# Patient Record
Sex: FEMALE | Race: WHITE | NOT HISPANIC OR LATINO | Employment: OTHER | URBAN - NONMETROPOLITAN AREA
[De-identification: names, ages, dates, MRNs, and addresses within clinical notes are randomized per-mention and may not be internally consistent; named-entity substitution may affect disease eponyms.]

---

## 2017-11-18 ENCOUNTER — HOSPITAL ENCOUNTER (OUTPATIENT)
Dept: GENERAL RADIOLOGY | Facility: HOSPITAL | Age: 72
Discharge: HOME | End: 2017-11-18
Admitting: COLON & RECTAL SURGERY

## 2017-11-18 ENCOUNTER — TRANSCRIBE ORDERS (OUTPATIENT)
Dept: CARDIOLOGY | Facility: HOSPITAL | Age: 72
End: 2017-11-18

## 2017-11-18 DIAGNOSIS — Z00.8 ENCOUNTER FOR ELECTROCARDIOGRAM: Primary | ICD-10-CM

## 2017-11-18 DIAGNOSIS — C18.9 COLON CANCER (HCC): ICD-10-CM

## 2017-11-18 DIAGNOSIS — Z92.89 HISTORY OF EKG: ICD-10-CM

## 2017-11-18 PROCEDURE — 71020 HC CHEST PA AND LATERAL: CPT

## 2017-12-18 ENCOUNTER — CONSULT (OUTPATIENT)
Dept: ONCOLOGY | Facility: CLINIC | Age: 72
End: 2017-12-18

## 2017-12-18 ENCOUNTER — DOCUMENTATION (OUTPATIENT)
Dept: ONCOLOGY | Facility: CLINIC | Age: 72
End: 2017-12-18

## 2017-12-18 ENCOUNTER — LAB (OUTPATIENT)
Dept: ONCOLOGY | Facility: HOSPITAL | Age: 72
End: 2017-12-18

## 2017-12-18 VITALS
HEIGHT: 62 IN | HEART RATE: 74 BPM | RESPIRATION RATE: 16 BRPM | SYSTOLIC BLOOD PRESSURE: 131 MMHG | BODY MASS INDEX: 25.84 KG/M2 | DIASTOLIC BLOOD PRESSURE: 69 MMHG | TEMPERATURE: 98.9 F | WEIGHT: 140.43 LBS

## 2017-12-18 DIAGNOSIS — C19 CANCER OF RECTOSIGMOID (COLON) (HCC): ICD-10-CM

## 2017-12-18 DIAGNOSIS — C19 CANCER OF RECTOSIGMOID (COLON) (HCC): Primary | ICD-10-CM

## 2017-12-18 LAB
ALBUMIN SERPL-MCNC: 4 G/DL (ref 3.4–4.8)
ALBUMIN/GLOB SERPL: 1.1 G/DL (ref 1.1–1.8)
ALP SERPL-CCNC: 81 U/L (ref 38–126)
ALT SERPL W P-5'-P-CCNC: 32 U/L (ref 9–52)
ANION GAP SERPL CALCULATED.3IONS-SCNC: 11 MMOL/L (ref 5–15)
AST SERPL-CCNC: 26 U/L (ref 14–36)
BASOPHILS # BLD AUTO: 0.03 10*3/MM3 (ref 0–0.2)
BASOPHILS NFR BLD AUTO: 0.3 % (ref 0–2)
BILIRUB SERPL-MCNC: 0.5 MG/DL (ref 0.2–1.3)
BUN BLD-MCNC: 12 MG/DL (ref 7–21)
BUN/CREAT SERPL: 20.7 (ref 7–25)
CALCIUM SPEC-SCNC: 9.2 MG/DL (ref 8.4–10.2)
CHLORIDE SERPL-SCNC: 100 MMOL/L (ref 95–110)
CO2 SERPL-SCNC: 28 MMOL/L (ref 22–31)
CREAT BLD-MCNC: 0.58 MG/DL (ref 0.5–1)
DEPRECATED RDW RBC AUTO: 45.9 FL (ref 36.4–46.3)
EOSINOPHIL # BLD AUTO: 0.15 10*3/MM3 (ref 0–0.7)
EOSINOPHIL NFR BLD AUTO: 1.5 % (ref 0–7)
ERYTHROCYTE [DISTWIDTH] IN BLOOD BY AUTOMATED COUNT: 15.8 % (ref 11.5–14.5)
FERRITIN SERPL-MCNC: 96.1 NG/ML (ref 11.1–264)
FOLATE SERPL-MCNC: 7.2 NG/ML (ref 2.76–21)
GFR SERPL CREATININE-BSD FRML MDRD: 102 ML/MIN/1.73 (ref 39–90)
GLOBULIN UR ELPH-MCNC: 3.8 GM/DL (ref 2.3–3.5)
GLUCOSE BLD-MCNC: 93 MG/DL (ref 60–100)
HCT VFR BLD AUTO: 38.9 % (ref 35–45)
HGB BLD-MCNC: 12.3 G/DL (ref 12–15.5)
IMM GRANULOCYTES # BLD: 0.04 10*3/MM3 (ref 0–0.02)
IMM GRANULOCYTES NFR BLD: 0.4 % (ref 0–0.5)
IRON 24H UR-MRATE: 58 MCG/DL (ref 37–170)
IRON SATN MFR SERPL: 19 % (ref 15–50)
LYMPHOCYTES # BLD AUTO: 2.08 10*3/MM3 (ref 0.6–4.2)
LYMPHOCYTES NFR BLD AUTO: 21 % (ref 10–50)
MCH RBC QN AUTO: 25.5 PG (ref 26.5–34)
MCHC RBC AUTO-ENTMCNC: 31.6 G/DL (ref 31.4–36)
MCV RBC AUTO: 80.7 FL (ref 80–98)
MONOCYTES # BLD AUTO: 0.72 10*3/MM3 (ref 0–0.9)
MONOCYTES NFR BLD AUTO: 7.3 % (ref 0–12)
NEUTROPHILS # BLD AUTO: 6.89 10*3/MM3 (ref 2–8.6)
NEUTROPHILS NFR BLD AUTO: 69.5 % (ref 37–80)
PLATELET # BLD AUTO: 596 10*3/MM3 (ref 150–450)
PMV BLD AUTO: 9.6 FL (ref 8–12)
POTASSIUM BLD-SCNC: 3.6 MMOL/L (ref 3.5–5.1)
PROT SERPL-MCNC: 7.8 G/DL (ref 6.3–8.6)
RBC # BLD AUTO: 4.82 10*6/MM3 (ref 3.77–5.16)
SODIUM BLD-SCNC: 139 MMOL/L (ref 137–145)
TIBC SERPL-MCNC: 312 MCG/DL (ref 265–497)
VIT B12 BLD-MCNC: 616 PG/ML (ref 239–931)
WBC NRBC COR # BLD: 9.91 10*3/MM3 (ref 3.2–9.8)

## 2017-12-18 PROCEDURE — 85025 COMPLETE CBC W/AUTO DIFF WBC: CPT | Performed by: INTERNAL MEDICINE

## 2017-12-18 PROCEDURE — 82607 VITAMIN B-12: CPT | Performed by: INTERNAL MEDICINE

## 2017-12-18 PROCEDURE — 82728 ASSAY OF FERRITIN: CPT | Performed by: INTERNAL MEDICINE

## 2017-12-18 PROCEDURE — G0463 HOSPITAL OUTPT CLINIC VISIT: HCPCS | Performed by: INTERNAL MEDICINE

## 2017-12-18 PROCEDURE — 82746 ASSAY OF FOLIC ACID SERUM: CPT | Performed by: INTERNAL MEDICINE

## 2017-12-18 PROCEDURE — 83550 IRON BINDING TEST: CPT | Performed by: INTERNAL MEDICINE

## 2017-12-18 PROCEDURE — 99204 OFFICE O/P NEW MOD 45 MIN: CPT | Performed by: INTERNAL MEDICINE

## 2017-12-18 PROCEDURE — 82378 CARCINOEMBRYONIC ANTIGEN: CPT | Performed by: INTERNAL MEDICINE

## 2017-12-18 PROCEDURE — 80053 COMPREHEN METABOLIC PANEL: CPT | Performed by: INTERNAL MEDICINE

## 2017-12-18 PROCEDURE — 83540 ASSAY OF IRON: CPT | Performed by: INTERNAL MEDICINE

## 2017-12-18 RX ORDER — ONDANSETRON 4 MG/1
8 TABLET, FILM COATED ORAL 4 TIMES DAILY PRN
Qty: 40 TABLET | Refills: 3 | Status: SHIPPED | OUTPATIENT
Start: 2017-12-18 | End: 2018-08-20

## 2017-12-18 NOTE — PROGRESS NOTES
Oncology Social Worker met with patient and her family in the exam room subsequent to her initial consultation with medical oncology. Pt. Is accompanied in the room by her , two sons, daughter and daughter in law. Pt. 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   Prior to SW introduction, pt received education from RN related to chemotherapy.   Pt. And family visibly overwhelmed with information.   Distress screening completed by pt who scored an eight on tool and marked indicators of nervousness and worry as well as physical concerns.  Undersigned provided information as to supports and [facility] services to include financial navigator, dietician, massage/music therapy.  SW encouraged pt to ask questions and offered feedback, encouragement and validation / normalization of emotions.  Pt stated no immediate needs as she shared primary concerns related to side effects of medications and treatment. Pt. Was encouraged to call with any questions or concerns. She is scheduled to begin chemotherapy next week subsequent to port placement.  Ongoing support to pt and her family was reinforced.

## 2017-12-18 NOTE — PATIENT INSTRUCTIONS
Oxaliplatin Injection  What is this medicine?  OXALIPLATIN (ox AL i FARZANA tin) is a chemotherapy drug. It targets fast dividing cells, like cancer cells, and causes these cells to die. This medicine is used to treat cancers of the colon and rectum, and many other cancers.  This medicine may be used for other purposes; ask your health care provider or pharmacist if you have questions.  COMMON BRAND NAME(S): Eloxatin  What should I tell my health care provider before I take this medicine?  They need to know if you have any of these conditions:  -kidney disease  -an unusual or allergic reaction to oxaliplatin, other chemotherapy, other medicines, foods, dyes, or preservatives  -pregnant or trying to get pregnant  -breast-feeding  How should I use this medicine?  This drug is given as an infusion into a vein. It is administered in a hospital or clinic by a specially trained health care professional.  Talk to your pediatrician regarding the use of this medicine in children. Special care may be needed.  Overdosage: If you think you have taken too much of this medicine contact a poison control center or emergency room at once.  NOTE: This medicine is only for you. Do not share this medicine with others.  What if I miss a dose?  It is important not to miss a dose. Call your doctor or health care professional if you are unable to keep an appointment.  What may interact with this medicine?  -medicines to increase blood counts like filgrastim, pegfilgrastim, sargramostim  -probenecid  -some antibiotics like amikacin, gentamicin, neomycin, polymyxin B, streptomycin, tobramycin  -zalcitabine  Talk to your doctor or health care professional before taking any of these medicines:  -acetaminophen  -aspirin  -ibuprofen  -ketoprofen  -naproxen  This list may not describe all possible interactions. Give your health care provider a list of all the medicines, herbs, non-prescription drugs, or dietary supplements you use. Also tell them if  you smoke, drink alcohol, or use illegal drugs. Some items may interact with your medicine.  What should I watch for while using this medicine?  Your condition will be monitored carefully while you are receiving this medicine. You will need important blood work done while you are taking this medicine.  This medicine can make you more sensitive to cold. Do not drink cold drinks or use ice. Cover exposed skin before coming in contact with cold temperatures or cold objects. When out in cold weather wear warm clothing and cover your mouth and nose to warm the air that goes into your lungs. Tell your doctor if you get sensitive to the cold.  This drug may make you feel generally unwell. This is not uncommon, as chemotherapy can affect healthy cells as well as cancer cells. Report any side effects. Continue your course of treatment even though you feel ill unless your doctor tells you to stop.  In some cases, you may be given additional medicines to help with side effects. Follow all directions for their use.  Call your doctor or health care professional for advice if you get a fever, chills or sore throat, or other symptoms of a cold or flu. Do not treat yourself. This drug decreases your body's ability to fight infections. Try to avoid being around people who are sick.  This medicine may increase your risk to bruise or bleed. Call your doctor or health care professional if you notice any unusual bleeding.  Be careful brushing and flossing your teeth or using a toothpick because you may get an infection or bleed more easily. If you have any dental work done, tell your dentist you are receiving this medicine.  Avoid taking products that contain aspirin, acetaminophen, ibuprofen, naproxen, or ketoprofen unless instructed by your doctor. These medicines may hide a fever.  Do not become pregnant while taking this medicine. Women should inform their doctor if they wish to become pregnant or think they might be pregnant. There  is a potential for serious side effects to an unborn child. Talk to your health care professional or pharmacist for more information. Do not breast-feed an infant while taking this medicine.  Call your doctor or health care professional if you get diarrhea. Do not treat yourself.  What side effects may I notice from receiving this medicine?  Side effects that you should report to your doctor or health care professional as soon as possible:  -allergic reactions like skin rash, itching or hives, swelling of the face, lips, or tongue  -low blood counts - This drug may decrease the number of white blood cells, red blood cells and platelets. You may be at increased risk for infections and bleeding.  -signs of infection - fever or chills, cough, sore throat, pain or difficulty passing urine  -signs of decreased platelets or bleeding - bruising, pinpoint red spots on the skin, black, tarry stools, nosebleeds  -signs of decreased red blood cells - unusually weak or tired, fainting spells, lightheadedness  -breathing problems  -chest pain, pressure  -cough  -diarrhea  -jaw tightness  -mouth sores  -nausea and vomiting  -pain, swelling, redness or irritation at the injection site  -pain, tingling, numbness in the hands or feet  -problems with balance, talking, walking  -redness, blistering, peeling or loosening of the skin, including inside the mouth  -trouble passing urine or change in the amount of urine  Side effects that usually do not require medical attention (report to your doctor or health care professional if they continue or are bothersome):  -changes in vision  -constipation  -hair loss  -loss of appetite  -metallic taste in the mouth or changes in taste  -stomach pain  This list may not describe all possible side effects. Call your doctor for medical advice about side effects. You may report side effects to FDA at 5-432-FDA-1908.  Where should I keep my medicine?  This drug is given in a hospital or clinic and  will not be stored at home.  NOTE: This sheet is a summary. It may not cover all possible information. If you have questions about this medicine, talk to your doctor, pharmacist, or health care provider.     © 2017, [employer]. (2009-07-14 17:22:47)  Leucovorin injection  What is this medicine?  LEUCOVORIN (loo koe VOR in) is used to prevent or treat the harmful effects of some medicines. This medicine is used to treat anemia caused by a low amount of folic acid in the body. It is also used with 5-fluorouracil (5-FU) to treat colon cancer.  This medicine may be used for other purposes; ask your health care provider or pharmacist if you have questions.  What should I tell my health care provider before I take this medicine?  They need to know if you have any of these conditions:  -anemia from low levels of vitamin B-12 in the blood  -an unusual or allergic reaction to leucovorin, folic acid, other medicines, foods, dyes, or preservatives  -pregnant or trying to get pregnant  -breast-feeding  How should I use this medicine?  This medicine is for injection into a muscle or into a vein. It is given by a health care professional in a hospital or clinic setting.  Talk to your pediatrician regarding the use of this medicine in children. Special care may be needed.  Overdosage: If you think you have taken too much of this medicine contact a poison control center or emergency room at once.  NOTE: This medicine is only for you. Do not share this medicine with others.  What if I miss a dose?  This does not apply.  What may interact with this medicine?  -capecitabine  -fluorouracil  -phenobarbital  -phenytoin  -primidone  -trimethoprim-sulfamethoxazole  This list may not describe all possible interactions. Give your health care provider a list of all the medicines, herbs, non-prescription drugs, or dietary supplements you use. Also tell them if you smoke, drink alcohol, or use illegal drugs. Some items may interact  with your medicine.  What should I watch for while using this medicine?  Your condition will be monitored carefully while you are receiving this medicine.  This medicine may increase the side effects of 5-fluorouracil, 5-FU. Tell your doctor or health care professional if you have diarrhea or mouth sores that do not get better or that get worse.  What side effects may I notice from receiving this medicine?  Side effects that you should report to your doctor or health care professional as soon as possible:  -allergic reactions like skin rash, itching or hives, swelling of the face, lips, or tongue  -breathing problems  -fever, infection  -mouth sores  -unusual bleeding or bruising  -unusually weak or tired  Side effects that usually do not require medical attention (report to your doctor or health care professional if they continue or are bothersome):  -constipation or diarrhea  -loss of appetite  -nausea, vomiting  This list may not describe all possible side effects. Call your doctor for medical advice about side effects. You may report side effects to FDA at 7-773-FDA-1462.  Where should I keep my medicine?  This drug is given in a hospital or clinic and will not be stored at home.  NOTE: This sheet is a summary. It may not cover all possible information. If you have questions about this medicine, talk to your doctor, pharmacist, or health care provider.     © 2017, [employer]. (2009-06-23 16:50:29)  Fluorouracil, 5-FU injection  What is this medicine?  FLUOROURACIL, 5-FU (flure oh YOOR a rivera) is a chemotherapy drug. It slows the growth of cancer cells. This medicine is used to treat many types of cancer like breast cancer, colon or rectal cancer, pancreatic cancer, and stomach cancer.  This medicine may be used for other purposes; ask your health care provider or pharmacist if you have questions.  COMMON BRAND NAME(S): Adrucil  What should I tell my health care provider before I take this  medicine?  They need to know if you have any of these conditions:  -blood disorders  -dihydropyrimidine dehydrogenase (DPD) deficiency  -infection (especially a virus infection such as chickenpox, cold sores, or herpes)  -kidney disease  -liver disease  -malnourished, poor nutrition  -recent or ongoing radiation therapy  -an unusual or allergic reaction to fluorouracil, other chemotherapy, other medicines, foods, dyes, or preservatives  -pregnant or trying to get pregnant  -breast-feeding  How should I use this medicine?  This drug is given as an infusion or injection into a vein. It is administered in a hospital or clinic by a specially trained health care professional.  Talk to your pediatrician regarding the use of this medicine in children. Special care may be needed.  Overdosage: If you think you have taken too much of this medicine contact a poison control center or emergency room at once.  NOTE: This medicine is only for you. Do not share this medicine with others.  What if I miss a dose?  It is important not to miss your dose. Call your doctor or health care professional if you are unable to keep an appointment.  What may interact with this medicine?  -allopurinol  -cimetidine  -dapsone  -digoxin  -hydroxyurea  -leucovorin  -levamisole  -medicines for seizures like ethotoin, fosphenytoin, phenytoin  -medicines to increase blood counts like filgrastim, pegfilgrastim, sargramostim  -medicines that treat or prevent blood clots like warfarin, enoxaparin, and dalteparin  -methotrexate  -metronidazole  -pyrimethamine  -some other chemotherapy drugs like busulfan, cisplatin, estramustine, vinblastine  -trimethoprim  -trimetrexate  -vaccines  Talk to your doctor or health care professional before taking any of these medicines:  -acetaminophen  -aspirin  -ibuprofen  -ketoprofen  -naproxen  This list may not describe all possible interactions. Give your health care provider a list of all the medicines, herbs,  non-prescription drugs, or dietary supplements you use. Also tell them if you smoke, drink alcohol, or use illegal drugs. Some items may interact with your medicine.  What should I watch for while using this medicine?  Visit your doctor for checks on your progress. This drug may make you feel generally unwell. This is not uncommon, as chemotherapy can affect healthy cells as well as cancer cells. Report any side effects. Continue your course of treatment even though you feel ill unless your doctor tells you to stop.  In some cases, you may be given additional medicines to help with side effects. Follow all directions for their use.  Call your doctor or health care professional for advice if you get a fever, chills or sore throat, or other symptoms of a cold or flu. Do not treat yourself. This drug decreases your body's ability to fight infections. Try to avoid being around people who are sick.  This medicine may increase your risk to bruise or bleed. Call your doctor or health care professional if you notice any unusual bleeding.  Be careful brushing and flossing your teeth or using a toothpick because you may get an infection or bleed more easily. If you have any dental work done, tell your dentist you are receiving this medicine.  Avoid taking products that contain aspirin, acetaminophen, ibuprofen, naproxen, or ketoprofen unless instructed by your doctor. These medicines may hide a fever.  Do not become pregnant while taking this medicine. Women should inform their doctor if they wish to become pregnant or think they might be pregnant. There is a potential for serious side effects to an unborn child. Talk to your health care professional or pharmacist for more information. Do not breast-feed an infant while taking this medicine.  Men should inform their doctor if they wish to father a child. This medicine may lower sperm counts.  Do not treat diarrhea with over the counter products. Contact your doctor if you  have diarrhea that lasts more than 2 days or if it is severe and watery.  This medicine can make you more sensitive to the sun. Keep out of the sun. If you cannot avoid being in the sun, wear protective clothing and use sunscreen. Do not use sun lamps or tanning beds/booths.  What side effects may I notice from receiving this medicine?  Side effects that you should report to your doctor or health care professional as soon as possible:  -allergic reactions like skin rash, itching or hives, swelling of the face, lips, or tongue  -low blood counts - this medicine may decrease the number of white blood cells, red blood cells and platelets. You may be at increased risk for infections and bleeding.  -signs of infection - fever or chills, cough, sore throat, pain or difficulty passing urine  -signs of decreased platelets or bleeding - bruising, pinpoint red spots on the skin, black, tarry stools, blood in the urine  -signs of decreased red blood cells - unusually weak or tired, fainting spells, lightheadedness  -breathing problems  -changes in vision  -chest pain  -mouth sores  -nausea and vomiting  -pain, swelling, redness at site where injected  -pain, tingling, numbness in the hands or feet  -redness, swelling, or sores on hands or feet  -stomach pain  -unusual bleeding  Side effects that usually do not require medical attention (report to your doctor or health care professional if they continue or are bothersome):  -changes in finger or toe nails  -diarrhea  -dry or itchy skin  -hair loss  -headache  -loss of appetite  -sensitivity of eyes to the light  -stomach upset  -unusually teary eyes  This list may not describe all possible side effects. Call your doctor for medical advice about side effects. You may report side effects to FDA at 9-157-FDA-8718.  Where should I keep my medicine?  This drug is given in a hospital or clinic and will not be stored at home.  NOTE: This sheet is a summary. It may not cover all  possible information. If you have questions about this medicine, talk to your doctor, pharmacist, or health care provider.     © 2017, [employer]. (2009-04-22 13:53:16)

## 2017-12-18 NOTE — PROGRESS NOTES
DATE OF CONSULT: 12/18/2017    REQUESTING SOURCE:  CHRISTINA Mccormack      REASON FOR CONSULTATION:  Adenocarcinoma of rectosigmoid region      HISTORY OF PRESENT ILLNESS:    72-year-old female with a past medical history significant for cardiomegaly, was found to have stool positive on routine testing done by primary medical provider.  Subsequently patient was referred to colonoscopy on November 5, 2017 by Dr. Corey and was found to have large polyp 15 cm from anal verge which showed high-grade dysplasia with adenocarcinoma.  Subsequently patient was referred to Dr. Hutchison had lower anterior resection done by Dr. Hutchison on November 22, 2017 which showed a stage III colon cancer with 2 out of 29 lymph nodes positive.  Patient has been referred to [facility] for further evaluation and recommendation regarding her newly diagnosed colon cancer.  States she had a constipation 1 week prior to her colonoscopy.  States her stools were darker than usual.  Denies any gross blood in the stool.  States she has lost 15 pounds since surgery.  States her appetite is getting better.  Still complains of intermittent constipation.  Denies any headache or dizziness.  Denies any tingling or numbness affecting upper or lower extremity.  Denies any abnormal swollen and anywhere in the body.    PAST MEDICAL HISTORY:    1. cardiomegaly      PAST SURGICAL HISTORY:  Past Surgical History:   Procedure Laterality Date   • COLON SURGERY     • GALLBLADDER SURGERY     • RETINAL DETACHMENT SURGERY         ALLERGIES:    Allergies   Allergen Reactions   • Codeine        SOCIAL HISTORY:   Social History   Substance Use Topics   • Smoking status: Former Smoker     Quit date: 12/2012   • Smokeless tobacco: Never Used   • Alcohol use None       CURRENT MEDICATIONS:    Current Outpatient Prescriptions   Medication Sig Dispense Refill   • Docusate Calcium (STOOL SOFTENER PO) Take  by mouth Daily.     • ondansetron (ZOFRAN) 4 MG tablet Take  2 tablets by mouth 4 (Four) Times a Day As Needed for Nausea or Vomiting. 40 tablet 3     No current facility-administered medications for this visit.         HOME MEDICATIONS:   No current outpatient prescriptions on file prior to visit.     No current facility-administered medications on file prior to visit.        FAMILY HISTORY:    Family History   Problem Relation Age of Onset   • Heart attack Brother    • Cancer Maternal Aunt        REVIEW OF SYSTEMS:      CONSTITUTIONAL:  Complains of fatigue.Complains of a 15 pound weight loss since surgery.  States her appetite is getting better.  Denies any fever, chills .     HEENT:  No epistaxis, mouth sores or difficulty swallowing.    RESPIRATORY:  No new shortness of breath. No new cough or hemoptysis.    CARDIOVASCULAR:  No chest pain or palpitations.    GASTROINTESTINAL: Complains of abdominal discomfort in right lower quadrant where scar is.  Denies any drainage.  Complains of constipation. No nausea, vomiting or blood in the stool.    GENITOURINARY: No Dysuria or Hematuria.    MUSCULOSKELETAL:  No new back pain or arthralgia..    LYMPHATICS:  Denies any abnormal swollen glands anywhere in the body.    NEUROLOGICAL : No tingling or numbness. No headache or dizziness. No seizures or balance problems.    SKIN: No new skin lesions.        PHYSICAL EXAMINATION:      VITAL SIGNS:  Temp:  [98.9 °F (37.2 °C)] 98.9 °F (37.2 °C)  Heart Rate:  [74] 74  Resp:  [16] 16  BP: (131)/(69) 131/69  Weight: 140 pounds.    ECOG performance status: 1    GENERAL:  Not in any distress.    HEENT:  Normocephalic, Atraumatic.Eyes  Shows mild pallor. No icterus. Extraocular Movements Intact. No Facial Asymmetry noted.    NECK:  No adenopathy. NO JVD.    RESPIRATORY:  Fair air entry bilateral. No rhonchi or wheezing.    CARDIOVASCULAR:  S1, S2. Regular rate and rhythm. No murmur or gallop appreciated.    ABDOMEN:  Soft, obese, nontender. Bowel sounds present in all four quadrants.  No  organomegaly appreciated.  Well-healed surgical scar present in midline, right lower quadrant and epigastric region.    EXTREMITIES:  No edema.No Calf Tenderness.    NEUROLOGIC:  Alert, awake and oriented ×3.  No  Motor or sensory deficit appreciated. Cranial Nerves 2-12 grossly intact.    SKIN : No new skin lesion identified.        DIAGNOSTIC DATA:    WBC   Date Value Ref Range Status   12/18/2017 9.91 (H) 3.20 - 9.80 10*3/mm3 Final     RBC   Date Value Ref Range Status   12/18/2017 4.82 3.77 - 5.16 10*6/mm3 Final     Hemoglobin   Date Value Ref Range Status   12/18/2017 12.3 12.0 - 15.5 g/dL Final     Hematocrit   Date Value Ref Range Status   12/18/2017 38.9 35.0 - 45.0 % Final     MCV   Date Value Ref Range Status   12/18/2017 80.7 80.0 - 98.0 fL Final     MCH   Date Value Ref Range Status   12/18/2017 25.5 (L) 26.5 - 34.0 pg Final     MCHC   Date Value Ref Range Status   12/18/2017 31.6 31.4 - 36.0 g/dL Final     RDW   Date Value Ref Range Status   12/18/2017 15.8 (H) 11.5 - 14.5 % Final     RDW-SD   Date Value Ref Range Status   12/18/2017 45.9 36.4 - 46.3 fl Final     MPV   Date Value Ref Range Status   12/18/2017 9.6 8.0 - 12.0 fL Final     Platelets   Date Value Ref Range Status   12/18/2017 596 (H) 150 - 450 10*3/mm3 Final     Neutrophil %   Date Value Ref Range Status   12/18/2017 69.5 37.0 - 80.0 % Final     Lymphocyte %   Date Value Ref Range Status   12/18/2017 21.0 10.0 - 50.0 % Final     Monocyte %   Date Value Ref Range Status   12/18/2017 7.3 0.0 - 12.0 % Final     Eosinophil %   Date Value Ref Range Status   12/18/2017 1.5 0.0 - 7.0 % Final     Basophil %   Date Value Ref Range Status   12/18/2017 0.3 0.0 - 2.0 % Final     Immature Grans %   Date Value Ref Range Status   12/18/2017 0.4 0.0 - 0.5 % Final     Neutrophils, Absolute   Date Value Ref Range Status   12/18/2017 6.89 2.00 - 8.60 10*3/mm3 Final     Lymphocytes, Absolute   Date Value Ref Range Status   12/18/2017 2.08 0.60 - 4.20 10*3/mm3  Final     Monocytes, Absolute   Date Value Ref Range Status   12/18/2017 0.72 0.00 - 0.90 10*3/mm3 Final     Eosinophils, Absolute   Date Value Ref Range Status   12/18/2017 0.15 0.00 - 0.70 10*3/mm3 Final     Basophils, Absolute   Date Value Ref Range Status   12/18/2017 0.03 0.00 - 0.20 10*3/mm3 Final     Immature Grans, Absolute   Date Value Ref Range Status   12/18/2017 0.04 (H) 0.00 - 0.02 10*3/mm3 Final     Glucose   Date Value Ref Range Status   12/18/2017 93 60 - 100 mg/dL Final     Sodium   Date Value Ref Range Status   12/18/2017 139 137 - 145 mmol/L Final     Potassium   Date Value Ref Range Status   12/18/2017 3.6 3.5 - 5.1 mmol/L Final     CO2   Date Value Ref Range Status   12/18/2017 28.0 22.0 - 31.0 mmol/L Final     Chloride   Date Value Ref Range Status   12/18/2017 100 95 - 110 mmol/L Final     Anion Gap   Date Value Ref Range Status   12/18/2017 11.0 5.0 - 15.0 mmol/L Final     Creatinine   Date Value Ref Range Status   12/18/2017 0.58 0.50 - 1.00 mg/dL Final     BUN   Date Value Ref Range Status   12/18/2017 12 7 - 21 mg/dL Final     BUN/Creatinine Ratio   Date Value Ref Range Status   12/18/2017 20.7 7.0 - 25.0 Final     Calcium   Date Value Ref Range Status   12/18/2017 9.2 8.4 - 10.2 mg/dL Final     eGFR Non  Amer   Date Value Ref Range Status   12/18/2017 102 (H) 39 - 90 mL/min/1.73 Final     Alkaline Phosphatase   Date Value Ref Range Status   12/18/2017 81 38 - 126 U/L Final     Total Protein   Date Value Ref Range Status   12/18/2017 7.8 6.3 - 8.6 g/dL Final     ALT (SGPT)   Date Value Ref Range Status   12/18/2017 32 9 - 52 U/L Final     AST (SGOT)   Date Value Ref Range Status   12/18/2017 26 14 - 36 U/L Final     Total Bilirubin   Date Value Ref Range Status   12/18/2017 0.5 0.2 - 1.3 mg/dL Final     Albumin   Date Value Ref Range Status   12/18/2017 4.00 3.40 - 4.80 g/dL Final     Globulin   Date Value Ref Range Status   12/18/2017 3.8 (H) 2.3 - 3.5 gm/dL Final     A/G Ratio    Date Value Ref Range Status   12/18/2017 1.1 1.1 - 1.8 g/dL Final     Lab Results   Component Value Date    IRON 58 12/18/2017    TIBC 312 12/18/2017    LABIRON 19 12/18/2017    FERRITIN 96.10 12/18/2017    [id number] 616 12/18/2017    FOLATE 7.20 12/18/2017     No results found for: , LABCA2, AFPTM, HCGQUANT, , CHROMGRNA, 2WFCM30ECA, CEA, REFLABREPO]      CEA level prior to surgery was elevated at 11.2.        Radiology Data :  CT scan of abdomen and pelvis with and without contrast done on November 6, 2017 at [facility] showed:  1.  Extensive rectosigmoid carcinoma extending to total length of 16 cm.  2.  7 mm mesenteric nodule adjacent to the sigmoid colon may be metastatic lymph node      Chest X-ray done on November 18, 2017 showed:  IMPRESSION:  CONCLUSION:  No Acute Disease  Cardiomegaly          Pathology :  Biopsy result from colonoscopy showed:  Tubular adenoma with high-grade dysplasia and focal adenocarcinoma in situ      Surgical pathology report from November 22, 2017 showed:  Adenocarcinoma with mucinous features arising from sigmoid colon, T3 N1 B lesion, 2 out of 29 lymph nodes positive.  Lymphatic invasion present and no vascular invasion or perineural invasion seen.          ASSESSMENT AND PLAN:      1.  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  Lymphatic invasion present.  No large vessel invasion or perineural invasion seen.  Margins were negative.  Staging of cancer, result of CT scan as well as pathology were discussed with patient and her family.  They were informed that his stage III cancer in usual treatment for stage III cancer of colon includes 6 month of treatment with chemotherapy like FOLFOX.  Side effect of FOLFOX including nausea, vomiting, diarrhea, lowering of blood count, risk of infection, neuropathy, kidney failure, diarrhea, need for blood transfusion and possibility of allergic reaction, risk of heart attack from  coronary vasospasm was also discuss.  We will provide them information to read about chemotherapy today.  Patient is scheduled to get port placed by Dr. Corey on December 21, 2017 and Paty.  We'll start her chemotherapy next week on December 27, 2017.  CBC, CMP and CEA today.    2.  History of cardiomegaly.    3.  Constipation    4.  Health maintenance: Patient quit smoking 5 years ago.  Had a colonoscopy in November 6, 2017.  Had a mammogram earlier this year.        Thank you for this consultation.    Wil Abraham MD  12/18/2017  7:36 PM          EMR Dragon/Transcription disclaimer:   Much of this encounter note is an electronic transcription/translation of spoken language to printed text. The electronic translation of spoken language may permit erroneous, or at times, nonsensical words or phrases to be inadvertently transcribed; Although I have reviewed the note for such errors, some may still exist.

## 2017-12-20 LAB — CEA SERPL-MCNC: 1.4 NG/ML (ref 0–5)

## 2017-12-27 ENCOUNTER — OFFICE VISIT (OUTPATIENT)
Dept: ONCOLOGY | Facility: CLINIC | Age: 72
End: 2017-12-27

## 2017-12-27 ENCOUNTER — INFUSION (OUTPATIENT)
Dept: ONCOLOGY | Facility: HOSPITAL | Age: 72
End: 2017-12-27

## 2017-12-27 VITALS
WEIGHT: 141.09 LBS | HEIGHT: 62 IN | TEMPERATURE: 98.7 F | HEART RATE: 94 BPM | BODY MASS INDEX: 25.96 KG/M2 | DIASTOLIC BLOOD PRESSURE: 61 MMHG | SYSTOLIC BLOOD PRESSURE: 124 MMHG | RESPIRATION RATE: 18 BRPM

## 2017-12-27 DIAGNOSIS — E87.6 HYPOKALEMIA: ICD-10-CM

## 2017-12-27 DIAGNOSIS — C19 CANCER OF RECTOSIGMOID (COLON) (HCC): Primary | ICD-10-CM

## 2017-12-27 DIAGNOSIS — C19 CANCER OF RECTOSIGMOID (COLON) (HCC): ICD-10-CM

## 2017-12-27 DIAGNOSIS — Z45.2 ENCOUNTER FOR VENOUS ACCESS DEVICE CARE: ICD-10-CM

## 2017-12-27 LAB
ALBUMIN SERPL-MCNC: 3.7 G/DL (ref 3.4–4.8)
ALBUMIN/GLOB SERPL: 1.1 G/DL (ref 1.1–1.8)
ALP SERPL-CCNC: 72 U/L (ref 38–126)
ALT SERPL W P-5'-P-CCNC: 33 U/L (ref 9–52)
ANION GAP SERPL CALCULATED.3IONS-SCNC: 11 MMOL/L (ref 5–15)
AST SERPL-CCNC: 26 U/L (ref 14–36)
BASOPHILS # BLD AUTO: 0.02 10*3/MM3 (ref 0–0.2)
BASOPHILS NFR BLD AUTO: 0.2 % (ref 0–2)
BILIRUB SERPL-MCNC: 0.5 MG/DL (ref 0.2–1.3)
BUN BLD-MCNC: 9 MG/DL (ref 7–21)
BUN/CREAT SERPL: 15.3 (ref 7–25)
CALCIUM SPEC-SCNC: 9 MG/DL (ref 8.4–10.2)
CHLORIDE SERPL-SCNC: 102 MMOL/L (ref 95–110)
CO2 SERPL-SCNC: 28 MMOL/L (ref 22–31)
CREAT BLD-MCNC: 0.59 MG/DL (ref 0.5–1)
DEPRECATED RDW RBC AUTO: 47.3 FL (ref 36.4–46.3)
EOSINOPHIL # BLD AUTO: 0.08 10*3/MM3 (ref 0–0.7)
EOSINOPHIL NFR BLD AUTO: 1 % (ref 0–7)
ERYTHROCYTE [DISTWIDTH] IN BLOOD BY AUTOMATED COUNT: 16.2 % (ref 11.5–14.5)
GFR SERPL CREATININE-BSD FRML MDRD: 100 ML/MIN/1.73 (ref 39–90)
GLOBULIN UR ELPH-MCNC: 3.3 GM/DL (ref 2.3–3.5)
GLUCOSE BLD-MCNC: 118 MG/DL (ref 60–100)
HCT VFR BLD AUTO: 37.3 % (ref 35–45)
HGB BLD-MCNC: 11.8 G/DL (ref 12–15.5)
IMM GRANULOCYTES # BLD: 0.03 10*3/MM3 (ref 0–0.02)
IMM GRANULOCYTES NFR BLD: 0.4 % (ref 0–0.5)
LYMPHOCYTES # BLD AUTO: 0.93 10*3/MM3 (ref 0.6–4.2)
LYMPHOCYTES NFR BLD AUTO: 11.4 % (ref 10–50)
MCH RBC QN AUTO: 25.6 PG (ref 26.5–34)
MCHC RBC AUTO-ENTMCNC: 31.6 G/DL (ref 31.4–36)
MCV RBC AUTO: 80.9 FL (ref 80–98)
MONOCYTES # BLD AUTO: 0.73 10*3/MM3 (ref 0–0.9)
MONOCYTES NFR BLD AUTO: 8.9 % (ref 0–12)
NEUTROPHILS # BLD AUTO: 6.38 10*3/MM3 (ref 2–8.6)
NEUTROPHILS NFR BLD AUTO: 78.1 % (ref 37–80)
PLATELET # BLD AUTO: 390 10*3/MM3 (ref 150–450)
PMV BLD AUTO: 9.7 FL (ref 8–12)
POTASSIUM BLD-SCNC: 2.7 MMOL/L (ref 3.5–5.1)
PROT SERPL-MCNC: 7 G/DL (ref 6.3–8.6)
RBC # BLD AUTO: 4.61 10*6/MM3 (ref 3.77–5.16)
SODIUM BLD-SCNC: 141 MMOL/L (ref 137–145)
WBC NRBC COR # BLD: 8.17 10*3/MM3 (ref 3.2–9.8)

## 2017-12-27 PROCEDURE — 96375 TX/PRO/DX INJ NEW DRUG ADDON: CPT | Performed by: INTERNAL MEDICINE

## 2017-12-27 PROCEDURE — 25010000002 OXALIPLATIN PER 0.5 MG: Performed by: INTERNAL MEDICINE

## 2017-12-27 PROCEDURE — 25010000002 LEUCOVORIN CALCIUM PER 50 MG: Performed by: INTERNAL MEDICINE

## 2017-12-27 PROCEDURE — 96415 CHEMO IV INFUSION ADDL HR: CPT | Performed by: INTERNAL MEDICINE

## 2017-12-27 PROCEDURE — 25010000003 DEXAMETHASONE SODIUM PHOSPHATE 100 MG/10ML SOLUTION 10 ML VIAL: Performed by: INTERNAL MEDICINE

## 2017-12-27 PROCEDURE — 96413 CHEMO IV INFUSION 1 HR: CPT | Performed by: INTERNAL MEDICINE

## 2017-12-27 PROCEDURE — 96411 CHEMO IV PUSH ADDL DRUG: CPT | Performed by: INTERNAL MEDICINE

## 2017-12-27 PROCEDURE — 96368 THER/DIAG CONCURRENT INF: CPT | Performed by: INTERNAL MEDICINE

## 2017-12-27 PROCEDURE — 25010000002 PALONOSETRON PER 25 MCG: Performed by: INTERNAL MEDICINE

## 2017-12-27 PROCEDURE — 85025 COMPLETE CBC W/AUTO DIFF WBC: CPT

## 2017-12-27 PROCEDURE — 36591 DRAW BLOOD OFF VENOUS DEVICE: CPT | Performed by: INTERNAL MEDICINE

## 2017-12-27 PROCEDURE — 99215 OFFICE O/P EST HI 40 MIN: CPT | Performed by: INTERNAL MEDICINE

## 2017-12-27 PROCEDURE — 25010000002 HEPARIN (PORCINE) PER 1000 UNITS: Performed by: INTERNAL MEDICINE

## 2017-12-27 PROCEDURE — 80053 COMPREHEN METABOLIC PANEL: CPT

## 2017-12-27 PROCEDURE — G0498 CHEMO EXTEND IV INFUS W/PUMP: HCPCS | Performed by: INTERNAL MEDICINE

## 2017-12-27 PROCEDURE — 25010000002 FLUOROURACIL PER 500 MG: Performed by: INTERNAL MEDICINE

## 2017-12-27 RX ORDER — DEXTROSE MONOHYDRATE 50 MG/ML
250 INJECTION, SOLUTION INTRAVENOUS ONCE
Status: CANCELLED | OUTPATIENT
Start: 2017-12-27

## 2017-12-27 RX ORDER — POTASSIUM CHLORIDE 750 MG/1
80 CAPSULE, EXTENDED RELEASE ORAL ONCE
Status: COMPLETED | OUTPATIENT
Start: 2017-12-27 | End: 2017-12-27

## 2017-12-27 RX ORDER — SODIUM CHLORIDE 0.9 % (FLUSH) 0.9 %
10 SYRINGE (ML) INJECTION AS NEEDED
Status: DISCONTINUED | OUTPATIENT
Start: 2017-12-27 | End: 2017-12-27 | Stop reason: HOSPADM

## 2017-12-27 RX ORDER — PALONOSETRON 0.05 MG/ML
0.25 INJECTION, SOLUTION INTRAVENOUS ONCE
Status: COMPLETED | OUTPATIENT
Start: 2017-12-27 | End: 2017-12-27

## 2017-12-27 RX ORDER — FLUOROURACIL 50 MG/ML
400 INJECTION, SOLUTION INTRAVENOUS ONCE
Status: CANCELLED | OUTPATIENT
Start: 2017-12-27

## 2017-12-27 RX ORDER — POTASSIUM CHLORIDE 750 MG/1
80 CAPSULE, EXTENDED RELEASE ORAL DAILY
Status: CANCELLED
Start: 2017-12-27

## 2017-12-27 RX ORDER — DEXTROSE MONOHYDRATE 50 MG/ML
250 INJECTION, SOLUTION INTRAVENOUS ONCE
Status: COMPLETED | OUTPATIENT
Start: 2017-12-27 | End: 2017-12-27

## 2017-12-27 RX ORDER — PALONOSETRON 0.05 MG/ML
0.25 INJECTION, SOLUTION INTRAVENOUS ONCE
Status: CANCELLED | OUTPATIENT
Start: 2017-12-27

## 2017-12-27 RX ORDER — POTASSIUM CHLORIDE 20 MEQ/1
40 TABLET, EXTENDED RELEASE ORAL DAILY
Qty: 6 TABLET | Refills: 0 | Status: SHIPPED | OUTPATIENT
Start: 2017-12-27 | End: 2017-12-30

## 2017-12-27 RX ORDER — FLUOROURACIL 50 MG/ML
400 INJECTION, SOLUTION INTRAVENOUS ONCE
Status: COMPLETED | OUTPATIENT
Start: 2017-12-27 | End: 2017-12-27

## 2017-12-27 RX ORDER — SODIUM CHLORIDE 0.9 % (FLUSH) 0.9 %
10 SYRINGE (ML) INJECTION AS NEEDED
Status: CANCELLED | OUTPATIENT
Start: 2017-12-27

## 2017-12-27 RX ORDER — FOLIC ACID 1 MG/1
1 TABLET ORAL DAILY
Qty: 90 TABLET | Refills: 1 | Status: SHIPPED | OUTPATIENT
Start: 2017-12-27 | End: 2018-06-28 | Stop reason: SDUPTHER

## 2017-12-27 RX ADMIN — LEUCOVORIN CALCIUM 660 MG: 350 INJECTION, POWDER, LYOPHILIZED, FOR SOLUTION INTRAMUSCULAR; INTRAVENOUS at 11:47

## 2017-12-27 RX ADMIN — Medication 10 ML: at 09:35

## 2017-12-27 RX ADMIN — FLUOROURACIL 3940 MG: 50 INJECTION, SOLUTION INTRAVENOUS at 14:35

## 2017-12-27 RX ADMIN — DEXAMETHASONE SODIUM PHOSPHATE 12 MG: 10 INJECTION, SOLUTION INTRAMUSCULAR; INTRAVENOUS at 11:09

## 2017-12-27 RX ADMIN — PALONOSETRON HYDROCHLORIDE 0.25 MG: 0.25 INJECTION INTRAVENOUS at 11:03

## 2017-12-27 RX ADMIN — OXALIPLATIN 140 MG: 5 INJECTION, SOLUTION, CONCENTRATE INTRAVENOUS at 11:47

## 2017-12-27 RX ADMIN — POTASSIUM CHLORIDE 80 MEQ: 750 CAPSULE, EXTENDED RELEASE ORAL at 11:05

## 2017-12-27 RX ADMIN — DEXTROSE MONOHYDRATE 250 ML: 50 INJECTION, SOLUTION INTRAVENOUS at 10:48

## 2017-12-27 RX ADMIN — FLUOROURACIL 660 MG: 50 INJECTION, SOLUTION INTRAVENOUS at 14:17

## 2017-12-28 NOTE — PROGRESS NOTES
DATE OF VISIT: 12/27/2017    REASON FOR VISIT:  Rectosigmoid cancer on adjuvant therapy with FOLFOX    HISTORY OF PRESENT ILLNESS:    72-year-old female with a past medical history significant for cardiomegaly was initially seen in consultation on December 18, 2017 for newly diagnosed rectosigmoid cancer.  Since then patient had port placement by Dr. Corey.  Patient is here to start first round of chemotherapy with FOLFOX today on December 27, 2017.  States her abdominal soreness is improving.  Denies any bleeding.  Still complains of intermittent constipation.  Denies any new headache or dizziness.  Denies any tingling or numbness affecting upper or lower extremity.    PAST MEDICAL HISTORY:    1.  Cardiomegaly  2.  Adenocarcinoma of rectosigmoid region      SOCIAL HISTORY:    Social History   Substance Use Topics   • Smoking status: Former Smoker     Quit date: 12/2012   • Smokeless tobacco: Never Used   • Alcohol use No       Surgical History :  Past Surgical History:   Procedure Laterality Date   • COLON SURGERY     • GALLBLADDER SURGERY     • RETINAL DETACHMENT SURGERY         ALLERGIES:    Allergies   Allergen Reactions   • Codeine          FAMILY HISTORY:  Family History   Problem Relation Age of Onset   • Heart attack Brother    • Cancer Maternal Aunt            REVIEW OF SYSTEMS:      CONSTITUTIONAL:  Complains of fatigue.Complains of a 15 pound weight loss since surgery.  States her appetite is getting better.  Denies any fever, chills .      HEENT:  No epistaxis, mouth sores or difficulty swallowing.     RESPIRATORY:  No new shortness of breath. No new cough or hemoptysis.     CARDIOVASCULAR:  No chest pain or palpitations.     GASTROINTESTINAL: Complains of abdominal discomfort in right lower quadrant where scar is.  Denies any drainage.  Complains of constipation. No nausea, vomiting or blood in the stool.     GENITOURINARY: No Dysuria or Hematuria.     MUSCULOSKELETAL:  No new back pain or  "arthralgia..     LYMPHATICS:  Denies any abnormal swollen glands anywhere in the body.     NEUROLOGICAL : No tingling or numbness. No headache or dizziness. No seizures or balance problems.     SKIN: No new skin lesions.          PHYSICAL EXAMINATION:      VITAL SIGNS:  /61  Pulse 94  Temp 98.7 °F (37.1 °C)  Resp 18  Ht 157.5 cm (62\")  Wt 64 kg (141 lb 1.5 oz)  BMI 25.81 kg/m2  Last 3 weights    12/27/17  0947   Weight: 64 kg (141 lb 1.5 oz)       ECOG  performance status: 1      GENERAL:  Not in any distress.    HEENT:  Normocephalic, Atraumatic.Mild Conjunctival pallor. No icterus. Extraocular Movements Intact. No Facial Asymmetry noted.    NECK:  No adenopathy. No JVD.    RESPIRATORY:  Fair air entry bilateral. No rhonchi or wheezing.    CARDIOVASCULAR:  S1, S2. Regular rate and rhythm. No murmur or gallop appreciated.    ABDOMEN:  Soft, obese, nontender. Bowel sounds present in all four quadrants.  No organomegaly appreciated.Well-healed surgical scar present in midline, right lower quadrant and epigastric region.    EXTREMITIES:  No edema.No Calf Tenderness.    NEUROLOGIC:  Alert, awake and oriented ×3.  No  Motor or sensory deficit appreciated. Cranial Nerves 2-12 grossly intact.    SKIN : No new skin lesion identified        DIAGNOSTIC DATA:    Glucose   Date Value Ref Range Status   12/27/2017 118 (H) 60 - 100 mg/dL Final     Sodium   Date Value Ref Range Status   12/27/2017 141 137 - 145 mmol/L Final     Potassium   Date Value Ref Range Status   12/27/2017 2.7 (C) 3.5 - 5.1 mmol/L Final     CO2   Date Value Ref Range Status   12/27/2017 28.0 22.0 - 31.0 mmol/L Final     Chloride   Date Value Ref Range Status   12/27/2017 102 95 - 110 mmol/L Final     Anion Gap   Date Value Ref Range Status   12/27/2017 11.0 5.0 - 15.0 mmol/L Final     Creatinine   Date Value Ref Range Status   12/27/2017 0.59 0.50 - 1.00 mg/dL Final     BUN   Date Value Ref Range Status   12/27/2017 9 7 - 21 mg/dL Final "     BUN/Creatinine Ratio   Date Value Ref Range Status   12/27/2017 15.3 7.0 - 25.0 Final     Calcium   Date Value Ref Range Status   12/27/2017 9.0 8.4 - 10.2 mg/dL Final     eGFR Non  Amer   Date Value Ref Range Status   12/27/2017 100 (H) 39 - 90 mL/min/1.73 Final     Alkaline Phosphatase   Date Value Ref Range Status   12/27/2017 72 38 - 126 U/L Final     Total Protein   Date Value Ref Range Status   12/27/2017 7.0 6.3 - 8.6 g/dL Final     ALT (SGPT)   Date Value Ref Range Status   12/27/2017 33 9 - 52 U/L Final     AST (SGOT)   Date Value Ref Range Status   12/27/2017 26 14 - 36 U/L Final     Total Bilirubin   Date Value Ref Range Status   12/27/2017 0.5 0.2 - 1.3 mg/dL Final     Albumin   Date Value Ref Range Status   12/27/2017 3.70 3.40 - 4.80 g/dL Final     Globulin   Date Value Ref Range Status   12/27/2017 3.3 2.3 - 3.5 gm/dL Final     A/G Ratio   Date Value Ref Range Status   12/27/2017 1.1 1.1 - 1.8 g/dL Final     Lab Results   Component Value Date    WBC 8.17 12/27/2017    HGB 11.8 (L) 12/27/2017    HCT 37.3 12/27/2017    MCV 80.9 12/27/2017     12/27/2017     Lab Results   Component Value Date    NEUTROABS 6.38 12/27/2017    IRON 58 12/18/2017    TIBC 312 12/18/2017    LABIRON 19 12/18/2017    FERRITIN 96.10 12/18/2017    BSABHODT30 616 12/18/2017    FOLATE 7.20 12/18/2017     Lab Results   Component Value Date    CEA 1.40 12/18/2017           CEA level prior to surgery was elevated at 11.2.           Radiology Data :  CT scan of abdomen and pelvis with and without contrast done on November 6, 2017 at [facility] showed:  1.  Extensive rectosigmoid carcinoma extending to total length of 16 cm.  2.  7 mm mesenteric nodule adjacent to the sigmoid colon may be metastatic lymph node        Chest X-ray done on November 18, 2017 showed:  IMPRESSION:  CONCLUSION:  No Acute Disease  Cardiomegaly              Pathology :  Biopsy result from colonoscopy showed:  Tubular adenoma with  high-grade dysplasia and focal adenocarcinoma in situ        Surgical pathology report from November 22, 2017 showed:  Adenocarcinoma with mucinous features arising from sigmoid colon, T3 N1 B lesion, 2 out of 29 lymph nodes positive.  Lymphatic invasion present and no vascular invasion or perineural invasion seen.              ASSESSMENT AND PLAN:       1.  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  Lymphatic invasion present.  No large vessel invasion or perineural invasion seen.  Margins were negative.   last clinic visit on December 18, 2017 patient was provided information about FOLFOX.  All her question and concern about side effects profile were answered today.  We'll start with chemotherapy with FOLFOX today on December 27, 2017.  I tried to Contact Dr. Hutchison today regarding location of the tumor whether it was the rectal tumor going into sigmoid colon or sigmoid adenocarcinoma.  He is away for 2 weeks.  We have left messages with office to contact us w when he returns.  For now we will continue with FOLFOX.  We'll see her back in about 2 weeks with a CBC and CMP on that day.  Patient was encouraged to call us with any unusual side effects from chemotherapy.    2.  Severe hypokalemia.  Patient was found to have a potassium of 2.7 today.  We will give her Kdur 40 mEq ×2 ,1 hour apart here  . prescription for Kdur 40 mEq by mouth daily for 3 days has been sent to pharmacy today.    3.  History of cardiomegaly.     4.  Constipation     5.  Health maintenance: Patient quit smoking 5 years ago.  Had a colonoscopy in November 6, 2017.  Had a mammogram earlier this year.    Wil Abraham MD  12/27/2017  6:42 PM        EMR Dragon/Transcription disclaimer:   Much of this encounter note is an electronic transcription/translation of spoken language to printed text. The electronic translation of spoken language may permit erroneous, or at times, nonsensical words or phrases to  be inadvertently transcribed; Although I have reviewed the note for such errors, some may still exist.

## 2017-12-29 ENCOUNTER — INFUSION (OUTPATIENT)
Dept: ONCOLOGY | Facility: HOSPITAL | Age: 72
End: 2017-12-29

## 2017-12-29 DIAGNOSIS — C19 CANCER OF RECTOSIGMOID (COLON) (HCC): ICD-10-CM

## 2017-12-29 DIAGNOSIS — Z45.2 ENCOUNTER FOR VENOUS ACCESS DEVICE CARE: Primary | ICD-10-CM

## 2017-12-29 PROCEDURE — 96523 IRRIG DRUG DELIVERY DEVICE: CPT | Performed by: NURSE PRACTITIONER

## 2017-12-29 PROCEDURE — 25010000002 HEPARIN FLUSH (PORCINE) 100 UNIT/ML SOLUTION: Performed by: INTERNAL MEDICINE

## 2017-12-29 RX ORDER — SODIUM CHLORIDE 0.9 % (FLUSH) 0.9 %
10 SYRINGE (ML) INJECTION AS NEEDED
Status: DISCONTINUED | OUTPATIENT
Start: 2017-12-29 | End: 2017-12-29 | Stop reason: HOSPADM

## 2017-12-29 RX ORDER — SODIUM CHLORIDE 0.9 % (FLUSH) 0.9 %
10 SYRINGE (ML) INJECTION AS NEEDED
Status: CANCELLED | OUTPATIENT
Start: 2018-01-10

## 2017-12-29 RX ADMIN — SODIUM CHLORIDE, PRESERVATIVE FREE 500 UNITS: 5 INJECTION INTRAVENOUS at 13:57

## 2017-12-29 RX ADMIN — Medication 10 ML: at 13:57

## 2018-01-02 ENCOUNTER — DOCUMENTATION (OUTPATIENT)
Dept: NUTRITION | Facility: HOSPITAL | Age: 73
End: 2018-01-02

## 2018-01-02 ENCOUNTER — APPOINTMENT (OUTPATIENT)
Dept: ONCOLOGY | Facility: CLINIC | Age: 73
End: 2018-01-02

## 2018-01-02 NOTE — PROGRESS NOTES
Adult Outpatient Nutrition  Assessment    Patient Name:  Mary Ann Condon  YOB: 1945  MRN: [id number]    Assessment Date:  Late entry from 12/27/17 visit    CHART REVIEW  Mary Ann Condon  1945  72 y.o.  Wt: 140.9 lb  Ht: 62 in  Dx: colon cancer  Tx: chemo    PATIENT/FAMILY COMMENTS  Usual Body Weight: 155 lb  Weight Comments: lost 9% body weight  Diet: regular (nothing cold)  Food Allergies: nkfa  Supplements: willing to try  Meals: 2-3  Food Preparation:   Nutrition Concerns:  *weight loss  *constipation      GOAL(s)  Optimize nutrition in attempt to prevent further loss of LBM      EDUCATION  -High calorie high protein diet (nothing cold)  -Supplementation        * Provided samples of commercial supplements & discount coupons. Recommended that pt heat chocolate flavored varieties (and drink as hot rachel)  -Importance of staying hydrated  -Food safety    To reinforce education, written information with RD's name & number provided.  Pt and family receptive to suggestions, and agreed to call on dietitian as needed.                          Electronically signed by:  Tarsha Castro RD  01/02/18 8:56 AM

## 2018-01-10 ENCOUNTER — OFFICE VISIT (OUTPATIENT)
Dept: ONCOLOGY | Facility: CLINIC | Age: 73
End: 2018-01-10

## 2018-01-10 ENCOUNTER — INFUSION (OUTPATIENT)
Dept: ONCOLOGY | Facility: HOSPITAL | Age: 73
End: 2018-01-10

## 2018-01-10 VITALS
SYSTOLIC BLOOD PRESSURE: 121 MMHG | DIASTOLIC BLOOD PRESSURE: 71 MMHG | TEMPERATURE: 98.6 F | BODY MASS INDEX: 25.64 KG/M2 | HEART RATE: 68 BPM | WEIGHT: 139.33 LBS | RESPIRATION RATE: 18 BRPM | HEIGHT: 62 IN

## 2018-01-10 DIAGNOSIS — C19 CANCER OF RECTOSIGMOID (COLON) (HCC): Primary | ICD-10-CM

## 2018-01-10 DIAGNOSIS — E87.6 HYPOKALEMIA: Primary | ICD-10-CM

## 2018-01-10 DIAGNOSIS — E87.6 HYPOKALEMIA: ICD-10-CM

## 2018-01-10 DIAGNOSIS — C19 CANCER OF RECTOSIGMOID (COLON) (HCC): ICD-10-CM

## 2018-01-10 DIAGNOSIS — Z45.2 ENCOUNTER FOR VENOUS ACCESS DEVICE CARE: ICD-10-CM

## 2018-01-10 LAB
ALBUMIN SERPL-MCNC: 3.6 G/DL (ref 3.4–4.8)
ALBUMIN/GLOB SERPL: 1.2 G/DL (ref 1.1–1.8)
ALP SERPL-CCNC: 68 U/L (ref 38–126)
ALT SERPL W P-5'-P-CCNC: 32 U/L (ref 9–52)
ANION GAP SERPL CALCULATED.3IONS-SCNC: 11 MMOL/L (ref 5–15)
AST SERPL-CCNC: 28 U/L (ref 14–36)
BASOPHILS # BLD AUTO: 0.02 10*3/MM3 (ref 0–0.2)
BASOPHILS NFR BLD AUTO: 0.7 % (ref 0–2)
BILIRUB SERPL-MCNC: 0.4 MG/DL (ref 0.2–1.3)
BUN BLD-MCNC: 8 MG/DL (ref 7–21)
BUN/CREAT SERPL: 13.6 (ref 7–25)
CALCIUM SPEC-SCNC: 8.6 MG/DL (ref 8.4–10.2)
CHLORIDE SERPL-SCNC: 103 MMOL/L (ref 95–110)
CO2 SERPL-SCNC: 29 MMOL/L (ref 22–31)
CREAT BLD-MCNC: 0.59 MG/DL (ref 0.5–1)
DEPRECATED RDW RBC AUTO: 46.9 FL (ref 36.4–46.3)
EOSINOPHIL # BLD AUTO: 0.07 10*3/MM3 (ref 0–0.7)
EOSINOPHIL NFR BLD AUTO: 2.4 % (ref 0–7)
ERYTHROCYTE [DISTWIDTH] IN BLOOD BY AUTOMATED COUNT: 16.2 % (ref 11.5–14.5)
GFR SERPL CREATININE-BSD FRML MDRD: 100 ML/MIN/1.73 (ref 60–90)
GLOBULIN UR ELPH-MCNC: 3 GM/DL (ref 2.3–3.5)
GLUCOSE BLD-MCNC: 88 MG/DL (ref 60–100)
HCT VFR BLD AUTO: 35.1 % (ref 35–45)
HGB BLD-MCNC: 11.3 G/DL (ref 12–15.5)
IMM GRANULOCYTES # BLD: 0.01 10*3/MM3 (ref 0–0.02)
IMM GRANULOCYTES NFR BLD: 0.3 % (ref 0–0.5)
LYMPHOCYTES # BLD AUTO: 0.83 10*3/MM3 (ref 0.6–4.2)
LYMPHOCYTES NFR BLD AUTO: 28.1 % (ref 10–50)
MCH RBC QN AUTO: 26 PG (ref 26.5–34)
MCHC RBC AUTO-ENTMCNC: 32.2 G/DL (ref 31.4–36)
MCV RBC AUTO: 80.9 FL (ref 80–98)
MONOCYTES # BLD AUTO: 0.29 10*3/MM3 (ref 0–0.9)
MONOCYTES NFR BLD AUTO: 9.8 % (ref 0–12)
NEUTROPHILS # BLD AUTO: 1.73 10*3/MM3 (ref 2–8.6)
NEUTROPHILS NFR BLD AUTO: 58.7 % (ref 37–80)
PLATELET # BLD AUTO: 260 10*3/MM3 (ref 150–450)
PMV BLD AUTO: 9.3 FL (ref 8–12)
POTASSIUM BLD-SCNC: 3.1 MMOL/L (ref 3.5–5.1)
PROT SERPL-MCNC: 6.6 G/DL (ref 6.3–8.6)
RBC # BLD AUTO: 4.34 10*6/MM3 (ref 3.77–5.16)
SODIUM BLD-SCNC: 143 MMOL/L (ref 137–145)
WBC NRBC COR # BLD: 2.95 10*3/MM3 (ref 3.2–9.8)

## 2018-01-10 PROCEDURE — G0498 CHEMO EXTEND IV INFUS W/PUMP: HCPCS | Performed by: INTERNAL MEDICINE

## 2018-01-10 PROCEDURE — 96411 CHEMO IV PUSH ADDL DRUG: CPT | Performed by: INTERNAL MEDICINE

## 2018-01-10 PROCEDURE — 99214 OFFICE O/P EST MOD 30 MIN: CPT | Performed by: INTERNAL MEDICINE

## 2018-01-10 PROCEDURE — 36591 DRAW BLOOD OFF VENOUS DEVICE: CPT | Performed by: INTERNAL MEDICINE

## 2018-01-10 PROCEDURE — 25010000002 LEUCOVORIN CALCIUM PER 50 MG: Performed by: INTERNAL MEDICINE

## 2018-01-10 PROCEDURE — 80053 COMPREHEN METABOLIC PANEL: CPT

## 2018-01-10 PROCEDURE — 96415 CHEMO IV INFUSION ADDL HR: CPT | Performed by: INTERNAL MEDICINE

## 2018-01-10 PROCEDURE — 25010000002 FLUOROURACIL PER 500 MG: Performed by: INTERNAL MEDICINE

## 2018-01-10 PROCEDURE — 96413 CHEMO IV INFUSION 1 HR: CPT | Performed by: INTERNAL MEDICINE

## 2018-01-10 PROCEDURE — 25010000002 PALONOSETRON PER 25 MCG: Performed by: INTERNAL MEDICINE

## 2018-01-10 PROCEDURE — 25010000003 DEXAMETHASONE SODIUM PHOSPHATE 100 MG/10ML SOLUTION 10 ML VIAL: Performed by: INTERNAL MEDICINE

## 2018-01-10 PROCEDURE — 85025 COMPLETE CBC W/AUTO DIFF WBC: CPT

## 2018-01-10 PROCEDURE — 25010000002 OXALIPLATIN PER 0.5 MG: Performed by: INTERNAL MEDICINE

## 2018-01-10 PROCEDURE — 96368 THER/DIAG CONCURRENT INF: CPT | Performed by: INTERNAL MEDICINE

## 2018-01-10 PROCEDURE — 96375 TX/PRO/DX INJ NEW DRUG ADDON: CPT | Performed by: INTERNAL MEDICINE

## 2018-01-10 RX ORDER — DEXTROSE MONOHYDRATE 50 MG/ML
250 INJECTION, SOLUTION INTRAVENOUS ONCE
Status: COMPLETED | OUTPATIENT
Start: 2018-01-10 | End: 2018-01-10

## 2018-01-10 RX ORDER — POTASSIUM CHLORIDE 750 MG/1
40 CAPSULE, EXTENDED RELEASE ORAL ONCE
Status: COMPLETED | OUTPATIENT
Start: 2018-01-10 | End: 2018-01-10

## 2018-01-10 RX ORDER — PALONOSETRON 0.05 MG/ML
0.25 INJECTION, SOLUTION INTRAVENOUS ONCE
Status: CANCELLED | OUTPATIENT
Start: 2018-01-10

## 2018-01-10 RX ORDER — DEXTROSE MONOHYDRATE 50 MG/ML
250 INJECTION, SOLUTION INTRAVENOUS ONCE
Status: CANCELLED | OUTPATIENT
Start: 2018-01-10

## 2018-01-10 RX ORDER — FLUOROURACIL 50 MG/ML
400 INJECTION, SOLUTION INTRAVENOUS ONCE
Status: CANCELLED | OUTPATIENT
Start: 2018-01-10

## 2018-01-10 RX ORDER — PALONOSETRON 0.05 MG/ML
0.25 INJECTION, SOLUTION INTRAVENOUS ONCE
Status: COMPLETED | OUTPATIENT
Start: 2018-01-10 | End: 2018-01-10

## 2018-01-10 RX ORDER — FLUOROURACIL 50 MG/ML
400 INJECTION, SOLUTION INTRAVENOUS ONCE
Status: COMPLETED | OUTPATIENT
Start: 2018-01-10 | End: 2018-01-10

## 2018-01-10 RX ORDER — POTASSIUM CHLORIDE 750 MG/1
40 CAPSULE, EXTENDED RELEASE ORAL ONCE
Status: CANCELLED
Start: 2018-01-10 | End: 2018-01-10

## 2018-01-10 RX ORDER — POTASSIUM CHLORIDE 750 MG/1
10 TABLET, EXTENDED RELEASE ORAL DAILY
Qty: 30 TABLET | Refills: 1 | Status: SHIPPED | OUTPATIENT
Start: 2018-01-10 | End: 2018-03-05 | Stop reason: SDUPTHER

## 2018-01-10 RX ORDER — SODIUM CHLORIDE 0.9 % (FLUSH) 0.9 %
10 SYRINGE (ML) INJECTION AS NEEDED
Status: CANCELLED | OUTPATIENT
Start: 2018-01-24

## 2018-01-10 RX ORDER — SODIUM CHLORIDE 0.9 % (FLUSH) 0.9 %
10 SYRINGE (ML) INJECTION AS NEEDED
Status: DISCONTINUED | OUTPATIENT
Start: 2018-01-10 | End: 2018-01-10 | Stop reason: HOSPADM

## 2018-01-10 RX ADMIN — DEXAMETHASONE SODIUM PHOSPHATE 12 MG: 10 INJECTION, SOLUTION INTRAMUSCULAR; INTRAVENOUS at 09:45

## 2018-01-10 RX ADMIN — FLUOROURACIL 660 MG: 50 INJECTION, SOLUTION INTRAVENOUS at 13:57

## 2018-01-10 RX ADMIN — LEUCOVORIN CALCIUM 660 MG: 350 INJECTION, POWDER, LYOPHILIZED, FOR SOLUTION INTRAMUSCULAR; INTRAVENOUS at 11:04

## 2018-01-10 RX ADMIN — OXALIPLATIN 110 MG: 5 INJECTION, SOLUTION, CONCENTRATE INTRAVENOUS at 11:04

## 2018-01-10 RX ADMIN — FLUOROURACIL 3150 MG: 50 INJECTION, SOLUTION INTRAVENOUS at 14:17

## 2018-01-10 RX ADMIN — Medication 10 ML: at 08:11

## 2018-01-10 RX ADMIN — POTASSIUM CHLORIDE 40 MEQ: 750 CAPSULE, EXTENDED RELEASE ORAL at 09:55

## 2018-01-10 RX ADMIN — PALONOSETRON HYDROCHLORIDE 0.25 MG: 0.25 INJECTION INTRAVENOUS at 09:34

## 2018-01-10 RX ADMIN — DEXTROSE MONOHYDRATE 250 ML: 50 INJECTION, SOLUTION INTRAVENOUS at 09:00

## 2018-01-10 NOTE — PROGRESS NOTES
DATE OF VISIT: 1/10/2018    REASON FOR VISIT:  Rectosigmoid cancer on adjuvant therapy with FOLFOX    HISTORY OF PRESENT ILLNESS:    72-year-old female with a past medical history significant for cardiomegaly was initially seen in consultation on December 18, 2017 for newly diagnosed rectosigmoid cancer.  Patient was started on adjuvant chemotherapy with FOLFOX on December 27, 2017.  Patient is here to get cycle 2 of FOLFOX today.  Complains of nausea that lasted for 1-2 days after chemotherapy.  Complains of right flank pain.  Denies any excessive diarrhea.  Complains of fatigue.  Denies any fever or chills.    PAST MEDICAL HISTORY:    1.  Cardiomegaly  2.  Adenocarcinoma of rectosigmoid region      SOCIAL HISTORY:    Social History   Substance Use Topics   • Smoking status: Former Smoker     Quit date: 12/2012   • Smokeless tobacco: Never Used   • Alcohol use No       Surgical History :  Past Surgical History:   Procedure Laterality Date   • COLON SURGERY     • GALLBLADDER SURGERY     • RETINAL DETACHMENT SURGERY         ALLERGIES:    Allergies   Allergen Reactions   • Codeine          FAMILY HISTORY:  Family History   Problem Relation Age of Onset   • Heart attack Brother    • Cancer Maternal Aunt            REVIEW OF SYSTEMS:      CONSTITUTIONAL:  Complains of fatigue.States her appetite is getting better.  Denies any fever, chills .      HEENT: States she had cold sores on her lower lip and underneath her nose after chemotherapy which is resolved.   No epistaxis, mouth sores or difficulty swallowing.     RESPIRATORY:  No new shortness of breath. No new cough or hemoptysis.     CARDIOVASCULAR:  No chest pain or palpitations.     GASTROINTESTINAL: Complains of intermittent nausea that lasted for 2 days after last chemotherapy.  Complains of right flank discomfort. Denies any blood in stool.  Denies any vomiting.    GENITOURINARY: No Dysuria or Hematuria.     MUSCULOSKELETAL:  No new back pain or  "arthralgia..     LYMPHATICS:  Denies any abnormal swollen glands anywhere in the body.     NEUROLOGICAL : No tingling or numbness. No headache or dizziness. No seizures or balance problems.     SKIN: No new skin lesions.          PHYSICAL EXAMINATION:      VITAL SIGNS:  /71  Pulse 68  Temp 98.6 °F (37 °C) (Temporal Artery )   Resp 18  Ht 157.5 cm (62.01\")  Wt 63.2 kg (139 lb 5.3 oz)  BMI 25.48 kg/m2  Last 3 weights    01/10/18  0822   Weight: 63.2 kg (139 lb 5.3 oz)       ECOG  performance status: 1      GENERAL:  Not in any distress.    HEENT:  Normocephalic, Atraumatic.Mild Conjunctival pallor. No icterus. Extraocular Movements Intact. No Facial Asymmetry noted.    NECK:  No adenopathy. No JVD.    RESPIRATORY:  Fair air entry bilateral. No rhonchi or wheezing.    CARDIOVASCULAR:  S1, S2. Regular rate and rhythm. No murmur or gallop appreciated.    ABDOMEN:  Soft, obese, nontender. Bowel sounds present in all four quadrants.  No organomegaly appreciated.Well-healed surgical scar present in midline, right lower quadrant and epigastric region.    EXTREMITIES:  No edema.No Calf Tenderness.    NEUROLOGIC:  Alert, awake and oriented ×3.  No  Motor or sensory deficit appreciated. Cranial Nerves 2-12 grossly intact.    SKIN : No new skin lesion identified        DIAGNOSTIC DATA:    Glucose   Date Value Ref Range Status   01/10/2018 88 60 - 100 mg/dL Final     Sodium   Date Value Ref Range Status   01/10/2018 143 137 - 145 mmol/L Final     Potassium   Date Value Ref Range Status   01/10/2018 3.1 (L) 3.5 - 5.1 mmol/L Final     CO2   Date Value Ref Range Status   01/10/2018 29.0 22.0 - 31.0 mmol/L Final     Chloride   Date Value Ref Range Status   01/10/2018 103 95 - 110 mmol/L Final     Anion Gap   Date Value Ref Range Status   01/10/2018 11.0 5.0 - 15.0 mmol/L Final     Creatinine   Date Value Ref Range Status   01/10/2018 0.59 0.50 - 1.00 mg/dL Final     BUN   Date Value Ref Range Status   01/10/2018 8 7 - 21 " mg/dL Final     BUN/Creatinine Ratio   Date Value Ref Range Status   01/10/2018 13.6 7.0 - 25.0 Final     Calcium   Date Value Ref Range Status   01/10/2018 8.6 8.4 - 10.2 mg/dL Final     eGFR Non  Amer   Date Value Ref Range Status   01/10/2018 100 >60 mL/min/1.73 Final     Alkaline Phosphatase   Date Value Ref Range Status   01/10/2018 68 38 - 126 U/L Final     Total Protein   Date Value Ref Range Status   01/10/2018 6.6 6.3 - 8.6 g/dL Final     ALT (SGPT)   Date Value Ref Range Status   01/10/2018 32 9 - 52 U/L Final     AST (SGOT)   Date Value Ref Range Status   01/10/2018 28 14 - 36 U/L Final     Total Bilirubin   Date Value Ref Range Status   01/10/2018 0.4 0.2 - 1.3 mg/dL Final     Albumin   Date Value Ref Range Status   01/10/2018 3.60 3.40 - 4.80 g/dL Final     Globulin   Date Value Ref Range Status   01/10/2018 3.0 2.3 - 3.5 gm/dL Final     A/G Ratio   Date Value Ref Range Status   01/10/2018 1.2 1.1 - 1.8 g/dL Final     Lab Results   Component Value Date    WBC 2.95 (L) 01/10/2018    HGB 11.3 (L) 01/10/2018    HCT 35.1 01/10/2018    MCV 80.9 01/10/2018     01/10/2018     Lab Results   Component Value Date    NEUTROABS 1.73 (L) 01/10/2018    IRON 58 12/18/2017    TIBC 312 12/18/2017    LABIRON 19 12/18/2017    FERRITIN 96.10 12/18/2017    [id number] 616 12/18/2017    FOLATE 7.20 12/18/2017     Lab Results   Component Value Date    CEA 1.40 12/18/2017           CEA level prior to surgery was elevated at 11.2.           Radiology Data :  CT scan of abdomen and pelvis with and without contrast done on November 6, 2017 at [facility] showed:  1.  Extensive rectosigmoid carcinoma extending to total length of 16 cm.  2.  7 mm mesenteric nodule adjacent to the sigmoid colon may be metastatic lymph node        Chest X-ray done on November 18, 2017 showed:  IMPRESSION:  CONCLUSION:  No Acute Disease  Cardiomegaly              Pathology :  Biopsy result from colonoscopy showed:  Tubular adenoma  with high-grade dysplasia and focal adenocarcinoma in situ        Surgical pathology report from November 22, 2017 showed:  Adenocarcinoma with mucinous features arising from sigmoid colon, T3 N1 B lesion, 2 out of 29 lymph nodes positive.  Lymphatic invasion present and no vascular invasion or perineural invasion seen.              ASSESSMENT AND PLAN:       1.  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  Lymphatic invasion present.  No large vessel invasion or perineural invasion seen.  Margins were negative.  I tried to Contact Dr. Hutchison today regarding location of the tumor whether it was the rectal tumor going into sigmoid colon or sigmoid adenocarcinoma.  He is away for 2 weeks.  We have left messages with office to contact us w when he returns.  She was started on chemotherapy with FOLFOX on December 27, 2017.  Patient tolerated first round of chemotherapy well.  In view of leukopenia with white blood cell count of 2.9 with absolute neutrophil count of 1700 we will decrease the dose of oxaliplatin by 20% and 5-FU combined 20% from cycle 2 today on January 10, 2018.  We will see her back in about 2 weeks with cycle 3 of chemotherapy.    2.   hypokalemia.  Patient was found to have a potassium of 3.1 today.  We will give her Kdur 40 mEq ×1 today.. prescription for Kdur 10 mEq by mouth daily as been sent to her pharmacy today.    3.  History of cardiomegaly.     4.  Constipation     5.  Health maintenance: Patient quit smoking 5 years ago.  Had a colonoscopy in November 6, 2017.  Had a mammogram earlier this year.        Wil Abraham MD  1/10/2018  8:50 AM        EMR Dragon/Transcription disclaimer:   Much of this encounter note is an electronic transcription/translation of spoken language to printed text. The electronic translation of spoken language may permit erroneous, or at times, nonsensical words or phrases to be inadvertently transcribed; Although I have reviewed  the note for such errors, some may still exist.

## 2018-01-12 ENCOUNTER — INFUSION (OUTPATIENT)
Dept: ONCOLOGY | Facility: HOSPITAL | Age: 73
End: 2018-01-12

## 2018-01-15 ENCOUNTER — DOCUMENTATION (OUTPATIENT)
Dept: ONCOLOGY | Facility: CLINIC | Age: 73
End: 2018-01-15

## 2018-01-15 NOTE — PROGRESS NOTES
Discussed with Dr. Poe. Patient had the tumor mostly in the sigmoid colon with a small part extending above the rectal reflection. This would not be treated as a primary rectal cancer and radiation wouldn't be of much benefit.

## 2018-01-24 ENCOUNTER — OFFICE VISIT (OUTPATIENT)
Dept: ONCOLOGY | Facility: CLINIC | Age: 73
End: 2018-01-24

## 2018-01-24 ENCOUNTER — INFUSION (OUTPATIENT)
Dept: ONCOLOGY | Facility: HOSPITAL | Age: 73
End: 2018-01-24

## 2018-01-24 VITALS
TEMPERATURE: 98.5 F | RESPIRATION RATE: 18 BRPM | DIASTOLIC BLOOD PRESSURE: 79 MMHG | BODY MASS INDEX: 25.68 KG/M2 | HEART RATE: 70 BPM | WEIGHT: 139.55 LBS | HEIGHT: 62 IN | SYSTOLIC BLOOD PRESSURE: 139 MMHG

## 2018-01-24 DIAGNOSIS — C19 CANCER OF RECTOSIGMOID (COLON) (HCC): ICD-10-CM

## 2018-01-24 DIAGNOSIS — C19 CANCER OF RECTOSIGMOID (COLON) (HCC): Primary | ICD-10-CM

## 2018-01-24 DIAGNOSIS — Z45.2 ENCOUNTER FOR VENOUS ACCESS DEVICE CARE: ICD-10-CM

## 2018-01-24 LAB
ALBUMIN SERPL-MCNC: 3.9 G/DL (ref 3.4–4.8)
ALBUMIN/GLOB SERPL: 1.3 G/DL (ref 1.1–1.8)
ALP SERPL-CCNC: 78 U/L (ref 38–126)
ALT SERPL W P-5'-P-CCNC: 43 U/L (ref 9–52)
ANION GAP SERPL CALCULATED.3IONS-SCNC: 10 MMOL/L (ref 5–15)
AST SERPL-CCNC: 26 U/L (ref 14–36)
BASOPHILS # BLD AUTO: 0.02 10*3/MM3 (ref 0–0.2)
BASOPHILS NFR BLD AUTO: 0.5 % (ref 0–2)
BILIRUB SERPL-MCNC: 0.5 MG/DL (ref 0.2–1.3)
BUN BLD-MCNC: 10 MG/DL (ref 7–21)
BUN/CREAT SERPL: 15.2 (ref 7–25)
CALCIUM SPEC-SCNC: 8.9 MG/DL (ref 8.4–10.2)
CHLORIDE SERPL-SCNC: 102 MMOL/L (ref 95–110)
CO2 SERPL-SCNC: 30 MMOL/L (ref 22–31)
CREAT BLD-MCNC: 0.66 MG/DL (ref 0.5–1)
DEPRECATED RDW RBC AUTO: 48.6 FL (ref 36.4–46.3)
EOSINOPHIL # BLD AUTO: 0.08 10*3/MM3 (ref 0–0.7)
EOSINOPHIL NFR BLD AUTO: 2.1 % (ref 0–7)
ERYTHROCYTE [DISTWIDTH] IN BLOOD BY AUTOMATED COUNT: 16.8 % (ref 11.5–14.5)
GFR SERPL CREATININE-BSD FRML MDRD: 88 ML/MIN/1.73 (ref 60–90)
GLOBULIN UR ELPH-MCNC: 3.1 GM/DL (ref 2.3–3.5)
GLUCOSE BLD-MCNC: 90 MG/DL (ref 60–100)
HCT VFR BLD AUTO: 36.6 % (ref 35–45)
HGB BLD-MCNC: 11.8 G/DL (ref 12–15.5)
IMM GRANULOCYTES # BLD: 0 10*3/MM3 (ref 0–0.02)
IMM GRANULOCYTES NFR BLD: 0 % (ref 0–0.5)
LYMPHOCYTES # BLD AUTO: 1.28 10*3/MM3 (ref 0.6–4.2)
LYMPHOCYTES NFR BLD AUTO: 34.1 % (ref 10–50)
MCH RBC QN AUTO: 26 PG (ref 26.5–34)
MCHC RBC AUTO-ENTMCNC: 32.2 G/DL (ref 31.4–36)
MCV RBC AUTO: 80.8 FL (ref 80–98)
MONOCYTES # BLD AUTO: 0.44 10*3/MM3 (ref 0–0.9)
MONOCYTES NFR BLD AUTO: 11.7 % (ref 0–12)
NEUTROPHILS # BLD AUTO: 1.93 10*3/MM3 (ref 2–8.6)
NEUTROPHILS NFR BLD AUTO: 51.6 % (ref 37–80)
PLATELET # BLD AUTO: 220 10*3/MM3 (ref 150–450)
PMV BLD AUTO: 9.2 FL (ref 8–12)
POTASSIUM BLD-SCNC: 3.5 MMOL/L (ref 3.5–5.1)
PROT SERPL-MCNC: 7 G/DL (ref 6.3–8.6)
RBC # BLD AUTO: 4.53 10*6/MM3 (ref 3.77–5.16)
SODIUM BLD-SCNC: 142 MMOL/L (ref 137–145)
WBC NRBC COR # BLD: 3.75 10*3/MM3 (ref 3.2–9.8)

## 2018-01-24 PROCEDURE — 85025 COMPLETE CBC W/AUTO DIFF WBC: CPT

## 2018-01-24 PROCEDURE — 25010000002 OXALIPLATIN PER 0.5 MG: Performed by: NURSE PRACTITIONER

## 2018-01-24 PROCEDURE — 36591 DRAW BLOOD OFF VENOUS DEVICE: CPT | Performed by: NURSE PRACTITIONER

## 2018-01-24 PROCEDURE — 96411 CHEMO IV PUSH ADDL DRUG: CPT | Performed by: NURSE PRACTITIONER

## 2018-01-24 PROCEDURE — 25010000002 LEUCOVORIN CALCIUM PER 50 MG: Performed by: NURSE PRACTITIONER

## 2018-01-24 PROCEDURE — 96375 TX/PRO/DX INJ NEW DRUG ADDON: CPT | Performed by: NURSE PRACTITIONER

## 2018-01-24 PROCEDURE — 99214 OFFICE O/P EST MOD 30 MIN: CPT | Performed by: NURSE PRACTITIONER

## 2018-01-24 PROCEDURE — 96368 THER/DIAG CONCURRENT INF: CPT | Performed by: NURSE PRACTITIONER

## 2018-01-24 PROCEDURE — 25010000002 PALONOSETRON PER 25 MCG: Performed by: NURSE PRACTITIONER

## 2018-01-24 PROCEDURE — G0498 CHEMO EXTEND IV INFUS W/PUMP: HCPCS | Performed by: NURSE PRACTITIONER

## 2018-01-24 PROCEDURE — 80053 COMPREHEN METABOLIC PANEL: CPT

## 2018-01-24 PROCEDURE — 96413 CHEMO IV INFUSION 1 HR: CPT | Performed by: NURSE PRACTITIONER

## 2018-01-24 PROCEDURE — 96415 CHEMO IV INFUSION ADDL HR: CPT | Performed by: NURSE PRACTITIONER

## 2018-01-24 PROCEDURE — 25010000003 DEXAMETHASONE SODIUM PHOSPHATE 100 MG/10ML SOLUTION 10 ML VIAL: Performed by: NURSE PRACTITIONER

## 2018-01-24 PROCEDURE — 25010000002 FLUOROURACIL PER 500 MG: Performed by: NURSE PRACTITIONER

## 2018-01-24 RX ORDER — SODIUM CHLORIDE 0.9 % (FLUSH) 0.9 %
10 SYRINGE (ML) INJECTION AS NEEDED
Status: CANCELLED | OUTPATIENT
Start: 2018-01-26

## 2018-01-24 RX ORDER — PALONOSETRON 0.05 MG/ML
0.25 INJECTION, SOLUTION INTRAVENOUS ONCE
Status: CANCELLED | OUTPATIENT
Start: 2018-01-24

## 2018-01-24 RX ORDER — FLUOROURACIL 50 MG/ML
400 INJECTION, SOLUTION INTRAVENOUS ONCE
Status: COMPLETED | OUTPATIENT
Start: 2018-01-24 | End: 2018-01-24

## 2018-01-24 RX ORDER — DEXTROSE MONOHYDRATE 50 MG/ML
250 INJECTION, SOLUTION INTRAVENOUS ONCE
Status: CANCELLED | OUTPATIENT
Start: 2018-01-24

## 2018-01-24 RX ORDER — PALONOSETRON 0.05 MG/ML
0.25 INJECTION, SOLUTION INTRAVENOUS ONCE
Status: COMPLETED | OUTPATIENT
Start: 2018-01-24 | End: 2018-01-24

## 2018-01-24 RX ORDER — FLUOROURACIL 50 MG/ML
400 INJECTION, SOLUTION INTRAVENOUS ONCE
Status: CANCELLED | OUTPATIENT
Start: 2018-01-24

## 2018-01-24 RX ORDER — DEXTROSE MONOHYDRATE 50 MG/ML
250 INJECTION, SOLUTION INTRAVENOUS ONCE
Status: COMPLETED | OUTPATIENT
Start: 2018-01-24 | End: 2018-01-24

## 2018-01-24 RX ORDER — SODIUM CHLORIDE 0.9 % (FLUSH) 0.9 %
10 SYRINGE (ML) INJECTION AS NEEDED
Status: DISCONTINUED | OUTPATIENT
Start: 2018-01-24 | End: 2018-01-24 | Stop reason: HOSPADM

## 2018-01-24 RX ADMIN — LEUCOVORIN CALCIUM 660 MG: 350 INJECTION, POWDER, LYOPHILIZED, FOR SOLUTION INTRAMUSCULAR; INTRAVENOUS at 11:35

## 2018-01-24 RX ADMIN — DEXTROSE MONOHYDRATE 250 ML: 50 INJECTION, SOLUTION INTRAVENOUS at 09:55

## 2018-01-24 RX ADMIN — DEXAMETHASONE SODIUM PHOSPHATE 12 MG: 10 INJECTION, SOLUTION INTRAMUSCULAR; INTRAVENOUS at 11:07

## 2018-01-24 RX ADMIN — PALONOSETRON HYDROCHLORIDE 0.25 MG: 0.25 INJECTION INTRAVENOUS at 10:28

## 2018-01-24 RX ADMIN — Medication 10 ML: at 08:42

## 2018-01-24 RX ADMIN — FLUOROURACIL 660 MG: 50 INJECTION, SOLUTION INTRAVENOUS at 13:57

## 2018-01-24 RX ADMIN — FLUOROURACIL 3150 MG: 50 INJECTION, SOLUTION INTRAVENOUS at 14:19

## 2018-01-24 RX ADMIN — OXALIPLATIN 110 MG: 5 INJECTION, SOLUTION, CONCENTRATE INTRAVENOUS at 11:35

## 2018-01-24 NOTE — PROGRESS NOTES
"DATE OF VISIT: 1/24/2018    REASON FOR VISIT:  Rectosigmoid cancer on adjuvant therapy with FOLFOX     HISTORY OF PRESENT ILLNESS:    72-year-old female with a past medical history significant for cardiomegaly was initially seen in consultation on December 18, 2017 for newly diagnosed rectosigmoid cancer.  Patient was started on adjuvant chemotherapy with FOLFOX on December 27, 2017.  Patient is here to get cycle 3 of FOLFOX today.  Complains of nausea that lasted for 1-2 days after chemotherapy but is relieved with Zofran. States she has some intermittent constipation , she states she is already taking a stool softener. Denies any fever or chills.         PAST MEDICAL HISTORY:    No past medical history on file.    SOCIAL HISTORY:    Social History   Substance Use Topics   • Smoking status: Former Smoker     Quit date: 12/2012   • Smokeless tobacco: Never Used   • Alcohol use No       Surgical History :  Past Surgical History:   Procedure Laterality Date   • COLON SURGERY     • GALLBLADDER SURGERY     • RETINAL DETACHMENT SURGERY         ALLERGIES:    Allergies   Allergen Reactions   • Codeine        REVIEW OF SYSTEMS:      CONSTITUTIONAL:  No fever, chills, or night sweats.     HEENT:  No epistaxis, mouth sores, or difficulty swallowing.    RESPIRATORY:  No new shortness of breath or cough at present.    CARDIOVASCULAR:  No chest pain or palpitations.    GASTROINTESTINAL:  No abdominal pain, nausea, vomiting, or blood in the stool.    GENITOURINARY:  No dysuria or hematuria.    MUSCULOSKELETAL:  No any new back pain or arthralgias.     NEUROLOGICAL:  No tingling or numbness. No new headache or dizziness.     LYMPHATICS:  Denies any abnormal swollen and anywhere in the body.    SKIN:  Denies any new skin rash.    PHYSICAL EXAMINATION:      VITAL SIGNS:  /79  Pulse 70  Temp 98.5 °F (36.9 °C) (Temporal Artery )   Resp 18  Ht 157.5 cm (62.01\")  Wt 63.3 kg (139 lb 8.8 oz)  BMI 25.52 kg/m2    GENERAL:  Not " in any distress.    HEENT:  Normocephalic, Atraumatic.Mild Conjunctival pallor. No icterus. No Facial Asymmetry noted.    NECK:  No adenopathy. No JVD.    RESPIRATORY:  Fair air entry bilateral. No rhonchi or wheezing.    CARDIOVASCULAR:  S1, S2. Regular rate and rhythm. No murmur or gallop appreciated.    ABDOMEN:  Soft, obese, nontender. Bowel sounds present in all four quadrants.  No organomegaly appreciated.    EXTREMITIES:  No edema.No Calf Tenderness.    NEUROLOGIC:  Alert, awake and oriented ×3.    SKIN : No new skin lesion identified  DIAGNOSTIC DATA:    Glucose   Date Value Ref Range Status   01/24/2018 90 60 - 100 mg/dL Final     Sodium   Date Value Ref Range Status   01/24/2018 142 137 - 145 mmol/L Final     Potassium   Date Value Ref Range Status   01/24/2018 3.5 3.5 - 5.1 mmol/L Final     CO2   Date Value Ref Range Status   01/24/2018 30.0 22.0 - 31.0 mmol/L Final     Chloride   Date Value Ref Range Status   01/24/2018 102 95 - 110 mmol/L Final     Anion Gap   Date Value Ref Range Status   01/24/2018 10.0 5.0 - 15.0 mmol/L Final     Creatinine   Date Value Ref Range Status   01/24/2018 0.66 0.50 - 1.00 mg/dL Final     BUN   Date Value Ref Range Status   01/24/2018 10 7 - 21 mg/dL Final     BUN/Creatinine Ratio   Date Value Ref Range Status   01/24/2018 15.2 7.0 - 25.0 Final     Calcium   Date Value Ref Range Status   01/24/2018 8.9 8.4 - 10.2 mg/dL Final     eGFR Non  Amer   Date Value Ref Range Status   01/24/2018 88 >60 mL/min/1.73 Final     Alkaline Phosphatase   Date Value Ref Range Status   01/24/2018 78 38 - 126 U/L Final     Total Protein   Date Value Ref Range Status   01/24/2018 7.0 6.3 - 8.6 g/dL Final     ALT (SGPT)   Date Value Ref Range Status   01/24/2018 43 9 - 52 U/L Final     AST (SGOT)   Date Value Ref Range Status   01/24/2018 26 14 - 36 U/L Final     Total Bilirubin   Date Value Ref Range Status   01/24/2018 0.5 0.2 - 1.3 mg/dL Final     Albumin   Date Value Ref Range Status    01/24/2018 3.90 3.40 - 4.80 g/dL Final     Globulin   Date Value Ref Range Status   01/24/2018 3.1 2.3 - 3.5 gm/dL Final     A/G Ratio   Date Value Ref Range Status   01/24/2018 1.3 1.1 - 1.8 g/dL Final     Lab Results   Component Value Date    WBC 3.75 01/24/2018    HGB 11.8 (L) 01/24/2018    HCT 36.6 01/24/2018    MCV 80.8 01/24/2018     01/24/2018     Lab Results   Component Value Date    NEUTROABS 1.93 (L) 01/24/2018    IRON 58 12/18/2017    TIBC 312 12/18/2017    LABIRON 19 12/18/2017    FERRITIN 96.10 12/18/2017    [id number] 616 12/18/2017    FOLATE 7.20 12/18/2017     Lab Results   Component Value Date    CEA 1.40 12/18/2017   ]    CEA prior to surgery was 11.2    [facility] showed:  1.  Extensive rectosigmoid carcinoma extending to total length of 16 cm.  2.  7 mm mesenteric nodule adjacent to the sigmoid colon may be metastatic lymph node          Chest X-ray done on November 18, 2017 showed:  IMPRESSION:  CONCLUSION:  No Acute Disease  Cardiomegaly          Pathology :  Biopsy result from colonoscopy showed:  Tubular adenoma with high-grade dysplasia and focal adenocarcinoma in situ          Surgical pathology report from November 22, 2017 showed:  Adenocarcinoma with mucinous features arising from sigmoid colon, T3 N1 B lesion, 2 out of 29 lymph nodes positive.  Lymphatic invasion present and no vascular invasion or perineural invasion seen.      ASSESSMENT AND PLAN:    1. 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  Lymphatic invasion present.  No large vessel invasion or perineural invasion seen.  Margins were negative. Diomedes Fatima received a call from Dr. Hurtado stating that the patient had the tumor mostly in the sigmoid colon with a small part extending above the rectal reflection. This would not be treated as a primary rectal cancer and radiation wouldn't be of much benefit. She was started on chemotherapy with FOLFOX on December 27, 2017.   Patient tolerated first round of chemotherapy well. She had some leukopenia after first cycle so oxaliplatin  And 5FU were decreased by 20%; will proceed with cycle #3 today, return in 2 weeks for cycle #4.       2. Hypokalemia; on last visit K was 3.1 she was started on oral K , and repeat today is 3.5 she will continue with oral K every other day and will recheck again in 2 weeks.  3. Constipation, she is already taking stool softener, will add Miralax to her daily regimen. She has been instructed that if she develops diarrhea or loose stools to d/c Miralax.    This document has been signed by CHRISTINA Paredes on January 24, 2018 11:59 AM

## 2018-01-26 ENCOUNTER — INFUSION (OUTPATIENT)
Dept: ONCOLOGY | Facility: HOSPITAL | Age: 73
End: 2018-01-26

## 2018-01-26 DIAGNOSIS — C19 CANCER OF RECTOSIGMOID (COLON) (HCC): ICD-10-CM

## 2018-01-26 DIAGNOSIS — Z45.2 ENCOUNTER FOR VENOUS ACCESS DEVICE CARE: Primary | ICD-10-CM

## 2018-01-26 PROCEDURE — 25010000002 HEPARIN FLUSH (PORCINE) 100 UNIT/ML SOLUTION: Performed by: INTERNAL MEDICINE

## 2018-01-26 PROCEDURE — 96523 IRRIG DRUG DELIVERY DEVICE: CPT | Performed by: NURSE PRACTITIONER

## 2018-01-26 RX ORDER — SODIUM CHLORIDE 0.9 % (FLUSH) 0.9 %
10 SYRINGE (ML) INJECTION AS NEEDED
Status: CANCELLED | OUTPATIENT
Start: 2018-02-07

## 2018-01-26 RX ORDER — SODIUM CHLORIDE 0.9 % (FLUSH) 0.9 %
10 SYRINGE (ML) INJECTION AS NEEDED
Status: DISCONTINUED | OUTPATIENT
Start: 2018-01-26 | End: 2018-01-26 | Stop reason: HOSPADM

## 2018-01-26 RX ADMIN — SODIUM CHLORIDE, PRESERVATIVE FREE 500 UNITS: 5 INJECTION INTRAVENOUS at 11:28

## 2018-01-26 RX ADMIN — Medication 10 ML: at 11:29

## 2018-02-07 ENCOUNTER — INFUSION (OUTPATIENT)
Dept: ONCOLOGY | Facility: HOSPITAL | Age: 73
End: 2018-02-07

## 2018-02-07 ENCOUNTER — OFFICE VISIT (OUTPATIENT)
Dept: ONCOLOGY | Facility: CLINIC | Age: 73
End: 2018-02-07

## 2018-02-07 VITALS
WEIGHT: 140.65 LBS | DIASTOLIC BLOOD PRESSURE: 74 MMHG | HEIGHT: 62 IN | BODY MASS INDEX: 25.88 KG/M2 | HEART RATE: 72 BPM | TEMPERATURE: 98.3 F | RESPIRATION RATE: 18 BRPM | SYSTOLIC BLOOD PRESSURE: 145 MMHG

## 2018-02-07 DIAGNOSIS — Z45.2 ENCOUNTER FOR VENOUS ACCESS DEVICE CARE: Primary | ICD-10-CM

## 2018-02-07 DIAGNOSIS — E87.6 HYPOKALEMIA: ICD-10-CM

## 2018-02-07 DIAGNOSIS — C19 CANCER OF RECTOSIGMOID (COLON) (HCC): Primary | ICD-10-CM

## 2018-02-07 DIAGNOSIS — C19 CANCER OF RECTOSIGMOID (COLON) (HCC): ICD-10-CM

## 2018-02-07 LAB
ALBUMIN SERPL-MCNC: 3.9 G/DL (ref 3.4–4.8)
ALBUMIN/GLOB SERPL: 1.3 G/DL (ref 1.1–1.8)
ALP SERPL-CCNC: 85 U/L (ref 38–126)
ALT SERPL W P-5'-P-CCNC: 44 U/L (ref 9–52)
ANION GAP SERPL CALCULATED.3IONS-SCNC: 9 MMOL/L (ref 5–15)
AST SERPL-CCNC: 34 U/L (ref 14–36)
BASOPHILS # BLD AUTO: 0.03 10*3/MM3 (ref 0–0.2)
BASOPHILS NFR BLD AUTO: 0.8 % (ref 0–2)
BILIRUB SERPL-MCNC: 0.5 MG/DL (ref 0.2–1.3)
BUN BLD-MCNC: 10 MG/DL (ref 7–21)
BUN/CREAT SERPL: 16.1 (ref 7–25)
CALCIUM SPEC-SCNC: 8.6 MG/DL (ref 8.4–10.2)
CHLORIDE SERPL-SCNC: 105 MMOL/L (ref 95–110)
CO2 SERPL-SCNC: 27 MMOL/L (ref 22–31)
CREAT BLD-MCNC: 0.62 MG/DL (ref 0.5–1)
DEPRECATED RDW RBC AUTO: 50.3 FL (ref 36.4–46.3)
EOSINOPHIL # BLD AUTO: 0.09 10*3/MM3 (ref 0–0.7)
EOSINOPHIL NFR BLD AUTO: 2.4 % (ref 0–7)
ERYTHROCYTE [DISTWIDTH] IN BLOOD BY AUTOMATED COUNT: 17.7 % (ref 11.5–14.5)
GFR SERPL CREATININE-BSD FRML MDRD: 95 ML/MIN/1.73 (ref 60–90)
GLOBULIN UR ELPH-MCNC: 3.1 GM/DL (ref 2.3–3.5)
GLUCOSE BLD-MCNC: 90 MG/DL (ref 60–100)
HCT VFR BLD AUTO: 35.4 % (ref 35–45)
HGB BLD-MCNC: 11.5 G/DL (ref 12–15.5)
IMM GRANULOCYTES # BLD: 0 10*3/MM3 (ref 0–0.02)
IMM GRANULOCYTES NFR BLD: 0 % (ref 0–0.5)
LYMPHOCYTES # BLD AUTO: 1.18 10*3/MM3 (ref 0.6–4.2)
LYMPHOCYTES NFR BLD AUTO: 31.2 % (ref 10–50)
MCH RBC QN AUTO: 26.3 PG (ref 26.5–34)
MCHC RBC AUTO-ENTMCNC: 32.5 G/DL (ref 31.4–36)
MCV RBC AUTO: 80.8 FL (ref 80–98)
MONOCYTES # BLD AUTO: 0.45 10*3/MM3 (ref 0–0.9)
MONOCYTES NFR BLD AUTO: 11.9 % (ref 0–12)
NEUTROPHILS # BLD AUTO: 2.03 10*3/MM3 (ref 2–8.6)
NEUTROPHILS NFR BLD AUTO: 53.7 % (ref 37–80)
PLATELET # BLD AUTO: 178 10*3/MM3 (ref 150–450)
PMV BLD AUTO: 9.2 FL (ref 8–12)
POTASSIUM BLD-SCNC: 3 MMOL/L (ref 3.5–5.1)
PROT SERPL-MCNC: 7 G/DL (ref 6.3–8.6)
RBC # BLD AUTO: 4.38 10*6/MM3 (ref 3.77–5.16)
SODIUM BLD-SCNC: 141 MMOL/L (ref 137–145)
WBC NRBC COR # BLD: 3.78 10*3/MM3 (ref 3.2–9.8)

## 2018-02-07 PROCEDURE — 25010000002 PALONOSETRON PER 25 MCG: Performed by: INTERNAL MEDICINE

## 2018-02-07 PROCEDURE — 96411 CHEMO IV PUSH ADDL DRUG: CPT | Performed by: INTERNAL MEDICINE

## 2018-02-07 PROCEDURE — 80053 COMPREHEN METABOLIC PANEL: CPT

## 2018-02-07 PROCEDURE — 99214 OFFICE O/P EST MOD 30 MIN: CPT | Performed by: INTERNAL MEDICINE

## 2018-02-07 PROCEDURE — 85025 COMPLETE CBC W/AUTO DIFF WBC: CPT

## 2018-02-07 PROCEDURE — 25010000003 DEXAMETHASONE SODIUM PHOSPHATE 100 MG/10ML SOLUTION 10 ML VIAL: Performed by: INTERNAL MEDICINE

## 2018-02-07 PROCEDURE — 25010000002 OXALIPLATIN PER 0.5 MG: Performed by: INTERNAL MEDICINE

## 2018-02-07 PROCEDURE — 96375 TX/PRO/DX INJ NEW DRUG ADDON: CPT | Performed by: INTERNAL MEDICINE

## 2018-02-07 PROCEDURE — 25010000002 FLUOROURACIL PER 500 MG: Performed by: INTERNAL MEDICINE

## 2018-02-07 PROCEDURE — 96413 CHEMO IV INFUSION 1 HR: CPT | Performed by: INTERNAL MEDICINE

## 2018-02-07 PROCEDURE — 96415 CHEMO IV INFUSION ADDL HR: CPT | Performed by: INTERNAL MEDICINE

## 2018-02-07 PROCEDURE — 36591 DRAW BLOOD OFF VENOUS DEVICE: CPT | Performed by: INTERNAL MEDICINE

## 2018-02-07 PROCEDURE — 25010000002 LEUCOVORIN CALCIUM PER 50 MG: Performed by: INTERNAL MEDICINE

## 2018-02-07 PROCEDURE — G0498 CHEMO EXTEND IV INFUS W/PUMP: HCPCS | Performed by: INTERNAL MEDICINE

## 2018-02-07 PROCEDURE — 96368 THER/DIAG CONCURRENT INF: CPT | Performed by: INTERNAL MEDICINE

## 2018-02-07 RX ORDER — ALENDRONATE SODIUM 70 MG/1
70 TABLET ORAL
COMMUNITY
Start: 2017-10-16 | End: 2018-08-20

## 2018-02-07 RX ORDER — POTASSIUM CHLORIDE 750 MG/1
40 CAPSULE, EXTENDED RELEASE ORAL ONCE
Status: CANCELLED
Start: 2018-02-07 | End: 2018-02-07

## 2018-02-07 RX ORDER — PALONOSETRON 0.05 MG/ML
0.25 INJECTION, SOLUTION INTRAVENOUS ONCE
Status: COMPLETED | OUTPATIENT
Start: 2018-02-07 | End: 2018-02-07

## 2018-02-07 RX ORDER — PALONOSETRON 0.05 MG/ML
0.25 INJECTION, SOLUTION INTRAVENOUS ONCE
Status: CANCELLED | OUTPATIENT
Start: 2018-02-21 | End: 2018-02-21

## 2018-02-07 RX ORDER — FLUOROURACIL 50 MG/ML
400 INJECTION, SOLUTION INTRAVENOUS ONCE
Status: COMPLETED | OUTPATIENT
Start: 2018-02-07 | End: 2018-02-07

## 2018-02-07 RX ORDER — DEXTROSE MONOHYDRATE 50 MG/ML
250 INJECTION, SOLUTION INTRAVENOUS ONCE
Status: COMPLETED | OUTPATIENT
Start: 2018-02-07 | End: 2018-02-07

## 2018-02-07 RX ORDER — SODIUM CHLORIDE 0.9 % (FLUSH) 0.9 %
10 SYRINGE (ML) INJECTION AS NEEDED
Status: CANCELLED | OUTPATIENT
Start: 2018-02-09

## 2018-02-07 RX ORDER — PALONOSETRON 0.05 MG/ML
0.25 INJECTION, SOLUTION INTRAVENOUS ONCE
Status: CANCELLED | OUTPATIENT
Start: 2018-02-07

## 2018-02-07 RX ORDER — FLUOROURACIL 50 MG/ML
400 INJECTION, SOLUTION INTRAVENOUS ONCE
Status: CANCELLED | OUTPATIENT
Start: 2018-02-07

## 2018-02-07 RX ORDER — DEXTROSE MONOHYDRATE 50 MG/ML
250 INJECTION, SOLUTION INTRAVENOUS ONCE
Status: CANCELLED | OUTPATIENT
Start: 2018-02-07

## 2018-02-07 RX ORDER — SODIUM CHLORIDE 0.9 % (FLUSH) 0.9 %
10 SYRINGE (ML) INJECTION AS NEEDED
Status: DISCONTINUED | OUTPATIENT
Start: 2018-02-07 | End: 2018-02-07 | Stop reason: HOSPADM

## 2018-02-07 RX ORDER — POTASSIUM CHLORIDE 750 MG/1
40 CAPSULE, EXTENDED RELEASE ORAL ONCE
Status: COMPLETED | OUTPATIENT
Start: 2018-02-07 | End: 2018-02-07

## 2018-02-07 RX ADMIN — FLUOROURACIL 660 MG: 50 INJECTION, SOLUTION INTRAVENOUS at 12:41

## 2018-02-07 RX ADMIN — LEUCOVORIN CALCIUM 660 MG: 350 INJECTION, POWDER, LYOPHILIZED, FOR SOLUTION INTRAMUSCULAR; INTRAVENOUS at 10:15

## 2018-02-07 RX ADMIN — DEXTROSE MONOHYDRATE 250 ML: 50 INJECTION, SOLUTION INTRAVENOUS at 10:00

## 2018-02-07 RX ADMIN — DEXAMETHASONE SODIUM PHOSPHATE 12 MG: 10 INJECTION, SOLUTION INTRAMUSCULAR; INTRAVENOUS at 09:38

## 2018-02-07 RX ADMIN — PALONOSETRON HYDROCHLORIDE 0.25 MG: 0.25 INJECTION INTRAVENOUS at 09:39

## 2018-02-07 RX ADMIN — FLUOROURACIL 3150 MG: 50 INJECTION, SOLUTION INTRAVENOUS at 13:01

## 2018-02-07 RX ADMIN — Medication 10 ML: at 08:37

## 2018-02-07 RX ADMIN — POTASSIUM CHLORIDE 40 MEQ: 750 CAPSULE, EXTENDED RELEASE ORAL at 09:56

## 2018-02-07 RX ADMIN — OXALIPLATIN 110 MG: 5 INJECTION, SOLUTION, CONCENTRATE INTRAVENOUS at 10:15

## 2018-02-07 NOTE — PROGRESS NOTES
DATE OF VISIT: 2/7/2018    REASON FOR VISIT:  Rectosigmoid cancer on adjuvant therapy with FOLFOX    HISTORY OF PRESENT ILLNESS:    72-year-old female with a past medical history significant for cardiomegaly was initially seen in consultation on December 18, 2017 for newly diagnosed rectosigmoid cancer.  Patient was started on adjuvant chemotherapy with FOLFOX on December 27, 2017.  Patient is here to get cycle 4 of FOLFOX today.  Complains of pain on the lateral aspect of right elbow that started about 3-4 days ago.  Complains of constipation alternating with diarrhea with stool softener. Denies any fever or chills.    PAST MEDICAL HISTORY:    1.  Cardiomegaly  2.  Adenocarcinoma of rectosigmoid region      SOCIAL HISTORY:    Social History   Substance Use Topics   • Smoking status: Former Smoker     Quit date: 12/2012   • Smokeless tobacco: Never Used   • Alcohol use No       Surgical History :  Past Surgical History:   Procedure Laterality Date   • COLON SURGERY     • GALLBLADDER SURGERY     • RETINAL DETACHMENT SURGERY         ALLERGIES:    Allergies   Allergen Reactions   • Codeine          FAMILY HISTORY:  Family History   Problem Relation Age of Onset   • Heart attack Brother    • Cancer Maternal Aunt            REVIEW OF SYSTEMS:      CONSTITUTIONAL:  Complains of fatigue. Denies any fever, chills .      HEENT:    No epistaxis, mouth sores or difficulty swallowing.     RESPIRATORY:  No new shortness of breath. No new cough or hemoptysis.     CARDIOVASCULAR:  No chest pain or palpitations.     GASTROINTESTINAL:Complains of constipation alternating with diarrhea when she uses stool softener.  Denies any excessive diarrhea. Denies any blood in stool.  Denies any vomiting.    GENITOURINARY: No Dysuria or Hematuria.     MUSCULOSKELETAL:   Complains of discomfort on lateral aspect of right elbow that started 3-4 days ago.  No new back pain or arthralgia..     LYMPHATICS:  Denies any abnormal swollen glands  "anywhere in the body.     NEUROLOGICAL : No tingling or numbness. No headache or dizziness. No seizures or balance problems.     SKIN: No new skin lesions.          PHYSICAL EXAMINATION:      VITAL SIGNS:  /74  Pulse 72  Temp 98.3 °F (36.8 °C) (Temporal Artery )   Resp 18  Ht 157.5 cm (62.01\")  Wt 63.8 kg (140 lb 10.5 oz)  BMI 25.72 kg/m2  Last 3 weights    02/07/18  0851   Weight: 63.8 kg (140 lb 10.5 oz)       ECOG  performance status: 1      GENERAL:  Not in any distress.    HEENT:  Normocephalic, Atraumatic.Mild Conjunctival pallor. No icterus. Extraocular Movements Intact. No Facial Asymmetry noted.    NECK:  No adenopathy. No JVD.    RESPIRATORY:  Fair air entry bilateral. No rhonchi or wheezing.    CARDIOVASCULAR:  S1, S2. Regular rate and rhythm. No murmur or gallop appreciated.    ABDOMEN:  Soft, obese, nontender. Bowel sounds present in all four quadrants.  No organomegaly appreciated.Well-healed surgical scar present in midline, right lower quadrant and epigastric region.    EXTREMITIES:  No edema.No Calf Tenderness.No swelling appreciated of right upper extremity.    NEUROLOGIC:  Alert, awake and oriented ×3.  No  Motor or sensory deficit appreciated. Cranial Nerves 2-12 grossly intact.    SKIN : No new skin lesion identified        DIAGNOSTIC DATA:    Glucose   Date Value Ref Range Status   02/07/2018 90 60 - 100 mg/dL Final     Sodium   Date Value Ref Range Status   02/07/2018 141 137 - 145 mmol/L Final     Potassium   Date Value Ref Range Status   02/07/2018 3.0 (L) 3.5 - 5.1 mmol/L Final     CO2   Date Value Ref Range Status   02/07/2018 27.0 22.0 - 31.0 mmol/L Final     Chloride   Date Value Ref Range Status   02/07/2018 105 95 - 110 mmol/L Final     Anion Gap   Date Value Ref Range Status   02/07/2018 9.0 5.0 - 15.0 mmol/L Final     Creatinine   Date Value Ref Range Status   02/07/2018 0.62 0.50 - 1.00 mg/dL Final     BUN   Date Value Ref Range Status   02/07/2018 10 7 - 21 mg/dL Final "     BUN/Creatinine Ratio   Date Value Ref Range Status   02/07/2018 16.1 7.0 - 25.0 Final     Calcium   Date Value Ref Range Status   02/07/2018 8.6 8.4 - 10.2 mg/dL Final     eGFR Non  Amer   Date Value Ref Range Status   02/07/2018 95 >60 mL/min/1.73 Final     Alkaline Phosphatase   Date Value Ref Range Status   02/07/2018 85 38 - 126 U/L Final     Total Protein   Date Value Ref Range Status   02/07/2018 7.0 6.3 - 8.6 g/dL Final     ALT (SGPT)   Date Value Ref Range Status   02/07/2018 44 9 - 52 U/L Final     AST (SGOT)   Date Value Ref Range Status   02/07/2018 34 14 - 36 U/L Final     Total Bilirubin   Date Value Ref Range Status   02/07/2018 0.5 0.2 - 1.3 mg/dL Final     Albumin   Date Value Ref Range Status   02/07/2018 3.90 3.40 - 4.80 g/dL Final     Globulin   Date Value Ref Range Status   02/07/2018 3.1 2.3 - 3.5 gm/dL Final     A/G Ratio   Date Value Ref Range Status   02/07/2018 1.3 1.1 - 1.8 g/dL Final     Lab Results   Component Value Date    WBC 3.78 02/07/2018    HGB 11.5 (L) 02/07/2018    HCT 35.4 02/07/2018    MCV 80.8 02/07/2018     02/07/2018     Lab Results   Component Value Date    NEUTROABS 2.03 02/07/2018    IRON 58 12/18/2017    TIBC 312 12/18/2017    LABIRON 19 12/18/2017    FERRITIN 96.10 12/18/2017    [id number] 616 12/18/2017    FOLATE 7.20 12/18/2017     Lab Results   Component Value Date    CEA 1.40 12/18/2017           CEA level prior to surgery was elevated at 11.2.           Radiology Data :  CT scan of abdomen and pelvis with and without contrast done on November 6, 2017 at [facility] showed:  1.  Extensive rectosigmoid carcinoma extending to total length of 16 cm.  2.  7 mm mesenteric nodule adjacent to the sigmoid colon may be metastatic lymph node        Chest X-ray done on November 18, 2017 showed:  IMPRESSION:  CONCLUSION:  No Acute Disease  Cardiomegaly              Pathology :  Biopsy result from colonoscopy showed:  Tubular adenoma with high-grade  dysplasia and focal adenocarcinoma in situ        Surgical pathology report from November 22, 2017 showed:  Adenocarcinoma with mucinous features arising from sigmoid colon, T3 N1 B lesion, 2 out of 29 lymph nodes positive.  Lymphatic invasion present and no vascular invasion or perineural invasion seen.              ASSESSMENT AND PLAN:       1.  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  Lymphatic invasion present.  No large vessel invasion or perineural invasion seen.  Margins were negative.   She was started on chemotherapy with FOLFOX on December 27, 2017.   In view of leukopenia with white blood cell count of 2.9 with absolute neutrophil count of 1700 we will decrease the dose of oxaliplatin by 20% and 5-FU combined 20% from cycle 2on January 10, 2018.  We will go head with cycle 4 of FOLFOX today.  Case was discussed with Dr. Poe by Dr. Hercules on January 15, 2018 and as perDr. Poe patient's tumor was mostly in sigmoid colon not in the rectum.  So she does not need any radiation which was discussed with patient and his family.      2.   hypokalemia.  Patient was found to have a potassium of 3.0 today.  We will give her Kdur 40 mEq ×1 today.  Patient was instructed to continue taking K-Dur 10 milliequivalents by mouth daily at home.    3.  History of cardiomegaly.     4.  Constipation     5.  Health maintenance: Patient quit smoking 5 years ago.  Had a colonoscopy in November 6, 2017.  Had a mammogram earlier this year.    .  Prescriptions: Patient is enough prescription for Zofran and Kdur.        Wil Abraham MD  2/7/2018  9:39 AM        EMR Dragon/Transcription disclaimer:   Much of this encounter note is an electronic transcription/translation of spoken language to printed text. The electronic translation of spoken language may permit erroneous, or at times, nonsensical words or phrases to be inadvertently transcribed; Although I have reviewed the note for  such errors, some may still exist.

## 2018-02-09 ENCOUNTER — INFUSION (OUTPATIENT)
Dept: ONCOLOGY | Facility: HOSPITAL | Age: 73
End: 2018-02-09

## 2018-02-09 DIAGNOSIS — Z45.2 ENCOUNTER FOR VENOUS ACCESS DEVICE CARE: Primary | ICD-10-CM

## 2018-02-09 PROCEDURE — 25010000002 HEPARIN FLUSH (PORCINE) 100 UNIT/ML SOLUTION: Performed by: INTERNAL MEDICINE

## 2018-02-09 PROCEDURE — 96523 IRRIG DRUG DELIVERY DEVICE: CPT | Performed by: INTERNAL MEDICINE

## 2018-02-09 RX ORDER — SODIUM CHLORIDE 0.9 % (FLUSH) 0.9 %
10 SYRINGE (ML) INJECTION AS NEEDED
Status: DISCONTINUED | OUTPATIENT
Start: 2018-02-09 | End: 2018-02-09 | Stop reason: HOSPADM

## 2018-02-09 RX ORDER — SODIUM CHLORIDE 0.9 % (FLUSH) 0.9 %
10 SYRINGE (ML) INJECTION AS NEEDED
Status: CANCELLED | OUTPATIENT
Start: 2018-02-09

## 2018-02-09 RX ADMIN — Medication 10 ML: at 10:12

## 2018-02-09 RX ADMIN — SODIUM CHLORIDE, PRESERVATIVE FREE 500 UNITS: 5 INJECTION INTRAVENOUS at 10:12

## 2018-02-13 ENCOUNTER — DOCUMENTATION (OUTPATIENT)
Dept: NUTRITION | Facility: HOSPITAL | Age: 73
End: 2018-02-13

## 2018-02-13 NOTE — PROGRESS NOTES
Adult Outpatient Nutrition  Assessment    Patient Name:  Mary Ann Condon  YOB: 1945  MRN: [id number]    Assessment Date:  Late Entry from 2/7/18 visit    Comments: Brief follow-up to reinforce nutrition needs. Pt stated eating well. Purchased Frother--provides more warm beverage options. Wt 140.8 lb (stable). Pt agreed to call on RD as needed.                        Electronically signed by:  Tarsha Castro RD  02/13/18 4:41 PM

## 2018-02-21 ENCOUNTER — INFUSION (OUTPATIENT)
Dept: ONCOLOGY | Facility: HOSPITAL | Age: 73
End: 2018-02-21

## 2018-02-21 ENCOUNTER — OFFICE VISIT (OUTPATIENT)
Dept: ONCOLOGY | Facility: CLINIC | Age: 73
End: 2018-02-21

## 2018-02-21 VITALS
WEIGHT: 140.65 LBS | HEART RATE: 64 BPM | HEIGHT: 62 IN | TEMPERATURE: 98.8 F | BODY MASS INDEX: 25.88 KG/M2 | SYSTOLIC BLOOD PRESSURE: 139 MMHG | RESPIRATION RATE: 18 BRPM | DIASTOLIC BLOOD PRESSURE: 76 MMHG

## 2018-02-21 DIAGNOSIS — E87.6 HYPOKALEMIA: ICD-10-CM

## 2018-02-21 DIAGNOSIS — C19 CANCER OF RECTOSIGMOID (COLON) (HCC): Primary | ICD-10-CM

## 2018-02-21 DIAGNOSIS — D70.1 CHEMOTHERAPY-INDUCED NEUTROPENIA (HCC): Primary | ICD-10-CM

## 2018-02-21 DIAGNOSIS — Z45.2 ENCOUNTER FOR VENOUS ACCESS DEVICE CARE: ICD-10-CM

## 2018-02-21 DIAGNOSIS — T45.1X5A CHEMOTHERAPY-INDUCED NEUTROPENIA (HCC): Primary | ICD-10-CM

## 2018-02-21 DIAGNOSIS — C19 CANCER OF RECTOSIGMOID (COLON) (HCC): ICD-10-CM

## 2018-02-21 LAB
ALBUMIN SERPL-MCNC: 3.7 G/DL (ref 3.4–4.8)
ALBUMIN/GLOB SERPL: 1.1 G/DL (ref 1.1–1.8)
ALP SERPL-CCNC: 83 U/L (ref 38–126)
ALT SERPL W P-5'-P-CCNC: 42 U/L (ref 9–52)
ANION GAP SERPL CALCULATED.3IONS-SCNC: 13 MMOL/L (ref 5–15)
AST SERPL-CCNC: 36 U/L (ref 14–36)
BASOPHILS # BLD AUTO: 0.03 10*3/MM3 (ref 0–0.2)
BASOPHILS NFR BLD AUTO: 0.9 % (ref 0–2)
BILIRUB SERPL-MCNC: 0.6 MG/DL (ref 0.2–1.3)
BUN BLD-MCNC: 14 MG/DL (ref 7–21)
BUN/CREAT SERPL: 22.2 (ref 7–25)
CALCIUM SPEC-SCNC: 9.2 MG/DL (ref 8.4–10.2)
CHLORIDE SERPL-SCNC: 104 MMOL/L (ref 95–110)
CO2 SERPL-SCNC: 26 MMOL/L (ref 22–31)
CREAT BLD-MCNC: 0.63 MG/DL (ref 0.5–1)
DEPRECATED RDW RBC AUTO: 54.8 FL (ref 36.4–46.3)
EOSINOPHIL # BLD AUTO: 0.08 10*3/MM3 (ref 0–0.7)
EOSINOPHIL NFR BLD AUTO: 2.4 % (ref 0–7)
ERYTHROCYTE [DISTWIDTH] IN BLOOD BY AUTOMATED COUNT: 18.9 % (ref 11.5–14.5)
GFR SERPL CREATININE-BSD FRML MDRD: 93 ML/MIN/1.73 (ref 39–90)
GLOBULIN UR ELPH-MCNC: 3.3 GM/DL (ref 2.3–3.5)
GLUCOSE BLD-MCNC: 91 MG/DL (ref 60–100)
HCT VFR BLD AUTO: 35.5 % (ref 35–45)
HGB BLD-MCNC: 11.7 G/DL (ref 12–15.5)
IMM GRANULOCYTES # BLD: 0 10*3/MM3 (ref 0–0.02)
IMM GRANULOCYTES NFR BLD: 0 % (ref 0–0.5)
LYMPHOCYTES # BLD AUTO: 1.18 10*3/MM3 (ref 0.6–4.2)
LYMPHOCYTES NFR BLD AUTO: 35 % (ref 10–50)
MCH RBC QN AUTO: 26.7 PG (ref 26.5–34)
MCHC RBC AUTO-ENTMCNC: 33 G/DL (ref 31.4–36)
MCV RBC AUTO: 81.1 FL (ref 80–98)
MONOCYTES # BLD AUTO: 0.4 10*3/MM3 (ref 0–0.9)
MONOCYTES NFR BLD AUTO: 11.9 % (ref 0–12)
NEUTROPHILS # BLD AUTO: 1.68 10*3/MM3 (ref 2–8.6)
NEUTROPHILS NFR BLD AUTO: 49.8 % (ref 37–80)
PLATELET # BLD AUTO: 154 10*3/MM3 (ref 150–450)
PMV BLD AUTO: 9.7 FL (ref 8–12)
POTASSIUM BLD-SCNC: 3.7 MMOL/L (ref 3.5–5.1)
PROT SERPL-MCNC: 7 G/DL (ref 6.3–8.6)
RBC # BLD AUTO: 4.38 10*6/MM3 (ref 3.77–5.16)
SODIUM BLD-SCNC: 143 MMOL/L (ref 137–145)
WBC NRBC COR # BLD: 3.37 10*3/MM3 (ref 3.2–9.8)

## 2018-02-21 PROCEDURE — 80053 COMPREHEN METABOLIC PANEL: CPT

## 2018-02-21 PROCEDURE — 25010000002 OXALIPLATIN PER 0.5 MG: Performed by: INTERNAL MEDICINE

## 2018-02-21 PROCEDURE — 25010000002 PALONOSETRON PER 25 MCG: Performed by: INTERNAL MEDICINE

## 2018-02-21 PROCEDURE — 99214 OFFICE O/P EST MOD 30 MIN: CPT | Performed by: INTERNAL MEDICINE

## 2018-02-21 PROCEDURE — 96375 TX/PRO/DX INJ NEW DRUG ADDON: CPT | Performed by: INTERNAL MEDICINE

## 2018-02-21 PROCEDURE — 36591 DRAW BLOOD OFF VENOUS DEVICE: CPT | Performed by: INTERNAL MEDICINE

## 2018-02-21 PROCEDURE — 25010000002 FLUOROURACIL PER 500 MG: Performed by: INTERNAL MEDICINE

## 2018-02-21 PROCEDURE — 85025 COMPLETE CBC W/AUTO DIFF WBC: CPT

## 2018-02-21 PROCEDURE — G0498 CHEMO EXTEND IV INFUS W/PUMP: HCPCS | Performed by: INTERNAL MEDICINE

## 2018-02-21 PROCEDURE — 96368 THER/DIAG CONCURRENT INF: CPT | Performed by: INTERNAL MEDICINE

## 2018-02-21 PROCEDURE — 96415 CHEMO IV INFUSION ADDL HR: CPT | Performed by: INTERNAL MEDICINE

## 2018-02-21 PROCEDURE — 25010000002 LEUCOVORIN CALCIUM PER 50 MG: Performed by: INTERNAL MEDICINE

## 2018-02-21 PROCEDURE — 96413 CHEMO IV INFUSION 1 HR: CPT | Performed by: INTERNAL MEDICINE

## 2018-02-21 PROCEDURE — 25010000003 DEXAMETHASONE SODIUM PHOSPHATE 100 MG/10ML SOLUTION 10 ML VIAL: Performed by: INTERNAL MEDICINE

## 2018-02-21 PROCEDURE — 96411 CHEMO IV PUSH ADDL DRUG: CPT | Performed by: INTERNAL MEDICINE

## 2018-02-21 RX ORDER — FLUOROURACIL 50 MG/ML
400 INJECTION, SOLUTION INTRAVENOUS ONCE
Status: CANCELLED | OUTPATIENT
Start: 2018-02-21

## 2018-02-21 RX ORDER — FLUOROURACIL 50 MG/ML
400 INJECTION, SOLUTION INTRAVENOUS ONCE
Status: COMPLETED | OUTPATIENT
Start: 2018-02-21 | End: 2018-02-21

## 2018-02-21 RX ORDER — PALONOSETRON 0.05 MG/ML
0.25 INJECTION, SOLUTION INTRAVENOUS ONCE
Status: COMPLETED | OUTPATIENT
Start: 2018-02-21 | End: 2018-02-21

## 2018-02-21 RX ORDER — DEXTROSE MONOHYDRATE 50 MG/ML
250 INJECTION, SOLUTION INTRAVENOUS ONCE
Status: COMPLETED | OUTPATIENT
Start: 2018-02-21 | End: 2018-02-21

## 2018-02-21 RX ORDER — DEXTROSE MONOHYDRATE 50 MG/ML
250 INJECTION, SOLUTION INTRAVENOUS ONCE
Status: CANCELLED | OUTPATIENT
Start: 2018-02-21

## 2018-02-21 RX ORDER — SODIUM CHLORIDE 0.9 % (FLUSH) 0.9 %
10 SYRINGE (ML) INJECTION AS NEEDED
Status: DISCONTINUED | OUTPATIENT
Start: 2018-02-21 | End: 2018-02-21 | Stop reason: HOSPADM

## 2018-02-21 RX ORDER — PALONOSETRON 0.05 MG/ML
0.25 INJECTION, SOLUTION INTRAVENOUS ONCE
Status: CANCELLED | OUTPATIENT
Start: 2018-03-14 | End: 2018-03-07

## 2018-02-21 RX ORDER — SODIUM CHLORIDE 0.9 % (FLUSH) 0.9 %
10 SYRINGE (ML) INJECTION AS NEEDED
Status: CANCELLED | OUTPATIENT
Start: 2018-02-23

## 2018-02-21 RX ADMIN — FLUOROURACIL 660 MG: 50 INJECTION, SOLUTION INTRAVENOUS at 12:03

## 2018-02-21 RX ADMIN — FLUOROURACIL 3150 MG: 50 INJECTION, SOLUTION INTRAVENOUS at 12:22

## 2018-02-21 RX ADMIN — OXALIPLATIN 110 MG: 5 INJECTION, SOLUTION, CONCENTRATE INTRAVENOUS at 09:39

## 2018-02-21 RX ADMIN — Medication 10 ML: at 07:51

## 2018-02-21 RX ADMIN — DEXTROSE MONOHYDRATE 250 ML: 50 INJECTION, SOLUTION INTRAVENOUS at 08:53

## 2018-02-21 RX ADMIN — DEXAMETHASONE SODIUM PHOSPHATE 12 MG: 10 INJECTION, SOLUTION INTRAMUSCULAR; INTRAVENOUS at 08:53

## 2018-02-21 RX ADMIN — PALONOSETRON HYDROCHLORIDE 0.25 MG: 0.25 INJECTION INTRAVENOUS at 09:20

## 2018-02-21 RX ADMIN — LEUCOVORIN CALCIUM 660 MG: 350 INJECTION, POWDER, LYOPHILIZED, FOR SOLUTION INTRAMUSCULAR; INTRAVENOUS at 09:38

## 2018-02-21 NOTE — PROGRESS NOTES
DATE OF VISIT: 2/21/2018    REASON FOR VISIT:  Sigmoid cancer on adjuvant therapy with FOLFOX    HISTORY OF PRESENT ILLNESS:    72-year-old female with a past medical history significant for cardiomegaly was initially seen in consultation on December 18, 2017 for newly diagnosed rectosigmoid cancer.  Patient was started on adjuvant chemotherapy with FOLFOX on December 27, 2017.  Patient is here to get cycle 5 of FOLFOX today. Still complains of pain on the lateral aspect of right elbow especially when reaching something.  Complains of constipation .  Complains of intermittent abdominal discomfort. Denies any fever or chills.      PAST MEDICAL HISTORY:    1.  Cardiomegaly  2.  Adenocarcinoma of rectosigmoid region      SOCIAL HISTORY:    Social History   Substance Use Topics   • Smoking status: Former Smoker     Quit date: 12/2012   • Smokeless tobacco: Never Used   • Alcohol use No       Surgical History :  Past Surgical History:   Procedure Laterality Date   • COLON SURGERY     • GALLBLADDER SURGERY     • RETINAL DETACHMENT SURGERY         ALLERGIES:    Allergies   Allergen Reactions   • Codeine          FAMILY HISTORY:  Family History   Problem Relation Age of Onset   • Heart attack Brother    • Cancer Maternal Aunt            REVIEW OF SYSTEMS:      CONSTITUTIONAL:  Complains of fatigue. Denies any fever, chills .      HEENT:    No epistaxis, mouth sores or difficulty swallowing.     RESPIRATORY:  No new shortness of breath. No new cough or hemoptysis.     CARDIOVASCULAR:  No chest pain or palpitations.     GASTROINTESTINAL:Complains of intermittent constipation.  Complains of intermittent abdominal  Discomfort.  Denies any excessive diarrhea. Denies any blood in stool.  Denies any vomiting.    GENITOURINARY: No Dysuria or Hematuria.     MUSCULOSKELETAL:   Complains of discomfort on lateral aspect of right elbow that has persisted since last clinic visit.  No new back pain or arthralgia..     LYMPHATICS:  Denies  "any abnormal swollen glands anywhere in the body.     NEUROLOGICAL : No tingling or numbness. No headache or dizziness. No seizures or balance problems.     SKIN: No new skin lesions.          PHYSICAL EXAMINATION:      VITAL SIGNS:  /76  Pulse 64  Temp 98.8 °F (37.1 °C) (Temporal Artery )   Resp 18  Ht 157.5 cm (62.01\")  Wt 63.8 kg (140 lb 10.5 oz)  BMI 25.72 kg/m2  Last 3 weights    02/21/18  0802   Weight: 63.8 kg (140 lb 10.5 oz)       ECOG  performance status: 1      GENERAL:  Not in any distress.    HEENT:  Normocephalic, Atraumatic.Mild Conjunctival pallor. No icterus. Extraocular Movements Intact. No Facial Asymmetry noted.    NECK:  No adenopathy. No JVD.    RESPIRATORY:  Fair air entry bilateral. No rhonchi or wheezing.    CARDIOVASCULAR:  S1, S2. Regular rate and rhythm. No murmur or gallop appreciated.    ABDOMEN:  Soft, obese, nontender. Bowel sounds present in all four quadrants.  No organomegaly appreciated.Well-healed surgical scar present in midline, right lower quadrant and epigastric region.    EXTREMITIES:  No edema.No Calf Tenderness.No swelling appreciated of right upper extremity.    NEUROLOGIC:  Alert, awake and oriented ×3.  No  Motor or sensory deficit appreciated. Cranial Nerves 2-12 grossly intact.    SKIN : No new skin lesion identified        DIAGNOSTIC DATA:    Glucose   Date Value Ref Range Status   02/21/2018 91 60 - 100 mg/dL Final     Sodium   Date Value Ref Range Status   02/21/2018 143 137 - 145 mmol/L Final     Potassium   Date Value Ref Range Status   02/21/2018 3.7 3.5 - 5.1 mmol/L Final     CO2   Date Value Ref Range Status   02/21/2018 26.0 22.0 - 31.0 mmol/L Final     Chloride   Date Value Ref Range Status   02/21/2018 104 95 - 110 mmol/L Final     Anion Gap   Date Value Ref Range Status   02/21/2018 13.0 5.0 - 15.0 mmol/L Final     Creatinine   Date Value Ref Range Status   02/21/2018 0.63 0.50 - 1.00 mg/dL Final     BUN   Date Value Ref Range Status "   02/21/2018 14 7 - 21 mg/dL Final     BUN/Creatinine Ratio   Date Value Ref Range Status   02/21/2018 22.2 7.0 - 25.0 Final     Calcium   Date Value Ref Range Status   02/21/2018 9.2 8.4 - 10.2 mg/dL Final     eGFR Non  Amer   Date Value Ref Range Status   02/21/2018 93 (H) 39 - 90 mL/min/1.73 Final     Alkaline Phosphatase   Date Value Ref Range Status   02/21/2018 83 38 - 126 U/L Final     Total Protein   Date Value Ref Range Status   02/21/2018 7.0 6.3 - 8.6 g/dL Final     ALT (SGPT)   Date Value Ref Range Status   02/21/2018 42 9 - 52 U/L Final     AST (SGOT)   Date Value Ref Range Status   02/21/2018 36 14 - 36 U/L Final     Total Bilirubin   Date Value Ref Range Status   02/21/2018 0.6 0.2 - 1.3 mg/dL Final     Albumin   Date Value Ref Range Status   02/21/2018 3.70 3.40 - 4.80 g/dL Final     Globulin   Date Value Ref Range Status   02/21/2018 3.3 2.3 - 3.5 gm/dL Final     A/G Ratio   Date Value Ref Range Status   02/21/2018 1.1 1.1 - 1.8 g/dL Final     Lab Results   Component Value Date    WBC 3.37 02/21/2018    HGB 11.7 (L) 02/21/2018    HCT 35.5 02/21/2018    MCV 81.1 02/21/2018     02/21/2018     Lab Results   Component Value Date    NEUTROABS 1.68 (L) 02/21/2018    IRON 58 12/18/2017    TIBC 312 12/18/2017    LABIRON 19 12/18/2017    FERRITIN 96.10 12/18/2017    [id number] 616 12/18/2017    FOLATE 7.20 12/18/2017     Lab Results   Component Value Date    CEA 1.40 12/18/2017           CEA level prior to surgery was elevated at 11.2.           Radiology Data :  CT scan of abdomen and pelvis with and without contrast done on November 6, 2017 at [facility] showed:  1.  Extensive rectosigmoid carcinoma extending to total length of 16 cm.  2.  7 mm mesenteric nodule adjacent to the sigmoid colon may be metastatic lymph node        Chest X-ray done on November 18, 2017 showed:  IMPRESSION:  CONCLUSION:  No Acute Disease  Cardiomegaly              Pathology :  Biopsy result from colonoscopy  showed:  Tubular adenoma with high-grade dysplasia and focal adenocarcinoma in situ        Surgical pathology report from November 22, 2017 showed:  Adenocarcinoma with mucinous features arising from sigmoid colon, T3 N1 B lesion, 2 out of 29 lymph nodes positive.  Lymphatic invasion present and no vascular invasion or perineural invasion seen.              ASSESSMENT AND PLAN:       1.  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  Lymphatic invasion present.  No large vessel invasion or perineural invasion seen.  Margins were negative.   She was started on chemotherapy with FOLFOX on December 27, 2017.   In view of leukopenia with white blood cell count of 2.9 with absolute neutrophil count of 1700 we will decrease the dose of oxaliplatin by 20% and 5-FU combined 20% from cycle 2 on January 10, 2018.  We will go ahead with cycle 5 of FOLFOX today.  Case was discussed with Dr. Michael by Dr. Hercules on January 15, 2018 and as perDrKane Michael patient's tumor was mostly in sigmoid colon not in the rectum.  So she does not need any radiation which was discussed with patient and his family.      2.  Mild neutropenia: Most likely secondary to chemotherapy.  Her white blood cell count is normal at 3.3 with absolute neutrophil count of 1670.  We will go ahead with cycle 5 of chemotherapy today.    3.  Persistent discomfort on lateral aspect of right elbow: Most likely muscular in origin.  Recommend following up with orthopedic for further workup.    4.   Hypokalemia.  Patient was found to have a potassium of 3.7 today.    Patient was instructed to continue taking K-Dur 10 milliequivalents by mouth daily at home.    5.  History of cardiomegaly.     6.  Constipation     7.  Health maintenance: Patient quit smoking 5 years ago.  Had a colonoscopy in November 6, 2017.  Had a mammogram earlier this year.    8.  Prescriptions: Patient is enough prescription for Zofran and Kdur.        Wil BENTLEY  MD Jarad  2/21/2018  8:42 AM        EMR Dragon/Transcription disclaimer:   Much of this encounter note is an electronic transcription/translation of spoken language to printed text. The electronic translation of spoken language may permit erroneous, or at times, nonsensical words or phrases to be inadvertently transcribed; Although I have reviewed the note for such errors, some may still exist.

## 2018-02-23 ENCOUNTER — INFUSION (OUTPATIENT)
Dept: ONCOLOGY | Facility: HOSPITAL | Age: 73
End: 2018-02-23

## 2018-02-23 DIAGNOSIS — Z45.2 ENCOUNTER FOR VENOUS ACCESS DEVICE CARE: Primary | ICD-10-CM

## 2018-02-23 PROCEDURE — 25010000002 HEPARIN FLUSH (PORCINE) 100 UNIT/ML SOLUTION: Performed by: INTERNAL MEDICINE

## 2018-02-23 PROCEDURE — 96523 IRRIG DRUG DELIVERY DEVICE: CPT | Performed by: INTERNAL MEDICINE

## 2018-02-23 RX ORDER — SODIUM CHLORIDE 0.9 % (FLUSH) 0.9 %
10 SYRINGE (ML) INJECTION AS NEEDED
Status: CANCELLED | OUTPATIENT
Start: 2018-03-07

## 2018-02-23 RX ORDER — SODIUM CHLORIDE 0.9 % (FLUSH) 0.9 %
10 SYRINGE (ML) INJECTION AS NEEDED
Status: DISCONTINUED | OUTPATIENT
Start: 2018-02-23 | End: 2018-02-23 | Stop reason: HOSPADM

## 2018-02-23 RX ADMIN — Medication 10 ML: at 10:42

## 2018-02-23 RX ADMIN — SODIUM CHLORIDE, PRESERVATIVE FREE 500 UNITS: 5 INJECTION INTRAVENOUS at 10:42

## 2018-03-05 RX ORDER — POTASSIUM CHLORIDE 750 MG/1
TABLET, FILM COATED, EXTENDED RELEASE ORAL
Qty: 30 TABLET | Refills: 1 | Status: SHIPPED | OUTPATIENT
Start: 2018-03-05 | End: 2018-04-18 | Stop reason: SDUPTHER

## 2018-03-07 ENCOUNTER — OFFICE VISIT (OUTPATIENT)
Dept: ONCOLOGY | Facility: CLINIC | Age: 73
End: 2018-03-07

## 2018-03-07 ENCOUNTER — INFUSION (OUTPATIENT)
Dept: ONCOLOGY | Facility: HOSPITAL | Age: 73
End: 2018-03-07

## 2018-03-07 VITALS
HEART RATE: 86 BPM | DIASTOLIC BLOOD PRESSURE: 84 MMHG | TEMPERATURE: 97.7 F | WEIGHT: 140.21 LBS | BODY MASS INDEX: 25.8 KG/M2 | SYSTOLIC BLOOD PRESSURE: 126 MMHG | RESPIRATION RATE: 18 BRPM | HEIGHT: 62 IN

## 2018-03-07 DIAGNOSIS — C19 CANCER OF RECTOSIGMOID (COLON) (HCC): Primary | ICD-10-CM

## 2018-03-07 DIAGNOSIS — Z45.2 ENCOUNTER FOR VENOUS ACCESS DEVICE CARE: ICD-10-CM

## 2018-03-07 DIAGNOSIS — C19 CANCER OF RECTOSIGMOID (COLON) (HCC): ICD-10-CM

## 2018-03-07 LAB
ALBUMIN SERPL-MCNC: 4 G/DL (ref 3.4–4.8)
ALBUMIN/GLOB SERPL: 1.1 G/DL (ref 1.1–1.8)
ALP SERPL-CCNC: 105 U/L (ref 38–126)
ALT SERPL W P-5'-P-CCNC: 38 U/L (ref 9–52)
ANION GAP SERPL CALCULATED.3IONS-SCNC: 14 MMOL/L (ref 5–15)
AST SERPL-CCNC: 30 U/L (ref 14–36)
BASOPHILS # BLD AUTO: 0.03 10*3/MM3 (ref 0–0.2)
BASOPHILS NFR BLD AUTO: 1.1 % (ref 0–2)
BILIRUB SERPL-MCNC: 0.5 MG/DL (ref 0.2–1.3)
BUN BLD-MCNC: 18 MG/DL (ref 7–21)
BUN/CREAT SERPL: 23.4 (ref 7–25)
CALCIUM SPEC-SCNC: 9.1 MG/DL (ref 8.4–10.2)
CHLORIDE SERPL-SCNC: 104 MMOL/L (ref 95–110)
CO2 SERPL-SCNC: 24 MMOL/L (ref 22–31)
CREAT BLD-MCNC: 0.77 MG/DL (ref 0.5–1)
DEPRECATED RDW RBC AUTO: 58.9 FL (ref 36.4–46.3)
EOSINOPHIL # BLD AUTO: 0.07 10*3/MM3 (ref 0–0.7)
EOSINOPHIL NFR BLD AUTO: 2.5 % (ref 0–7)
ERYTHROCYTE [DISTWIDTH] IN BLOOD BY AUTOMATED COUNT: 19.7 % (ref 11.5–14.5)
GFR SERPL CREATININE-BSD FRML MDRD: 74 ML/MIN/1.73 (ref 39–90)
GLOBULIN UR ELPH-MCNC: 3.5 GM/DL (ref 2.3–3.5)
GLUCOSE BLD-MCNC: 100 MG/DL (ref 60–100)
HCT VFR BLD AUTO: 38.9 % (ref 35–45)
HGB BLD-MCNC: 12.3 G/DL (ref 12–15.5)
IMM GRANULOCYTES # BLD: 0 10*3/MM3 (ref 0–0.02)
IMM GRANULOCYTES NFR BLD: 0 % (ref 0–0.5)
LYMPHOCYTES # BLD AUTO: 1.05 10*3/MM3 (ref 0.6–4.2)
LYMPHOCYTES NFR BLD AUTO: 38.2 % (ref 10–50)
MCH RBC QN AUTO: 26.2 PG (ref 26.5–34)
MCHC RBC AUTO-ENTMCNC: 31.6 G/DL (ref 31.4–36)
MCV RBC AUTO: 82.9 FL (ref 80–98)
MONOCYTES # BLD AUTO: 0.54 10*3/MM3 (ref 0–0.9)
MONOCYTES NFR BLD AUTO: 19.6 % (ref 0–12)
NEUTROPHILS # BLD AUTO: 1.06 10*3/MM3 (ref 2–8.6)
NEUTROPHILS NFR BLD AUTO: 38.6 % (ref 37–80)
PLATELET # BLD AUTO: 131 10*3/MM3 (ref 150–450)
PMV BLD AUTO: 10.2 FL (ref 8–12)
POTASSIUM BLD-SCNC: 3.8 MMOL/L (ref 3.5–5.1)
PROT SERPL-MCNC: 7.5 G/DL (ref 6.3–8.6)
RBC # BLD AUTO: 4.69 10*6/MM3 (ref 3.77–5.16)
SODIUM BLD-SCNC: 142 MMOL/L (ref 137–145)
WBC NRBC COR # BLD: 2.75 10*3/MM3 (ref 3.2–9.8)

## 2018-03-07 PROCEDURE — 25010000002 HEPARIN FLUSH (PORCINE) 100 UNIT/ML SOLUTION: Performed by: INTERNAL MEDICINE

## 2018-03-07 PROCEDURE — 99214 OFFICE O/P EST MOD 30 MIN: CPT | Performed by: INTERNAL MEDICINE

## 2018-03-07 PROCEDURE — 80053 COMPREHEN METABOLIC PANEL: CPT

## 2018-03-07 PROCEDURE — 85025 COMPLETE CBC W/AUTO DIFF WBC: CPT

## 2018-03-07 PROCEDURE — 36591 DRAW BLOOD OFF VENOUS DEVICE: CPT | Performed by: INTERNAL MEDICINE

## 2018-03-07 PROCEDURE — G0463 HOSPITAL OUTPT CLINIC VISIT: HCPCS | Performed by: INTERNAL MEDICINE

## 2018-03-07 RX ORDER — SODIUM CHLORIDE 0.9 % (FLUSH) 0.9 %
10 SYRINGE (ML) INJECTION AS NEEDED
Status: DISCONTINUED | OUTPATIENT
Start: 2018-03-07 | End: 2018-03-07 | Stop reason: HOSPADM

## 2018-03-07 RX ORDER — SODIUM CHLORIDE 0.9 % (FLUSH) 0.9 %
10 SYRINGE (ML) INJECTION AS NEEDED
Status: CANCELLED | OUTPATIENT
Start: 2018-03-14

## 2018-03-07 RX ADMIN — SODIUM CHLORIDE, PRESERVATIVE FREE 500 UNITS: 5 INJECTION INTRAVENOUS at 09:04

## 2018-03-07 RX ADMIN — Medication 10 ML: at 08:28

## 2018-03-07 NOTE — PROGRESS NOTES
DATE OF VISIT: 3/7/2018    REASON FOR VISIT:  Sigmoid cancer on adjuvant therapy with FOLFOX    HISTORY OF PRESENT ILLNESS:    72-year-old female with a past medical history significant for cardiomegaly was initially seen in consultation on December 18, 2017 for newly diagnosed rectosigmoid cancer.  Patient was started on adjuvant chemotherapy with FOLFOX on December 27, 2017.  Patient is here to get cycle 6 of FOLFOX today.   Complains of constipation .  Complains of intermittent abdominal discomfort. Denies any fever or chills.      PAST MEDICAL HISTORY:    1.  Cardiomegaly  2.  Adenocarcinoma of rectosigmoid region      SOCIAL HISTORY:    Social History   Substance Use Topics   • Smoking status: Former Smoker     Quit date: 12/2012   • Smokeless tobacco: Never Used   • Alcohol use No       Surgical History :  Past Surgical History:   Procedure Laterality Date   • COLON SURGERY     • GALLBLADDER SURGERY     • RETINAL DETACHMENT SURGERY         ALLERGIES:    Allergies   Allergen Reactions   • Codeine          FAMILY HISTORY:  Family History   Problem Relation Age of Onset   • Heart attack Brother    • Cancer Maternal Aunt            REVIEW OF SYSTEMS:      CONSTITUTIONAL:  Complains of fatigue. Denies any fever, chills .      HEENT:    No epistaxis, mouth sores or difficulty swallowing.     RESPIRATORY:  No new shortness of breath. No new cough or hemoptysis.     CARDIOVASCULAR:  No chest pain or palpitations.     GASTROINTESTINAL:Complains of intermittent constipation.  Complains of intermittent abdominal  Discomfort.  Denies any excessive diarrhea. Denies any blood in stool.  Denies any vomiting.    GENITOURINARY: No Dysuria or Hematuria.     MUSCULOSKELETAL:   States pain in right elbow is completely resolved. No new back pain or arthralgia..     LYMPHATICS:  Denies any abnormal swollen glands anywhere in the body.     NEUROLOGICAL : No tingling or numbness. No headache or dizziness. No seizures or balance  "problems.     SKIN: No new skin lesions.          PHYSICAL EXAMINATION:      VITAL SIGNS:  /84  Pulse 86  Temp 97.7 °F (36.5 °C) (Temporal Artery )   Resp 18  Ht 157.5 cm (62.01\")  Wt 63.6 kg (140 lb 3.4 oz)  BMI 25.64 kg/m2  Last 3 weights    03/07/18  0836   Weight: 63.6 kg (140 lb 3.4 oz)       ECOG  performance status: 1      GENERAL:  Not in any distress.    HEENT:  Normocephalic, Atraumatic.Mild Conjunctival pallor. No icterus. Extraocular Movements Intact. No Facial Asymmetry noted.    NECK:  No adenopathy. No JVD.    RESPIRATORY:  Fair air entry bilateral. No rhonchi or wheezing.    CARDIOVASCULAR:  S1, S2. Regular rate and rhythm. No murmur or gallop appreciated.    ABDOMEN:  Soft, obese, nontender. Bowel sounds present in all four quadrants.  No organomegaly appreciated.Well-healed surgical scar present in midline, right lower quadrant and epigastric region.    EXTREMITIES:  No edema.No Calf Tenderness.No swelling appreciated of right upper extremity.    NEUROLOGIC:  Alert, awake and oriented ×3.  No  Motor or sensory deficit appreciated. Cranial Nerves 2-12 grossly intact.    SKIN : No new skin lesion identified        DIAGNOSTIC DATA:    Glucose   Date Value Ref Range Status   03/07/2018 100 60 - 100 mg/dL Final     Sodium   Date Value Ref Range Status   03/07/2018 142 137 - 145 mmol/L Final     Potassium   Date Value Ref Range Status   03/07/2018 3.8 3.5 - 5.1 mmol/L Final     CO2   Date Value Ref Range Status   03/07/2018 24.0 22.0 - 31.0 mmol/L Final     Chloride   Date Value Ref Range Status   03/07/2018 104 95 - 110 mmol/L Final     Anion Gap   Date Value Ref Range Status   03/07/2018 14.0 5.0 - 15.0 mmol/L Final     Creatinine   Date Value Ref Range Status   03/07/2018 0.77 0.50 - 1.00 mg/dL Final     BUN   Date Value Ref Range Status   03/07/2018 18 7 - 21 mg/dL Final     BUN/Creatinine Ratio   Date Value Ref Range Status   03/07/2018 23.4 7.0 - 25.0 Final     Calcium   Date Value Ref " Range Status   03/07/2018 9.1 8.4 - 10.2 mg/dL Final     eGFR Non  Amer   Date Value Ref Range Status   03/07/2018 74 39 - 90 mL/min/1.73 Final     Alkaline Phosphatase   Date Value Ref Range Status   03/07/2018 105 38 - 126 U/L Final     Total Protein   Date Value Ref Range Status   03/07/2018 7.5 6.3 - 8.6 g/dL Final     ALT (SGPT)   Date Value Ref Range Status   03/07/2018 38 9 - 52 U/L Final     AST (SGOT)   Date Value Ref Range Status   03/07/2018 30 14 - 36 U/L Final     Total Bilirubin   Date Value Ref Range Status   03/07/2018 0.5 0.2 - 1.3 mg/dL Final     Albumin   Date Value Ref Range Status   03/07/2018 4.00 3.40 - 4.80 g/dL Final     Globulin   Date Value Ref Range Status   03/07/2018 3.5 2.3 - 3.5 gm/dL Final     A/G Ratio   Date Value Ref Range Status   03/07/2018 1.1 1.1 - 1.8 g/dL Final     Lab Results   Component Value Date    WBC 2.75 (L) 03/07/2018    HGB 12.3 03/07/2018    HCT 38.9 03/07/2018    MCV 82.9 03/07/2018     (L) 03/07/2018     Lab Results   Component Value Date    NEUTROABS 1.06 (L) 03/07/2018    IRON 58 12/18/2017    TIBC 312 12/18/2017    LABIRON 19 12/18/2017    FERRITIN 96.10 12/18/2017    [id number] 616 12/18/2017    FOLATE 7.20 12/18/2017     Lab Results   Component Value Date    CEA 1.40 12/18/2017           CEA level prior to surgery was elevated at 11.2.           Radiology Data :  CT scan of abdomen and pelvis with and without contrast done on November 6, 2017 at [facility] showed:  1.  Extensive rectosigmoid carcinoma extending to total length of 16 cm.  2.  7 mm mesenteric nodule adjacent to the sigmoid colon may be metastatic lymph node        Chest X-ray done on November 18, 2017 showed:  IMPRESSION:  CONCLUSION:  No Acute Disease  Cardiomegaly              Pathology :  Biopsy result from colonoscopy showed:  Tubular adenoma with high-grade dysplasia and focal adenocarcinoma in situ        Surgical pathology report from November 22, 2017  showed:  Adenocarcinoma with mucinous features arising from sigmoid colon, T3 N1 B lesion, 2 out of 29 lymph nodes positive.  Lymphatic invasion present and no vascular invasion or perineural invasion seen.              ASSESSMENT AND PLAN:       1.  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  Lymphatic invasion present.  No large vessel invasion or perineural invasion seen.  Margins were negative.   She was started on chemotherapy with FOLFOX on December 27, 2017.   In view of leukopenia with white blood cell count of 2.9 with absolute neutrophil count of 1700 we will decrease the dose of oxaliplatin by 20% and 5-FU combined 20% from cycle 2 on January 10, 2018.  Case was discussed with Dr. Michael by Dr. Hercules on January 15, 2018 and as perDr. Michael patient's tumor was mostly in sigmoid colon not in the rectum.  So she does not need any radiation which was discussed with patient and his family.  Neutropenia with ANC count of 1000, we will hold chemotherapy today.  We'll recheck CBC next week and resume chemotherapy.  Patient is requesting cycle 7 of chemotherapy to be scheduled on April 4 since she is planning to go to [place] for 1 week after next round of chemotherapy.  We will do that.  In view of recurrent cytopenia requiring holding chemotherapy.  We will omit 5-FU infusion with cycle 6 of chemotherapy next week.  This recommendation were discussed with patient and her family.    2.  Moderate neutropenia: Most likely secondary to chemotherapy.  Her white blood cell count is normal at 2.75 with absolute neutrophil count of 1030.  Hold chemotherapy today and recheck CBC and resume chemotherapy next week.    3.  Mild thrombocytopenia: Platelet count is 1 31,000 today.  Most likely secondary to chemotherapy.  Monitored with CBC.    4.   Hypokalemia.  Patient was found to have a potassium of 3.7 today.    Patient was instructed to continue taking K-Dur 10 milliequivalents  by mouth daily at home.    5.  History of cardiomegaly.     6.  Constipation     7.  Health maintenance: Patient quit smoking 5 years ago.  Had a colonoscopy in November 6, 2017.  Had a mammogram earlier this year.    8.  Prescriptions: Patient is enough prescription for Zofran and Kdur.        Wil Abraham MD  3/7/2018  11:23 AM        EMR Dragon/Transcription disclaimer:   Much of this encounter note is an electronic transcription/translation of spoken language to printed text. The electronic translation of spoken language may permit erroneous, or at times, nonsensical words or phrases to be inadvertently transcribed; Although I have reviewed the note for such errors, some may still exist.

## 2018-03-14 ENCOUNTER — INFUSION (OUTPATIENT)
Dept: ONCOLOGY | Facility: HOSPITAL | Age: 73
End: 2018-03-14

## 2018-03-14 VITALS
HEART RATE: 72 BPM | RESPIRATION RATE: 16 BRPM | SYSTOLIC BLOOD PRESSURE: 137 MMHG | TEMPERATURE: 97.4 F | DIASTOLIC BLOOD PRESSURE: 66 MMHG

## 2018-03-14 DIAGNOSIS — C19 CANCER OF RECTOSIGMOID (COLON) (HCC): ICD-10-CM

## 2018-03-14 DIAGNOSIS — Z45.2 ENCOUNTER FOR VENOUS ACCESS DEVICE CARE: Primary | ICD-10-CM

## 2018-03-14 LAB
ALBUMIN SERPL-MCNC: 3.9 G/DL (ref 3.4–4.8)
ALBUMIN/GLOB SERPL: 1.1 G/DL (ref 1.1–1.8)
ALP SERPL-CCNC: 99 U/L (ref 38–126)
ALT SERPL W P-5'-P-CCNC: 28 U/L (ref 9–52)
ANION GAP SERPL CALCULATED.3IONS-SCNC: 11 MMOL/L (ref 5–15)
AST SERPL-CCNC: 27 U/L (ref 14–36)
BASOPHILS # BLD MANUAL: 0.06 10*3/MM3 (ref 0–0.2)
BASOPHILS NFR BLD AUTO: 2 % (ref 0–2)
BILIRUB SERPL-MCNC: 0.4 MG/DL (ref 0.2–1.3)
BUN BLD-MCNC: 17 MG/DL (ref 7–21)
BUN/CREAT SERPL: 24.3 (ref 7–25)
CALCIUM SPEC-SCNC: 9.4 MG/DL (ref 8.4–10.2)
CHLORIDE SERPL-SCNC: 101 MMOL/L (ref 95–110)
CO2 SERPL-SCNC: 29 MMOL/L (ref 22–31)
CREAT BLD-MCNC: 0.7 MG/DL (ref 0.5–1)
DEPRECATED RDW RBC AUTO: 60.1 FL (ref 36.4–46.3)
ERYTHROCYTE [DISTWIDTH] IN BLOOD BY AUTOMATED COUNT: 19.7 % (ref 11.5–14.5)
GFR SERPL CREATININE-BSD FRML MDRD: 82 ML/MIN/1.73 (ref 60–90)
GLOBULIN UR ELPH-MCNC: 3.6 GM/DL (ref 2.3–3.5)
GLUCOSE BLD-MCNC: 90 MG/DL (ref 60–100)
HCT VFR BLD AUTO: 39.1 % (ref 35–45)
HGB BLD-MCNC: 12.9 G/DL (ref 12–15.5)
LYMPHOCYTES # BLD MANUAL: 1.6 10*3/MM3 (ref 0.6–4.2)
LYMPHOCYTES NFR BLD MANUAL: 27 % (ref 0–12)
LYMPHOCYTES NFR BLD MANUAL: 52 % (ref 10–50)
MCH RBC QN AUTO: 27.6 PG (ref 26.5–34)
MCHC RBC AUTO-ENTMCNC: 33 G/DL (ref 31.4–36)
MCV RBC AUTO: 83.7 FL (ref 80–98)
METAMYELOCYTES NFR BLD MANUAL: 2 % (ref 0–0)
MONOCYTES # BLD AUTO: 0.83 10*3/MM3 (ref 0–0.9)
NEUTROPHILS # BLD AUTO: 0.4 10*3/MM3 (ref 2–8.6)
NEUTROPHILS NFR BLD MANUAL: 13 % (ref 37–80)
PLATELET # BLD AUTO: 247 10*3/MM3 (ref 150–450)
PMV BLD AUTO: 9.4 FL (ref 8–12)
POTASSIUM BLD-SCNC: 3.9 MMOL/L (ref 3.5–5.1)
PROT SERPL-MCNC: 7.5 G/DL (ref 6.3–8.6)
RBC # BLD AUTO: 4.67 10*6/MM3 (ref 3.77–5.16)
RBC MORPH BLD: NORMAL
SMALL PLATELETS BLD QL SMEAR: ADEQUATE
SODIUM BLD-SCNC: 141 MMOL/L (ref 137–145)
VARIANT LYMPHS NFR BLD MANUAL: 4 % (ref 0–5)
WBC MORPH BLD: NORMAL
WBC NRBC COR # BLD: 3.08 10*3/MM3 (ref 3.2–9.8)

## 2018-03-14 PROCEDURE — 25010000002 PALONOSETRON PER 25 MCG: Performed by: INTERNAL MEDICINE

## 2018-03-14 PROCEDURE — 80053 COMPREHEN METABOLIC PANEL: CPT

## 2018-03-14 PROCEDURE — G0498 CHEMO EXTEND IV INFUS W/PUMP: HCPCS | Performed by: INTERNAL MEDICINE

## 2018-03-14 PROCEDURE — 96375 TX/PRO/DX INJ NEW DRUG ADDON: CPT | Performed by: INTERNAL MEDICINE

## 2018-03-14 PROCEDURE — 25010000002 OXALIPLATIN PER 0.5 MG: Performed by: INTERNAL MEDICINE

## 2018-03-14 PROCEDURE — 25010000003 DEXAMETHASONE SODIUM PHOSPHATE 100 MG/10ML SOLUTION 10 ML VIAL: Performed by: INTERNAL MEDICINE

## 2018-03-14 PROCEDURE — 85007 BL SMEAR W/DIFF WBC COUNT: CPT

## 2018-03-14 PROCEDURE — 25010000002 FLUOROURACIL PER 500 MG: Performed by: INTERNAL MEDICINE

## 2018-03-14 PROCEDURE — 96368 THER/DIAG CONCURRENT INF: CPT | Performed by: INTERNAL MEDICINE

## 2018-03-14 PROCEDURE — 96415 CHEMO IV INFUSION ADDL HR: CPT | Performed by: INTERNAL MEDICINE

## 2018-03-14 PROCEDURE — 25010000002 LEUCOVORIN CALCIUM PER 50 MG: Performed by: INTERNAL MEDICINE

## 2018-03-14 PROCEDURE — 85025 COMPLETE CBC W/AUTO DIFF WBC: CPT

## 2018-03-14 PROCEDURE — 96413 CHEMO IV INFUSION 1 HR: CPT | Performed by: INTERNAL MEDICINE

## 2018-03-14 RX ORDER — SODIUM CHLORIDE 0.9 % (FLUSH) 0.9 %
10 SYRINGE (ML) INJECTION AS NEEDED
Status: DISCONTINUED | OUTPATIENT
Start: 2018-03-14 | End: 2018-03-14 | Stop reason: HOSPADM

## 2018-03-14 RX ORDER — PALONOSETRON 0.05 MG/ML
0.25 INJECTION, SOLUTION INTRAVENOUS ONCE
Status: COMPLETED | OUTPATIENT
Start: 2018-03-14 | End: 2018-03-14

## 2018-03-14 RX ORDER — DEXTROSE MONOHYDRATE 50 MG/ML
250 INJECTION, SOLUTION INTRAVENOUS ONCE
Status: CANCELLED | OUTPATIENT
Start: 2018-03-14

## 2018-03-14 RX ORDER — DEXTROSE MONOHYDRATE 50 MG/ML
250 INJECTION, SOLUTION INTRAVENOUS ONCE
Status: COMPLETED | OUTPATIENT
Start: 2018-03-14 | End: 2018-03-14

## 2018-03-14 RX ORDER — SODIUM CHLORIDE 0.9 % (FLUSH) 0.9 %
10 SYRINGE (ML) INJECTION AS NEEDED
Status: CANCELLED | OUTPATIENT
Start: 2018-03-16

## 2018-03-14 RX ADMIN — PALONOSETRON HYDROCHLORIDE 0.25 MG: 0.25 INJECTION INTRAVENOUS at 10:24

## 2018-03-14 RX ADMIN — LEUCOVORIN CALCIUM 660 MG: 350 INJECTION, POWDER, LYOPHILIZED, FOR SOLUTION INTRAMUSCULAR; INTRAVENOUS at 10:56

## 2018-03-14 RX ADMIN — DEXTROSE MONOHYDRATE 250 ML: 50 INJECTION, SOLUTION INTRAVENOUS at 09:45

## 2018-03-14 RX ADMIN — FLUOROURACIL 3150 MG: 50 INJECTION, SOLUTION INTRAVENOUS at 13:29

## 2018-03-14 RX ADMIN — Medication 10 ML: at 08:50

## 2018-03-14 RX ADMIN — DEXAMETHASONE SODIUM PHOSPHATE 12 MG: 10 INJECTION, SOLUTION INTRAMUSCULAR; INTRAVENOUS at 09:45

## 2018-03-14 RX ADMIN — OXALIPLATIN 110 MG: 5 INJECTION, SOLUTION, CONCENTRATE INTRAVENOUS at 10:56

## 2018-03-16 ENCOUNTER — INFUSION (OUTPATIENT)
Dept: ONCOLOGY | Facility: HOSPITAL | Age: 73
End: 2018-03-16

## 2018-03-16 DIAGNOSIS — Z45.2 ENCOUNTER FOR VENOUS ACCESS DEVICE CARE: Primary | ICD-10-CM

## 2018-03-16 PROCEDURE — 96523 IRRIG DRUG DELIVERY DEVICE: CPT | Performed by: INTERNAL MEDICINE

## 2018-03-16 PROCEDURE — 25010000002 HEPARIN FLUSH (PORCINE) 100 UNIT/ML SOLUTION: Performed by: INTERNAL MEDICINE

## 2018-03-16 RX ORDER — SODIUM CHLORIDE 0.9 % (FLUSH) 0.9 %
10 SYRINGE (ML) INJECTION AS NEEDED
Status: CANCELLED | OUTPATIENT
Start: 2018-04-04

## 2018-03-16 RX ORDER — SODIUM CHLORIDE 0.9 % (FLUSH) 0.9 %
10 SYRINGE (ML) INJECTION AS NEEDED
Status: DISCONTINUED | OUTPATIENT
Start: 2018-03-16 | End: 2018-03-16 | Stop reason: HOSPADM

## 2018-03-16 RX ADMIN — SODIUM CHLORIDE, PRESERVATIVE FREE 500 UNITS: 5 INJECTION INTRAVENOUS at 10:58

## 2018-03-16 RX ADMIN — Medication 10 ML: at 10:58

## 2018-04-04 ENCOUNTER — OFFICE VISIT (OUTPATIENT)
Dept: ONCOLOGY | Facility: CLINIC | Age: 73
End: 2018-04-04

## 2018-04-04 ENCOUNTER — APPOINTMENT (OUTPATIENT)
Dept: ONCOLOGY | Facility: HOSPITAL | Age: 73
End: 2018-04-04

## 2018-04-04 ENCOUNTER — INFUSION (OUTPATIENT)
Dept: ONCOLOGY | Facility: HOSPITAL | Age: 73
End: 2018-04-04

## 2018-04-04 VITALS
BODY MASS INDEX: 27.16 KG/M2 | WEIGHT: 147.6 LBS | TEMPERATURE: 98.1 F | HEIGHT: 62 IN | RESPIRATION RATE: 18 BRPM | HEART RATE: 65 BPM | DIASTOLIC BLOOD PRESSURE: 72 MMHG | SYSTOLIC BLOOD PRESSURE: 137 MMHG

## 2018-04-04 DIAGNOSIS — D70.1 CHEMOTHERAPY-INDUCED NEUTROPENIA (HCC): Primary | ICD-10-CM

## 2018-04-04 DIAGNOSIS — Z45.2 ENCOUNTER FOR VENOUS ACCESS DEVICE CARE: ICD-10-CM

## 2018-04-04 DIAGNOSIS — C19 CANCER OF RECTOSIGMOID (COLON) (HCC): Primary | ICD-10-CM

## 2018-04-04 DIAGNOSIS — T45.1X5A CHEMOTHERAPY-INDUCED NEUTROPENIA (HCC): Primary | ICD-10-CM

## 2018-04-04 DIAGNOSIS — E87.6 HYPOKALEMIA: ICD-10-CM

## 2018-04-04 DIAGNOSIS — C19 CANCER OF RECTOSIGMOID (COLON) (HCC): ICD-10-CM

## 2018-04-04 LAB
ALBUMIN SERPL-MCNC: 3.9 G/DL (ref 3.4–4.8)
ALBUMIN/GLOB SERPL: 1.2 G/DL (ref 1.1–1.8)
ALP SERPL-CCNC: 91 U/L (ref 38–126)
ALT SERPL W P-5'-P-CCNC: 29 U/L (ref 9–52)
ANION GAP SERPL CALCULATED.3IONS-SCNC: 10 MMOL/L (ref 5–15)
ANISOCYTOSIS BLD QL: NORMAL
AST SERPL-CCNC: 27 U/L (ref 14–36)
BASOPHILS # BLD AUTO: 0.04 10*3/MM3 (ref 0–0.2)
BASOPHILS NFR BLD AUTO: 1 % (ref 0–2)
BILIRUB SERPL-MCNC: 0.4 MG/DL (ref 0.2–1.3)
BUN BLD-MCNC: 14 MG/DL (ref 7–21)
BUN/CREAT SERPL: 23.3 (ref 7–25)
CALCIUM SPEC-SCNC: 9.2 MG/DL (ref 8.4–10.2)
CHLORIDE SERPL-SCNC: 103 MMOL/L (ref 95–110)
CO2 SERPL-SCNC: 28 MMOL/L (ref 22–31)
CREAT BLD-MCNC: 0.6 MG/DL (ref 0.5–1)
DEPRECATED RDW RBC AUTO: 59.8 FL (ref 36.4–46.3)
EOSINOPHIL # BLD AUTO: 0.12 10*3/MM3 (ref 0–0.7)
EOSINOPHIL NFR BLD AUTO: 3 % (ref 0–7)
ERYTHROCYTE [DISTWIDTH] IN BLOOD BY AUTOMATED COUNT: 19 % (ref 11.5–14.5)
GFR SERPL CREATININE-BSD FRML MDRD: 98 ML/MIN/1.73 (ref 60–90)
GLOBULIN UR ELPH-MCNC: 3.3 GM/DL (ref 2.3–3.5)
GLUCOSE BLD-MCNC: 92 MG/DL (ref 60–100)
HCT VFR BLD AUTO: 37.5 % (ref 35–45)
HGB BLD-MCNC: 12.3 G/DL (ref 12–15.5)
IMM GRANULOCYTES # BLD: 0.02 10*3/MM3 (ref 0–0.02)
IMM GRANULOCYTES NFR BLD: 0.5 % (ref 0–0.5)
LYMPHOCYTES # BLD AUTO: 1.34 10*3/MM3 (ref 0.6–4.2)
LYMPHOCYTES NFR BLD AUTO: 33.4 % (ref 10–50)
MCH RBC QN AUTO: 27.8 PG (ref 26.5–34)
MCHC RBC AUTO-ENTMCNC: 32.8 G/DL (ref 31.4–36)
MCV RBC AUTO: 84.8 FL (ref 80–98)
MONOCYTES # BLD AUTO: 0.88 10*3/MM3 (ref 0–0.9)
MONOCYTES NFR BLD AUTO: 21.9 % (ref 0–12)
NEUTROPHILS # BLD AUTO: 1.61 10*3/MM3 (ref 2–8.6)
NEUTROPHILS NFR BLD AUTO: 40.2 % (ref 37–80)
PLATELET # BLD AUTO: 365 10*3/MM3 (ref 150–450)
PMV BLD AUTO: 9.8 FL (ref 8–12)
POTASSIUM BLD-SCNC: 3.8 MMOL/L (ref 3.5–5.1)
PROT SERPL-MCNC: 7.2 G/DL (ref 6.3–8.6)
RBC # BLD AUTO: 4.42 10*6/MM3 (ref 3.77–5.16)
SMALL PLATELETS BLD QL SMEAR: ADEQUATE
SODIUM BLD-SCNC: 141 MMOL/L (ref 137–145)
WBC MORPH BLD: NORMAL
WBC NRBC COR # BLD: 4.01 10*3/MM3 (ref 3.2–9.8)

## 2018-04-04 PROCEDURE — 25010000002 FLUOROURACIL PER 500 MG: Performed by: INTERNAL MEDICINE

## 2018-04-04 PROCEDURE — 85007 BL SMEAR W/DIFF WBC COUNT: CPT

## 2018-04-04 PROCEDURE — 96415 CHEMO IV INFUSION ADDL HR: CPT | Performed by: INTERNAL MEDICINE

## 2018-04-04 PROCEDURE — 96413 CHEMO IV INFUSION 1 HR: CPT | Performed by: INTERNAL MEDICINE

## 2018-04-04 PROCEDURE — 96368 THER/DIAG CONCURRENT INF: CPT | Performed by: INTERNAL MEDICINE

## 2018-04-04 PROCEDURE — 85025 COMPLETE CBC W/AUTO DIFF WBC: CPT

## 2018-04-04 PROCEDURE — 25010000002 LEUCOVORIN CALCIUM PER 50 MG: Performed by: INTERNAL MEDICINE

## 2018-04-04 PROCEDURE — 96375 TX/PRO/DX INJ NEW DRUG ADDON: CPT | Performed by: INTERNAL MEDICINE

## 2018-04-04 PROCEDURE — 25010000002 OXALIPLATIN PER 0.5 MG: Performed by: INTERNAL MEDICINE

## 2018-04-04 PROCEDURE — 99214 OFFICE O/P EST MOD 30 MIN: CPT | Performed by: INTERNAL MEDICINE

## 2018-04-04 PROCEDURE — 25010000003 DEXAMETHASONE SODIUM PHOSPHATE 100 MG/10ML SOLUTION 10 ML VIAL: Performed by: INTERNAL MEDICINE

## 2018-04-04 PROCEDURE — G0498 CHEMO EXTEND IV INFUS W/PUMP: HCPCS | Performed by: INTERNAL MEDICINE

## 2018-04-04 PROCEDURE — 25010000002 PALONOSETRON PER 25 MCG: Performed by: INTERNAL MEDICINE

## 2018-04-04 PROCEDURE — 80053 COMPREHEN METABOLIC PANEL: CPT

## 2018-04-04 RX ORDER — PALONOSETRON 0.05 MG/ML
0.25 INJECTION, SOLUTION INTRAVENOUS ONCE
Status: COMPLETED | OUTPATIENT
Start: 2018-04-04 | End: 2018-04-04

## 2018-04-04 RX ORDER — DEXTROSE MONOHYDRATE 50 MG/ML
250 INJECTION, SOLUTION INTRAVENOUS ONCE
Status: CANCELLED | OUTPATIENT
Start: 2018-04-04

## 2018-04-04 RX ORDER — DEXTROSE MONOHYDRATE 50 MG/ML
250 INJECTION, SOLUTION INTRAVENOUS ONCE
Status: COMPLETED | OUTPATIENT
Start: 2018-04-04 | End: 2018-04-04

## 2018-04-04 RX ORDER — SODIUM CHLORIDE 0.9 % (FLUSH) 0.9 %
10 SYRINGE (ML) INJECTION AS NEEDED
Status: CANCELLED | OUTPATIENT
Start: 2018-04-06

## 2018-04-04 RX ORDER — PALONOSETRON 0.05 MG/ML
0.25 INJECTION, SOLUTION INTRAVENOUS ONCE
Status: CANCELLED | OUTPATIENT
Start: 2018-04-04

## 2018-04-04 RX ORDER — SODIUM CHLORIDE 0.9 % (FLUSH) 0.9 %
10 SYRINGE (ML) INJECTION AS NEEDED
Status: DISCONTINUED | OUTPATIENT
Start: 2018-04-04 | End: 2018-04-04 | Stop reason: HOSPADM

## 2018-04-04 RX ADMIN — PALONOSETRON HYDROCHLORIDE 0.25 MG: 0.25 INJECTION INTRAVENOUS at 10:52

## 2018-04-04 RX ADMIN — Medication 10 ML: at 08:27

## 2018-04-04 RX ADMIN — OXALIPLATIN 115 MG: 5 INJECTION, SOLUTION, CONCENTRATE INTRAVENOUS at 11:32

## 2018-04-04 RX ADMIN — DEXTROSE MONOHYDRATE 250 ML: 50 INJECTION, SOLUTION INTRAVENOUS at 10:06

## 2018-04-04 RX ADMIN — LEUCOVORIN CALCIUM 680 MG: 350 INJECTION, POWDER, LYOPHILIZED, FOR SOLUTION INTRAMUSCULAR; INTRAVENOUS at 11:32

## 2018-04-04 RX ADMIN — FLUOROURACIL 3240 MG: 50 INJECTION, SOLUTION INTRAVENOUS at 13:59

## 2018-04-04 RX ADMIN — DEXAMETHASONE SODIUM PHOSPHATE 12 MG: 10 INJECTION, SOLUTION INTRAMUSCULAR; INTRAVENOUS at 10:25

## 2018-04-04 NOTE — PROGRESS NOTES
DATE OF VISIT: 4/4/2018    REASON FOR VISIT:  Sigmoid cancer on adjuvant therapy with FOLFOX    HISTORY OF PRESENT ILLNESS:    72-year-old female with a past medical history significant for cardiomegaly was initially seen in consultation on December 18, 2017 for newly diagnosed rectosigmoid cancer.  Patient was started on adjuvant chemotherapy with FOLFOX on December 27, 2017.  Patient is here to get cycle 7 of FOLFOX today.   Complains of constipation .  Complains of intermittent abdominal discomfort. Denies any fever or chills.  Denies any new tingling or numbness.      PAST MEDICAL HISTORY:    1.  Cardiomegaly  2.  Adenocarcinoma of rectosigmoid region      SOCIAL HISTORY:    Social History   Substance Use Topics   • Smoking status: Former Smoker     Quit date: 12/2012   • Smokeless tobacco: Never Used   • Alcohol use No       Surgical History :  Past Surgical History:   Procedure Laterality Date   • COLON SURGERY     • GALLBLADDER SURGERY     • RETINAL DETACHMENT SURGERY         ALLERGIES:    Allergies   Allergen Reactions   • Codeine          FAMILY HISTORY:  Family History   Problem Relation Age of Onset   • Heart attack Brother    • Cancer Maternal Aunt            REVIEW OF SYSTEMS:      CONSTITUTIONAL:  Complains of fatigue. Denies any fever, chills .      HEENT:    No epistaxis, mouth sores or difficulty swallowing.     RESPIRATORY:  No new shortness of breath. No new cough or hemoptysis.     CARDIOVASCULAR:  No chest pain or palpitations.     GASTROINTESTINAL:Complains of intermittent constipation.  Complains of intermittent abdominal  Discomfort.  Denies any excessive diarrhea. Denies any blood in stool.  Denies any vomiting.    GENITOURINARY: No Dysuria or Hematuria.     MUSCULOSKELETAL:No new back pain or arthralgia..     LYMPHATICS:  Denies any abnormal swollen glands anywhere in the body.     NEUROLOGICAL : No tingling or numbness. No headache or dizziness. No seizures or balance problems.     SKIN:  "No new skin lesions.          PHYSICAL EXAMINATION:      VITAL SIGNS:  /72   Pulse 65   Temp 98.1 °F (36.7 °C) (Temporal Artery )   Resp 18   Ht 158.3 cm (62.32\")   Wt 67 kg (147 lb 9.6 oz)   BMI 26.72 kg/m²   1    04/04/18 0919   Weight: 67 kg (147 lb 9.6 oz)       ECOG  performance status: 1      GENERAL:  Not in any distress.    HEENT:  Normocephalic, Atraumatic.Mild Conjunctival pallor. No icterus. Extraocular Movements Intact. No Facial Asymmetry noted.    NECK:  No adenopathy. No JVD.    RESPIRATORY:  Fair air entry bilateral. No rhonchi or wheezing.    CARDIOVASCULAR:  S1, S2. Regular rate and rhythm. No murmur or gallop appreciated.    ABDOMEN:  Soft, obese, nontender. Bowel sounds present in all four quadrants.  No organomegaly appreciated.Well-healed surgical scar present in midline, right lower quadrant and epigastric region.    MUSCULOSKELETAL:  No edema.No Calf Tenderness.Normal range of motion.    NEUROLOGIC:  Alert, awake and oriented ×3.  No  Motor or sensory deficit appreciated. Cranial Nerves 2-12 grossly intact.    SKIN : No new skin lesion identified    LYMPHATICS: No new lymph node enlargement in neck.    PSYCHIATRY: Normal affect. Normal judgement. Makes good eye contact.        DIAGNOSTIC DATA:    Glucose   Date Value Ref Range Status   04/04/2018 92 60 - 100 mg/dL Final     Sodium   Date Value Ref Range Status   04/04/2018 141 137 - 145 mmol/L Final     Potassium   Date Value Ref Range Status   04/04/2018 3.8 3.5 - 5.1 mmol/L Final     CO2   Date Value Ref Range Status   04/04/2018 28.0 22.0 - 31.0 mmol/L Final     Chloride   Date Value Ref Range Status   04/04/2018 103 95 - 110 mmol/L Final     Anion Gap   Date Value Ref Range Status   04/04/2018 10.0 5.0 - 15.0 mmol/L Final     Creatinine   Date Value Ref Range Status   04/04/2018 0.60 0.50 - 1.00 mg/dL Final     BUN   Date Value Ref Range Status   04/04/2018 14 7 - 21 mg/dL Final     BUN/Creatinine Ratio   Date Value Ref Range " Status   04/04/2018 23.3 7.0 - 25.0 Final     Calcium   Date Value Ref Range Status   04/04/2018 9.2 8.4 - 10.2 mg/dL Final     eGFR Non  Amer   Date Value Ref Range Status   04/04/2018 98 >60 mL/min/1.73 Final     Alkaline Phosphatase   Date Value Ref Range Status   04/04/2018 91 38 - 126 U/L Final     Total Protein   Date Value Ref Range Status   04/04/2018 7.2 6.3 - 8.6 g/dL Final     ALT (SGPT)   Date Value Ref Range Status   04/04/2018 29 9 - 52 U/L Final     AST (SGOT)   Date Value Ref Range Status   04/04/2018 27 14 - 36 U/L Final     Total Bilirubin   Date Value Ref Range Status   04/04/2018 0.4 0.2 - 1.3 mg/dL Final     Albumin   Date Value Ref Range Status   04/04/2018 3.90 3.40 - 4.80 g/dL Final     Globulin   Date Value Ref Range Status   04/04/2018 3.3 2.3 - 3.5 gm/dL Final     A/G Ratio   Date Value Ref Range Status   04/04/2018 1.2 1.1 - 1.8 g/dL Final     Lab Results   Component Value Date    WBC 4.01 04/04/2018    HGB 12.3 04/04/2018    HCT 37.5 04/04/2018    MCV 84.8 04/04/2018     04/04/2018     Lab Results   Component Value Date    NEUTROABS 1.61 (L) 04/04/2018    IRON 58 12/18/2017    TIBC 312 12/18/2017    LABIRON 19 12/18/2017    FERRITIN 96.10 12/18/2017    [id number] 616 12/18/2017    FOLATE 7.20 12/18/2017     Lab Results   Component Value Date    CEA 1.40 12/18/2017           CEA level prior to surgery was elevated at 11.2.           Radiology Data :  CT scan of abdomen and pelvis with and without contrast done on November 6, 2017 at [facility] showed:  1.  Extensive rectosigmoid carcinoma extending to total length of 16 cm.  2.  7 mm mesenteric nodule adjacent to the sigmoid colon may be metastatic lymph node        Chest X-ray done on November 18, 2017 showed:  IMPRESSION:  CONCLUSION:  No Acute Disease  Cardiomegaly              Pathology :  Biopsy result from colonoscopy showed:  Tubular adenoma with high-grade dysplasia and focal adenocarcinoma in  situ        Surgical pathology report from November 22, 2017 showed:  Adenocarcinoma with mucinous features arising from sigmoid colon, T3 N1 B lesion, 2 out of 29 lymph nodes positive.  Lymphatic invasion present and no vascular invasion or perineural invasion seen.              ASSESSMENT AND PLAN:       1.  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  Lymphatic invasion present.  No large vessel invasion or perineural invasion seen.  Margins were negative.   She was started on chemotherapy with FOLFOX on December 27, 2017.   In view of leukopenia with white blood cell count of 2.9 with absolute neutrophil count of 1700 we will decrease the dose of oxaliplatin by 20% and 5-FU combined 20% from cycle 2 on January 10, 2018.  Case was discussed with Dr. Michael by Dr. Hercules on January 15, 2018 and as perDr. Michael patient's tumor was mostly in sigmoid colon not in the rectum.  So she does not need any radiation which was discussed with patient and his family.  Received cycle 6 of chemotherapy on March 16, 2018.  After that patient went to [place] for 1 week and requested a break.  We will go ahead with cycle 7 of chemotherapy today on April 4, 2018.  Last patient will return to clinic in 2 weeks with a repeat CBC and CMP on that day.    2.  Moderate neutropenia: Most likely secondary to chemotherapy.  Her white blood cell count is normal at 4.01 with absolute neutrophil count of 1610. Monitor with CBC for now    3.   Hypokalemia.  Patient was found to have a potassium of 3.7 today.    Patient was instructed to continue taking K-Dur 10 milliequivalents by mouth daily at home.    4.  History of cardiomegaly.     5.  Constipation     6.  Health maintenance: Patient quit smoking around 2012.  Had a colonoscopy in November 6, 2017.  Had a mammogram in 2017.    7.  Prescriptions: Patient is enough prescription for Zofran and Kdur.        Wil Abraham MD  4/4/2018  6:16 PM        EMR  Dragon/Transcription disclaimer:   Much of this encounter note is an electronic transcription/translation of spoken language to printed text. The electronic translation of spoken language may permit erroneous, or at times, nonsensical words or phrases to be inadvertently transcribed; Although I have reviewed the note for such errors, some may still exist.

## 2018-04-06 ENCOUNTER — INFUSION (OUTPATIENT)
Dept: ONCOLOGY | Facility: HOSPITAL | Age: 73
End: 2018-04-06

## 2018-04-06 DIAGNOSIS — Z45.2 ENCOUNTER FOR VENOUS ACCESS DEVICE CARE: Primary | ICD-10-CM

## 2018-04-06 PROCEDURE — 25010000002 HEPARIN FLUSH (PORCINE) 100 UNIT/ML SOLUTION: Performed by: INTERNAL MEDICINE

## 2018-04-06 PROCEDURE — 96523 IRRIG DRUG DELIVERY DEVICE: CPT | Performed by: INTERNAL MEDICINE

## 2018-04-06 RX ORDER — SODIUM CHLORIDE 0.9 % (FLUSH) 0.9 %
10 SYRINGE (ML) INJECTION AS NEEDED
Status: DISCONTINUED | OUTPATIENT
Start: 2018-04-06 | End: 2018-04-06 | Stop reason: HOSPADM

## 2018-04-06 RX ORDER — SODIUM CHLORIDE 0.9 % (FLUSH) 0.9 %
10 SYRINGE (ML) INJECTION AS NEEDED
Status: CANCELLED | OUTPATIENT
Start: 2018-04-18

## 2018-04-06 RX ADMIN — SODIUM CHLORIDE, PRESERVATIVE FREE 500 UNITS: 5 INJECTION INTRAVENOUS at 13:35

## 2018-04-06 RX ADMIN — Medication 10 ML: at 13:22

## 2018-04-18 ENCOUNTER — OFFICE VISIT (OUTPATIENT)
Dept: ONCOLOGY | Facility: CLINIC | Age: 73
End: 2018-04-18

## 2018-04-18 ENCOUNTER — INFUSION (OUTPATIENT)
Dept: ONCOLOGY | Facility: HOSPITAL | Age: 73
End: 2018-04-18

## 2018-04-18 VITALS
RESPIRATION RATE: 18 BRPM | TEMPERATURE: 98.1 F | BODY MASS INDEX: 27.16 KG/M2 | HEART RATE: 68 BPM | SYSTOLIC BLOOD PRESSURE: 136 MMHG | DIASTOLIC BLOOD PRESSURE: 74 MMHG | WEIGHT: 147.6 LBS | HEIGHT: 62 IN

## 2018-04-18 DIAGNOSIS — T45.1X5A ANEMIA DUE TO ANTINEOPLASTIC CHEMOTHERAPY: ICD-10-CM

## 2018-04-18 DIAGNOSIS — E87.6 HYPOKALEMIA: Primary | ICD-10-CM

## 2018-04-18 DIAGNOSIS — C19 CANCER OF RECTOSIGMOID (COLON) (HCC): ICD-10-CM

## 2018-04-18 DIAGNOSIS — C19 CANCER OF RECTOSIGMOID (COLON) (HCC): Primary | ICD-10-CM

## 2018-04-18 DIAGNOSIS — Z45.2 ENCOUNTER FOR VENOUS ACCESS DEVICE CARE: ICD-10-CM

## 2018-04-18 DIAGNOSIS — D64.81 ANEMIA DUE TO ANTINEOPLASTIC CHEMOTHERAPY: ICD-10-CM

## 2018-04-18 DIAGNOSIS — E87.6 HYPOKALEMIA: ICD-10-CM

## 2018-04-18 PROBLEM — D64.9 ANEMIA: Status: ACTIVE | Noted: 2018-04-18

## 2018-04-18 LAB
ALBUMIN SERPL-MCNC: 3.7 G/DL (ref 3.4–4.8)
ALBUMIN/GLOB SERPL: 1.2 G/DL (ref 1.1–1.8)
ALP SERPL-CCNC: 94 U/L (ref 38–126)
ALT SERPL W P-5'-P-CCNC: 30 U/L (ref 9–52)
ANION GAP SERPL CALCULATED.3IONS-SCNC: 12 MMOL/L (ref 5–15)
AST SERPL-CCNC: 25 U/L (ref 14–36)
BASOPHILS # BLD AUTO: 0.02 10*3/MM3 (ref 0–0.2)
BASOPHILS NFR BLD AUTO: 0.4 % (ref 0–2)
BILIRUB SERPL-MCNC: 0.3 MG/DL (ref 0.2–1.3)
BUN BLD-MCNC: 8 MG/DL (ref 7–21)
BUN/CREAT SERPL: 12.9 (ref 7–25)
CALCIUM SPEC-SCNC: 8.7 MG/DL (ref 8.4–10.2)
CHLORIDE SERPL-SCNC: 105 MMOL/L (ref 95–110)
CO2 SERPL-SCNC: 27 MMOL/L (ref 22–31)
CREAT BLD-MCNC: 0.62 MG/DL (ref 0.5–1)
DEPRECATED RDW RBC AUTO: 55.3 FL (ref 36.4–46.3)
EOSINOPHIL # BLD AUTO: 0.14 10*3/MM3 (ref 0–0.7)
EOSINOPHIL NFR BLD AUTO: 2.7 % (ref 0–7)
ERYTHROCYTE [DISTWIDTH] IN BLOOD BY AUTOMATED COUNT: 17.9 % (ref 11.5–14.5)
GFR SERPL CREATININE-BSD FRML MDRD: 95 ML/MIN/1.73 (ref 39–90)
GLOBULIN UR ELPH-MCNC: 3.1 GM/DL (ref 2.3–3.5)
GLUCOSE BLD-MCNC: 97 MG/DL (ref 60–100)
HCT VFR BLD AUTO: 36.7 % (ref 35–45)
HGB BLD-MCNC: 11.8 G/DL (ref 12–15.5)
IMM GRANULOCYTES # BLD: 0.01 10*3/MM3 (ref 0–0.02)
IMM GRANULOCYTES NFR BLD: 0.2 % (ref 0–0.5)
LYMPHOCYTES # BLD AUTO: 1.37 10*3/MM3 (ref 0.6–4.2)
LYMPHOCYTES NFR BLD AUTO: 26.7 % (ref 10–50)
MCH RBC QN AUTO: 27.3 PG (ref 26.5–34)
MCHC RBC AUTO-ENTMCNC: 32.2 G/DL (ref 31.4–36)
MCV RBC AUTO: 84.8 FL (ref 80–98)
MONOCYTES # BLD AUTO: 0.83 10*3/MM3 (ref 0–0.9)
MONOCYTES NFR BLD AUTO: 16.1 % (ref 0–12)
NEUTROPHILS # BLD AUTO: 2.77 10*3/MM3 (ref 2–8.6)
NEUTROPHILS NFR BLD AUTO: 53.9 % (ref 37–80)
NRBC BLD MANUAL-RTO: 0 /100 WBC (ref 0–0)
PLATELET # BLD AUTO: 159 10*3/MM3 (ref 150–450)
PMV BLD AUTO: 9.5 FL (ref 8–12)
POTASSIUM BLD-SCNC: 3.4 MMOL/L (ref 3.5–5.1)
PROT SERPL-MCNC: 6.8 G/DL (ref 6.3–8.6)
RBC # BLD AUTO: 4.33 10*6/MM3 (ref 3.77–5.16)
SODIUM BLD-SCNC: 144 MMOL/L (ref 137–145)
WBC NRBC COR # BLD: 5.14 10*3/MM3 (ref 3.2–9.8)

## 2018-04-18 PROCEDURE — 96413 CHEMO IV INFUSION 1 HR: CPT | Performed by: INTERNAL MEDICINE

## 2018-04-18 PROCEDURE — 25010000002 FLUOROURACIL PER 500 MG: Performed by: INTERNAL MEDICINE

## 2018-04-18 PROCEDURE — G0498 CHEMO EXTEND IV INFUS W/PUMP: HCPCS | Performed by: INTERNAL MEDICINE

## 2018-04-18 PROCEDURE — 96368 THER/DIAG CONCURRENT INF: CPT | Performed by: INTERNAL MEDICINE

## 2018-04-18 PROCEDURE — 25010000003 DEXAMETHASONE SODIUM PHOSPHATE 100 MG/10ML SOLUTION 10 ML VIAL: Performed by: INTERNAL MEDICINE

## 2018-04-18 PROCEDURE — 96375 TX/PRO/DX INJ NEW DRUG ADDON: CPT | Performed by: INTERNAL MEDICINE

## 2018-04-18 PROCEDURE — 25010000002 PALONOSETRON PER 25 MCG: Performed by: INTERNAL MEDICINE

## 2018-04-18 PROCEDURE — 80053 COMPREHEN METABOLIC PANEL: CPT

## 2018-04-18 PROCEDURE — 25010000002 LEUCOVORIN CALCIUM PER 50 MG: Performed by: INTERNAL MEDICINE

## 2018-04-18 PROCEDURE — 99214 OFFICE O/P EST MOD 30 MIN: CPT | Performed by: INTERNAL MEDICINE

## 2018-04-18 PROCEDURE — 96415 CHEMO IV INFUSION ADDL HR: CPT | Performed by: INTERNAL MEDICINE

## 2018-04-18 PROCEDURE — 85025 COMPLETE CBC W/AUTO DIFF WBC: CPT

## 2018-04-18 PROCEDURE — 25010000002 OXALIPLATIN PER 0.5 MG: Performed by: INTERNAL MEDICINE

## 2018-04-18 RX ORDER — SODIUM CHLORIDE 0.9 % (FLUSH) 0.9 %
10 SYRINGE (ML) INJECTION AS NEEDED
Status: CANCELLED | OUTPATIENT
Start: 2018-04-20

## 2018-04-18 RX ORDER — POTASSIUM CHLORIDE 750 MG/1
40 CAPSULE, EXTENDED RELEASE ORAL ONCE
Status: CANCELLED
Start: 2018-04-18 | End: 2018-04-18

## 2018-04-18 RX ORDER — DEXTROSE MONOHYDRATE 50 MG/ML
250 INJECTION, SOLUTION INTRAVENOUS ONCE
Status: CANCELLED | OUTPATIENT
Start: 2018-04-18

## 2018-04-18 RX ORDER — POTASSIUM CHLORIDE 750 MG/1
40 CAPSULE, EXTENDED RELEASE ORAL ONCE
Status: COMPLETED | OUTPATIENT
Start: 2018-04-18 | End: 2018-04-18

## 2018-04-18 RX ORDER — POTASSIUM CHLORIDE 750 MG/1
TABLET, FILM COATED, EXTENDED RELEASE ORAL
Qty: 30 TABLET | Refills: 1 | Status: SHIPPED | OUTPATIENT
Start: 2018-04-18 | End: 2018-05-16 | Stop reason: SDUPTHER

## 2018-04-18 RX ORDER — SODIUM CHLORIDE 0.9 % (FLUSH) 0.9 %
10 SYRINGE (ML) INJECTION AS NEEDED
Status: DISCONTINUED | OUTPATIENT
Start: 2018-04-18 | End: 2018-04-18 | Stop reason: HOSPADM

## 2018-04-18 RX ORDER — PALONOSETRON 0.05 MG/ML
0.25 INJECTION, SOLUTION INTRAVENOUS ONCE
Status: CANCELLED | OUTPATIENT
Start: 2018-05-02 | End: 2018-05-02

## 2018-04-18 RX ORDER — PALONOSETRON 0.05 MG/ML
0.25 INJECTION, SOLUTION INTRAVENOUS ONCE
Status: CANCELLED | OUTPATIENT
Start: 2018-04-18

## 2018-04-18 RX ORDER — DEXTROSE MONOHYDRATE 50 MG/ML
250 INJECTION, SOLUTION INTRAVENOUS ONCE
Status: COMPLETED | OUTPATIENT
Start: 2018-04-18 | End: 2018-04-18

## 2018-04-18 RX ORDER — PALONOSETRON 0.05 MG/ML
0.25 INJECTION, SOLUTION INTRAVENOUS ONCE
Status: COMPLETED | OUTPATIENT
Start: 2018-04-18 | End: 2018-04-18

## 2018-04-18 RX ORDER — POTASSIUM CHLORIDE 1500 MG/1
40 TABLET, EXTENDED RELEASE ORAL DAILY
Qty: 6 TABLET | Refills: 0 | OUTPATIENT
Start: 2018-04-18 | End: 2018-04-21

## 2018-04-18 RX ADMIN — DEXTROSE MONOHYDRATE 250 ML: 50 INJECTION, SOLUTION INTRAVENOUS at 08:41

## 2018-04-18 RX ADMIN — POTASSIUM CHLORIDE 40 MEQ: 750 CAPSULE, EXTENDED RELEASE ORAL at 11:19

## 2018-04-18 RX ADMIN — LEUCOVORIN CALCIUM 700 MG: 350 INJECTION, POWDER, LYOPHILIZED, FOR SOLUTION INTRAMUSCULAR; INTRAVENOUS at 09:15

## 2018-04-18 RX ADMIN — Medication 10 ML: at 08:02

## 2018-04-18 RX ADMIN — PALONOSETRON HYDROCHLORIDE 0.25 MG: 0.25 INJECTION INTRAVENOUS at 08:46

## 2018-04-18 RX ADMIN — FLUOROURACIL 3240 MG: 50 INJECTION, SOLUTION INTRAVENOUS at 11:38

## 2018-04-18 RX ADMIN — DEXAMETHASONE SODIUM PHOSPHATE 12 MG: 10 INJECTION, SOLUTION INTRAMUSCULAR; INTRAVENOUS at 08:49

## 2018-04-18 RX ADMIN — OXALIPLATIN 115 MG: 5 INJECTION, SOLUTION, CONCENTRATE INTRAVENOUS at 09:15

## 2018-04-18 NOTE — PROGRESS NOTES
DATE OF VISIT: 4/18/2018      REASON FOR VISIT:  Sigmoid cancer on adjuvant therapy with FOLFOX      HISTORY OF PRESENT ILLNESS:    72-year-old female with a past medical history significant for cardiomegaly was initially seen in consultation on December 18, 2017 for newly diagnosed rectosigmoid cancer.  Patient was started on adjuvant chemotherapy with FOLFOX on December 27, 2017.  Patient is here to get cycle 8 of FOLFOX today.   Complains of  Chronic constipation for which she has been using stool softeners.  Denies any excessive nausea or vomiting or diarrhea with chemotherapy.  Denies any fever or chills.  Denies any new tingling or numbness.      PAST MEDICAL HISTORY:    1.  Cardiomegaly  2.  Adenocarcinoma of rectosigmoid region      SOCIAL HISTORY:    Social History   Substance Use Topics   • Smoking status: Former Smoker     Quit date: 12/2012   • Smokeless tobacco: Never Used   • Alcohol use No       Surgical History :  Past Surgical History:   Procedure Laterality Date   • COLON SURGERY     • GALLBLADDER SURGERY     • RETINAL DETACHMENT SURGERY         ALLERGIES:    Allergies   Allergen Reactions   • Codeine          FAMILY HISTORY:  Family History   Problem Relation Age of Onset   • Heart attack Brother    • Cancer Maternal Aunt            REVIEW OF SYSTEMS:      CONSTITUTIONAL:  Complains of fatigue. Denies any fever, chills .      HEENT:    No epistaxis, mouth sores or difficulty swallowing.     RESPIRATORY:  No new shortness of breath. No new cough or hemoptysis.     CARDIOVASCULAR:  No chest pain or palpitations.     GASTROINTESTINAL:Complains of intermittent constipation.  Complains of intermittent abdominal  Discomfort.  Denies any excessive diarrhea. Denies any blood in stool.  Denies any vomiting.    GENITOURINARY: No Dysuria or Hematuria.     MUSCULOSKELETAL:No new back pain or arthralgia..     LYMPHATICS:  Denies any abnormal swollen glands anywhere in the body.     NEUROLOGICAL : No tingling  "or numbness. No headache or dizziness. No seizures or balance problems.     SKIN: No new skin lesions.          PHYSICAL EXAMINATION:      VITAL SIGNS:  /74   Pulse 68   Temp 98.1 °F (36.7 °C) (Temporal Artery )   Resp 18   Ht 157.5 cm (62.01\")   Wt 67 kg (147 lb 9.6 oz)   BMI 26.99 kg/m²   1    04/18/18  0807   Weight: 67 kg (147 lb 9.6 oz)       ECOG  performance status: 1      GENERAL:  Not in any distress.    HEENT:  Normocephalic, Atraumatic.Mild Conjunctival pallor. No icterus. Extraocular Movements Intact. No Facial Asymmetry noted.    NECK:  No adenopathy. No JVD.    RESPIRATORY:  Fair air entry bilateral. No rhonchi or wheezing.    CARDIOVASCULAR:  S1, S2. Regular rate and rhythm. No murmur or gallop appreciated.    ABDOMEN:  Soft, obese, nontender. Bowel sounds present in all four quadrants.  No organomegaly appreciated.Well-healed surgical scar present in midline, right lower quadrant and epigastric region.    MUSCULOSKELETAL:  No edema.No Calf Tenderness.Normal range of motion.    NEUROLOGIC:  Alert, awake and oriented ×3.  No  Motor or sensory deficit appreciated. Cranial Nerves 2-12 grossly intact.    SKIN : No new skin lesion identified    LYMPHATICS: No new lymph node enlargement in neck.    PSYCHIATRY: Normal affect. Normal judgement. Makes good eye contact.        DIAGNOSTIC DATA:    Glucose   Date Value Ref Range Status   04/18/2018 97 60 - 100 mg/dL Final     Sodium   Date Value Ref Range Status   04/18/2018 144 137 - 145 mmol/L Final     Potassium   Date Value Ref Range Status   04/18/2018 3.4 (L) 3.5 - 5.1 mmol/L Final     CO2   Date Value Ref Range Status   04/18/2018 27.0 22.0 - 31.0 mmol/L Final     Chloride   Date Value Ref Range Status   04/18/2018 105 95 - 110 mmol/L Final     Anion Gap   Date Value Ref Range Status   04/18/2018 12.0 5.0 - 15.0 mmol/L Final     Creatinine   Date Value Ref Range Status   04/18/2018 0.62 0.50 - 1.00 mg/dL Final     BUN   Date Value Ref Range " Status   04/18/2018 8 7 - 21 mg/dL Final     BUN/Creatinine Ratio   Date Value Ref Range Status   04/18/2018 12.9 7.0 - 25.0 Final     Calcium   Date Value Ref Range Status   04/18/2018 8.7 8.4 - 10.2 mg/dL Final     eGFR Non  Amer   Date Value Ref Range Status   04/18/2018 95 (H) 39 - 90 mL/min/1.73 Final     Alkaline Phosphatase   Date Value Ref Range Status   04/18/2018 94 38 - 126 U/L Final     Total Protein   Date Value Ref Range Status   04/18/2018 6.8 6.3 - 8.6 g/dL Final     ALT (SGPT)   Date Value Ref Range Status   04/18/2018 30 9 - 52 U/L Final     AST (SGOT)   Date Value Ref Range Status   04/18/2018 25 14 - 36 U/L Final     Total Bilirubin   Date Value Ref Range Status   04/18/2018 0.3 0.2 - 1.3 mg/dL Final     Albumin   Date Value Ref Range Status   04/18/2018 3.70 3.40 - 4.80 g/dL Final     Globulin   Date Value Ref Range Status   04/18/2018 3.1 2.3 - 3.5 gm/dL Final     A/G Ratio   Date Value Ref Range Status   04/18/2018 1.2 1.1 - 1.8 g/dL Final     Lab Results   Component Value Date    WBC 5.14 04/18/2018    HGB 11.8 (L) 04/18/2018    HCT 36.7 04/18/2018    MCV 84.8 04/18/2018     04/18/2018     Lab Results   Component Value Date    NEUTROABS 2.77 04/18/2018    IRON 58 12/18/2017    TIBC 312 12/18/2017    LABIRON 19 12/18/2017    FERRITIN 96.10 12/18/2017    [id number] 616 12/18/2017    FOLATE 7.20 12/18/2017     Lab Results   Component Value Date    CEA 1.40 12/18/2017           CEA level prior to surgery was elevated at 11.2.           Radiology Data :  CT scan of abdomen and pelvis with and without contrast done on November 6, 2017 at [facility] [place], [place] showed:  1.  Extensive rectosigmoid carcinoma extending to total length of 16 cm.  2.  7 mm mesenteric nodule adjacent to the sigmoid colon may be metastatic lymph node        Chest X-ray done on November 18, 2017 showed:  IMPRESSION:  CONCLUSION:  No Acute Disease  Cardiomegaly              Pathology :  Biopsy result from  colonoscopy showed:  Tubular adenoma with high-grade dysplasia and focal adenocarcinoma in situ        Surgical pathology report from November 22, 2017 showed:  Adenocarcinoma with mucinous features arising from sigmoid colon, T3 N1 B lesion, 2 out of 29 lymph nodes positive.  Lymphatic invasion present and no vascular invasion or perineural invasion seen.              ASSESSMENT AND PLAN:       1.  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  Lymphatic invasion present.  No large vessel invasion or perineural invasion seen.  Margins were negative.   She was started on chemotherapy with FOLFOX on December 27, 2017.   In view of leukopenia with white blood cell count of 2.9 with absolute neutrophil count of 1700 we will decrease the dose of oxaliplatin by 20% and 5-FU combined 20% from cycle 2 on January 10, 2018.  Case was discussed with Dr. Michael by Dr. Hercules on January 15, 2018 and as perDr. Michael patient's tumor was mostly in sigmoid colon not in the rectum.  So she does not need any radiation which was discussed with patient and his family.  Received cycle 6 of chemotherapy on March 16, 2018.  After that patient went to [place] for 1 week and requested a break.  We will go ahead with cycle 8 of FOLFOX today. Will ask patient will return to clinic in 2 weeks with a repeat CBC and CMP on that day.    2.  Mild anemia: Secondary to chemotherapy.  Hemoglobin is 11.8.  We will monitored with CBC for now.    3.   Hypokalemia.  Patient was found to have a potassium of 3.4 today.  Will give her 40 mEq by mouth ×1 today.  Patient was instructed to continue taking K-Dur 10 milliequivalents by mouth daily at home.    4.  History of cardiomegaly.     5.  Constipation     6.  Health maintenance: Patient quit smoking around 2012.  Had a colonoscopy in November 6, 2017.  Had a mammogram in 2017.    7.  Prescriptions: Patient is enough prescription for Zofran and Kdur.        Wil BENTLEY  MD Jarad  4/18/2018  8:28 AM        EMR Dragon/Transcription disclaimer:   Much of this encounter note is an electronic transcription/translation of spoken language to printed text. The electronic translation of spoken language may permit erroneous, or at times, nonsensical words or phrases to be inadvertently transcribed; Although I have reviewed the note for such errors, some may still exist.

## 2018-04-20 ENCOUNTER — INFUSION (OUTPATIENT)
Dept: ONCOLOGY | Facility: HOSPITAL | Age: 73
End: 2018-04-20

## 2018-04-20 DIAGNOSIS — Z45.2 ENCOUNTER FOR VENOUS ACCESS DEVICE CARE: Primary | ICD-10-CM

## 2018-04-20 PROCEDURE — 25010000002 HEPARIN FLUSH (PORCINE) 100 UNIT/ML SOLUTION: Performed by: INTERNAL MEDICINE

## 2018-04-20 PROCEDURE — 96523 IRRIG DRUG DELIVERY DEVICE: CPT | Performed by: INTERNAL MEDICINE

## 2018-04-20 RX ORDER — SODIUM CHLORIDE 0.9 % (FLUSH) 0.9 %
10 SYRINGE (ML) INJECTION AS NEEDED
Status: CANCELLED | OUTPATIENT
Start: 2018-05-02

## 2018-04-20 RX ORDER — SODIUM CHLORIDE 0.9 % (FLUSH) 0.9 %
10 SYRINGE (ML) INJECTION AS NEEDED
Status: DISCONTINUED | OUTPATIENT
Start: 2018-04-20 | End: 2018-04-20 | Stop reason: HOSPADM

## 2018-04-20 RX ADMIN — Medication 10 ML: at 09:10

## 2018-04-20 RX ADMIN — SODIUM CHLORIDE, PRESERVATIVE FREE 500 UNITS: 5 INJECTION INTRAVENOUS at 09:11

## 2018-05-02 ENCOUNTER — APPOINTMENT (OUTPATIENT)
Dept: ONCOLOGY | Facility: HOSPITAL | Age: 73
End: 2018-05-02

## 2018-05-02 ENCOUNTER — OFFICE VISIT (OUTPATIENT)
Dept: ONCOLOGY | Facility: CLINIC | Age: 73
End: 2018-05-02

## 2018-05-02 ENCOUNTER — INFUSION (OUTPATIENT)
Dept: ONCOLOGY | Facility: HOSPITAL | Age: 73
End: 2018-05-02

## 2018-05-02 VITALS
BODY MASS INDEX: 27.49 KG/M2 | RESPIRATION RATE: 18 BRPM | HEART RATE: 70 BPM | HEIGHT: 62 IN | WEIGHT: 149.4 LBS | DIASTOLIC BLOOD PRESSURE: 72 MMHG | TEMPERATURE: 98.1 F | SYSTOLIC BLOOD PRESSURE: 137 MMHG

## 2018-05-02 DIAGNOSIS — D64.81 ANEMIA DUE TO ANTINEOPLASTIC CHEMOTHERAPY: ICD-10-CM

## 2018-05-02 DIAGNOSIS — E87.6 HYPOKALEMIA: ICD-10-CM

## 2018-05-02 DIAGNOSIS — C19 CANCER OF RECTOSIGMOID (COLON) (HCC): ICD-10-CM

## 2018-05-02 DIAGNOSIS — Z45.2 ENCOUNTER FOR VENOUS ACCESS DEVICE CARE: ICD-10-CM

## 2018-05-02 DIAGNOSIS — E87.6 HYPOKALEMIA: Primary | ICD-10-CM

## 2018-05-02 DIAGNOSIS — T45.1X5A ANEMIA DUE TO ANTINEOPLASTIC CHEMOTHERAPY: ICD-10-CM

## 2018-05-02 DIAGNOSIS — C19 CANCER OF RECTOSIGMOID (COLON) (HCC): Primary | ICD-10-CM

## 2018-05-02 LAB
ALBUMIN SERPL-MCNC: 3.6 G/DL (ref 3.4–4.8)
ALBUMIN/GLOB SERPL: 1 G/DL (ref 1.1–1.8)
ALP SERPL-CCNC: 98 U/L (ref 38–126)
ALT SERPL W P-5'-P-CCNC: 30 U/L (ref 9–52)
ANION GAP SERPL CALCULATED.3IONS-SCNC: 9 MMOL/L (ref 5–15)
AST SERPL-CCNC: 26 U/L (ref 14–36)
BASOPHILS # BLD AUTO: 0.04 10*3/MM3 (ref 0–0.2)
BASOPHILS NFR BLD AUTO: 0.8 % (ref 0–2)
BILIRUB SERPL-MCNC: 0.3 MG/DL (ref 0.2–1.3)
BUN BLD-MCNC: 13 MG/DL (ref 7–21)
BUN/CREAT SERPL: 20 (ref 7–25)
CALCIUM SPEC-SCNC: 8.9 MG/DL (ref 8.4–10.2)
CHLORIDE SERPL-SCNC: 104 MMOL/L (ref 95–110)
CO2 SERPL-SCNC: 28 MMOL/L (ref 22–31)
CREAT BLD-MCNC: 0.65 MG/DL (ref 0.5–1)
DEPRECATED RDW RBC AUTO: 54.6 FL (ref 36.4–46.3)
EOSINOPHIL # BLD AUTO: 0.18 10*3/MM3 (ref 0–0.7)
EOSINOPHIL NFR BLD AUTO: 3.4 % (ref 0–7)
ERYTHROCYTE [DISTWIDTH] IN BLOOD BY AUTOMATED COUNT: 17.4 % (ref 11.5–14.5)
GFR SERPL CREATININE-BSD FRML MDRD: 90 ML/MIN/1.73 (ref 60–90)
GLOBULIN UR ELPH-MCNC: 3.5 GM/DL (ref 2.3–3.5)
GLUCOSE BLD-MCNC: 98 MG/DL (ref 60–100)
HCT VFR BLD AUTO: 36.9 % (ref 35–45)
HGB BLD-MCNC: 11.9 G/DL (ref 12–15.5)
IMM GRANULOCYTES # BLD: 0.02 10*3/MM3 (ref 0–0.02)
IMM GRANULOCYTES NFR BLD: 0.4 % (ref 0–0.5)
LYMPHOCYTES # BLD AUTO: 1.33 10*3/MM3 (ref 0.6–4.2)
LYMPHOCYTES NFR BLD AUTO: 25.1 % (ref 10–50)
MCH RBC QN AUTO: 27.5 PG (ref 26.5–34)
MCHC RBC AUTO-ENTMCNC: 32.2 G/DL (ref 31.4–36)
MCV RBC AUTO: 85.4 FL (ref 80–98)
MONOCYTES # BLD AUTO: 0.92 10*3/MM3 (ref 0–0.9)
MONOCYTES NFR BLD AUTO: 17.4 % (ref 0–12)
NEUTROPHILS # BLD AUTO: 2.81 10*3/MM3 (ref 2–8.6)
NEUTROPHILS NFR BLD AUTO: 52.9 % (ref 37–80)
PLATELET # BLD AUTO: 162 10*3/MM3 (ref 150–450)
PMV BLD AUTO: 10.2 FL (ref 8–12)
POTASSIUM BLD-SCNC: 3.2 MMOL/L (ref 3.5–5.1)
PROT SERPL-MCNC: 7.1 G/DL (ref 6.3–8.6)
RBC # BLD AUTO: 4.32 10*6/MM3 (ref 3.77–5.16)
SODIUM BLD-SCNC: 141 MMOL/L (ref 137–145)
WBC NRBC COR # BLD: 5.3 10*3/MM3 (ref 3.2–9.8)

## 2018-05-02 PROCEDURE — G0498 CHEMO EXTEND IV INFUS W/PUMP: HCPCS | Performed by: INTERNAL MEDICINE

## 2018-05-02 PROCEDURE — 96368 THER/DIAG CONCURRENT INF: CPT | Performed by: INTERNAL MEDICINE

## 2018-05-02 PROCEDURE — 96415 CHEMO IV INFUSION ADDL HR: CPT | Performed by: INTERNAL MEDICINE

## 2018-05-02 PROCEDURE — 25010000003 DEXAMETHASONE SODIUM PHOSPHATE 100 MG/10ML SOLUTION 10 ML VIAL: Performed by: INTERNAL MEDICINE

## 2018-05-02 PROCEDURE — 25010000002 FLUOROURACIL PER 500 MG: Performed by: INTERNAL MEDICINE

## 2018-05-02 PROCEDURE — 85025 COMPLETE CBC W/AUTO DIFF WBC: CPT

## 2018-05-02 PROCEDURE — 96375 TX/PRO/DX INJ NEW DRUG ADDON: CPT | Performed by: INTERNAL MEDICINE

## 2018-05-02 PROCEDURE — 25010000002 PALONOSETRON PER 25 MCG: Performed by: INTERNAL MEDICINE

## 2018-05-02 PROCEDURE — 36591 DRAW BLOOD OFF VENOUS DEVICE: CPT | Performed by: INTERNAL MEDICINE

## 2018-05-02 PROCEDURE — 80053 COMPREHEN METABOLIC PANEL: CPT

## 2018-05-02 PROCEDURE — 1123F ACP DISCUSS/DSCN MKR DOCD: CPT | Performed by: INTERNAL MEDICINE

## 2018-05-02 PROCEDURE — 25010000002 LEUCOVORIN CALCIUM PER 50 MG: Performed by: INTERNAL MEDICINE

## 2018-05-02 PROCEDURE — 25010000002 OXALIPLATIN PER 0.5 MG: Performed by: INTERNAL MEDICINE

## 2018-05-02 PROCEDURE — 96413 CHEMO IV INFUSION 1 HR: CPT | Performed by: INTERNAL MEDICINE

## 2018-05-02 PROCEDURE — 99214 OFFICE O/P EST MOD 30 MIN: CPT | Performed by: INTERNAL MEDICINE

## 2018-05-02 RX ORDER — SODIUM CHLORIDE 0.9 % (FLUSH) 0.9 %
10 SYRINGE (ML) INJECTION AS NEEDED
Status: CANCELLED | OUTPATIENT
Start: 2018-05-04

## 2018-05-02 RX ORDER — POTASSIUM CHLORIDE 750 MG/1
40 CAPSULE, EXTENDED RELEASE ORAL ONCE
Status: COMPLETED | OUTPATIENT
Start: 2018-05-02 | End: 2018-05-02

## 2018-05-02 RX ORDER — POTASSIUM CHLORIDE 750 MG/1
40 CAPSULE, EXTENDED RELEASE ORAL ONCE
Status: CANCELLED
Start: 2018-05-02 | End: 2018-05-02

## 2018-05-02 RX ORDER — DEXTROSE MONOHYDRATE 50 MG/ML
250 INJECTION, SOLUTION INTRAVENOUS ONCE
Status: COMPLETED | OUTPATIENT
Start: 2018-05-02 | End: 2018-05-02

## 2018-05-02 RX ORDER — PALONOSETRON 0.05 MG/ML
0.25 INJECTION, SOLUTION INTRAVENOUS ONCE
Status: COMPLETED | OUTPATIENT
Start: 2018-05-02 | End: 2018-05-02

## 2018-05-02 RX ORDER — DEXTROSE MONOHYDRATE 50 MG/ML
250 INJECTION, SOLUTION INTRAVENOUS ONCE
Status: CANCELLED | OUTPATIENT
Start: 2018-05-02

## 2018-05-02 RX ORDER — SODIUM CHLORIDE 0.9 % (FLUSH) 0.9 %
10 SYRINGE (ML) INJECTION AS NEEDED
Status: DISCONTINUED | OUTPATIENT
Start: 2018-05-02 | End: 2018-05-02 | Stop reason: HOSPADM

## 2018-05-02 RX ADMIN — POTASSIUM CHLORIDE 40 MEQ: 750 CAPSULE, EXTENDED RELEASE ORAL at 09:42

## 2018-05-02 RX ADMIN — DEXAMETHASONE SODIUM PHOSPHATE 12 MG: 10 INJECTION, SOLUTION INTRAMUSCULAR; INTRAVENOUS at 08:32

## 2018-05-02 RX ADMIN — LEUCOVORIN CALCIUM 700 MG: 350 INJECTION, POWDER, LYOPHILIZED, FOR SOLUTION INTRAMUSCULAR; INTRAVENOUS at 09:15

## 2018-05-02 RX ADMIN — FLUOROURACIL 3240 MG: 50 INJECTION, SOLUTION INTRAVENOUS at 11:38

## 2018-05-02 RX ADMIN — PALONOSETRON HYDROCHLORIDE 0.25 MG: 0.25 INJECTION INTRAVENOUS at 09:03

## 2018-05-02 RX ADMIN — Medication 10 ML: at 07:42

## 2018-05-02 RX ADMIN — DEXTROSE MONOHYDRATE 250 ML: 50 INJECTION, SOLUTION INTRAVENOUS at 08:31

## 2018-05-02 RX ADMIN — OXALIPLATIN 115 MG: 5 INJECTION, SOLUTION, CONCENTRATE INTRAVENOUS at 09:15

## 2018-05-02 NOTE — PROGRESS NOTES
DATE OF VISIT: 5/2/2018      REASON FOR VISIT:  Sigmoid cancer on adjuvant therapy with FOLFOX      HISTORY OF PRESENT ILLNESS:    72-year-old female with a past medical history significant for cardiomegaly was initially seen in consultation on December 18, 2017 for newly diagnosed rectosigmoid cancer.  Patient was started on adjuvant chemotherapy with FOLFOX on December 27, 2017.  Patient is here to get cycle 9 of FOLFOX today.   Complains of  Chronic constipation for which she has been using stool softeners.  States she had accidental burn on left thumb . Complains of worsening fatigue. Denies any excessive nausea or vomiting or diarrhea with chemotherapy.  Denies any fever or chills.  Denies any new tingling or numbness.      PAST MEDICAL HISTORY:    1.  Cardiomegaly  2.  Adenocarcinoma of rectosigmoid region      SOCIAL HISTORY:    Social History   Substance Use Topics   • Smoking status: Former Smoker     Quit date: 12/2012   • Smokeless tobacco: Never Used   • Alcohol use No       Surgical History :  Past Surgical History:   Procedure Laterality Date   • COLON SURGERY     • GALLBLADDER SURGERY     • RETINAL DETACHMENT SURGERY         ALLERGIES:    Allergies   Allergen Reactions   • Codeine          FAMILY HISTORY:  Family History   Problem Relation Age of Onset   • Heart attack Brother    • Cancer Maternal Aunt            REVIEW OF SYSTEMS:      CONSTITUTIONAL:  Complains of fatigue. Denies any fever, chills .      HEENT:    No epistaxis, mouth sores or difficulty swallowing.     RESPIRATORY:  No new shortness of breath. No new cough or hemoptysis.     CARDIOVASCULAR:  No chest pain or palpitations.     GASTROINTESTINAL:Complains of intermittent constipation.  Complains of intermittent abdominal  Discomfort.  Denies any excessive diarrhea. Denies any blood in stool.  Denies any vomiting.    GENITOURINARY: No Dysuria or Hematuria.     MUSCULOSKELETAL:No new back pain or arthralgia..     LYMPHATICS:  Denies any  "abnormal swollen glands anywhere in the body.     NEUROLOGICAL : No tingling or numbness. No headache or dizziness. No seizures or balance problems.     SKIN: small blister present on left thumb.        PHYSICAL EXAMINATION:      VITAL SIGNS:  /72   Pulse 70   Temp 98.1 °F (36.7 °C) (Temporal Artery )   Resp 18   Ht 157.5 cm (62.01\")   Wt 67.8 kg (149 lb 6.4 oz)   BMI 27.32 kg/m²   1    05/02/18  0752   Weight: 67.8 kg (149 lb 6.4 oz)       ECOG  performance status: 1      GENERAL:  Not in any distress.    HEENT:  Normocephalic, Atraumatic.Mild Conjunctival pallor. No icterus. Extraocular Movements Intact. No Facial Asymmetry noted.    NECK:  No adenopathy. No JVD.    RESPIRATORY:  Fair air entry bilateral. No rhonchi or wheezing.    CARDIOVASCULAR:  S1, S2. Regular rate and rhythm. No murmur or gallop appreciated.    ABDOMEN:  Soft, obese, nontender. Bowel sounds present in all four quadrants.  No organomegaly appreciated.Well-healed surgical scar present in midline, right lower quadrant and epigastric region.    MUSCULOSKELETAL:  No edema.No Calf Tenderness.Normal range of motion.    NEUROLOGIC:  Alert, awake and oriented ×3.  No  Motor or sensory deficit appreciated. Cranial Nerves 2-12 grossly intact.    SKIN : Small blister about 0.5 cm present on left thumb.    LYMPHATICS: No new lymph node enlargement in neck.    PSYCHIATRY: Normal affect. Normal judgement. Makes good eye contact.        DIAGNOSTIC DATA:    Glucose   Date Value Ref Range Status   04/18/2018 97 60 - 100 mg/dL Final     Sodium   Date Value Ref Range Status   04/18/2018 144 137 - 145 mmol/L Final     Potassium   Date Value Ref Range Status   04/18/2018 3.4 (L) 3.5 - 5.1 mmol/L Final     CO2   Date Value Ref Range Status   04/18/2018 27.0 22.0 - 31.0 mmol/L Final     Chloride   Date Value Ref Range Status   04/18/2018 105 95 - 110 mmol/L Final     Anion Gap   Date Value Ref Range Status   04/18/2018 12.0 5.0 - 15.0 mmol/L Final "     Creatinine   Date Value Ref Range Status   04/18/2018 0.62 0.50 - 1.00 mg/dL Final     BUN   Date Value Ref Range Status   04/18/2018 8 7 - 21 mg/dL Final     BUN/Creatinine Ratio   Date Value Ref Range Status   04/18/2018 12.9 7.0 - 25.0 Final     Calcium   Date Value Ref Range Status   04/18/2018 8.7 8.4 - 10.2 mg/dL Final     eGFR Non  Amer   Date Value Ref Range Status   04/18/2018 95 (H) 39 - 90 mL/min/1.73 Final     Alkaline Phosphatase   Date Value Ref Range Status   04/18/2018 94 38 - 126 U/L Final     Total Protein   Date Value Ref Range Status   04/18/2018 6.8 6.3 - 8.6 g/dL Final     ALT (SGPT)   Date Value Ref Range Status   04/18/2018 30 9 - 52 U/L Final     AST (SGOT)   Date Value Ref Range Status   04/18/2018 25 14 - 36 U/L Final     Total Bilirubin   Date Value Ref Range Status   04/18/2018 0.3 0.2 - 1.3 mg/dL Final     Albumin   Date Value Ref Range Status   04/18/2018 3.70 3.40 - 4.80 g/dL Final     Globulin   Date Value Ref Range Status   04/18/2018 3.1 2.3 - 3.5 gm/dL Final     A/G Ratio   Date Value Ref Range Status   04/18/2018 1.2 1.1 - 1.8 g/dL Final     Lab Results   Component Value Date    WBC 5.30 05/02/2018    HGB 11.9 (L) 05/02/2018    HCT 36.9 05/02/2018    MCV 85.4 05/02/2018     05/02/2018     Lab Results   Component Value Date    NEUTROABS 2.81 05/02/2018    IRON 58 12/18/2017    TIBC 312 12/18/2017    LABIRON 19 12/18/2017    FERRITIN 96.10 12/18/2017    [id number] 616 12/18/2017    FOLATE 7.20 12/18/2017     Lab Results   Component Value Date    CEA 1.40 12/18/2017           CEA level prior to surgery was elevated at 11.2.           Radiology Data :  CT scan of abdomen and pelvis with and without contrast done on November 6, 2017 at [facility] showed:  1.  Extensive rectosigmoid carcinoma extending to total length of 16 cm.  2.  7 mm mesenteric nodule adjacent to the sigmoid colon may be metastatic lymph node        Chest X-ray done on November 18, 2017  showed:  IMPRESSION:  CONCLUSION:  No Acute Disease  Cardiomegaly              Pathology :  Biopsy result from colonoscopy showed:  Tubular adenoma with high-grade dysplasia and focal adenocarcinoma in situ        Surgical pathology report from November 22, 2017 showed:  Adenocarcinoma with mucinous features arising from sigmoid colon, T3 N1 B lesion, 2 out of 29 lymph nodes positive.  Lymphatic invasion present and no vascular invasion or perineural invasion seen.              ASSESSMENT AND PLAN:       1.  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  Lymphatic invasion present.  No large vessel invasion or perineural invasion seen.  Margins were negative.   She was started on chemotherapy with FOLFOX on December 27, 2017.   In view of leukopenia with white blood cell count of 2.9 with absolute neutrophil count of 1700 we will decrease the dose of oxaliplatin by 20% and 5-FU combined 20% from cycle 2 on January 10, 2018.  Case was discussed with Dr. Michael by Dr. Hercules on January 15, 2018 and as perDr. Michael patient's tumor was mostly in sigmoid colon not in the rectum.  So she does not need any radiation which was discussed with patient and his family.  We will go ahead with cycle 9 of FOLFOX today. Will ask patient will return to clinic in 2 weeks with a repeat CBC and CMP on that day.    2.  Mild anemia: Secondary to chemotherapy.  Hemoglobin is 11.9.  We will monitored with CBC for now.    3.   Hypokalemia.  Patient was found to have a potassium of 3.2 today.  Will give her 40 mEq by mouth ×1 today.  Patient was instructed to continue taking K-Dur 10 milliequivalents by mouth daily at home.    4.  History of cardiomegaly.     5.  Constipation     6.  Health maintenance: Patient quit smoking around 2012.  Had a colonoscopy in November 6, 2017.  Had a mammogram in 2017.    7.  Prescriptions: Patient is enough prescription for Zofran and Kdur.    8. Advance care planning:  Patient is able to make on her decision and remains full code for now.  Spouse is mentioned as health care surrogate on chart.        Wil Abraham MD  5/2/2018  7:59 AM        EMR Dragon/Transcription disclaimer:   Much of this encounter note is an electronic transcription/translation of spoken language to printed text. The electronic translation of spoken language may permit erroneous, or at times, nonsensical words or phrases to be inadvertently transcribed; Although I have reviewed the note for such errors, some may still exist.

## 2018-05-04 ENCOUNTER — INFUSION (OUTPATIENT)
Dept: ONCOLOGY | Facility: HOSPITAL | Age: 73
End: 2018-05-04

## 2018-05-04 DIAGNOSIS — Z45.2 ENCOUNTER FOR VENOUS ACCESS DEVICE CARE: Primary | ICD-10-CM

## 2018-05-04 PROCEDURE — 96523 IRRIG DRUG DELIVERY DEVICE: CPT | Performed by: INTERNAL MEDICINE

## 2018-05-04 PROCEDURE — 25010000002 HEPARIN FLUSH (PORCINE) 100 UNIT/ML SOLUTION: Performed by: INTERNAL MEDICINE

## 2018-05-04 RX ORDER — SODIUM CHLORIDE 0.9 % (FLUSH) 0.9 %
10 SYRINGE (ML) INJECTION AS NEEDED
Status: DISCONTINUED | OUTPATIENT
Start: 2018-05-04 | End: 2018-05-04 | Stop reason: HOSPADM

## 2018-05-04 RX ORDER — SODIUM CHLORIDE 0.9 % (FLUSH) 0.9 %
10 SYRINGE (ML) INJECTION AS NEEDED
Status: CANCELLED | OUTPATIENT
Start: 2018-05-16

## 2018-05-04 RX ADMIN — SODIUM CHLORIDE, PRESERVATIVE FREE 500 UNITS: 5 INJECTION INTRAVENOUS at 12:32

## 2018-05-04 RX ADMIN — Medication 10 ML: at 12:32

## 2018-05-16 ENCOUNTER — INFUSION (OUTPATIENT)
Dept: ONCOLOGY | Facility: HOSPITAL | Age: 73
End: 2018-05-16

## 2018-05-16 ENCOUNTER — OFFICE VISIT (OUTPATIENT)
Dept: ONCOLOGY | Facility: CLINIC | Age: 73
End: 2018-05-16

## 2018-05-16 VITALS
SYSTOLIC BLOOD PRESSURE: 142 MMHG | DIASTOLIC BLOOD PRESSURE: 82 MMHG | HEART RATE: 64 BPM | BODY MASS INDEX: 27.79 KG/M2 | WEIGHT: 151 LBS | TEMPERATURE: 98.3 F | HEIGHT: 62 IN | RESPIRATION RATE: 18 BRPM

## 2018-05-16 DIAGNOSIS — T45.1X5A ANEMIA DUE TO ANTINEOPLASTIC CHEMOTHERAPY: Primary | ICD-10-CM

## 2018-05-16 DIAGNOSIS — Z45.2 ENCOUNTER FOR VENOUS ACCESS DEVICE CARE: ICD-10-CM

## 2018-05-16 DIAGNOSIS — D64.81 ANEMIA DUE TO ANTINEOPLASTIC CHEMOTHERAPY: Primary | ICD-10-CM

## 2018-05-16 DIAGNOSIS — C19 CANCER OF RECTOSIGMOID (COLON) (HCC): ICD-10-CM

## 2018-05-16 DIAGNOSIS — D69.6 THROMBOCYTOPENIA (HCC): ICD-10-CM

## 2018-05-16 DIAGNOSIS — E87.6 HYPOKALEMIA: ICD-10-CM

## 2018-05-16 DIAGNOSIS — C19 CANCER OF RECTOSIGMOID (COLON) (HCC): Primary | ICD-10-CM

## 2018-05-16 LAB
ALBUMIN SERPL-MCNC: 3.7 G/DL (ref 3.4–4.8)
ALBUMIN/GLOB SERPL: 1.1 G/DL (ref 1.1–1.8)
ALP SERPL-CCNC: 101 U/L (ref 38–126)
ALT SERPL W P-5'-P-CCNC: 37 U/L (ref 9–52)
ANION GAP SERPL CALCULATED.3IONS-SCNC: 11 MMOL/L (ref 5–15)
AST SERPL-CCNC: 26 U/L (ref 14–36)
BILIRUB SERPL-MCNC: 0.3 MG/DL (ref 0.2–1.3)
BUN BLD-MCNC: 11 MG/DL (ref 7–21)
BUN/CREAT SERPL: 19 (ref 7–25)
CALCIUM SPEC-SCNC: 8.7 MG/DL (ref 8.4–10.2)
CHLORIDE SERPL-SCNC: 104 MMOL/L (ref 95–110)
CO2 SERPL-SCNC: 26 MMOL/L (ref 22–31)
CREAT BLD-MCNC: 0.58 MG/DL (ref 0.5–1)
DEPRECATED RDW RBC AUTO: 53.7 FL (ref 36.4–46.3)
ERYTHROCYTE [DISTWIDTH] IN BLOOD BY AUTOMATED COUNT: 17.1 % (ref 11.5–14.5)
GFR SERPL CREATININE-BSD FRML MDRD: 102 ML/MIN/1.73 (ref 39–90)
GLOBULIN UR ELPH-MCNC: 3.4 GM/DL (ref 2.3–3.5)
GLUCOSE BLD-MCNC: 99 MG/DL (ref 60–100)
HCT VFR BLD AUTO: 36.5 % (ref 35–45)
HGB BLD-MCNC: 11.7 G/DL (ref 12–15.5)
LYMPHOCYTES # BLD MANUAL: 0.98 10*3/MM3 (ref 0.6–4.2)
LYMPHOCYTES NFR BLD MANUAL: 18 % (ref 0–12)
LYMPHOCYTES NFR BLD MANUAL: 22 % (ref 10–50)
MCH RBC QN AUTO: 27.7 PG (ref 26.5–34)
MCHC RBC AUTO-ENTMCNC: 32.1 G/DL (ref 31.4–36)
MCV RBC AUTO: 86.3 FL (ref 80–98)
MONOCYTES # BLD AUTO: 0.8 10*3/MM3 (ref 0–0.9)
NEUTROPHILS # BLD AUTO: 2.68 10*3/MM3 (ref 2–8.6)
NEUTROPHILS NFR BLD MANUAL: 60 % (ref 37–80)
PLATELET # BLD AUTO: 119 10*3/MM3 (ref 150–450)
PMV BLD AUTO: 9.9 FL (ref 8–12)
POTASSIUM BLD-SCNC: 3.7 MMOL/L (ref 3.5–5.1)
PROT SERPL-MCNC: 7.1 G/DL (ref 6.3–8.6)
RBC # BLD AUTO: 4.23 10*6/MM3 (ref 3.77–5.16)
RBC MORPH BLD: NORMAL
SMALL PLATELETS BLD QL SMEAR: ABNORMAL
SODIUM BLD-SCNC: 141 MMOL/L (ref 137–145)
WBC MORPH BLD: NORMAL
WBC NRBC COR # BLD: 4.46 10*3/MM3 (ref 3.2–9.8)

## 2018-05-16 PROCEDURE — 85025 COMPLETE CBC W/AUTO DIFF WBC: CPT

## 2018-05-16 PROCEDURE — G8420 CALC BMI NORM PARAMETERS: HCPCS | Performed by: INTERNAL MEDICINE

## 2018-05-16 PROCEDURE — 25010000002 LEUCOVORIN CALCIUM PER 50 MG: Performed by: INTERNAL MEDICINE

## 2018-05-16 PROCEDURE — 99214 OFFICE O/P EST MOD 30 MIN: CPT | Performed by: INTERNAL MEDICINE

## 2018-05-16 PROCEDURE — 96368 THER/DIAG CONCURRENT INF: CPT | Performed by: INTERNAL MEDICINE

## 2018-05-16 PROCEDURE — 25010000003 DEXAMETHASONE SODIUM PHOSPHATE 100 MG/10ML SOLUTION 10 ML VIAL: Performed by: INTERNAL MEDICINE

## 2018-05-16 PROCEDURE — 85007 BL SMEAR W/DIFF WBC COUNT: CPT

## 2018-05-16 PROCEDURE — 96415 CHEMO IV INFUSION ADDL HR: CPT | Performed by: INTERNAL MEDICINE

## 2018-05-16 PROCEDURE — 25010000002 OXALIPLATIN PER 0.5 MG: Performed by: INTERNAL MEDICINE

## 2018-05-16 PROCEDURE — G0498 CHEMO EXTEND IV INFUS W/PUMP: HCPCS | Performed by: INTERNAL MEDICINE

## 2018-05-16 PROCEDURE — 25010000002 FLUOROURACIL PER 500 MG: Performed by: INTERNAL MEDICINE

## 2018-05-16 PROCEDURE — 80053 COMPREHEN METABOLIC PANEL: CPT

## 2018-05-16 PROCEDURE — 96375 TX/PRO/DX INJ NEW DRUG ADDON: CPT | Performed by: INTERNAL MEDICINE

## 2018-05-16 PROCEDURE — 96413 CHEMO IV INFUSION 1 HR: CPT | Performed by: INTERNAL MEDICINE

## 2018-05-16 PROCEDURE — 25010000002 PALONOSETRON PER 25 MCG: Performed by: INTERNAL MEDICINE

## 2018-05-16 PROCEDURE — 1123F ACP DISCUSS/DSCN MKR DOCD: CPT | Performed by: INTERNAL MEDICINE

## 2018-05-16 RX ORDER — POTASSIUM CHLORIDE 750 MG/1
10 TABLET, FILM COATED, EXTENDED RELEASE ORAL DAILY
Qty: 30 TABLET | Refills: 1 | Status: SHIPPED | OUTPATIENT
Start: 2018-05-16 | End: 2018-06-13 | Stop reason: SDUPTHER

## 2018-05-16 RX ORDER — DEXTROSE MONOHYDRATE 50 MG/ML
250 INJECTION, SOLUTION INTRAVENOUS ONCE
Status: CANCELLED | OUTPATIENT
Start: 2018-05-16

## 2018-05-16 RX ORDER — PALONOSETRON 0.05 MG/ML
0.25 INJECTION, SOLUTION INTRAVENOUS ONCE
Status: CANCELLED | OUTPATIENT
Start: 2018-05-16

## 2018-05-16 RX ORDER — SODIUM CHLORIDE 0.9 % (FLUSH) 0.9 %
10 SYRINGE (ML) INJECTION AS NEEDED
Status: DISCONTINUED | OUTPATIENT
Start: 2018-05-16 | End: 2018-05-16 | Stop reason: HOSPADM

## 2018-05-16 RX ORDER — SODIUM CHLORIDE 0.9 % (FLUSH) 0.9 %
10 SYRINGE (ML) INJECTION AS NEEDED
Status: CANCELLED | OUTPATIENT
Start: 2018-05-18

## 2018-05-16 RX ORDER — DEXTROSE MONOHYDRATE 50 MG/ML
250 INJECTION, SOLUTION INTRAVENOUS ONCE
Status: COMPLETED | OUTPATIENT
Start: 2018-05-16 | End: 2018-05-16

## 2018-05-16 RX ORDER — PALONOSETRON 0.05 MG/ML
0.25 INJECTION, SOLUTION INTRAVENOUS ONCE
Status: COMPLETED | OUTPATIENT
Start: 2018-05-16 | End: 2018-05-16

## 2018-05-16 RX ADMIN — DEXTROSE MONOHYDRATE 250 ML: 50 INJECTION, SOLUTION INTRAVENOUS at 10:39

## 2018-05-16 RX ADMIN — Medication 10 ML: at 09:37

## 2018-05-16 RX ADMIN — DEXAMETHASONE SODIUM PHOSPHATE 12 MG: 10 INJECTION, SOLUTION INTRAMUSCULAR; INTRAVENOUS at 10:46

## 2018-05-16 RX ADMIN — PALONOSETRON HYDROCHLORIDE 0.25 MG: 0.25 INJECTION INTRAVENOUS at 11:22

## 2018-05-16 RX ADMIN — FLUOROURACIL 3240 MG: 50 INJECTION, SOLUTION INTRAVENOUS at 13:54

## 2018-05-16 RX ADMIN — OXALIPLATIN 115 MG: 5 INJECTION, SOLUTION, CONCENTRATE INTRAVENOUS at 11:36

## 2018-05-16 RX ADMIN — LEUCOVORIN CALCIUM 700 MG: 350 INJECTION, POWDER, LYOPHILIZED, FOR SOLUTION INTRAMUSCULAR; INTRAVENOUS at 11:36

## 2018-05-16 NOTE — PROGRESS NOTES
DATE OF VISIT: 5/16/2018      REASON FOR VISIT:  Sigmoid cancer on adjuvant therapy with FOLFOX      HISTORY OF PRESENT ILLNESS:    72-year-old female with a past medical history significant for cardiomegaly was initially seen in consultation on December 18, 2017 for newly diagnosed rectosigmoid cancer.  Patient was started on adjuvant chemotherapy with FOLFOX on December 27, 2017.  Patient is here to get cycle 10 of FOLFOX today.   Complains of  Chronic constipation for which she has been using stool softeners.   Complains of worsening fatigue.Complains of prolong nausea without vomiting since last chemotherapy. Denies any excessivevomiting or diarrhea with chemotherapy.  Denies any fever or chills.  Denies any new tingling or numbness.      PAST MEDICAL HISTORY:    1.  Cardiomegaly  2.  Adenocarcinoma of rectosigmoid region      SOCIAL HISTORY:    Social History   Substance Use Topics   • Smoking status: Former Smoker     Quit date: 12/2012   • Smokeless tobacco: Never Used   • Alcohol use No       Surgical History :  Past Surgical History:   Procedure Laterality Date   • COLON SURGERY     • GALLBLADDER SURGERY     • RETINAL DETACHMENT SURGERY         ALLERGIES:    Allergies   Allergen Reactions   • Codeine          FAMILY HISTORY:  Family History   Problem Relation Age of Onset   • Heart attack Brother    • Cancer Maternal Aunt            REVIEW OF SYSTEMS:      CONSTITUTIONAL:  Complains of worsening  fatigue. Denies any fever, chills .      HEENT:    No epistaxis, mouth sores or difficulty swallowing.     RESPIRATORY:  No new shortness of breath. No new cough or hemoptysis.     CARDIOVASCULAR:  No chest pain or palpitations.     GASTROINTESTINAL:Complains of intermittent constipation.  Complains of prolong nausea without vomiting with last chemotherapy.  Denies any excessive diarrhea. Denies any blood in stool.  Denies any vomiting.    GENITOURINARY: No Dysuria or Hematuria.     MUSCULOSKELETAL:No new back pain  "or arthralgia..     LYMPHATICS:  Denies any abnormal swollen glands anywhere in the body.     NEUROLOGICAL : No tingling or numbness. No headache or dizziness. No seizures or balance problems.     SKIN: No new skin lesions.        PHYSICAL EXAMINATION:      VITAL SIGNS:  /82   Pulse 64   Temp 98.3 °F (36.8 °C) (Temporal Artery )   Resp 18   Ht 157.5 cm (62.01\")   Wt 68.5 kg (151 lb)   BMI 27.61 kg/m²   1    05/16/18  0954   Weight: 68.5 kg (151 lb)       ECOG  performance status: 1      GENERAL:  Not in any distress.    HEENT:  Normocephalic, Atraumatic.Mild Conjunctival pallor. No icterus. Extraocular Movements Intact. No Facial Asymmetry noted.    NECK:  No adenopathy. No JVD.    RESPIRATORY:  Fair air entry bilateral. No rhonchi or wheezing.    CARDIOVASCULAR:  S1, S2. Regular rate and rhythm. No murmur or gallop appreciated.    ABDOMEN:  Soft, obese, nontender. Bowel sounds present in all four quadrants.  No organomegaly appreciated.Well-healed surgical scar present in midline, right lower quadrant and epigastric region.    MUSCULOSKELETAL:  No edema.No Calf Tenderness.Normal range of motion.    NEUROLOGIC:  Alert, awake and oriented ×3.  No  Motor or sensory deficit appreciated. Cranial Nerves 2-12 grossly intact.    SKIN : No new skin lesions.    LYMPHATICS: No new lymph node enlargement in neck.    PSYCHIATRY: Normal affect. Normal judgement. Makes good eye contact.        DIAGNOSTIC DATA:    Glucose   Date Value Ref Range Status   05/16/2018 99 60 - 100 mg/dL Final     Sodium   Date Value Ref Range Status   05/16/2018 141 137 - 145 mmol/L Final     Potassium   Date Value Ref Range Status   05/16/2018 3.7 3.5 - 5.1 mmol/L Final     CO2   Date Value Ref Range Status   05/16/2018 26.0 22.0 - 31.0 mmol/L Final     Chloride   Date Value Ref Range Status   05/16/2018 104 95 - 110 mmol/L Final     Anion Gap   Date Value Ref Range Status   05/16/2018 11.0 5.0 - 15.0 mmol/L Final     Creatinine   Date " Value Ref Range Status   05/16/2018 0.58 0.50 - 1.00 mg/dL Final     BUN   Date Value Ref Range Status   05/16/2018 11 7 - 21 mg/dL Final     BUN/Creatinine Ratio   Date Value Ref Range Status   05/16/2018 19.0 7.0 - 25.0 Final     Calcium   Date Value Ref Range Status   05/16/2018 8.7 8.4 - 10.2 mg/dL Final     eGFR Non  Amer   Date Value Ref Range Status   05/16/2018 102 (H) 39 - 90 mL/min/1.73 Final     Alkaline Phosphatase   Date Value Ref Range Status   05/16/2018 101 38 - 126 U/L Final     Total Protein   Date Value Ref Range Status   05/16/2018 7.1 6.3 - 8.6 g/dL Final     ALT (SGPT)   Date Value Ref Range Status   05/16/2018 37 9 - 52 U/L Final     AST (SGOT)   Date Value Ref Range Status   05/16/2018 26 14 - 36 U/L Final     Total Bilirubin   Date Value Ref Range Status   05/16/2018 0.3 0.2 - 1.3 mg/dL Final     Albumin   Date Value Ref Range Status   05/16/2018 3.70 3.40 - 4.80 g/dL Final     Globulin   Date Value Ref Range Status   05/16/2018 3.4 2.3 - 3.5 gm/dL Final     A/G Ratio   Date Value Ref Range Status   05/16/2018 1.1 1.1 - 1.8 g/dL Final     Lab Results   Component Value Date    WBC 4.46 05/16/2018    HGB 11.7 (L) 05/16/2018    HCT 36.5 05/16/2018    MCV 86.3 05/16/2018     (L) 05/16/2018     Lab Results   Component Value Date    NEUTROABS 2.81 05/02/2018    IRON 58 12/18/2017    TIBC 312 12/18/2017    LABIRON 19 12/18/2017    FERRITIN 96.10 12/18/2017    [id number] 616 12/18/2017    FOLATE 7.20 12/18/2017     Lab Results   Component Value Date    CEA 1.40 12/18/2017           CEA level prior to surgery was elevated at 11.2.           Radiology Data :  CT scan of abdomen and pelvis with and without contrast done on November 6, 2017 at [facility] showed:  1.  Extensive rectosigmoid carcinoma extending to total length of 16 cm.  2.  7 mm mesenteric nodule adjacent to the sigmoid colon may be metastatic lymph node        Chest X-ray done on November 18, 2017  showed:  IMPRESSION:  CONCLUSION:  No Acute Disease  Cardiomegaly              Pathology :  Biopsy result from colonoscopy showed:  Tubular adenoma with high-grade dysplasia and focal adenocarcinoma in situ        Surgical pathology report from November 22, 2017 showed:  Adenocarcinoma with mucinous features arising from sigmoid colon, T3 N1 B lesion, 2 out of 29 lymph nodes positive.  Lymphatic invasion present and no vascular invasion or perineural invasion seen.              ASSESSMENT AND PLAN:       1.  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  Lymphatic invasion present.  No large vessel invasion or perineural invasion seen.  Margins were negative.   She was started on chemotherapy with FOLFOX on December 27, 2017.   In view of leukopenia with white blood cell count of 2.9 with absolute neutrophil count of 1700 we will decrease the dose of oxaliplatin by 20% and 5-FU combined 20% from cycle 2 on January 10, 2018.  Case was discussed with Dr. Michael by Dr. Hercules on January 15, 2018 and as perDr. Michael patient's tumor was mostly in sigmoid colon not in the rectum.  So she does not need any radiation which was discussed with patient and his family.  We will go ahead with cycle 10 of FOLFOX today. Will ask patient will return to clinic in 2 weeks with a repeat CBC and CMP on that day.    2.  Mild anemia: Secondary to chemotherapy.  Hemoglobin is 11.7.  We will monitored with CBC for now.    3.  Thrombocytopenia: Secondary to chemotherapy.  Platelet count is 119,000.  Without any active bleeding.  We'll monitored with CBC for now    4.   Hypokalemia. Potassium is 3.7 today.  Patient was instructed to continue taking K-Dur 10 milliequivalents by mouth daily at home.    5.  History of cardiomegaly.     6.  Health maintenance: Patient quit smoking around 2012.  Had a colonoscopy in November 6, 2017.  Had a mammogram in 2017.    7.  Prescriptions: Patient is enough  prescription for Zofran.  Prescription for K-dur 30 day supply with 1 refill has been sent to her pharmacy today on May 16, 2018.    8.BMI:Patient's Body mass index is 27.61 kg/m². BMI is in normal range. No follow up is required.    9. Advance care planning: Patient is able to make on her decision and remains full code for now.  Spouse is mentioned as health care surrogate on chart.        Wil Abraham MD  5/16/2018  10:18 AM        EMR Dragon/Transcription disclaimer:   Much of this encounter note is an electronic transcription/translation of spoken language to printed text. The electronic translation of spoken language may permit erroneous, or at times, nonsensical words or phrases to be inadvertently transcribed; Although I have reviewed the note for such errors, some may still exist.

## 2018-05-18 ENCOUNTER — INFUSION (OUTPATIENT)
Dept: ONCOLOGY | Facility: HOSPITAL | Age: 73
End: 2018-05-18

## 2018-05-18 DIAGNOSIS — C19 CANCER OF RECTOSIGMOID (COLON) (HCC): ICD-10-CM

## 2018-05-18 DIAGNOSIS — Z45.2 ENCOUNTER FOR VENOUS ACCESS DEVICE CARE: Primary | ICD-10-CM

## 2018-05-18 PROCEDURE — 96523 IRRIG DRUG DELIVERY DEVICE: CPT | Performed by: INTERNAL MEDICINE

## 2018-05-18 PROCEDURE — 25010000002 HEPARIN FLUSH (PORCINE) 100 UNIT/ML SOLUTION: Performed by: INTERNAL MEDICINE

## 2018-05-18 RX ORDER — SODIUM CHLORIDE 0.9 % (FLUSH) 0.9 %
10 SYRINGE (ML) INJECTION AS NEEDED
Status: DISCONTINUED | OUTPATIENT
Start: 2018-05-18 | End: 2018-05-18 | Stop reason: HOSPADM

## 2018-05-18 RX ORDER — SODIUM CHLORIDE 0.9 % (FLUSH) 0.9 %
10 SYRINGE (ML) INJECTION AS NEEDED
Status: CANCELLED | OUTPATIENT
Start: 2018-05-30

## 2018-05-18 RX ADMIN — Medication 10 ML: at 11:49

## 2018-05-18 RX ADMIN — SODIUM CHLORIDE, PRESERVATIVE FREE 500 UNITS: 5 INJECTION INTRAVENOUS at 11:50

## 2018-05-30 ENCOUNTER — OFFICE VISIT (OUTPATIENT)
Dept: ONCOLOGY | Facility: CLINIC | Age: 73
End: 2018-05-30

## 2018-05-30 ENCOUNTER — INFUSION (OUTPATIENT)
Dept: ONCOLOGY | Facility: HOSPITAL | Age: 73
End: 2018-05-30

## 2018-05-30 VITALS
BODY MASS INDEX: 27.6 KG/M2 | RESPIRATION RATE: 18 BRPM | WEIGHT: 150 LBS | HEIGHT: 62 IN | DIASTOLIC BLOOD PRESSURE: 97 MMHG | HEART RATE: 76 BPM | SYSTOLIC BLOOD PRESSURE: 137 MMHG | TEMPERATURE: 98.2 F

## 2018-05-30 DIAGNOSIS — D69.6 THROMBOCYTOPENIA (HCC): Primary | ICD-10-CM

## 2018-05-30 DIAGNOSIS — C19 CANCER OF RECTOSIGMOID (COLON) (HCC): Primary | ICD-10-CM

## 2018-05-30 DIAGNOSIS — E87.6 HYPOKALEMIA: ICD-10-CM

## 2018-05-30 DIAGNOSIS — C19 CANCER OF RECTOSIGMOID (COLON) (HCC): ICD-10-CM

## 2018-05-30 DIAGNOSIS — T45.1X5A ANEMIA DUE TO ANTINEOPLASTIC CHEMOTHERAPY: ICD-10-CM

## 2018-05-30 DIAGNOSIS — Z45.2 ENCOUNTER FOR VENOUS ACCESS DEVICE CARE: ICD-10-CM

## 2018-05-30 DIAGNOSIS — D64.81 ANEMIA DUE TO ANTINEOPLASTIC CHEMOTHERAPY: ICD-10-CM

## 2018-05-30 LAB
ALBUMIN SERPL-MCNC: 3.7 G/DL (ref 3.4–4.8)
ALBUMIN/GLOB SERPL: 1 G/DL (ref 1.1–1.8)
ALP SERPL-CCNC: 107 U/L (ref 38–126)
ALT SERPL W P-5'-P-CCNC: 35 U/L (ref 9–52)
ANION GAP SERPL CALCULATED.3IONS-SCNC: 8 MMOL/L (ref 5–15)
AST SERPL-CCNC: 33 U/L (ref 14–36)
BASOPHILS # BLD AUTO: 0.02 10*3/MM3 (ref 0–0.2)
BASOPHILS NFR BLD AUTO: 0.5 % (ref 0–2)
BILIRUB SERPL-MCNC: 0.3 MG/DL (ref 0.2–1.3)
BUN BLD-MCNC: 14 MG/DL (ref 7–21)
BUN/CREAT SERPL: 23.7 (ref 7–25)
CALCIUM SPEC-SCNC: 8.6 MG/DL (ref 8.4–10.2)
CHLORIDE SERPL-SCNC: 106 MMOL/L (ref 95–110)
CO2 SERPL-SCNC: 28 MMOL/L (ref 22–31)
CREAT BLD-MCNC: 0.59 MG/DL (ref 0.5–1)
DEPRECATED RDW RBC AUTO: 53.1 FL (ref 36.4–46.3)
EOSINOPHIL # BLD AUTO: 0.14 10*3/MM3 (ref 0–0.7)
EOSINOPHIL NFR BLD AUTO: 3.4 % (ref 0–7)
ERYTHROCYTE [DISTWIDTH] IN BLOOD BY AUTOMATED COUNT: 16.7 % (ref 11.5–14.5)
GFR SERPL CREATININE-BSD FRML MDRD: 100 ML/MIN/1.73 (ref 39–90)
GLOBULIN UR ELPH-MCNC: 3.6 GM/DL (ref 2.3–3.5)
GLUCOSE BLD-MCNC: 101 MG/DL (ref 60–100)
HCT VFR BLD AUTO: 37.3 % (ref 35–45)
HGB BLD-MCNC: 12.1 G/DL (ref 12–15.5)
IMM GRANULOCYTES # BLD: 0.01 10*3/MM3 (ref 0–0.02)
IMM GRANULOCYTES NFR BLD: 0.2 % (ref 0–0.5)
LYMPHOCYTES # BLD AUTO: 1.15 10*3/MM3 (ref 0.6–4.2)
LYMPHOCYTES NFR BLD AUTO: 27.6 % (ref 10–50)
MCH RBC QN AUTO: 28.1 PG (ref 26.5–34)
MCHC RBC AUTO-ENTMCNC: 32.4 G/DL (ref 31.4–36)
MCV RBC AUTO: 86.5 FL (ref 80–98)
MONOCYTES # BLD AUTO: 0.75 10*3/MM3 (ref 0–0.9)
MONOCYTES NFR BLD AUTO: 18 % (ref 0–12)
NEUTROPHILS # BLD AUTO: 2.09 10*3/MM3 (ref 2–8.6)
NEUTROPHILS NFR BLD AUTO: 50.3 % (ref 37–80)
PLATELET # BLD AUTO: 134 10*3/MM3 (ref 150–450)
PMV BLD AUTO: 9.7 FL (ref 8–12)
POTASSIUM BLD-SCNC: 3.5 MMOL/L (ref 3.5–5.1)
PROT SERPL-MCNC: 7.3 G/DL (ref 6.3–8.6)
RBC # BLD AUTO: 4.31 10*6/MM3 (ref 3.77–5.16)
SODIUM BLD-SCNC: 142 MMOL/L (ref 137–145)
WBC NRBC COR # BLD: 4.16 10*3/MM3 (ref 3.2–9.8)

## 2018-05-30 PROCEDURE — 25010000002 PALONOSETRON PER 25 MCG: Performed by: INTERNAL MEDICINE

## 2018-05-30 PROCEDURE — 25010000002 LEUCOVORIN CALCIUM PER 50 MG: Performed by: INTERNAL MEDICINE

## 2018-05-30 PROCEDURE — G0498 CHEMO EXTEND IV INFUS W/PUMP: HCPCS | Performed by: INTERNAL MEDICINE

## 2018-05-30 PROCEDURE — 80053 COMPREHEN METABOLIC PANEL: CPT

## 2018-05-30 PROCEDURE — 96413 CHEMO IV INFUSION 1 HR: CPT | Performed by: INTERNAL MEDICINE

## 2018-05-30 PROCEDURE — G8420 CALC BMI NORM PARAMETERS: HCPCS | Performed by: INTERNAL MEDICINE

## 2018-05-30 PROCEDURE — 85025 COMPLETE CBC W/AUTO DIFF WBC: CPT

## 2018-05-30 PROCEDURE — 25010000003 DEXAMETHASONE SODIUM PHOSPHATE 100 MG/10ML SOLUTION 10 ML VIAL: Performed by: INTERNAL MEDICINE

## 2018-05-30 PROCEDURE — 1123F ACP DISCUSS/DSCN MKR DOCD: CPT | Performed by: INTERNAL MEDICINE

## 2018-05-30 PROCEDURE — 96375 TX/PRO/DX INJ NEW DRUG ADDON: CPT | Performed by: INTERNAL MEDICINE

## 2018-05-30 PROCEDURE — 25010000002 FLUOROURACIL PER 500 MG: Performed by: INTERNAL MEDICINE

## 2018-05-30 PROCEDURE — 36591 DRAW BLOOD OFF VENOUS DEVICE: CPT | Performed by: INTERNAL MEDICINE

## 2018-05-30 PROCEDURE — 25010000002 OXALIPLATIN PER 0.5 MG: Performed by: INTERNAL MEDICINE

## 2018-05-30 PROCEDURE — 99214 OFFICE O/P EST MOD 30 MIN: CPT | Performed by: INTERNAL MEDICINE

## 2018-05-30 PROCEDURE — 96368 THER/DIAG CONCURRENT INF: CPT | Performed by: INTERNAL MEDICINE

## 2018-05-30 PROCEDURE — 96415 CHEMO IV INFUSION ADDL HR: CPT | Performed by: INTERNAL MEDICINE

## 2018-05-30 RX ORDER — SODIUM CHLORIDE 0.9 % (FLUSH) 0.9 %
10 SYRINGE (ML) INJECTION AS NEEDED
Status: CANCELLED | OUTPATIENT
Start: 2018-06-01

## 2018-05-30 RX ORDER — DEXTROSE MONOHYDRATE 50 MG/ML
250 INJECTION, SOLUTION INTRAVENOUS ONCE
Status: COMPLETED | OUTPATIENT
Start: 2018-05-30 | End: 2018-05-30

## 2018-05-30 RX ORDER — SODIUM CHLORIDE 0.9 % (FLUSH) 0.9 %
10 SYRINGE (ML) INJECTION AS NEEDED
Status: DISCONTINUED | OUTPATIENT
Start: 2018-05-30 | End: 2018-05-30 | Stop reason: HOSPADM

## 2018-05-30 RX ORDER — PALONOSETRON 0.05 MG/ML
0.25 INJECTION, SOLUTION INTRAVENOUS ONCE
Status: COMPLETED | OUTPATIENT
Start: 2018-05-30 | End: 2018-05-30

## 2018-05-30 RX ORDER — DEXTROSE MONOHYDRATE 50 MG/ML
250 INJECTION, SOLUTION INTRAVENOUS ONCE
Status: CANCELLED | OUTPATIENT
Start: 2018-05-30

## 2018-05-30 RX ORDER — PALONOSETRON 0.05 MG/ML
0.25 INJECTION, SOLUTION INTRAVENOUS ONCE
Status: CANCELLED | OUTPATIENT
Start: 2018-05-30

## 2018-05-30 RX ADMIN — LEUCOVORIN CALCIUM 700 MG: 350 INJECTION, POWDER, LYOPHILIZED, FOR SOLUTION INTRAMUSCULAR; INTRAVENOUS at 10:10

## 2018-05-30 RX ADMIN — OXALIPLATIN 115 MG: 5 INJECTION, SOLUTION, CONCENTRATE INTRAVENOUS at 10:10

## 2018-05-30 RX ADMIN — DEXTROSE MONOHYDRATE 250 ML: 50 INJECTION, SOLUTION INTRAVENOUS at 09:08

## 2018-05-30 RX ADMIN — PALONOSETRON HYDROCHLORIDE 0.25 MG: 0.25 INJECTION INTRAVENOUS at 09:18

## 2018-05-30 RX ADMIN — FLUOROURACIL 3240 MG: 50 INJECTION, SOLUTION INTRAVENOUS at 12:25

## 2018-05-30 RX ADMIN — DEXAMETHASONE SODIUM PHOSPHATE 12 MG: 10 INJECTION, SOLUTION INTRAMUSCULAR; INTRAVENOUS at 09:19

## 2018-05-30 RX ADMIN — Medication 10 ML: at 08:20

## 2018-05-30 NOTE — PROGRESS NOTES
DATE OF VISIT: 5/30/2018      REASON FOR VISIT:  Sigmoid cancer on adjuvant therapy with FOLFOX      HISTORY OF PRESENT ILLNESS:    72-year-old female with a past medical history significant for cardiomegaly was initially seen in consultation on December 18, 2017 for newly diagnosed rectosigmoid cancer.  Patient was started on adjuvant chemotherapy with FOLFOX on December 27, 2017.  Patient is here to get cycle 11 of FOLFOX today.   Complains of  Chronic constipation for which she has been using stool softeners.   Complains of worsening fatigue. Denies any excessivevomiting or diarrhea with chemotherapy.  Denies any fever or chills.  Denies any new tingling or numbness.      PAST MEDICAL HISTORY:    1.  Cardiomegaly  2.  Adenocarcinoma of rectosigmoid region      SOCIAL HISTORY:    Social History   Substance Use Topics   • Smoking status: Former Smoker     Quit date: 12/2012   • Smokeless tobacco: Never Used   • Alcohol use No       Surgical History :  Past Surgical History:   Procedure Laterality Date   • COLON SURGERY     • GALLBLADDER SURGERY     • RETINAL DETACHMENT SURGERY         ALLERGIES:    Allergies   Allergen Reactions   • Codeine          FAMILY HISTORY:  Family History   Problem Relation Age of Onset   • Heart attack Brother    • Cancer Maternal Aunt            REVIEW OF SYSTEMS:      CONSTITUTIONAL:  Complains of worsening  fatigue. Denies any fever, chills .      HEENT:    No epistaxis, mouth sores or difficulty swallowing.     RESPIRATORY:  No new shortness of breath. No new cough or hemoptysis.     CARDIOVASCULAR:  No chest pain or palpitations.     GASTROINTESTINAL:Complains of intermittent constipation. Denies any excessive diarrhea. Denies any blood in stool.  Denies any vomiting.    GENITOURINARY: No Dysuria or Hematuria.     MUSCULOSKELETAL:No new back pain or arthralgia..     LYMPHATICS:  Denies any abnormal swollen glands anywhere in the body.     NEUROLOGICAL : No tingling or numbness. No  "headache or dizziness. No seizures or balance problems.     SKIN: No new skin lesions.        PHYSICAL EXAMINATION:      VITAL SIGNS:  /97   Pulse 76   Temp 98.2 °F (36.8 °C) (Temporal Artery )   Resp 18   Ht 157.5 cm (62.01\")   Wt 68 kg (150 lb)   BMI 27.43 kg/m²   1    05/30/18  0840   Weight: 68 kg (150 lb)       ECOG  performance status: 1      GENERAL:  Not in any distress.    HEENT:  Normocephalic, Atraumatic.Mild Conjunctival pallor. No icterus. Extraocular Movements Intact. No Facial Asymmetry noted.    NECK:  No adenopathy. No JVD.    RESPIRATORY:  Fair air entry bilateral. No rhonchi or wheezing.    CARDIOVASCULAR:  S1, S2. Regular rate and rhythm. No murmur or gallop appreciated.    ABDOMEN:  Soft, obese, nontender. Bowel sounds present in all four quadrants.  No organomegaly appreciated.Well-healed surgical scar present in midline, right lower quadrant and epigastric region.    MUSCULOSKELETAL:  No edema.No Calf Tenderness.Normal range of motion.    NEUROLOGIC:  Alert, awake and oriented ×3.  No  Motor or sensory deficit appreciated. Cranial Nerves 2-12 grossly intact.    SKIN : No new skin lesions.    LYMPHATICS: No new lymph node enlargement in neck.    PSYCHIATRY: Normal affect. Normal judgement. Makes good eye contact.        DIAGNOSTIC DATA:    Glucose   Date Value Ref Range Status   05/30/2018 101 (H) 60 - 100 mg/dL Final     Sodium   Date Value Ref Range Status   05/30/2018 142 137 - 145 mmol/L Final     Potassium   Date Value Ref Range Status   05/30/2018 3.5 3.5 - 5.1 mmol/L Final     CO2   Date Value Ref Range Status   05/30/2018 28.0 22.0 - 31.0 mmol/L Final     Chloride   Date Value Ref Range Status   05/30/2018 106 95 - 110 mmol/L Final     Anion Gap   Date Value Ref Range Status   05/30/2018 8.0 5.0 - 15.0 mmol/L Final     Creatinine   Date Value Ref Range Status   05/30/2018 0.59 0.50 - 1.00 mg/dL Final     BUN   Date Value Ref Range Status   05/30/2018 14 7 - 21 mg/dL Final "     BUN/Creatinine Ratio   Date Value Ref Range Status   05/30/2018 23.7 7.0 - 25.0 Final     Calcium   Date Value Ref Range Status   05/30/2018 8.6 8.4 - 10.2 mg/dL Final     eGFR Non  Amer   Date Value Ref Range Status   05/30/2018 100 (H) 39 - 90 mL/min/1.73 Final     Alkaline Phosphatase   Date Value Ref Range Status   05/30/2018 107 38 - 126 U/L Final     Total Protein   Date Value Ref Range Status   05/30/2018 7.3 6.3 - 8.6 g/dL Final     ALT (SGPT)   Date Value Ref Range Status   05/30/2018 35 9 - 52 U/L Final     AST (SGOT)   Date Value Ref Range Status   05/30/2018 33 14 - 36 U/L Final     Total Bilirubin   Date Value Ref Range Status   05/30/2018 0.3 0.2 - 1.3 mg/dL Final     Albumin   Date Value Ref Range Status   05/30/2018 3.70 3.40 - 4.80 g/dL Final     Globulin   Date Value Ref Range Status   05/30/2018 3.6 (H) 2.3 - 3.5 gm/dL Final     A/G Ratio   Date Value Ref Range Status   05/30/2018 1.0 (L) 1.1 - 1.8 g/dL Final     Lab Results   Component Value Date    WBC 4.16 05/30/2018    HGB 12.1 05/30/2018    HCT 37.3 05/30/2018    MCV 86.5 05/30/2018     (L) 05/30/2018     Lab Results   Component Value Date    NEUTROABS 2.09 05/30/2018    IRON 58 12/18/2017    TIBC 312 12/18/2017    LABIRON 19 12/18/2017    FERRITIN 96.10 12/18/2017    [id number] 616 12/18/2017    FOLATE 7.20 12/18/2017     Lab Results   Component Value Date    CEA 1.40 12/18/2017           CEA level prior to surgery was elevated at 11.2.           Radiology Data :  CT scan of abdomen and pelvis with and without contrast done on November 6, 2017 at [facility] showed:  1.  Extensive rectosigmoid carcinoma extending to total length of 16 cm.  2.  7 mm mesenteric nodule adjacent to the sigmoid colon may be metastatic lymph node        Chest X-ray done on November 18, 2017 showed:  IMPRESSION:  CONCLUSION:  No Acute Disease  Cardiomegaly              Pathology :  Biopsy result from colonoscopy showed:  Tubular adenoma  with high-grade dysplasia and focal adenocarcinoma in situ        Surgical pathology report from November 22, 2017 showed:  Adenocarcinoma with mucinous features arising from sigmoid colon, T3 N1 B lesion, 2 out of 29 lymph nodes positive.  Lymphatic invasion present and no vascular invasion or perineural invasion seen.              ASSESSMENT AND PLAN:       1.  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  Lymphatic invasion present.  No large vessel invasion or perineural invasion seen.  Margins were negative.   She was started on chemotherapy with FOLFOX on December 27, 2017.   In view of leukopenia with white blood cell count of 2.9 with absolute neutrophil count of 1700 we will decrease the dose of oxaliplatin by 20% and 5-FU combined 20% from cycle 2 on January 10, 2018.  Case was discussed with Dr. Michael by Dr. Herucles on January 15, 2018 and as perDr. Michael patient's tumor was mostly in sigmoid colon not in the rectum.  So she does not need any radiation which was discussed with patient and his family.  We will go ahead with cycle 11 of FOLFOX today. Will ask patient will return to clinic in 2 weeks with a repeat CBC and CMP on that day.    2.  Mild anemia: Secondary to chemotherapy.  Hemoglobin is 12.1.  We will monitored with CBC for now.    3.  Thrombocytopenia: Secondary to chemotherapy.  Platelet count is 134,000.  Without any active bleeding.  We'll monitored with CBC for now    4.   Hypokalemia. Potassium is 3.5 today.  Patient was instructed to continue taking K-Dur 10 milliequivalents by mouth daily at home.    5.  History of cardiomegaly.     6.  Health maintenance: Patient quit smoking around 2012.  Had a colonoscopy in November 6, 2017.  Had a mammogram in 2017.    7.  Prescriptions: Patient is enough prescription for Zofran.  Prescription for K-dur 30 day supply with 1 refill has been sent to her pharmacy  on May 16, 2018.    8.BMI:Patient's Body mass  index is 27.43 kg/m². BMI is in normal range. No follow up is required.    9. Advance care planning: Patient is able to make on her decision and remains full code for now.  Spouse is mentioned as health care surrogate on chart.        Wil Abraham MD  5/30/2018  9:56 AM        EMR Dragon/Transcription disclaimer:   Much of this encounter note is an electronic transcription/translation of spoken language to printed text. The electronic translation of spoken language may permit erroneous, or at times, nonsensical words or phrases to be inadvertently transcribed; Although I have reviewed the note for such errors, some may still exist.

## 2018-06-13 ENCOUNTER — INFUSION (OUTPATIENT)
Dept: ONCOLOGY | Facility: HOSPITAL | Age: 73
End: 2018-06-13

## 2018-06-13 ENCOUNTER — OFFICE VISIT (OUTPATIENT)
Dept: ONCOLOGY | Facility: CLINIC | Age: 73
End: 2018-06-13

## 2018-06-13 VITALS
TEMPERATURE: 97.8 F | HEIGHT: 62 IN | DIASTOLIC BLOOD PRESSURE: 89 MMHG | BODY MASS INDEX: 27.46 KG/M2 | RESPIRATION RATE: 18 BRPM | HEART RATE: 78 BPM | WEIGHT: 149.2 LBS | SYSTOLIC BLOOD PRESSURE: 148 MMHG

## 2018-06-13 DIAGNOSIS — T45.1X5A ANEMIA DUE TO ANTINEOPLASTIC CHEMOTHERAPY: ICD-10-CM

## 2018-06-13 DIAGNOSIS — Z45.2 ENCOUNTER FOR VENOUS ACCESS DEVICE CARE: ICD-10-CM

## 2018-06-13 DIAGNOSIS — C19 CANCER OF RECTOSIGMOID (COLON) (HCC): Primary | ICD-10-CM

## 2018-06-13 DIAGNOSIS — T45.1X5A CHEMOTHERAPY-INDUCED NEUTROPENIA (HCC): ICD-10-CM

## 2018-06-13 DIAGNOSIS — D70.1 CHEMOTHERAPY-INDUCED NEUTROPENIA (HCC): ICD-10-CM

## 2018-06-13 DIAGNOSIS — D64.81 ANEMIA DUE TO ANTINEOPLASTIC CHEMOTHERAPY: ICD-10-CM

## 2018-06-13 DIAGNOSIS — E87.6 HYPOKALEMIA: ICD-10-CM

## 2018-06-13 DIAGNOSIS — D69.6 THROMBOCYTOPENIA (HCC): ICD-10-CM

## 2018-06-13 LAB
ALBUMIN SERPL-MCNC: 3.7 G/DL (ref 3.4–4.8)
ALBUMIN/GLOB SERPL: 1.1 G/DL (ref 1.1–1.8)
ALP SERPL-CCNC: 111 U/L (ref 38–126)
ALT SERPL W P-5'-P-CCNC: 34 U/L (ref 9–52)
ANION GAP SERPL CALCULATED.3IONS-SCNC: 10 MMOL/L (ref 5–15)
AST SERPL-CCNC: 29 U/L (ref 14–36)
BASOPHILS # BLD AUTO: 0.01 10*3/MM3 (ref 0–0.2)
BASOPHILS NFR BLD AUTO: 0.3 % (ref 0–2)
BILIRUB SERPL-MCNC: 0.3 MG/DL (ref 0.2–1.3)
BUN BLD-MCNC: 14 MG/DL (ref 7–21)
BUN/CREAT SERPL: 23.7 (ref 7–25)
CALCIUM SPEC-SCNC: 8.4 MG/DL (ref 8.4–10.2)
CHLORIDE SERPL-SCNC: 106 MMOL/L (ref 95–110)
CO2 SERPL-SCNC: 25 MMOL/L (ref 22–31)
CREAT BLD-MCNC: 0.59 MG/DL (ref 0.5–1)
DEPRECATED RDW RBC AUTO: 53 FL (ref 36.4–46.3)
EOSINOPHIL # BLD AUTO: 0.12 10*3/MM3 (ref 0–0.7)
EOSINOPHIL NFR BLD AUTO: 3.2 % (ref 0–7)
ERYTHROCYTE [DISTWIDTH] IN BLOOD BY AUTOMATED COUNT: 16.6 % (ref 11.5–14.5)
GFR SERPL CREATININE-BSD FRML MDRD: 100 ML/MIN/1.73 (ref 39–90)
GLOBULIN UR ELPH-MCNC: 3.4 GM/DL (ref 2.3–3.5)
GLUCOSE BLD-MCNC: 91 MG/DL (ref 60–100)
HCT VFR BLD AUTO: 37.4 % (ref 35–45)
HGB BLD-MCNC: 12 G/DL (ref 12–15.5)
IMM GRANULOCYTES # BLD: 0.01 10*3/MM3 (ref 0–0.02)
IMM GRANULOCYTES NFR BLD: 0.3 % (ref 0–0.5)
LYMPHOCYTES # BLD AUTO: 1 10*3/MM3 (ref 0.6–4.2)
LYMPHOCYTES NFR BLD AUTO: 26.4 % (ref 10–50)
MCH RBC QN AUTO: 28 PG (ref 26.5–34)
MCHC RBC AUTO-ENTMCNC: 32.1 G/DL (ref 31.4–36)
MCV RBC AUTO: 87.2 FL (ref 80–98)
MONOCYTES # BLD AUTO: 0.88 10*3/MM3 (ref 0–0.9)
MONOCYTES NFR BLD AUTO: 23.2 % (ref 0–12)
NEUTROPHILS # BLD AUTO: 1.77 10*3/MM3 (ref 2–8.6)
NEUTROPHILS NFR BLD AUTO: 46.6 % (ref 37–80)
PLATELET # BLD AUTO: 121 10*3/MM3 (ref 150–450)
PMV BLD AUTO: 10.6 FL (ref 8–12)
POTASSIUM BLD-SCNC: 3.6 MMOL/L (ref 3.5–5.1)
PROT SERPL-MCNC: 7.1 G/DL (ref 6.3–8.6)
RBC # BLD AUTO: 4.29 10*6/MM3 (ref 3.77–5.16)
RBC MORPH BLD: NORMAL
SMALL PLATELETS BLD QL SMEAR: NORMAL
SODIUM BLD-SCNC: 141 MMOL/L (ref 137–145)
WBC MORPH BLD: NORMAL
WBC NRBC COR # BLD: 3.79 10*3/MM3 (ref 3.2–9.8)

## 2018-06-13 PROCEDURE — 25010000002 FLUOROURACIL PER 500 MG: Performed by: INTERNAL MEDICINE

## 2018-06-13 PROCEDURE — 25010000003 DEXAMETHASONE SODIUM PHOSPHATE 100 MG/10ML SOLUTION 10 ML VIAL: Performed by: INTERNAL MEDICINE

## 2018-06-13 PROCEDURE — G0498 CHEMO EXTEND IV INFUS W/PUMP: HCPCS | Performed by: INTERNAL MEDICINE

## 2018-06-13 PROCEDURE — 96415 CHEMO IV INFUSION ADDL HR: CPT | Performed by: INTERNAL MEDICINE

## 2018-06-13 PROCEDURE — 96417 CHEMO IV INFUS EACH ADDL SEQ: CPT | Performed by: INTERNAL MEDICINE

## 2018-06-13 PROCEDURE — 85025 COMPLETE CBC W/AUTO DIFF WBC: CPT

## 2018-06-13 PROCEDURE — 25010000002 LEUCOVORIN CALCIUM PER 50 MG: Performed by: INTERNAL MEDICINE

## 2018-06-13 PROCEDURE — 96413 CHEMO IV INFUSION 1 HR: CPT | Performed by: INTERNAL MEDICINE

## 2018-06-13 PROCEDURE — 25010000002 PALONOSETRON PER 25 MCG: Performed by: INTERNAL MEDICINE

## 2018-06-13 PROCEDURE — 80053 COMPREHEN METABOLIC PANEL: CPT

## 2018-06-13 PROCEDURE — 99214 OFFICE O/P EST MOD 30 MIN: CPT | Performed by: INTERNAL MEDICINE

## 2018-06-13 PROCEDURE — 1123F ACP DISCUSS/DSCN MKR DOCD: CPT | Performed by: INTERNAL MEDICINE

## 2018-06-13 PROCEDURE — G8420 CALC BMI NORM PARAMETERS: HCPCS | Performed by: INTERNAL MEDICINE

## 2018-06-13 PROCEDURE — 25010000002 OXALIPLATIN PER 0.5 MG: Performed by: INTERNAL MEDICINE

## 2018-06-13 PROCEDURE — 96375 TX/PRO/DX INJ NEW DRUG ADDON: CPT | Performed by: INTERNAL MEDICINE

## 2018-06-13 PROCEDURE — 85007 BL SMEAR W/DIFF WBC COUNT: CPT

## 2018-06-13 RX ORDER — SODIUM CHLORIDE 0.9 % (FLUSH) 0.9 %
10 SYRINGE (ML) INJECTION AS NEEDED
Status: DISCONTINUED | OUTPATIENT
Start: 2018-06-13 | End: 2018-06-13 | Stop reason: HOSPADM

## 2018-06-13 RX ORDER — DEXTROSE MONOHYDRATE 50 MG/ML
250 INJECTION, SOLUTION INTRAVENOUS ONCE
Status: CANCELLED | OUTPATIENT
Start: 2018-06-13

## 2018-06-13 RX ORDER — PALONOSETRON 0.05 MG/ML
0.25 INJECTION, SOLUTION INTRAVENOUS ONCE
Status: CANCELLED | OUTPATIENT
Start: 2018-06-13

## 2018-06-13 RX ORDER — PALONOSETRON 0.05 MG/ML
0.25 INJECTION, SOLUTION INTRAVENOUS ONCE
Status: COMPLETED | OUTPATIENT
Start: 2018-06-13 | End: 2018-06-13

## 2018-06-13 RX ORDER — DEXTROSE MONOHYDRATE 50 MG/ML
250 INJECTION, SOLUTION INTRAVENOUS ONCE
Status: COMPLETED | OUTPATIENT
Start: 2018-06-13 | End: 2018-06-13

## 2018-06-13 RX ORDER — POTASSIUM CHLORIDE 750 MG/1
10 TABLET, FILM COATED, EXTENDED RELEASE ORAL DAILY
Qty: 30 TABLET | Refills: 0 | Status: SHIPPED | OUTPATIENT
Start: 2018-06-13 | End: 2018-08-20

## 2018-06-13 RX ORDER — SODIUM CHLORIDE 0.9 % (FLUSH) 0.9 %
10 SYRINGE (ML) INJECTION AS NEEDED
Status: CANCELLED | OUTPATIENT
Start: 2018-06-13

## 2018-06-13 RX ADMIN — DEXAMETHASONE SODIUM PHOSPHATE 12 MG: 10 INJECTION, SOLUTION INTRAMUSCULAR; INTRAVENOUS at 10:09

## 2018-06-13 RX ADMIN — Medication 10 ML: at 08:28

## 2018-06-13 RX ADMIN — OXALIPLATIN 115 MG: 5 INJECTION, SOLUTION, CONCENTRATE INTRAVENOUS at 10:40

## 2018-06-13 RX ADMIN — PALONOSETRON HYDROCHLORIDE 0.25 MG: 0.25 INJECTION INTRAVENOUS at 09:56

## 2018-06-13 RX ADMIN — FLUOROURACIL 3240 MG: 50 INJECTION, SOLUTION INTRAVENOUS at 12:43

## 2018-06-13 RX ADMIN — DEXTROSE MONOHYDRATE 250 ML: 50 INJECTION, SOLUTION INTRAVENOUS at 09:45

## 2018-06-13 RX ADMIN — LEUCOVORIN CALCIUM 700 MG: 350 INJECTION, POWDER, LYOPHILIZED, FOR SOLUTION INTRAMUSCULAR; INTRAVENOUS at 10:40

## 2018-06-13 NOTE — PROGRESS NOTES
DATE OF VISIT: 6/13/2018      REASON FOR VISIT:  Sigmoid cancer on adjuvant therapy with FOLFOX      HISTORY OF PRESENT ILLNESS:    72-year-old female with a past medical history significant for cardiomegaly was initially seen in consultation on December 18, 2017 for newly diagnosed rectosigmoid cancer.  Patient was started on adjuvant chemotherapy with FOLFOX on December 27, 2017.  Patient is here to get cycle 12 of FOLFOX today.   Complains of  Chronic constipation for which she has been using stool softeners.   Complains of worsening fatigue. Denies any excessivevomiting or diarrhea with chemotherapy.  Denies any fever or chills.  Denies any new tingling or numbness.      PAST MEDICAL HISTORY:    1.  Cardiomegaly  2.  Adenocarcinoma of rectosigmoid region      SOCIAL HISTORY:    Social History   Substance Use Topics   • Smoking status: Former Smoker     Quit date: 12/2012   • Smokeless tobacco: Never Used   • Alcohol use No       Surgical History :  Past Surgical History:   Procedure Laterality Date   • COLON SURGERY     • GALLBLADDER SURGERY     • RETINAL DETACHMENT SURGERY         ALLERGIES:    Allergies   Allergen Reactions   • Codeine          FAMILY HISTORY:  Family History   Problem Relation Age of Onset   • Heart attack Brother    • Cancer Maternal Aunt            REVIEW OF SYSTEMS:      CONSTITUTIONAL:  Complains of worsening  fatigue. Denies any fever, chills .      HEENT:    No epistaxis, mouth sores or difficulty swallowing.     RESPIRATORY:  No new shortness of breath. No new cough or hemoptysis.     CARDIOVASCULAR:  No chest pain or palpitations.     GASTROINTESTINAL:Complains of intermittent constipation. Denies any excessive diarrhea. Denies any blood in stool.  Denies any vomiting.    GENITOURINARY: No Dysuria or Hematuria.     MUSCULOSKELETAL:No new back pain or arthralgia..     LYMPHATICS:  Denies any abnormal swollen glands anywhere in the body.     NEUROLOGICAL : No tingling or numbness. No  "headache or dizziness. No seizures or balance problems.     SKIN: No new skin lesions.        PHYSICAL EXAMINATION:      VITAL SIGNS:  /89   Pulse 78   Temp 97.8 °F (36.6 °C) (Temporal Artery )   Resp 18   Ht 157.5 cm (62.01\")   Wt 67.7 kg (149 lb 3.2 oz)   BMI 27.28 kg/m²   1    06/13/18  0837   Weight: 67.7 kg (149 lb 3.2 oz)       ECOG  performance status: 1      GENERAL:  Not in any distress.    HEENT:  Normocephalic, Atraumatic.Mild Conjunctival pallor. No icterus. Extraocular Movements Intact. No Facial Asymmetry noted.    NECK:  No adenopathy. No JVD.    RESPIRATORY:  Fair air entry bilateral. No rhonchi or wheezing.    CARDIOVASCULAR:  S1, S2. Regular rate and rhythm. No murmur or gallop appreciated.    ABDOMEN:  Soft, obese, nontender. Bowel sounds present in all four quadrants.  No organomegaly appreciated.Well-healed surgical scar present in midline, right lower quadrant and epigastric region.    MUSCULOSKELETAL:  No edema.No Calf Tenderness.Normal range of motion.    NEUROLOGIC:  Alert, awake and oriented ×3.  No  Motor or sensory deficit appreciated. Cranial Nerves 2-12 grossly intact.    SKIN : No new skin lesions.    LYMPHATICS: No new lymph node enlargement in neck.    PSYCHIATRY: Normal affect. Normal judgement. Makes good eye contact.        DIAGNOSTIC DATA:    Glucose   Date Value Ref Range Status   06/13/2018 91 60 - 100 mg/dL Final     Sodium   Date Value Ref Range Status   06/13/2018 141 137 - 145 mmol/L Final     Potassium   Date Value Ref Range Status   06/13/2018 3.6 3.5 - 5.1 mmol/L Final     CO2   Date Value Ref Range Status   06/13/2018 25.0 22.0 - 31.0 mmol/L Final     Chloride   Date Value Ref Range Status   06/13/2018 106 95 - 110 mmol/L Final     Anion Gap   Date Value Ref Range Status   06/13/2018 10.0 5.0 - 15.0 mmol/L Final     Creatinine   Date Value Ref Range Status   06/13/2018 0.59 0.50 - 1.00 mg/dL Final     BUN   Date Value Ref Range Status   06/13/2018 14 7 - 21 " mg/dL Final     BUN/Creatinine Ratio   Date Value Ref Range Status   06/13/2018 23.7 7.0 - 25.0 Final     Calcium   Date Value Ref Range Status   06/13/2018 8.4 8.4 - 10.2 mg/dL Final     eGFR Non  Amer   Date Value Ref Range Status   06/13/2018 100 (H) 39 - 90 mL/min/1.73 Final     Alkaline Phosphatase   Date Value Ref Range Status   06/13/2018 111 38 - 126 U/L Final     Total Protein   Date Value Ref Range Status   06/13/2018 7.1 6.3 - 8.6 g/dL Final     ALT (SGPT)   Date Value Ref Range Status   06/13/2018 34 9 - 52 U/L Final     AST (SGOT)   Date Value Ref Range Status   06/13/2018 29 14 - 36 U/L Final     Total Bilirubin   Date Value Ref Range Status   06/13/2018 0.3 0.2 - 1.3 mg/dL Final     Albumin   Date Value Ref Range Status   06/13/2018 3.70 3.40 - 4.80 g/dL Final     Globulin   Date Value Ref Range Status   06/13/2018 3.4 2.3 - 3.5 gm/dL Final     A/G Ratio   Date Value Ref Range Status   06/13/2018 1.1 1.1 - 1.8 g/dL Final     Lab Results   Component Value Date    WBC 3.79 06/13/2018    HGB 12.0 06/13/2018    HCT 37.4 06/13/2018    MCV 87.2 06/13/2018     (L) 06/13/2018     Lab Results   Component Value Date    NEUTROABS 1.77 (L) 06/13/2018    IRON 58 12/18/2017    TIBC 312 12/18/2017    LABIRON 19 12/18/2017    FERRITIN 96.10 12/18/2017    [id number] 616 12/18/2017    FOLATE 7.20 12/18/2017     Lab Results   Component Value Date    CEA 1.40 12/18/2017           CEA level prior to surgery was elevated at 11.2.           Radiology Data :  CT scan of abdomen and pelvis with and without contrast done on November 6, 2017 at [facility] showed:  1.  Extensive rectosigmoid carcinoma extending to total length of 16 cm.  2.  7 mm mesenteric nodule adjacent to the sigmoid colon may be metastatic lymph node        Chest X-ray done on November 18, 2017 showed:  IMPRESSION:  CONCLUSION:  No Acute Disease  Cardiomegaly              Pathology :  Biopsy result from colonoscopy showed:  Tubular  adenoma with high-grade dysplasia and focal adenocarcinoma in situ        Surgical pathology report from November 22, 2017 showed:  Adenocarcinoma with mucinous features arising from sigmoid colon, T3 N1 B lesion, 2 out of 29 lymph nodes positive.  Lymphatic invasion present and no vascular invasion or perineural invasion seen.              ASSESSMENT AND PLAN:       1.  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  Lymphatic invasion present.  No large vessel invasion or perineural invasion seen.  Margins were negative.   She was started on chemotherapy with FOLFOX on December 27, 2017.   In view of leukopenia with white blood cell count of 2.9 with absolute neutrophil count of 1700 we will decrease the dose of oxaliplatin by 20% and 5-FU combined 20% from cycle 2 on January 10, 2018.  Case was discussed with Dr. Michael by Dr. Hercules on January 15, 2018 and as perDr. Michael patient's tumor was mostly in sigmoid colon not in the rectum.  So she does not need any radiation which was discussed with patient and his family.    -We will go head with 12th and final round of FOLFOX today on June 13, 2018.  -We will ask patient to return to clinic months with CBC, CMP, CEA and CT of chest, abdomen and pelvis with contrast to be done prior to next clinic visit for surveillance purposes.  -Patient will also need surveillance colonoscopy, family is requesting referral to Dr. Arriola.  Referral has been placed today on June 13, 2018.    2.  Mild anemia: Secondary to chemotherapy.  Hemoglobin is 12.0.  We will monitored with CBC for now.    3.  Thrombocytopenia: Secondary to chemotherapy.  Platelet count is 121,000.  Without any active bleeding.  We'll monitored with CBC for now    4.   Hypokalemia. Potassium is 3.6 today.  Patient was instructed to continue taking K-Dur 10 milliequivalents by mouth daily at home for  next 1 month.    5.  Neutropenia: Secondary to chemotherapy.  ANC 1700.  We  will monitored with CBC for now.    6.  History of cardiomegaly.     7.  Health maintenance: Patient quit smoking around 2012.  Had a colonoscopy in November 6, 2017.  Had a mammogram in 2017.    8.  Prescriptions: Patient is enough prescription for Zofran.  Prescription for K-dur 30 day supply with no refill has been sent to her pharmacy  today on June 13, 2018.    9.BMI:Patient's Body mass index is 27.28 kg/m². BMI is in normal range. No follow up is required.    10. Advance care planning: Patient is able to make on her decision and remains full code for now.  Spouse is mentioned as health care surrogate on chart.        Wil Abraham MD  6/13/2018  5:58 PM        EMR Dragon/Transcription disclaimer:   Much of this encounter note is an electronic transcription/translation of spoken language to printed text. The electronic translation of spoken language may permit erroneous, or at times, nonsensical words or phrases to be inadvertently transcribed; Although I have reviewed the note for such errors, some may still exist.

## 2018-06-13 NOTE — PATIENT INSTRUCTIONS
Patient Instructions for CT Scan    · Your CT scan is being done without any oral contrast.  Your CT scan may be done with IV contrast, if you have an allergy to iodine--please tell your nurse.    · Do not eat or drink 4 hours prior to scan.     · You may take your medications with sips of water, except DO NOT take your diabetic pill the morning of the test.    Arrive at the Outpatient Surgery entrance at [facility] 20 minutes prior to appointment time.    You will receive a phone call with an appointment for your CT scan.  Please call our office, if someone does not contact you with 3 days.    Claudette Rosado RN  June 13, 2018  9:26 AM

## 2018-06-15 ENCOUNTER — INFUSION (OUTPATIENT)
Dept: ONCOLOGY | Facility: HOSPITAL | Age: 73
End: 2018-06-15

## 2018-06-15 DIAGNOSIS — Z45.2 ENCOUNTER FOR VENOUS ACCESS DEVICE CARE: Primary | ICD-10-CM

## 2018-06-15 PROCEDURE — 25010000002 HEPARIN FLUSH (PORCINE) 100 UNIT/ML SOLUTION: Performed by: INTERNAL MEDICINE

## 2018-06-15 PROCEDURE — 96523 IRRIG DRUG DELIVERY DEVICE: CPT | Performed by: INTERNAL MEDICINE

## 2018-06-15 RX ORDER — SODIUM CHLORIDE 0.9 % (FLUSH) 0.9 %
10 SYRINGE (ML) INJECTION AS NEEDED
Status: CANCELLED | OUTPATIENT
Start: 2018-07-24

## 2018-06-15 RX ORDER — SODIUM CHLORIDE 0.9 % (FLUSH) 0.9 %
10 SYRINGE (ML) INJECTION AS NEEDED
Status: DISCONTINUED | OUTPATIENT
Start: 2018-06-15 | End: 2018-06-15 | Stop reason: HOSPADM

## 2018-06-15 RX ADMIN — Medication 10 ML: at 10:36

## 2018-06-15 RX ADMIN — SODIUM CHLORIDE, PRESERVATIVE FREE 500 UNITS: 5 INJECTION INTRAVENOUS at 10:36

## 2018-06-28 RX ORDER — FOLIC ACID 1 MG/1
1 TABLET ORAL DAILY
Qty: 90 TABLET | Refills: 1 | Status: SHIPPED | OUTPATIENT
Start: 2018-06-28 | End: 2018-08-20

## 2018-07-17 ENCOUNTER — TELEPHONE (OUTPATIENT)
Dept: ONCOLOGY | Facility: HOSPITAL | Age: 73
End: 2018-07-17

## 2018-07-17 RX ORDER — GABAPENTIN 100 MG/1
100 CAPSULE ORAL 3 TIMES DAILY
Qty: 90 CAPSULE | Refills: 0 | Status: SHIPPED | OUTPATIENT
Start: 2018-07-17 | End: 2018-08-20 | Stop reason: SDUPTHER

## 2018-07-17 NOTE — TELEPHONE ENCOUNTER
----- Message from Wil Dill MD sent at 7/17/2018 12:45 PM CDT -----  Regarding: RE: MEDICATION  Contact: [phone]  Prescription for neurontin sent to her pharmacy. Thanks  ----- Message -----  From: Tarah Gunn RN  Sent: 7/17/2018   9:44 AM  To: Wil Dill MD  Subject: FW: MEDICATION                                   See message below, thanks    ----- Message -----  From: Marley Potter  Sent: 7/17/2018   9:24 AM  To: Erica [facility]  Subject: MEDICATION                                       PT HAD SPOKE WITH DR DILL ABOUT TINGLING IN HER HANDS AND FEET. SHE RECENTLY FELL AND DECIDED THAT MAYBE SHE DOES NEED SOMETHING CALLED INTO [facility].  SHE COULDN'T REMEMBER THE NAME OF THE MEDICATION THAT THEY DISCUSSED.

## 2018-07-24 ENCOUNTER — INFUSION (OUTPATIENT)
Dept: ONCOLOGY | Facility: HOSPITAL | Age: 73
End: 2018-07-24

## 2018-07-24 DIAGNOSIS — Z45.2 ENCOUNTER FOR VENOUS ACCESS DEVICE CARE: Primary | ICD-10-CM

## 2018-07-24 PROCEDURE — 96523 IRRIG DRUG DELIVERY DEVICE: CPT | Performed by: INTERNAL MEDICINE

## 2018-07-24 PROCEDURE — 25010000002 HEPARIN FLUSH (PORCINE) 100 UNIT/ML SOLUTION: Performed by: INTERNAL MEDICINE

## 2018-07-24 RX ORDER — SODIUM CHLORIDE 0.9 % (FLUSH) 0.9 %
10 SYRINGE (ML) INJECTION AS NEEDED
Status: CANCELLED | OUTPATIENT
Start: 2018-09-12

## 2018-07-24 RX ORDER — SODIUM CHLORIDE 0.9 % (FLUSH) 0.9 %
10 SYRINGE (ML) INJECTION AS NEEDED
Status: DISCONTINUED | OUTPATIENT
Start: 2018-07-24 | End: 2018-07-24 | Stop reason: HOSPADM

## 2018-07-24 RX ADMIN — Medication 10 ML: at 07:55

## 2018-07-24 RX ADMIN — SODIUM CHLORIDE, PRESERVATIVE FREE 500 UNITS: 5 INJECTION INTRAVENOUS at 07:55

## 2018-07-27 ENCOUNTER — APPOINTMENT (OUTPATIENT)
Dept: ONCOLOGY | Facility: HOSPITAL | Age: 73
End: 2018-07-27

## 2018-08-20 ENCOUNTER — TELEPHONE (OUTPATIENT)
Dept: ONCOLOGY | Facility: HOSPITAL | Age: 73
End: 2018-08-20

## 2018-08-20 RX ORDER — GABAPENTIN 100 MG/1
CAPSULE ORAL
Qty: 90 CAPSULE | Refills: 0 | Status: SHIPPED | OUTPATIENT
Start: 2018-08-20 | End: 2018-09-19 | Stop reason: SDUPTHER

## 2018-08-20 NOTE — TELEPHONE ENCOUNTER
Informed pts  that neurotnin prescription was sent into [facility] pharmacy.  states understanding.

## 2018-08-27 ENCOUNTER — ANESTHESIA (OUTPATIENT)
Dept: GASTROENTEROLOGY | Facility: HOSPITAL | Age: 73
End: 2018-08-27

## 2018-08-27 ENCOUNTER — ANESTHESIA EVENT (OUTPATIENT)
Dept: GASTROENTEROLOGY | Facility: HOSPITAL | Age: 73
End: 2018-08-27

## 2018-08-27 ENCOUNTER — HOSPITAL ENCOUNTER (OUTPATIENT)
Facility: HOSPITAL | Age: 73
Setting detail: HOSPITAL OUTPATIENT SURGERY
Discharge: HOME | End: 2018-08-27
Attending: INTERNAL MEDICINE | Admitting: INTERNAL MEDICINE

## 2018-08-27 VITALS
SYSTOLIC BLOOD PRESSURE: 121 MMHG | WEIGHT: 151.01 LBS | OXYGEN SATURATION: 96 % | RESPIRATION RATE: 17 BRPM | BODY MASS INDEX: 25.78 KG/M2 | HEART RATE: 65 BPM | DIASTOLIC BLOOD PRESSURE: 50 MMHG | TEMPERATURE: 97 F | HEIGHT: 64 IN

## 2018-08-27 DIAGNOSIS — C18.7 CANCER OF SIGMOID (HCC): ICD-10-CM

## 2018-08-27 PROCEDURE — 88305 TISSUE EXAM BY PATHOLOGIST: CPT | Performed by: PATHOLOGY

## 2018-08-27 PROCEDURE — 25010000002 FENTANYL CITRATE (PF) 100 MCG/2ML SOLUTION: Performed by: NURSE ANESTHETIST, CERTIFIED REGISTERED

## 2018-08-27 PROCEDURE — 88305 TISSUE EXAM BY PATHOLOGIST: CPT | Performed by: INTERNAL MEDICINE

## 2018-08-27 PROCEDURE — 25010000002 PROPOFOL 10 MG/ML EMULSION: Performed by: NURSE ANESTHETIST, CERTIFIED REGISTERED

## 2018-08-27 PROCEDURE — 25010000002 MIDAZOLAM PER 1 MG: Performed by: NURSE ANESTHETIST, CERTIFIED REGISTERED

## 2018-08-27 RX ORDER — DEXTROSE AND SODIUM CHLORIDE 5; .45 G/100ML; G/100ML
20 INJECTION, SOLUTION INTRAVENOUS CONTINUOUS
Status: DISCONTINUED | OUTPATIENT
Start: 2018-08-27 | End: 2018-08-27 | Stop reason: HOSPADM

## 2018-08-27 RX ORDER — 0.9 % SODIUM CHLORIDE 0.9 %
10 VIAL (ML) INJECTION ONCE
Status: DISCONTINUED | OUTPATIENT
Start: 2018-08-27 | End: 2018-08-27 | Stop reason: HOSPADM

## 2018-08-27 RX ORDER — MIDAZOLAM HYDROCHLORIDE 1 MG/ML
INJECTION INTRAMUSCULAR; INTRAVENOUS AS NEEDED
Status: DISCONTINUED | OUTPATIENT
Start: 2018-08-27 | End: 2018-08-27 | Stop reason: SURG

## 2018-08-27 RX ORDER — FENTANYL CITRATE 50 UG/ML
INJECTION, SOLUTION INTRAMUSCULAR; INTRAVENOUS AS NEEDED
Status: DISCONTINUED | OUTPATIENT
Start: 2018-08-27 | End: 2018-08-27 | Stop reason: SURG

## 2018-08-27 RX ORDER — PROPOFOL 10 MG/ML
VIAL (ML) INTRAVENOUS AS NEEDED
Status: DISCONTINUED | OUTPATIENT
Start: 2018-08-27 | End: 2018-08-27 | Stop reason: SURG

## 2018-08-27 RX ADMIN — MIDAZOLAM 1 MG: 1 INJECTION INTRAMUSCULAR; INTRAVENOUS at 14:15

## 2018-08-27 RX ADMIN — DEXTROSE AND SODIUM CHLORIDE 20 ML/HR: 5; 450 INJECTION, SOLUTION INTRAVENOUS at 12:33

## 2018-08-27 RX ADMIN — DEXTROSE AND SODIUM CHLORIDE: 5; 450 INJECTION, SOLUTION INTRAVENOUS at 14:08

## 2018-08-27 RX ADMIN — FENTANYL CITRATE 50 MCG: 50 INJECTION, SOLUTION INTRAMUSCULAR; INTRAVENOUS at 14:15

## 2018-08-27 RX ADMIN — PROPOFOL 80 MG: 10 INJECTION, EMULSION INTRAVENOUS at 14:15

## 2018-08-27 NOTE — ANESTHESIA POSTPROCEDURE EVALUATION
Patient: Mary Ann Condon    Procedure Summary     Date:  08/27/18 Room / Location:  [facility] ENDOSCOPY 2 / [facility] ENDOSCOPY    Anesthesia Start:  1412 Anesthesia Stop:  1426    Procedure:  COLONOSCOPY (N/A ) Diagnosis:       Cancer of sigmoid (CMS/HCC)      (Cancer of sigmoid (CMS/HCC) [C18.7])    Surgeon:  Lobo Arriola DO Provider:  Areli Ortiz CRNA    Anesthesia Type:  MAC ASA Status:  2          Anesthesia Type: MAC  Last vitals  BP   150/67 (08/27/18 1219)   Temp   97.1 °F (36.2 °C) (08/27/18 1219)   Pulse   70 (08/27/18 1219)   Resp   16 (08/27/18 1219)     SpO2   97 % (08/27/18 1219)     Post Anesthesia Care and Evaluation    Patient location during evaluation: PACU  Patient participation: complete - patient participated  Level of consciousness: awake and alert  Pain management: adequate  Airway patency: patent  Anesthetic complications: No anesthetic complications    Cardiovascular status: acceptable  Respiratory status: acceptable  Hydration status: acceptable

## 2018-08-27 NOTE — ANESTHESIA PREPROCEDURE EVALUATION
Anesthesia Evaluation                  Airway   Mallampati: II  TM distance: >3 FB  Neck ROM: full  Dental    (+) lower dentures and upper dentures    Pulmonary - negative pulmonary ROS and normal exam   Cardiovascular - negative cardio ROS and normal exam        Neuro/Psych- negative ROS  GI/Hepatic/Renal/Endo - negative ROS     Musculoskeletal (-) negative ROS    Abdominal  - normal exam   Substance History - negative use     OB/GYN negative ob/gyn ROS         Other - negative ROS                       Anesthesia Plan    ASA 2     MAC     intravenous induction   Anesthetic plan and risks discussed with patient.

## 2018-08-29 LAB
LAB AP CASE REPORT: NORMAL
PATH REPORT.FINAL DX SPEC: NORMAL
PATH REPORT.GROSS SPEC: NORMAL

## 2018-09-12 ENCOUNTER — HOSPITAL ENCOUNTER (OUTPATIENT)
Dept: CT IMAGING | Facility: HOSPITAL | Age: 73
Discharge: HOME | End: 2018-09-12

## 2018-09-12 ENCOUNTER — INFUSION (OUTPATIENT)
Dept: ONCOLOGY | Facility: HOSPITAL | Age: 73
End: 2018-09-12

## 2018-09-12 ENCOUNTER — HOSPITAL ENCOUNTER (OUTPATIENT)
Dept: CT IMAGING | Facility: HOSPITAL | Age: 73
Discharge: HOME | End: 2018-09-12
Admitting: INTERNAL MEDICINE

## 2018-09-12 DIAGNOSIS — C19 CANCER OF RECTOSIGMOID (COLON) (HCC): ICD-10-CM

## 2018-09-12 DIAGNOSIS — Z45.2 ENCOUNTER FOR VENOUS ACCESS DEVICE CARE: Primary | ICD-10-CM

## 2018-09-12 LAB
ALBUMIN SERPL-MCNC: 3.9 G/DL (ref 3.4–4.8)
ALBUMIN/GLOB SERPL: 1 G/DL (ref 1.1–1.8)
ALP SERPL-CCNC: 102 U/L (ref 38–126)
ALT SERPL W P-5'-P-CCNC: 22 U/L (ref 9–52)
ANION GAP SERPL CALCULATED.3IONS-SCNC: 9 MMOL/L (ref 5–15)
AST SERPL-CCNC: 32 U/L (ref 14–36)
BASOPHILS # BLD AUTO: 0.02 10*3/MM3 (ref 0–0.2)
BASOPHILS NFR BLD AUTO: 0.4 % (ref 0–2)
BILIRUB SERPL-MCNC: 0.6 MG/DL (ref 0.2–1.3)
BUN BLD-MCNC: 17 MG/DL (ref 7–21)
BUN/CREAT SERPL: 27.9 (ref 7–25)
CALCIUM SPEC-SCNC: 8.8 MG/DL (ref 8.4–10.2)
CEA SERPL-MCNC: 1.4 NG/ML (ref 0–5)
CHLORIDE SERPL-SCNC: 103 MMOL/L (ref 95–110)
CO2 SERPL-SCNC: 29 MMOL/L (ref 22–31)
CREAT BLD-MCNC: 0.61 MG/DL (ref 0.5–1)
DEPRECATED RDW RBC AUTO: 45.9 FL (ref 36.4–46.3)
EOSINOPHIL # BLD AUTO: 0.08 10*3/MM3 (ref 0–0.7)
EOSINOPHIL NFR BLD AUTO: 1.4 % (ref 0–7)
ERYTHROCYTE [DISTWIDTH] IN BLOOD BY AUTOMATED COUNT: 14.8 % (ref 11.5–14.5)
GFR SERPL CREATININE-BSD FRML MDRD: 96 ML/MIN/1.73 (ref 39–90)
GLOBULIN UR ELPH-MCNC: 3.8 GM/DL (ref 2.3–3.5)
GLUCOSE BLD-MCNC: 94 MG/DL (ref 60–100)
HCT VFR BLD AUTO: 39.8 % (ref 35–45)
HGB BLD-MCNC: 13.1 G/DL (ref 12–15.5)
IMM GRANULOCYTES # BLD: 0.01 10*3/MM3 (ref 0–0.02)
IMM GRANULOCYTES NFR BLD: 0.2 % (ref 0–0.5)
LYMPHOCYTES # BLD AUTO: 1.28 10*3/MM3 (ref 0.6–4.2)
LYMPHOCYTES NFR BLD AUTO: 22.9 % (ref 10–50)
MCH RBC QN AUTO: 28 PG (ref 26.5–34)
MCHC RBC AUTO-ENTMCNC: 32.9 G/DL (ref 31.4–36)
MCV RBC AUTO: 85 FL (ref 80–98)
MONOCYTES # BLD AUTO: 0.6 10*3/MM3 (ref 0–0.9)
MONOCYTES NFR BLD AUTO: 10.8 % (ref 0–12)
NEUTROPHILS # BLD AUTO: 3.59 10*3/MM3 (ref 2–8.6)
NEUTROPHILS NFR BLD AUTO: 64.3 % (ref 37–80)
PLATELET # BLD AUTO: 229 10*3/MM3 (ref 150–450)
PMV BLD AUTO: 9.3 FL (ref 8–12)
POTASSIUM BLD-SCNC: 3.9 MMOL/L (ref 3.5–5.1)
PROT SERPL-MCNC: 7.7 G/DL (ref 6.3–8.6)
RBC # BLD AUTO: 4.68 10*6/MM3 (ref 3.77–5.16)
SODIUM BLD-SCNC: 141 MMOL/L (ref 137–145)
WBC NRBC COR # BLD: 5.58 10*3/MM3 (ref 3.2–9.8)

## 2018-09-12 PROCEDURE — 25010000002 HEPARIN FLUSH (PORCINE) 100 UNIT/ML SOLUTION: Performed by: INTERNAL MEDICINE

## 2018-09-12 PROCEDURE — 85025 COMPLETE CBC W/AUTO DIFF WBC: CPT | Performed by: INTERNAL MEDICINE

## 2018-09-12 PROCEDURE — 71260 CT THORAX DX C+: CPT

## 2018-09-12 PROCEDURE — 25010000002 IOPAMIDOL 61 % SOLUTION: Performed by: INTERNAL MEDICINE

## 2018-09-12 PROCEDURE — 36591 DRAW BLOOD OFF VENOUS DEVICE: CPT | Performed by: INTERNAL MEDICINE

## 2018-09-12 PROCEDURE — 74177 CT ABD & PELVIS W/CONTRAST: CPT

## 2018-09-12 PROCEDURE — 80053 COMPREHEN METABOLIC PANEL: CPT | Performed by: INTERNAL MEDICINE

## 2018-09-12 PROCEDURE — 82378 CARCINOEMBRYONIC ANTIGEN: CPT | Performed by: INTERNAL MEDICINE

## 2018-09-12 RX ORDER — SODIUM CHLORIDE 0.9 % (FLUSH) 0.9 %
10 SYRINGE (ML) INJECTION AS NEEDED
Status: CANCELLED | OUTPATIENT
Start: 2018-11-09

## 2018-09-12 RX ORDER — SODIUM CHLORIDE 0.9 % (FLUSH) 0.9 %
10 SYRINGE (ML) INJECTION AS NEEDED
Status: DISCONTINUED | OUTPATIENT
Start: 2018-09-12 | End: 2018-09-12 | Stop reason: HOSPADM

## 2018-09-12 RX ADMIN — HEPARIN SODIUM (PORCINE) LOCK FLUSH IV SOLN 100 UNIT/ML 500 UNITS: 100 SOLUTION at 11:00

## 2018-09-12 RX ADMIN — IOPAMIDOL 93 ML: 612 INJECTION, SOLUTION INTRAVENOUS at 10:39

## 2018-09-12 RX ADMIN — Medication 10 ML: at 10:20

## 2018-09-19 ENCOUNTER — OFFICE VISIT (OUTPATIENT)
Dept: ONCOLOGY | Facility: CLINIC | Age: 73
End: 2018-09-19

## 2018-09-19 VITALS
HEART RATE: 67 BPM | HEIGHT: 64 IN | RESPIRATION RATE: 18 BRPM | WEIGHT: 153.8 LBS | BODY MASS INDEX: 26.26 KG/M2 | OXYGEN SATURATION: 96 % | DIASTOLIC BLOOD PRESSURE: 64 MMHG | TEMPERATURE: 98.4 F | SYSTOLIC BLOOD PRESSURE: 147 MMHG

## 2018-09-19 DIAGNOSIS — C19 CANCER OF RECTOSIGMOID (COLON) (HCC): Primary | ICD-10-CM

## 2018-09-19 DIAGNOSIS — D64.81 ANEMIA DUE TO ANTINEOPLASTIC CHEMOTHERAPY: ICD-10-CM

## 2018-09-19 DIAGNOSIS — T45.1X5A NEUROPATHY DUE TO CHEMOTHERAPEUTIC DRUG (HCC): ICD-10-CM

## 2018-09-19 DIAGNOSIS — G62.0 NEUROPATHY DUE TO CHEMOTHERAPEUTIC DRUG (HCC): ICD-10-CM

## 2018-09-19 DIAGNOSIS — T45.1X5A ANEMIA DUE TO ANTINEOPLASTIC CHEMOTHERAPY: ICD-10-CM

## 2018-09-19 PROCEDURE — 99214 OFFICE O/P EST MOD 30 MIN: CPT | Performed by: INTERNAL MEDICINE

## 2018-09-19 PROCEDURE — G8420 CALC BMI NORM PARAMETERS: HCPCS | Performed by: INTERNAL MEDICINE

## 2018-09-19 PROCEDURE — 1123F ACP DISCUSS/DSCN MKR DOCD: CPT | Performed by: INTERNAL MEDICINE

## 2018-09-19 PROCEDURE — G0463 HOSPITAL OUTPT CLINIC VISIT: HCPCS | Performed by: INTERNAL MEDICINE

## 2018-09-19 RX ORDER — GABAPENTIN 100 MG/1
100 CAPSULE ORAL 3 TIMES DAILY
Qty: 270 CAPSULE | Refills: 0 | Status: SHIPPED | OUTPATIENT
Start: 2018-09-19 | End: 2019-06-06 | Stop reason: SDUPTHER

## 2018-09-19 NOTE — PROGRESS NOTES
DATE OF VISIT: 9/19/2018      REASON FOR VISIT:  Sigmoid cancer on surveillance, Oxaliplatin induced neuropathy      HISTORY OF PRESENT ILLNESS:    72-year-old female with a past medical history significant for cardiomegaly was initially seen in consultation on December 18, 2017 for newly diagnosed rectosigmoid cancer.  Patient was started on adjuvant chemotherapy with FOLFOX on December 27, 2017.  Patient finished cycle 12 of FOLFOX on June 13, 2018.  Patient started complaining of neuropathy month after finishing chemotherapy for which she has been on Neurontin 100 mg 3 times daily.  Patient had a CT of chest, abdomen and pelvis with contrast as well as blood work done recently.  She is here to discuss the result of blood work and further recommendation.  Denies any new lymph node enlargement.  Denies any blood in the stool or urine.  Denies any recent weight loss.      PAST MEDICAL HISTORY:    1.  Cardiomegaly  2.  Adenocarcinoma of rectosigmoid region      SOCIAL HISTORY:    Social History   Substance Use Topics   • Smoking status: Former Smoker     Quit date: 12/2012   • Smokeless tobacco: Never Used   • Alcohol use No       Surgical History :  Past Surgical History:   Procedure Laterality Date   • COLON SURGERY     • COLONOSCOPY N/A 8/27/2018    Procedure: COLONOSCOPY;  Surgeon: Lobo Arriola DO;  Location: [facility] ENDOSCOPY;  Service: Gastroenterology   • GALLBLADDER SURGERY     • RETINAL DETACHMENT SURGERY         ALLERGIES:    Allergies   Allergen Reactions   • Codeine          FAMILY HISTORY:  Family History   Problem Relation Age of Onset   • Heart attack Brother    • Cancer Maternal Aunt            REVIEW OF SYSTEMS:      CONSTITUTIONAL:   Has gained 4 pounds since last clinic visit.  Denies any fever, chills .      HEENT:    No epistaxis, mouth sores or difficulty swallowing.     RESPIRATORY:  No new shortness of breath. No new cough or hemoptysis.     CARDIOVASCULAR:  No chest pain or  "palpitations.     GASTROINTESTINAL:Complains of intermittent constipation. Denies any excessive diarrhea. Denies any blood in stool.  Denies any vomiting.    GENITOURINARY: No Dysuria or Hematuria.     MUSCULOSKELETAL:No new back pain or arthralgia..     LYMPHATICS:  Denies any abnormal swollen glands anywhere in the body.     NEUROLOGICAL : Complains of tingling and numbness affecting bilateral hand and bilateral feet.  Worst in left foot. No headache or dizziness. No seizures or balance problems.     SKIN: No new skin lesions.        PHYSICAL EXAMINATION:      VITAL SIGNS:  /64   Pulse 67   Temp 98.4 °F (36.9 °C) (Temporal Artery )   Resp 18   Ht 162.6 cm (64.02\")   Wt 69.8 kg (153 lb 12.8 oz)   SpO2 96%   BMI 26.39 kg/m²   1    09/19/18  1051   Weight: 69.8 kg (153 lb 12.8 oz)       ECOG  performance status: 1      GENERAL:  Not in any distress.    HEENT:  Normocephalic, Atraumatic.Mild Conjunctival pallor. No icterus. Extraocular Movements Intact. No Facial Asymmetry noted.    NECK:  No adenopathy. No JVD.    RESPIRATORY:  Fair air entry bilateral. No rhonchi or wheezing.    CARDIOVASCULAR:  S1, S2. Regular rate and rhythm. No murmur or gallop appreciated.    ABDOMEN:  Soft, obese, nontender. Bowel sounds present in all four quadrants.  No organomegaly appreciated.Well-healed surgical scar present in midline, right lower quadrant and epigastric region.    MUSCULOSKELETAL:  No edema.No Calf Tenderness.Normal range of motion.    NEUROLOGIC:  Alert, awake and oriented ×3.  No  Motor  deficit appreciated. Cranial Nerves 2-12 grossly intact.    SKIN : No new skin lesions.    LYMPHATICS: No new lymph node enlargement in neck.    PSYCHIATRY: Normal affect. Normal judgement. Makes good eye contact.        DIAGNOSTIC DATA:    Glucose   Date Value Ref Range Status   09/12/2018 94 60 - 100 mg/dL Final     Sodium   Date Value Ref Range Status   09/12/2018 141 137 - 145 mmol/L Final     Potassium   Date Value " Ref Range Status   09/12/2018 3.9 3.5 - 5.1 mmol/L Final     CO2   Date Value Ref Range Status   09/12/2018 29.0 22.0 - 31.0 mmol/L Final     Chloride   Date Value Ref Range Status   09/12/2018 103 95 - 110 mmol/L Final     Anion Gap   Date Value Ref Range Status   09/12/2018 9.0 5.0 - 15.0 mmol/L Final     Creatinine   Date Value Ref Range Status   09/12/2018 0.61 0.50 - 1.00 mg/dL Final     BUN   Date Value Ref Range Status   09/12/2018 17 7 - 21 mg/dL Final     BUN/Creatinine Ratio   Date Value Ref Range Status   09/12/2018 27.9 (H) 7.0 - 25.0 Final     Calcium   Date Value Ref Range Status   09/12/2018 8.8 8.4 - 10.2 mg/dL Final     eGFR Non  Amer   Date Value Ref Range Status   09/12/2018 96 (H) 39 - 90 mL/min/1.73 Final     Alkaline Phosphatase   Date Value Ref Range Status   09/12/2018 102 38 - 126 U/L Final     Total Protein   Date Value Ref Range Status   09/12/2018 7.7 6.3 - 8.6 g/dL Final     ALT (SGPT)   Date Value Ref Range Status   09/12/2018 22 9 - 52 U/L Final     AST (SGOT)   Date Value Ref Range Status   09/12/2018 32 14 - 36 U/L Final     Total Bilirubin   Date Value Ref Range Status   09/12/2018 0.6 0.2 - 1.3 mg/dL Final     Albumin   Date Value Ref Range Status   09/12/2018 3.90 3.40 - 4.80 g/dL Final     Globulin   Date Value Ref Range Status   09/12/2018 3.8 (H) 2.3 - 3.5 gm/dL Final     Lab Results   Component Value Date    WBC 5.58 09/12/2018    HGB 13.1 09/12/2018    HCT 39.8 09/12/2018    MCV 85.0 09/12/2018     09/12/2018     Lab Results   Component Value Date    NEUTROABS 3.59 09/12/2018    IRON 58 12/18/2017    TIBC 312 12/18/2017    LABIRON 19 12/18/2017    FERRITIN 96.10 12/18/2017    [id number] 616 12/18/2017    FOLATE 7.20 12/18/2017     Lab Results   Component Value Date    CEA 1.40 09/12/2018           CEA level prior to surgery was elevated at 11.2.           Radiology Data :  CT of chest, abdomen and pelvis with contrast done on September 12, 2018 was reviewed,  discussed with patient, it showed:  FINDINGS:     CHEST: There is a 1.2 cm low-density left thyroid nodule that is  below size criteria to warrant any follow-up. There is no pleural  or pericardial effusion. Emphysematous changes of the lungs are  noted. The lungs are otherwise clear. There is no pleural or  pericardial effusion. There is no thoracic adenopathy. No bony  abnormality is noted.     ABDOMEN: The patient is status post cholecystectomy. There is a  small hiatal hernia. The solid abdominal organs are unremarkable.  Vascular calcifications are noted. There is no abdominal  adenopathy. There is no free fluid or free air within the  abdomen. The abdominal portion of the GI tract is unremarkable.     Pelvis: The patient is status post rectosigmoid colon resection  with primary anastomosis noted. There is no free fluid in the  pelvis. Pelvic organs appear unremarkable by CT. There is no  pelvic adenopathy. Pelvic portion of the GI tract is otherwise  unremarkable. Degenerative changes are noted in the spine.     IMPRESSION:  No definite evidence of recurrent or metastatic  Disease.      CT scan of abdomen and pelvis with and without contrast done on November 6, 2017 at [facility] showed:  1.  Extensive rectosigmoid carcinoma extending to total length of 16 cm.  2.  7 mm mesenteric nodule adjacent to the sigmoid colon may be metastatic lymph node        Chest X-ray done on November 18, 2017 showed:  IMPRESSION:  CONCLUSION:  No Acute Disease  Cardiomegaly              Pathology :  Biopsy result from colonoscopy showed:  Tubular adenoma with high-grade dysplasia and focal adenocarcinoma in situ        Surgical pathology report from November 22, 2017 showed:  Adenocarcinoma with mucinous features arising from sigmoid colon, T3 N1 B lesion, 2 out of 29 lymph nodes positive.  Lymphatic invasion present and no vascular invasion or perineural invasion seen.              ASSESSMENT AND PLAN:       1.   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  Lymphatic invasion present.  No large vessel invasion or perineural invasion seen.  Margins were negative.   She was started on chemotherapy with FOLFOX on December 27, 2017.   In view of leukopenia with white blood cell count of 2.9 with absolute neutrophil count of 1700 we will decrease the dose of oxaliplatin by 20% and 5-FU combined 20% from cycle 2 on January 10, 2018.  Case was discussed with Dr. Michael by Dr. Hercules on January 15, 2018 and as perDr. Michael patient's tumor was mostly in sigmoid colon not in the rectum.  So she does not need any radiation which was discussed with patient and his family.    -Patient received 12 cycles of chemotherapy consisting of FOLFOX from December 27, 2017 until June 13, 2018.  -Colonoscopy done by Dr. Arriola on August 27, 2018 does not show any evidence of recurrence.  Next colonoscopy will be due in August 2020 21st.  -Recently done CT of chest, abdomen and pelvis with contrast as well as CEA on September 14, 2018 does not show any evidence of recurrence.  -Patient is going to [place] for next 6 months.  We will give her appointment to come back in May 2019 when she returns to [place]  -Recommend checking CBC, CMP and CEA every 3 months while in [place].  She is going to find a medical doctor and get blood work done over that.  -Next surveillance CT scan will be due in September 2019.    2.  Mild anemia: Secondary to chemotherapy.  Hemoglobin is 13.1.  We will monitored with CBC for now.    3.  Oxaliplatin-induced neuropathy:  -Patient started having tingling numbness affecting upper and lower extremity 1 month after finishing chemotherapy.  -Currently patient is on Neurontin 100 mg 3 times daily.  Prescription for 90 day supply has been sent to mail-in pharmacy today on September 19, 2018.    4.  History of cardiomegaly.     5.  Health maintenance: Patient quit smoking around 2012.   Had a colonoscopy in 08/2018 by Dr Arriola.  Had a mammogram in 2017.    6.  Prescriptions: Prescription for Neurontin 100 mg 3 times daily 90 day supply with no refills has been sent to mail-in pharmacy today on September 19, 2018.    7.BMI:Patient's Body mass index is 26.39 kg/m². BMI is in normal range. No follow up is required.    8. Advance care planning: Patient is able to make on her decision and remains full code for now.  Spouse is mentioned as health care surrogate on chart.        Wil Abraham MD  9/19/2018  5:52 PM        EMR Dragon/Transcription disclaimer:   Much of this encounter note is an electronic transcription/translation of spoken language to printed text. The electronic translation of spoken language may permit erroneous, or at times, nonsensical words or phrases to be inadvertently transcribed; Although I have reviewed the note for such errors, some may still exist.

## 2018-11-09 ENCOUNTER — INFUSION (OUTPATIENT)
Dept: ONCOLOGY | Facility: HOSPITAL | Age: 73
End: 2018-11-09

## 2018-11-09 DIAGNOSIS — Z45.2 ENCOUNTER FOR VENOUS ACCESS DEVICE CARE: Primary | ICD-10-CM

## 2018-11-09 PROCEDURE — 96523 IRRIG DRUG DELIVERY DEVICE: CPT | Performed by: INTERNAL MEDICINE

## 2018-11-09 RX ORDER — SODIUM CHLORIDE 0.9 % (FLUSH) 0.9 %
10 SYRINGE (ML) INJECTION AS NEEDED
Status: CANCELLED | OUTPATIENT
Start: 2018-12-21

## 2018-11-09 RX ORDER — SODIUM CHLORIDE 0.9 % (FLUSH) 0.9 %
10 SYRINGE (ML) INJECTION AS NEEDED
Status: DISCONTINUED | OUTPATIENT
Start: 2018-11-09 | End: 2018-11-09 | Stop reason: HOSPADM

## 2018-11-09 RX ADMIN — Medication 10 ML: at 10:13

## 2018-11-09 RX ADMIN — SODIUM CHLORIDE, PRESERVATIVE FREE 500 UNITS: 5 INJECTION INTRAVENOUS at 10:13

## 2019-05-15 ENCOUNTER — INFUSION (OUTPATIENT)
Dept: ONCOLOGY | Facility: HOSPITAL | Age: 74
End: 2019-05-15

## 2019-05-15 ENCOUNTER — OFFICE VISIT (OUTPATIENT)
Dept: ONCOLOGY | Facility: CLINIC | Age: 74
End: 2019-05-15

## 2019-05-15 VITALS
SYSTOLIC BLOOD PRESSURE: 181 MMHG | OXYGEN SATURATION: 97 % | RESPIRATION RATE: 18 BRPM | DIASTOLIC BLOOD PRESSURE: 72 MMHG | TEMPERATURE: 98.1 F | BODY MASS INDEX: 27.11 KG/M2 | HEIGHT: 64 IN | WEIGHT: 158.8 LBS | HEART RATE: 57 BPM

## 2019-05-15 DIAGNOSIS — C19 CANCER OF RECTOSIGMOID (COLON) (HCC): Primary | ICD-10-CM

## 2019-05-15 DIAGNOSIS — Z45.2 ENCOUNTER FOR VENOUS ACCESS DEVICE CARE: ICD-10-CM

## 2019-05-15 DIAGNOSIS — T45.1X5A NEUROPATHY DUE TO CHEMOTHERAPEUTIC DRUG (HCC): ICD-10-CM

## 2019-05-15 DIAGNOSIS — I10 HYPERTENSION, UNSPECIFIED TYPE: ICD-10-CM

## 2019-05-15 DIAGNOSIS — G62.0 NEUROPATHY DUE TO CHEMOTHERAPEUTIC DRUG (HCC): ICD-10-CM

## 2019-05-15 LAB
ALBUMIN SERPL-MCNC: 4 G/DL (ref 3.5–5.2)
ALBUMIN/GLOB SERPL: 1.3 G/DL
ALP SERPL-CCNC: 80 U/L (ref 39–117)
ALT SERPL W P-5'-P-CCNC: 20 U/L (ref 1–33)
ANION GAP SERPL CALCULATED.3IONS-SCNC: 10 MMOL/L
AST SERPL-CCNC: 26 U/L (ref 1–32)
BASOPHILS # BLD AUTO: 0.04 10*3/MM3 (ref 0–0.2)
BASOPHILS NFR BLD AUTO: 0.7 % (ref 0–1.5)
BILIRUB SERPL-MCNC: 0.7 MG/DL (ref 0.2–1.2)
BUN BLD-MCNC: 13 MG/DL (ref 8–23)
BUN/CREAT SERPL: 19.4 (ref 7–25)
CALCIUM SPEC-SCNC: 8.9 MG/DL (ref 8.6–10.5)
CEA SERPL-MCNC: 1.52 NG/ML
CHLORIDE SERPL-SCNC: 103 MMOL/L (ref 98–107)
CO2 SERPL-SCNC: 29 MMOL/L (ref 22–29)
CREAT BLD-MCNC: 0.67 MG/DL (ref 0.57–1)
DEPRECATED RDW RBC AUTO: 45 FL (ref 37–54)
EOSINOPHIL # BLD AUTO: 0.08 10*3/MM3 (ref 0–0.4)
EOSINOPHIL NFR BLD AUTO: 1.3 % (ref 0.3–6.2)
ERYTHROCYTE [DISTWIDTH] IN BLOOD BY AUTOMATED COUNT: 14.7 % (ref 12.3–15.4)
GFR SERPL CREATININE-BSD FRML MDRD: 86 ML/MIN/1.73
GLOBULIN UR ELPH-MCNC: 3.2 GM/DL
GLUCOSE BLD-MCNC: 91 MG/DL (ref 65–99)
HCT VFR BLD AUTO: 38.4 % (ref 34–46.6)
HGB BLD-MCNC: 12.3 G/DL (ref 12–15.9)
IMM GRANULOCYTES # BLD AUTO: 0.02 10*3/MM3 (ref 0–0.05)
IMM GRANULOCYTES NFR BLD AUTO: 0.3 % (ref 0–0.5)
LYMPHOCYTES # BLD AUTO: 1.39 10*3/MM3 (ref 0.7–3.1)
LYMPHOCYTES NFR BLD AUTO: 22.6 % (ref 19.6–45.3)
MCH RBC QN AUTO: 26.7 PG (ref 26.6–33)
MCHC RBC AUTO-ENTMCNC: 32 G/DL (ref 31.5–35.7)
MCV RBC AUTO: 83.5 FL (ref 79–97)
MONOCYTES # BLD AUTO: 0.62 10*3/MM3 (ref 0.1–0.9)
MONOCYTES NFR BLD AUTO: 10.1 % (ref 5–12)
NEUTROPHILS # BLD AUTO: 4 10*3/MM3 (ref 1.7–7)
NEUTROPHILS NFR BLD AUTO: 65 % (ref 42.7–76)
NRBC BLD AUTO-RTO: 0 /100 WBC (ref 0–0.2)
PLATELET # BLD AUTO: 272 10*3/MM3 (ref 140–450)
PMV BLD AUTO: 10 FL (ref 6–12)
POTASSIUM BLD-SCNC: 3.6 MMOL/L (ref 3.5–5.2)
PROT SERPL-MCNC: 7.2 G/DL (ref 6–8.5)
RBC # BLD AUTO: 4.6 10*6/MM3 (ref 3.77–5.28)
SODIUM BLD-SCNC: 142 MMOL/L (ref 136–145)
WBC NRBC COR # BLD: 6.15 10*3/MM3 (ref 3.4–10.8)

## 2019-05-15 PROCEDURE — 99214 OFFICE O/P EST MOD 30 MIN: CPT | Performed by: INTERNAL MEDICINE

## 2019-05-15 PROCEDURE — G8417 CALC BMI ABV UP PARAM F/U: HCPCS | Performed by: INTERNAL MEDICINE

## 2019-05-15 PROCEDURE — 85025 COMPLETE CBC W/AUTO DIFF WBC: CPT | Performed by: INTERNAL MEDICINE

## 2019-05-15 PROCEDURE — 80053 COMPREHEN METABOLIC PANEL: CPT | Performed by: INTERNAL MEDICINE

## 2019-05-15 PROCEDURE — G0463 HOSPITAL OUTPT CLINIC VISIT: HCPCS | Performed by: INTERNAL MEDICINE

## 2019-05-15 PROCEDURE — 1123F ACP DISCUSS/DSCN MKR DOCD: CPT | Performed by: INTERNAL MEDICINE

## 2019-05-15 PROCEDURE — 36591 DRAW BLOOD OFF VENOUS DEVICE: CPT | Performed by: INTERNAL MEDICINE

## 2019-05-15 PROCEDURE — G9903 PT SCRN TBCO ID AS NON USER: HCPCS | Performed by: INTERNAL MEDICINE

## 2019-05-15 PROCEDURE — 82378 CARCINOEMBRYONIC ANTIGEN: CPT | Performed by: INTERNAL MEDICINE

## 2019-05-15 RX ORDER — SODIUM CHLORIDE 0.9 % (FLUSH) 0.9 %
10 SYRINGE (ML) INJECTION AS NEEDED
Status: CANCELLED | OUTPATIENT
Start: 2019-06-26

## 2019-05-15 RX ORDER — SODIUM CHLORIDE 0.9 % (FLUSH) 0.9 %
10 SYRINGE (ML) INJECTION AS NEEDED
Status: DISCONTINUED | OUTPATIENT
Start: 2019-05-15 | End: 2019-05-15 | Stop reason: HOSPADM

## 2019-05-15 RX ADMIN — SODIUM CHLORIDE, PRESERVATIVE FREE 10 ML: 5 INJECTION INTRAVENOUS at 13:16

## 2019-05-15 RX ADMIN — Medication 500 UNITS: at 13:18

## 2019-05-15 RX ADMIN — SODIUM CHLORIDE, PRESERVATIVE FREE 10 ML: 5 INJECTION INTRAVENOUS at 12:39

## 2019-05-15 NOTE — PATIENT INSTRUCTIONS
Patient Instructions for CT Scan    · Your CT scan is being done without any oral contrast.  Your CT scan may be done with IV contrast, if you have an allergy to iodine--please tell your nurse.    · Do not eat or drink 4 hours prior to scan.     · You may take your medications with sips of water, except DO NOT take your diabetic pill the morning of the test.    Arrive at the Outpatient Surgery entrance at [facility] 20 minutes prior to appointment time.    You will receive a phone call with an appointment for your CT scan.  Please call our office, if someone does not contact you with 3 days.    Lana Rose RN  May 15, 2019  1:19 PM

## 2019-05-15 NOTE — PROGRESS NOTES
DATE OF VISIT: 5/15/2019      REASON FOR VISIT:  Sigmoid cancer on surveillance, Oxaliplatin induced neuropathy      HISTORY OF PRESENT ILLNESS:    73-year-old female with a past medical history significant for cardiomegaly was initially seen in consultation on 2017 for newly diagnosed rectosigmoid cancer.  Patient was started on adjuvant chemotherapy with FOLFOX on 2017.  Patient finished cycle 12 of FOLFOX on 2018.  Patient started complaining of neuropathy month after finishing chemotherapy for which she has been on Neurontin 100 mg 3 times daily.   Denies any new lymph node enlargement.  Denies any blood in the stool or urine.  Denies any recent weight loss.      PAST MEDICAL HISTORY:    1.  Cardiomegaly  2.  Adenocarcinoma of rectosigmoid region      SOCIAL HISTORY:    Social History     Tobacco Use   • Smoking status: Former Smoker     Last attempt to quit: 2012     Years since quittin.4   • Smokeless tobacco: Never Used   Substance Use Topics   • Alcohol use: No   • Drug use: No       Surgical History :  Past Surgical History:   Procedure Laterality Date   • COLON SURGERY     • COLONOSCOPY N/A 2018    Procedure: COLONOSCOPY;  Surgeon: Lobo Arriola DO;  Location: [facility] ENDOSCOPY;  Service: Gastroenterology   • GALLBLADDER SURGERY     • RETINAL DETACHMENT SURGERY         ALLERGIES:    Allergies   Allergen Reactions   • Codeine          FAMILY HISTORY:  Family History   Problem Relation Age of Onset   • Heart attack Brother    • Cancer Maternal Aunt            REVIEW OF SYSTEMS:      CONSTITUTIONAL:   Has gained 6 pounds since last clinic visit.  Denies any fever, chills .      HEENT:    No epistaxis, mouth sores or difficulty swallowing.     RESPIRATORY:  No new shortness of breath. No new cough or hemoptysis.     CARDIOVASCULAR:  No chest pain or palpitations.     GASTROINTESTINAL:Complains of intermittent constipation. Denies any excessive diarrhea. Denies  "any blood in stool.  Denies any vomiting.    GENITOURINARY: No Dysuria or Hematuria.     MUSCULOSKELETAL:No new back pain or arthralgia..     LYMPHATICS:  Denies any abnormal swollen glands anywhere in the body.     NEUROLOGICAL : Complains of tingling and numbness affecting bilateral hand and bilateral feet.  No headache or dizziness. No seizures or balance problems.     SKIN: No new skin lesions.        PHYSICAL EXAMINATION:      VITAL SIGNS:  BP (!) 181/72   Pulse 57   Temp 98.1 °F (36.7 °C) (Temporal)   Resp 18   Ht 162.6 cm (64.02\")   Wt 72 kg (158 lb 12.8 oz)   SpO2 97%   BMI 27.24 kg/m²       05/15/19  1258   Weight: 72 kg (158 lb 12.8 oz)       ECOG  performance status: 1      GENERAL:  Not in any distress.    HEENT:  Normocephalic, Atraumatic.Mild Conjunctival pallor. No icterus. Extraocular Movements Intact. No Facial Asymmetry noted.    NECK:  No adenopathy. No JVD.    RESPIRATORY:  Fair air entry bilateral. No rhonchi or wheezing.    CARDIOVASCULAR:  S1, S2. Regular rate and rhythm. No murmur or gallop appreciated.    ABDOMEN:  Soft, obese, nontender. Bowel sounds present in all four quadrants.  No organomegaly appreciated.Well-healed surgical scar present in midline, right lower quadrant and epigastric region.    MUSCULOSKELETAL:  No edema.No Calf Tenderness.Normal range of motion.    NEUROLOGIC:  Alert, awake and oriented ×3.  No  Motor  deficit appreciated. Cranial Nerves 2-12 grossly intact.    SKIN : No new skin lesions.Skin is warm and moist    LYMPHATICS: No new lymph node enlargement in neck.    PSYCHIATRY: Normal affect. Normal judgement. Makes good eye contact.        DIAGNOSTIC DATA:    Glucose   Date Value Ref Range Status   05/15/2019 91 65 - 99 mg/dL Final     Sodium   Date Value Ref Range Status   05/15/2019 142 136 - 145 mmol/L Final     Potassium   Date Value Ref Range Status   05/15/2019 3.6 3.5 - 5.2 mmol/L Final     CO2   Date Value Ref Range Status   05/15/2019 29.0 22.0 - " 29.0 mmol/L Final     Chloride   Date Value Ref Range Status   05/15/2019 103 98 - 107 mmol/L Final     Anion Gap   Date Value Ref Range Status   05/15/2019 10.0 mmol/L Final     Creatinine   Date Value Ref Range Status   05/15/2019 0.67 0.57 - 1.00 mg/dL Final     BUN   Date Value Ref Range Status   05/15/2019 13 8 - 23 mg/dL Final     BUN/Creatinine Ratio   Date Value Ref Range Status   05/15/2019 19.4 7.0 - 25.0 Final     Calcium   Date Value Ref Range Status   05/15/2019 8.9 8.6 - 10.5 mg/dL Final     eGFR Non  Amer   Date Value Ref Range Status   05/15/2019 86 >60 mL/min/1.73 Final     Alkaline Phosphatase   Date Value Ref Range Status   05/15/2019 80 39 - 117 U/L Final     Total Protein   Date Value Ref Range Status   05/15/2019 7.2 6.0 - 8.5 g/dL Final     ALT (SGPT)   Date Value Ref Range Status   05/15/2019 20 1 - 33 U/L Final     AST (SGOT)   Date Value Ref Range Status   05/15/2019 26 1 - 32 U/L Final     Total Bilirubin   Date Value Ref Range Status   05/15/2019 0.7 0.2 - 1.2 mg/dL Final     Albumin   Date Value Ref Range Status   05/15/2019 4.00 3.50 - 5.20 g/dL Final     Globulin   Date Value Ref Range Status   05/15/2019 3.2 gm/dL Final     Lab Results   Component Value Date    WBC 6.15 05/15/2019    HGB 12.3 05/15/2019    HCT 38.4 05/15/2019    MCV 83.5 05/15/2019     05/15/2019     Lab Results   Component Value Date    NEUTROABS 4.00 05/15/2019    IRON 58 12/18/2017    TIBC 312 12/18/2017    LABIRON 19 12/18/2017    FERRITIN 96.10 12/18/2017    [id number] 616 12/18/2017    FOLATE 7.20 12/18/2017     Lab Results   Component Value Date    CEA 1.40 09/12/2018           CEA level prior to surgery was elevated at 11.2.           Radiology Data :  CT of chest, abdomen and pelvis with contrast done on September 12, 2018 was reviewed, discussed with patient, it showed:  FINDINGS:     CHEST: There is a 1.2 cm low-density left thyroid nodule that is  below size criteria to warrant any follow-up.  There is no pleural  or pericardial effusion. Emphysematous changes of the lungs are  noted. The lungs are otherwise clear. There is no pleural or  pericardial effusion. There is no thoracic adenopathy. No bony  abnormality is noted.     ABDOMEN: The patient is status post cholecystectomy. There is a  small hiatal hernia. The solid abdominal organs are unremarkable.  Vascular calcifications are noted. There is no abdominal  adenopathy. There is no free fluid or free air within the  abdomen. The abdominal portion of the GI tract is unremarkable.     Pelvis: The patient is status post rectosigmoid colon resection  with primary anastomosis noted. There is no free fluid in the  pelvis. Pelvic organs appear unremarkable by CT. There is no  pelvic adenopathy. Pelvic portion of the GI tract is otherwise  unremarkable. Degenerative changes are noted in the spine.     IMPRESSION:  No definite evidence of recurrent or metastatic  Disease.      CT scan of abdomen and pelvis with and without contrast done on November 6, 2017 at [facility] showed:  1.  Extensive rectosigmoid carcinoma extending to total length of 16 cm.  2.  7 mm mesenteric nodule adjacent to the sigmoid colon may be metastatic lymph node        Chest X-ray done on November 18, 2017 showed:  IMPRESSION:  CONCLUSION:  No Acute Disease  Cardiomegaly              Pathology :  Biopsy result from colonoscopy showed:  Tubular adenoma with high-grade dysplasia and focal adenocarcinoma in situ        Surgical pathology report from November 22, 2017 showed:  Adenocarcinoma with mucinous features arising from sigmoid colon, T3 N1 B lesion, 2 out of 29 lymph nodes positive.  Lymphatic invasion present and no vascular invasion or perineural invasion seen.              ASSESSMENT AND PLAN:       1.  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  Lymphatic invasion present.  No large vessel invasion or perineural invasion seen.   Margins were negative.   She was started on chemotherapy with FOLFOX on December 27, 2017.   In view of leukopenia with white blood cell count of 2.9 with absolute neutrophil count of 1700 we will decrease the dose of oxaliplatin by 20% and 5-FU combined 20% from cycle 2 on January 10, 2018.  Case was discussed with Dr. Michael by Dr. Hercules on January 15, 2018 and as perDr. Michael patient's tumor was mostly in sigmoid colon not in the rectum.  So she does not need any radiation which was discussed with patient and his family.    -Patient received 12 cycles of chemotherapy consisting of FOLFOX from December 27, 2017 until June 13, 2018.  -Colonoscopy done by Dr. Arriola on August 27, 2018 does not show any evidence of recurrence.  Next colonoscopy will be due in August 2020 21st.  -Recently done CT of chest, abdomen and pelvis with contrast as well as CEA on September 14, 2018 does not show any evidence of recurrence.  -CEA level has been drawn earlier today.  We will follow-up on results of CEA.    -We will ask patient to return to clinic in 3 months with repeat CBC, CMP, CEA and CT of chest, abdomen and pelvis with contrast to be done for surveillance purpose prior to next clinic visit.    2.  Mild anemia: Resolved.  Hemoglobin is 12.6.    3.  Oxaliplatin-induced neuropathy:  -Patient started having tingling numbness affecting upper and lower extremity 1 month after finishing chemotherapy.  -Currently patient is on Neurontin 100 mg 3 times daily.  Patient has enough prescription for Neurontin    4.  History of cardiomegaly.    5.  Hypertension:  -Patient's blood pressure is elevated at 181/72.  Patient does not have a history of high blood pressure.  Recommend rechecking blood pressure letter at home, if persistently remains elevated she may need to start antihypertensive medication.  Recommend following up with medical doctor regarding new onset of high blood pressure.  Denies any headache or dizziness or chest  pain today.     6.  Health maintenance: Patient quit smoking around 2012.  Had a colonoscopy in 08/2018 by Dr Arriola.  Had a mammogram in 2018.    7.  Prescriptions: Patient has enough prescription for Neurontin.    8.BMI:Patient's Body mass index is 27.24 kg/m². BMI is higher than reference range.  Patient was notified about elevated BMI and was counseled about diet and exercise for weight loss.    9. Advance care planning: Patient is able to make on her decision and remains full code for now.  Spouse is mentioned as health care surrogate on chart.        Wil Abraham MD  5/15/2019  6:53 PM        EMR Dragon/Transcription disclaimer:   Much of this encounter note is an electronic transcription/translation of spoken language to printed text. The electronic translation of spoken language may permit erroneous, or at times, nonsensical words or phrases to be inadvertently transcribed; Although I have reviewed the note for such errors, some may still exist.

## 2019-06-06 ENCOUNTER — TELEPHONE (OUTPATIENT)
Dept: ONCOLOGY | Facility: CLINIC | Age: 74
End: 2019-06-06

## 2019-06-06 RX ORDER — GABAPENTIN 100 MG/1
100 CAPSULE ORAL 3 TIMES DAILY
Qty: 270 CAPSULE | Refills: 0 | Status: SHIPPED | OUTPATIENT
Start: 2019-06-06 | End: 2019-09-16 | Stop reason: SDUPTHER

## 2019-06-06 NOTE — TELEPHONE ENCOUNTER
Patient would like refill on Gabapentin sent to [facility] in [place]. I informed patient's  that we don't fill her simvastatin.

## 2019-06-26 ENCOUNTER — INFUSION (OUTPATIENT)
Dept: ONCOLOGY | Facility: HOSPITAL | Age: 74
End: 2019-06-26

## 2019-06-26 DIAGNOSIS — Z45.2 ENCOUNTER FOR VENOUS ACCESS DEVICE CARE: Primary | ICD-10-CM

## 2019-06-26 PROCEDURE — 96523 IRRIG DRUG DELIVERY DEVICE: CPT | Performed by: INTERNAL MEDICINE

## 2019-06-26 RX ORDER — SODIUM CHLORIDE 0.9 % (FLUSH) 0.9 %
10 SYRINGE (ML) INJECTION AS NEEDED
Status: CANCELLED | OUTPATIENT
Start: 2019-08-26

## 2019-06-26 RX ORDER — SODIUM CHLORIDE 0.9 % (FLUSH) 0.9 %
10 SYRINGE (ML) INJECTION AS NEEDED
Status: DISCONTINUED | OUTPATIENT
Start: 2019-06-26 | End: 2019-06-26 | Stop reason: HOSPADM

## 2019-06-26 RX ADMIN — SODIUM CHLORIDE, PRESERVATIVE FREE 10 ML: 5 INJECTION INTRAVENOUS at 08:12

## 2019-06-26 RX ADMIN — Medication 500 UNITS: at 08:12

## 2019-08-26 ENCOUNTER — HOSPITAL ENCOUNTER (OUTPATIENT)
Dept: CT IMAGING | Facility: HOSPITAL | Age: 74
Discharge: HOME | End: 2019-08-26
Admitting: INTERNAL MEDICINE

## 2019-08-26 ENCOUNTER — INFUSION (OUTPATIENT)
Dept: ONCOLOGY | Facility: HOSPITAL | Age: 74
End: 2019-08-26

## 2019-08-26 ENCOUNTER — APPOINTMENT (OUTPATIENT)
Dept: CT IMAGING | Facility: HOSPITAL | Age: 74
End: 2019-08-26

## 2019-08-26 DIAGNOSIS — Z45.2 ENCOUNTER FOR VENOUS ACCESS DEVICE CARE: Primary | ICD-10-CM

## 2019-08-26 DIAGNOSIS — C19 CANCER OF RECTOSIGMOID (COLON) (HCC): ICD-10-CM

## 2019-08-26 LAB
ALBUMIN SERPL-MCNC: 4 G/DL (ref 3.5–5.2)
ALBUMIN/GLOB SERPL: 1.3 G/DL
ALP SERPL-CCNC: 83 U/L (ref 39–117)
ALT SERPL W P-5'-P-CCNC: 18 U/L (ref 1–33)
ANION GAP SERPL CALCULATED.3IONS-SCNC: 11 MMOL/L (ref 5–15)
AST SERPL-CCNC: 19 U/L (ref 1–32)
BASOPHILS # BLD AUTO: 0.04 10*3/MM3 (ref 0–0.2)
BASOPHILS NFR BLD AUTO: 0.6 % (ref 0–1.5)
BILIRUB SERPL-MCNC: 0.6 MG/DL (ref 0.2–1.2)
BUN BLD-MCNC: 13 MG/DL (ref 8–23)
BUN/CREAT SERPL: 19.1 (ref 7–25)
CALCIUM SPEC-SCNC: 8.8 MG/DL (ref 8.6–10.5)
CEA SERPL-MCNC: 1.83 NG/ML
CHLORIDE SERPL-SCNC: 104 MMOL/L (ref 98–107)
CO2 SERPL-SCNC: 28 MMOL/L (ref 22–29)
CREAT BLD-MCNC: 0.68 MG/DL (ref 0.57–1)
DEPRECATED RDW RBC AUTO: 44.3 FL (ref 37–54)
EOSINOPHIL # BLD AUTO: 0.13 10*3/MM3 (ref 0–0.4)
EOSINOPHIL NFR BLD AUTO: 2 % (ref 0.3–6.2)
ERYTHROCYTE [DISTWIDTH] IN BLOOD BY AUTOMATED COUNT: 14.9 % (ref 12.3–15.4)
GFR SERPL CREATININE-BSD FRML MDRD: 85 ML/MIN/1.73
GLOBULIN UR ELPH-MCNC: 3.2 GM/DL
GLUCOSE BLD-MCNC: 100 MG/DL (ref 65–99)
HCT VFR BLD AUTO: 40.7 % (ref 34–46.6)
HGB BLD-MCNC: 12.7 G/DL (ref 12–15.9)
IMM GRANULOCYTES # BLD AUTO: 0.03 10*3/MM3 (ref 0–0.05)
IMM GRANULOCYTES NFR BLD AUTO: 0.5 % (ref 0–0.5)
LYMPHOCYTES # BLD AUTO: 1.41 10*3/MM3 (ref 0.7–3.1)
LYMPHOCYTES NFR BLD AUTO: 21.4 % (ref 19.6–45.3)
MCH RBC QN AUTO: 25.8 PG (ref 26.6–33)
MCHC RBC AUTO-ENTMCNC: 31.2 G/DL (ref 31.5–35.7)
MCV RBC AUTO: 82.6 FL (ref 79–97)
MONOCYTES # BLD AUTO: 0.66 10*3/MM3 (ref 0.1–0.9)
MONOCYTES NFR BLD AUTO: 10 % (ref 5–12)
NEUTROPHILS # BLD AUTO: 4.31 10*3/MM3 (ref 1.7–7)
NEUTROPHILS NFR BLD AUTO: 65.5 % (ref 42.7–76)
NRBC BLD AUTO-RTO: 0 /100 WBC (ref 0–0.2)
PLATELET # BLD AUTO: 253 10*3/MM3 (ref 140–450)
PMV BLD AUTO: 10 FL (ref 6–12)
POTASSIUM BLD-SCNC: 3.6 MMOL/L (ref 3.5–5.2)
PROT SERPL-MCNC: 7.2 G/DL (ref 6–8.5)
RBC # BLD AUTO: 4.93 10*6/MM3 (ref 3.77–5.28)
SODIUM BLD-SCNC: 143 MMOL/L (ref 136–145)
WBC NRBC COR # BLD: 6.58 10*3/MM3 (ref 3.4–10.8)

## 2019-08-26 PROCEDURE — 71260 CT THORAX DX C+: CPT

## 2019-08-26 PROCEDURE — 85025 COMPLETE CBC W/AUTO DIFF WBC: CPT | Performed by: INTERNAL MEDICINE

## 2019-08-26 PROCEDURE — 25010000002 IOPAMIDOL 61 % SOLUTION: Performed by: INTERNAL MEDICINE

## 2019-08-26 PROCEDURE — 74177 CT ABD & PELVIS W/CONTRAST: CPT

## 2019-08-26 PROCEDURE — 80053 COMPREHEN METABOLIC PANEL: CPT | Performed by: INTERNAL MEDICINE

## 2019-08-26 PROCEDURE — 36591 DRAW BLOOD OFF VENOUS DEVICE: CPT | Performed by: NURSE PRACTITIONER

## 2019-08-26 PROCEDURE — 82378 CARCINOEMBRYONIC ANTIGEN: CPT | Performed by: INTERNAL MEDICINE

## 2019-08-26 RX ORDER — SODIUM CHLORIDE 0.9 % (FLUSH) 0.9 %
10 SYRINGE (ML) INJECTION AS NEEDED
Status: DISCONTINUED | OUTPATIENT
Start: 2019-08-26 | End: 2019-08-26 | Stop reason: HOSPADM

## 2019-08-26 RX ORDER — HEPARIN SODIUM (PORCINE) LOCK FLUSH IV SOLN 100 UNIT/ML 100 UNIT/ML
500 SOLUTION INTRAVENOUS AS NEEDED
Status: CANCELLED | OUTPATIENT
Start: 2020-05-14

## 2019-08-26 RX ORDER — SODIUM CHLORIDE 0.9 % (FLUSH) 0.9 %
10 SYRINGE (ML) INJECTION AS NEEDED
Status: CANCELLED | OUTPATIENT
Start: 2020-05-14

## 2019-08-26 RX ADMIN — Medication 500 UNITS: at 08:55

## 2019-08-26 RX ADMIN — IOPAMIDOL 80 ML: 612 INJECTION, SOLUTION INTRAVENOUS at 08:53

## 2019-08-28 ENCOUNTER — OFFICE VISIT (OUTPATIENT)
Dept: ONCOLOGY | Facility: CLINIC | Age: 74
End: 2019-08-28

## 2019-08-28 VITALS
HEIGHT: 64 IN | RESPIRATION RATE: 18 BRPM | SYSTOLIC BLOOD PRESSURE: 150 MMHG | TEMPERATURE: 97.6 F | WEIGHT: 161 LBS | BODY MASS INDEX: 27.49 KG/M2 | HEART RATE: 63 BPM | DIASTOLIC BLOOD PRESSURE: 70 MMHG

## 2019-08-28 DIAGNOSIS — C19 CANCER OF RECTOSIGMOID (COLON) (HCC): Primary | ICD-10-CM

## 2019-08-28 PROCEDURE — G0463 HOSPITAL OUTPT CLINIC VISIT: HCPCS | Performed by: NURSE PRACTITIONER

## 2019-08-28 PROCEDURE — 99214 OFFICE O/P EST MOD 30 MIN: CPT | Performed by: NURSE PRACTITIONER

## 2019-08-28 RX ORDER — SIMVASTATIN 10 MG
10 TABLET ORAL DAILY
Refills: 0 | COMMUNITY
Start: 2019-06-07 | End: 2020-05-18 | Stop reason: SDUPTHER

## 2019-08-29 NOTE — PROGRESS NOTES
DATE OF VISIT: 2019    REASON FOR VISIT:  Sigmoid cancer on surveillance, Oxaliplatin induced neuropathy        HISTORY OF PRESENT ILLNESS:    73-year-old female with a past medical history significant for cardiomegaly was initially seen in consultation on 2017 for newly diagnosed rectosigmoid cancer.  Patient was started on adjuvant chemotherapy with FOLFOX on 2017.  Patient finished cycle 12 of FOLFOX on 2018.  Patient started complaining of neuropathy month after finishing chemotherapy for which she has been on Neurontin 100 mg 3 times daily.   Denies any new lymph node enlargement.  Denies any blood in the stool or urine.  Denies any recent weight loss. Had recent CT scan for surveillance; here for results.           PAST MEDICAL HISTORY:    No past medical history on file.    SOCIAL HISTORY:    Social History     Tobacco Use   • Smoking status: Former Smoker     Last attempt to quit: 2012     Years since quittin.7   • Smokeless tobacco: Never Used   Substance Use Topics   • Alcohol use: No   • Drug use: No       Surgical History :  Past Surgical History:   Procedure Laterality Date   • COLON SURGERY     • COLONOSCOPY N/A 2018    Procedure: COLONOSCOPY;  Surgeon: Lobo Arriola DO;  Location: [facility] ENDOSCOPY;  Service: Gastroenterology   • GALLBLADDER SURGERY     • RETINAL DETACHMENT SURGERY         ALLERGIES:    Allergies   Allergen Reactions   • Codeine        REVIEW OF SYSTEMS:      CONSTITUTIONAL:  No fever, chills, or night sweats.     HEENT:  No epistaxis, mouth sores, or difficulty swallowing.    RESPIRATORY:  No new shortness of breath or cough at present.    CARDIOVASCULAR:  No chest pain or palpitations.    GASTROINTESTINAL:  No abdominal pain, nausea, vomiting, or blood in the stool.    GENITOURINARY:  No dysuria or hematuria.    MUSCULOSKELETAL:  No any new back pain or arthralgias.     NEUROLOGICAL:  Stable neuropathy; continues to take  "Gabapentin.No new headache or dizziness.     LYMPHATICS:  Denies any abnormal swollen and anywhere in the body.    SKIN:  Denies any new skin rash.    PHYSICAL EXAMINATION:      VITAL SIGNS:  /70   Pulse 63   Temp 97.6 °F (36.4 °C) (Temporal)   Resp 18   Ht 162.6 cm (64.02\")   Wt 73 kg (161 lb)   BMI 27.62 kg/m²     GENERAL:  Not in any distress.    HEENT:  Normocephalic, Atraumatic.Mild Conjunctival pallor. No icterus. No Facial Asymmetry noted.    NECK:  No adenopathy. No JVD.    RESPIRATORY:  Fair air entry bilateral. No rhonchi or wheezing.    CARDIOVASCULAR:  S1, S2. Regular rate and rhythm. No murmur or gallop appreciated.    ABDOMEN:  Soft, obese, nontender. Bowel sounds present in all four quadrants.  No organomegaly appreciated.    EXTREMITIES:  No edema.No Calf Tenderness.    NEUROLOGIC:  Alert, awake and oriented ×3.    SKIN : No new skin lesion identified  DIAGNOSTIC DATA:    Glucose   Date Value Ref Range Status   08/26/2019 100 (H) 65 - 99 mg/dL Final     Sodium   Date Value Ref Range Status   08/26/2019 143 136 - 145 mmol/L Final     Potassium   Date Value Ref Range Status   08/26/2019 3.6 3.5 - 5.2 mmol/L Final     CO2   Date Value Ref Range Status   08/26/2019 28.0 22.0 - 29.0 mmol/L Final     Chloride   Date Value Ref Range Status   08/26/2019 104 98 - 107 mmol/L Final     Anion Gap   Date Value Ref Range Status   08/26/2019 11.0 5.0 - 15.0 mmol/L Final     Creatinine   Date Value Ref Range Status   08/26/2019 0.68 0.57 - 1.00 mg/dL Final     BUN   Date Value Ref Range Status   08/26/2019 13 8 - 23 mg/dL Final     BUN/Creatinine Ratio   Date Value Ref Range Status   08/26/2019 19.1 7.0 - 25.0 Final     Calcium   Date Value Ref Range Status   08/26/2019 8.8 8.6 - 10.5 mg/dL Final     eGFR Non  Amer   Date Value Ref Range Status   08/26/2019 85 >60 mL/min/1.73 Final     Alkaline Phosphatase   Date Value Ref Range Status   08/26/2019 83 39 - 117 U/L Final     Total Protein   Date " Value Ref Range Status   08/26/2019 7.2 6.0 - 8.5 g/dL Final     ALT (SGPT)   Date Value Ref Range Status   08/26/2019 18 1 - 33 U/L Final     AST (SGOT)   Date Value Ref Range Status   08/26/2019 19 1 - 32 U/L Final     Total Bilirubin   Date Value Ref Range Status   08/26/2019 0.6 0.2 - 1.2 mg/dL Final     Albumin   Date Value Ref Range Status   08/26/2019 4.00 3.50 - 5.20 g/dL Final     Globulin   Date Value Ref Range Status   08/26/2019 3.2 gm/dL Final     Lab Results   Component Value Date    WBC 6.58 08/26/2019    HGB 12.7 08/26/2019    HCT 40.7 08/26/2019    MCV 82.6 08/26/2019     08/26/2019     Lab Results   Component Value Date    NEUTROABS 4.31 08/26/2019    IRON 58 12/18/2017    TIBC 312 12/18/2017    LABIRON 19 12/18/2017    FERRITIN 96.10 12/18/2017    GYGZDSUN41 616 12/18/2017    FOLATE 7.20 12/18/2017     Lab Results   Component Value Date    CEA 1.83 08/26/2019   ]  CEA level prior to surgery was elevated at 11.2.           Radiology Data :    CT C/A/P with contrast August 26, 2019 reviewed and discussed with patient:    1.No evidence of recurrent or metastatic malignancy  2. Mild hepatomegaly and hepatic steatosis   3. Status post hysterectomy and cholecystectomy  4. Emphysema  5. Atherosclerosis  6. Postoperative change of rectosigmoid junction as above  7. Hiatal hernia          CT of chest, abdomen and pelvis with contrast done on September 12, 2018 was reviewed, discussed with patient, it showed:  FINDINGS:     CHEST: There is a 1.2 cm low-density left thyroid nodule that is  below size criteria to warrant any follow-up. There is no pleural  or pericardial effusion. Emphysematous changes of the lungs are  noted. The lungs are otherwise clear. There is no pleural or  pericardial effusion. There is no thoracic adenopathy. No bony  abnormality is noted.     ABDOMEN: The patient is status post cholecystectomy. There is a  small hiatal hernia. The solid abdominal organs are  unremarkable.  Vascular calcifications are noted. There is no abdominal  adenopathy. There is no free fluid or free air within the  abdomen. The abdominal portion of the GI tract is unremarkable.     Pelvis: The patient is status post rectosigmoid colon resection  with primary anastomosis noted. There is no free fluid in the  pelvis. Pelvic organs appear unremarkable by CT. There is no  pelvic adenopathy. Pelvic portion of the GI tract is otherwise  unremarkable. Degenerative changes are noted in the spine.     IMPRESSION:  No definite evidence of recurrent or metastatic  Disease.        CT scan of abdomen and pelvis with and without contrast done on November 6, 2017 at [facility] showed:  1.  Extensive rectosigmoid carcinoma extending to total length of 16 cm.  2.  7 mm mesenteric nodule adjacent to the sigmoid colon may be metastatic lymph node        Chest X-ray done on November 18, 2017 showed:  IMPRESSION:  CONCLUSION:  No Acute Disease  Cardiomegaly              Pathology :  Biopsy result from colonoscopy showed:  Tubular adenoma with high-grade dysplasia and focal adenocarcinoma in situ        Surgical pathology report from November 22, 2017 showed:  Adenocarcinoma with mucinous features arising from sigmoid colon, T3 N1 B lesion, 2 out of 29 lymph nodes positive.  Lymphatic invasion present and no vascular invasion or perineural invasion seen.              ASSESSMENT AND PLAN:       1.  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  Lymphatic invasion present.  No large vessel invasion or perineural invasion seen.  Margins were negative.   She was started on chemotherapy with FOLFOX on December 27, 2017.   In view of leukopenia with white blood cell count of 2.9 with absolute neutrophil count of 1700 we will decrease the dose of oxaliplatin by 20% and 5-FU combined 20% from cycle 2 on January 10, 2018.  Case was discussed with Dr. Michael by Dr. Hercules on January  15, 2018 and as perDr. Michael patient's tumor was mostly in sigmoid colon not in the rectum.  So she does not need any radiation which was discussed with patient and his family.    -Patient received 12 cycles of chemotherapy consisting of FOLFOX from December 27, 2017 until June 13, 2018.  -Colonoscopy done by Dr. Arriola on August 27, 2018 does not show any evidence of recurrence.  Next colonoscopy will be due in August 2020 21st.  -Recently done CT of chest, abdomen and pelvis with contrast as well as CEA on September 14, 2018 does not show any evidence of recurrence.  -CEA level has been drawn earlier today; STABLE AT 1.83..    -We will ask patient to return to clinic in April 2020 when she returns from her winter vacation in [place] with repeat labs; she states she has an oncologist in [place] that she will be seeing while she is in [place]..     2.  Mild anemia: Resolved.  Hemoglobin is 12.7.     3.  Oxaliplatin-induced neuropathy:  -Patient started having tingling numbness affecting upper and lower extremity 1 month after finishing chemotherapy.  -Currently patient is on Neurontin 100 mg 3 times daily.  Patient has enough prescription for Neurontin     4.  History of cardiomegaly.     5.  Hypertension:  -Patient's blood pressure is elevated at 150/70.     6.  Health maintenance: Patient quit smoking around 2012.  Had a colonoscopy in 08/2018 by Dr Arriola.  Had a mammogram in 2018.     7.  Prescriptions: Patient has enough prescription for Neurontin.        This document has been signed by CHRISTINA Paredes on August 29, 2019 8:02 AM

## 2019-09-16 RX ORDER — GABAPENTIN 100 MG/1
CAPSULE ORAL
Qty: 270 CAPSULE | Refills: 0 | Status: SHIPPED | OUTPATIENT
Start: 2019-09-16 | End: 2020-05-18 | Stop reason: SDUPTHER

## 2020-05-13 ENCOUNTER — APPOINTMENT (OUTPATIENT)
Dept: ONCOLOGY | Facility: HOSPITAL | Age: 75
End: 2020-05-13

## 2020-05-13 ENCOUNTER — APPOINTMENT (OUTPATIENT)
Dept: ONCOLOGY | Facility: CLINIC | Age: 75
End: 2020-05-13

## 2020-05-14 ENCOUNTER — LAB (OUTPATIENT)
Dept: ONCOLOGY | Facility: HOSPITAL | Age: 75
End: 2020-05-14

## 2020-05-14 DIAGNOSIS — C19 CANCER OF RECTOSIGMOID (COLON) (HCC): ICD-10-CM

## 2020-05-14 LAB
ALBUMIN SERPL-MCNC: 4.2 G/DL (ref 3.5–5.2)
ALBUMIN/GLOB SERPL: 1.4 G/DL
ALP SERPL-CCNC: 74 U/L (ref 39–117)
ALT SERPL W P-5'-P-CCNC: 24 U/L (ref 1–33)
ANION GAP SERPL CALCULATED.3IONS-SCNC: 15 MMOL/L (ref 5–15)
AST SERPL-CCNC: 29 U/L (ref 1–32)
BASOPHILS # BLD AUTO: 0.05 10*3/MM3 (ref 0–0.2)
BASOPHILS NFR BLD AUTO: 0.6 % (ref 0–1.5)
BILIRUB SERPL-MCNC: 0.5 MG/DL (ref 0.2–1.2)
BUN BLD-MCNC: 12 MG/DL (ref 8–23)
BUN/CREAT SERPL: 15.2 (ref 7–25)
CALCIUM SPEC-SCNC: 9.4 MG/DL (ref 8.6–10.5)
CEA SERPL-MCNC: 1.73 NG/ML
CHLORIDE SERPL-SCNC: 104 MMOL/L (ref 98–107)
CO2 SERPL-SCNC: 24 MMOL/L (ref 22–29)
CREAT BLD-MCNC: 0.79 MG/DL (ref 0.57–1)
DEPRECATED RDW RBC AUTO: 43.7 FL (ref 37–54)
EOSINOPHIL # BLD AUTO: 0.09 10*3/MM3 (ref 0–0.4)
EOSINOPHIL NFR BLD AUTO: 1.1 % (ref 0.3–6.2)
ERYTHROCYTE [DISTWIDTH] IN BLOOD BY AUTOMATED COUNT: 14.6 % (ref 12.3–15.4)
GFR SERPL CREATININE-BSD FRML MDRD: 71 ML/MIN/1.73
GLOBULIN UR ELPH-MCNC: 3.1 GM/DL
GLUCOSE BLD-MCNC: 101 MG/DL (ref 65–99)
HCT VFR BLD AUTO: 41.3 % (ref 34–46.6)
HGB BLD-MCNC: 13.3 G/DL (ref 12–15.9)
IMM GRANULOCYTES # BLD AUTO: 0.01 10*3/MM3 (ref 0–0.05)
IMM GRANULOCYTES NFR BLD AUTO: 0.1 % (ref 0–0.5)
LYMPHOCYTES # BLD AUTO: 1.72 10*3/MM3 (ref 0.7–3.1)
LYMPHOCYTES NFR BLD AUTO: 20.9 % (ref 19.6–45.3)
MCH RBC QN AUTO: 26.8 PG (ref 26.6–33)
MCHC RBC AUTO-ENTMCNC: 32.2 G/DL (ref 31.5–35.7)
MCV RBC AUTO: 83.1 FL (ref 79–97)
MONOCYTES # BLD AUTO: 0.8 10*3/MM3 (ref 0.1–0.9)
MONOCYTES NFR BLD AUTO: 9.7 % (ref 5–12)
NEUTROPHILS # BLD AUTO: 5.57 10*3/MM3 (ref 1.7–7)
NEUTROPHILS NFR BLD AUTO: 67.6 % (ref 42.7–76)
NRBC BLD AUTO-RTO: 0 /100 WBC (ref 0–0.2)
PLATELET # BLD AUTO: 291 10*3/MM3 (ref 140–450)
PMV BLD AUTO: 10.2 FL (ref 6–12)
POTASSIUM BLD-SCNC: 3.8 MMOL/L (ref 3.5–5.2)
PROT SERPL-MCNC: 7.3 G/DL (ref 6–8.5)
RBC # BLD AUTO: 4.97 10*6/MM3 (ref 3.77–5.28)
SODIUM BLD-SCNC: 143 MMOL/L (ref 136–145)
WBC NRBC COR # BLD: 8.24 10*3/MM3 (ref 3.4–10.8)

## 2020-05-14 PROCEDURE — 85025 COMPLETE CBC W/AUTO DIFF WBC: CPT | Performed by: NURSE PRACTITIONER

## 2020-05-14 PROCEDURE — 82378 CARCINOEMBRYONIC ANTIGEN: CPT | Performed by: NURSE PRACTITIONER

## 2020-05-14 PROCEDURE — 36415 COLL VENOUS BLD VENIPUNCTURE: CPT | Performed by: NURSE PRACTITIONER

## 2020-05-14 PROCEDURE — 80053 COMPREHEN METABOLIC PANEL: CPT | Performed by: NURSE PRACTITIONER

## 2020-05-18 ENCOUNTER — TELEMEDICINE (OUTPATIENT)
Dept: ONCOLOGY | Facility: CLINIC | Age: 75
End: 2020-05-18

## 2020-05-18 DIAGNOSIS — G62.0 NEUROPATHY DUE TO CHEMOTHERAPEUTIC DRUG (HCC): ICD-10-CM

## 2020-05-18 DIAGNOSIS — T45.1X5A NEUROPATHY DUE TO CHEMOTHERAPEUTIC DRUG (HCC): ICD-10-CM

## 2020-05-18 DIAGNOSIS — C19 CANCER OF RECTOSIGMOID (COLON) (HCC): Primary | ICD-10-CM

## 2020-05-18 PROCEDURE — 99214 OFFICE O/P EST MOD 30 MIN: CPT | Performed by: NURSE PRACTITIONER

## 2020-05-18 RX ORDER — SIMVASTATIN 10 MG
10 TABLET ORAL DAILY
Qty: 90 TABLET | Refills: 1 | Status: SHIPPED | OUTPATIENT
Start: 2020-05-18 | End: 2020-10-14

## 2020-05-18 RX ORDER — GABAPENTIN 100 MG/1
100 CAPSULE ORAL 3 TIMES DAILY
Qty: 270 CAPSULE | Refills: 0 | Status: SHIPPED | OUTPATIENT
Start: 2020-05-18 | End: 2020-12-08 | Stop reason: SDUPTHER

## 2020-05-18 NOTE — PROGRESS NOTES
This visit has been rescheduled as a video visit to comply with patient safety concerns in accordance with CDC recommendations. Total time of discussion was 11 minutes.    You have chosen to receive care through a video visit. Do you consent to use a telephone visit for your medical care today? Yes      REASON FOR VISIT:  Sigmoid cancer on surveillance, Oxaliplatin induced neuropathy        HISTORY OF PRESENT ILLNESS:    74-year-old female with a past medical history significant for cardiomegaly was initially seen in consultation on December 18, 2017 for newly diagnosed rectosigmoid cancer.  Patient was started on adjuvant chemotherapy with FOLFOX on December 27, 2017.  Patient finished cycle 12 of FOLFOX on June 13, 2018.  Patient started complaining of neuropathy month after finishing chemotherapy for which she has been on Neurontin 100 mg 3 times daily.   Denies any new lymph node enlargement.  Denies any blood in the stool or urine.  Denies any recent weight loss. She had yearly CT scan in August 2019 for surveillance that was unremarkable. Next colonoscopy per Dr. Arriola is due August 2021.    Pt has returned from yearly trip to [place]; she was seen by her oncologist in [place] for routine follow-up.    Review of Systems   Constitutional: Negative.    HENT:  Negative.    Eyes: Negative.    Respiratory: Negative.    Cardiovascular: Negative.    Gastrointestinal: Negative.    Genitourinary: Negative.     Skin: Negative.    Neurological: Negative.    Hematological: Negative.    Psychiatric/Behavioral: Negative.        Physical Exam   Constitutional: She appears well-developed and well-nourished.   HENT:   Head: Normocephalic and atraumatic.   Eyes: Pupils are equal, round, and reactive to light. Conjunctivae are normal.   Neck: Neck normal appearance.  Pulmonary/Chest: Effort normal.   Abdominal: Abdomen appears normal. Soft.   Neurological: She is alert.   Skin: Skin is warm and dry.   Psychiatric: She has a  normal mood and affect.       Component CEA   Latest Ref Rng & Units ng/mL   12/18/2017 1.40   9/12/2018 1.40   5/15/2019 1.52   8/26/2019 1.83   5/14/2020 1.73           Radiology Data :     CT C/A/P with contrast August 26, 2019 reviewed and discussed with patient:     1.No evidence of recurrent or metastatic malignancy  2. Mild hepatomegaly and hepatic steatosis   3. Status post hysterectomy and cholecystectomy  4. Emphysema  5. Atherosclerosis  6. Postoperative change of rectosigmoid junction as above  7. Hiatal hernia             CT scan of abdomen and pelvis with and without contrast done on November 6, 2017 at [facility] showed:  1.  Extensive rectosigmoid carcinoma extending to total length of 16 cm.  2.  7 mm mesenteric nodule adjacent to the sigmoid colon may be metastatic lymph node        Chest X-ray done on November 18, 2017 showed:  IMPRESSION:  CONCLUSION:  No Acute Disease  Cardiomegaly              Pathology :  Biopsy result from colonoscopy showed:  Tubular adenoma with high-grade dysplasia and focal adenocarcinoma in situ        Surgical pathology report from November 22, 2017 showed:  Adenocarcinoma with mucinous features arising from sigmoid colon, T3 N1 B lesion, 2 out of 29 lymph nodes positive.  Lymphatic invasion present and no vascular invasion or perineural invasion seen.              ASSESSMENT AND PLAN:       1.  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  Lymphatic invasion present.  No large vessel invasion or perineural invasion seen.  Margins were negative.   She was started on chemotherapy with FOLFOX on December 27, 2017.   In view of leukopenia with white blood cell count of 2.9 with absolute neutrophil count of 1700 we will decrease the dose of oxaliplatin by 20% and 5-FU combined 20% from cycle 2 on January 10, 2018.  Case was discussed with Dr. Michael by Dr. Hercules on January 15, 2018 and as perDr. Michael patient's tumor was  mostly in sigmoid colon not in the rectum.  So she does not need any radiation which was discussed with patient and his family.    -Patient received 12 cycles of chemotherapy consisting of FOLFOX from December 27, 2017 until June 13, 2018.  -Colonoscopy done by Dr. Arriola on August 27, 2018 does not show any evidence of recurrence.  Next colonoscopy will be due in August 2020 21st.  -Recently done CT of chest, abdomen and pelvis with contrast on August 2019 does not show any evidence of recurrence.  -CEA level has been drawn earlier today; STABLE AT 1.73..    -patient will have next yearly surveillance CT scan in August 2020 along with repeat labs; orders placed today; will RTC in November with repeat labs before she returns back to [place] for winter vacation.     2.  Mild anemia: Resolved.  Hemoglobin is 13.1.     3.  Oxaliplatin-induced neuropathy:  -Patient started having tingling numbness affecting upper and lower extremity 1 month after finishing chemotherapy.  -Currently patient is on Neurontin 100 mg 3 times daily.  New prescription sent to pharmacy today 5/18/2020.    3. Health maintenance: Patient quit smoking around 2012.  Had a colonoscopy in 08/2018 by Dr Arriola.Will be due for repeat colonoscopy in 8/2021.  Had a mammogram in 2018.

## 2020-08-12 ENCOUNTER — LAB (OUTPATIENT)
Dept: LAB | Facility: HOSPITAL | Age: 75
End: 2020-08-12

## 2020-08-12 ENCOUNTER — HOSPITAL ENCOUNTER (OUTPATIENT)
Dept: CT IMAGING | Facility: HOSPITAL | Age: 75
Discharge: HOME | End: 2020-08-12
Admitting: NURSE PRACTITIONER

## 2020-08-12 DIAGNOSIS — C19 CANCER OF RECTOSIGMOID (COLON) (HCC): ICD-10-CM

## 2020-08-12 LAB
ALBUMIN SERPL-MCNC: 4.2 G/DL (ref 3.5–5.2)
ALBUMIN/GLOB SERPL: 1.4 G/DL
ALP SERPL-CCNC: 73 U/L (ref 39–117)
ALT SERPL W P-5'-P-CCNC: 19 U/L (ref 1–33)
ANION GAP SERPL CALCULATED.3IONS-SCNC: 10 MMOL/L (ref 5–15)
AST SERPL-CCNC: 26 U/L (ref 1–32)
BASOPHILS # BLD AUTO: 0.04 10*3/MM3 (ref 0–0.2)
BASOPHILS NFR BLD AUTO: 0.6 % (ref 0–1.5)
BILIRUB SERPL-MCNC: 0.5 MG/DL (ref 0–1.2)
BUN SERPL-MCNC: 13 MG/DL (ref 8–23)
BUN/CREAT SERPL: 18.8 (ref 7–25)
CALCIUM SPEC-SCNC: 9.2 MG/DL (ref 8.6–10.5)
CEA SERPL-MCNC: 2.05 NG/ML
CHLORIDE SERPL-SCNC: 100 MMOL/L (ref 98–107)
CO2 SERPL-SCNC: 31 MMOL/L (ref 22–29)
CREAT SERPL-MCNC: 0.69 MG/DL (ref 0.57–1)
DEPRECATED RDW RBC AUTO: 46.9 FL (ref 37–54)
EOSINOPHIL # BLD AUTO: 0.17 10*3/MM3 (ref 0–0.4)
EOSINOPHIL NFR BLD AUTO: 2.5 % (ref 0.3–6.2)
ERYTHROCYTE [DISTWIDTH] IN BLOOD BY AUTOMATED COUNT: 15.3 % (ref 12.3–15.4)
GFR SERPL CREATININE-BSD FRML MDRD: 83 ML/MIN/1.73
GLOBULIN UR ELPH-MCNC: 2.9 GM/DL
GLUCOSE SERPL-MCNC: 99 MG/DL (ref 65–99)
HCT VFR BLD AUTO: 42.8 % (ref 34–46.6)
HGB BLD-MCNC: 13.4 G/DL (ref 12–15.9)
IMM GRANULOCYTES # BLD AUTO: 0.02 10*3/MM3 (ref 0–0.05)
IMM GRANULOCYTES NFR BLD AUTO: 0.3 % (ref 0–0.5)
LYMPHOCYTES # BLD AUTO: 1.33 10*3/MM3 (ref 0.7–3.1)
LYMPHOCYTES NFR BLD AUTO: 19.5 % (ref 19.6–45.3)
MCH RBC QN AUTO: 26.5 PG (ref 26.6–33)
MCHC RBC AUTO-ENTMCNC: 31.3 G/DL (ref 31.5–35.7)
MCV RBC AUTO: 84.8 FL (ref 79–97)
MONOCYTES # BLD AUTO: 0.66 10*3/MM3 (ref 0.1–0.9)
MONOCYTES NFR BLD AUTO: 9.7 % (ref 5–12)
NEUTROPHILS NFR BLD AUTO: 4.59 10*3/MM3 (ref 1.7–7)
NEUTROPHILS NFR BLD AUTO: 67.4 % (ref 42.7–76)
NRBC BLD AUTO-RTO: 0 /100 WBC (ref 0–0.2)
PLATELET # BLD AUTO: 266 10*3/MM3 (ref 140–450)
PMV BLD AUTO: 9.9 FL (ref 6–12)
POTASSIUM SERPL-SCNC: 3.8 MMOL/L (ref 3.5–5.2)
PROT SERPL-MCNC: 7.1 G/DL (ref 6–8.5)
RBC # BLD AUTO: 5.05 10*6/MM3 (ref 3.77–5.28)
SODIUM SERPL-SCNC: 141 MMOL/L (ref 136–145)
WBC # BLD AUTO: 6.81 10*3/MM3 (ref 3.4–10.8)

## 2020-08-12 PROCEDURE — 25010000002 IOPAMIDOL 61 % SOLUTION: Performed by: NURSE PRACTITIONER

## 2020-08-12 PROCEDURE — 36415 COLL VENOUS BLD VENIPUNCTURE: CPT

## 2020-08-12 PROCEDURE — 82378 CARCINOEMBRYONIC ANTIGEN: CPT

## 2020-08-12 PROCEDURE — 71260 CT THORAX DX C+: CPT

## 2020-08-12 PROCEDURE — 85025 COMPLETE CBC W/AUTO DIFF WBC: CPT

## 2020-08-12 PROCEDURE — 74177 CT ABD & PELVIS W/CONTRAST: CPT

## 2020-08-12 PROCEDURE — 80053 COMPREHEN METABOLIC PANEL: CPT

## 2020-08-12 RX ADMIN — IOPAMIDOL 90 ML: 612 INJECTION, SOLUTION INTRAVENOUS at 08:53

## 2020-10-13 ENCOUNTER — LAB (OUTPATIENT)
Dept: LAB | Facility: HOSPITAL | Age: 75
End: 2020-10-13

## 2020-10-13 DIAGNOSIS — Z01.818 PREOP TESTING: Primary | ICD-10-CM

## 2020-10-13 PROCEDURE — C9803 HOPD COVID-19 SPEC COLLECT: HCPCS

## 2020-10-13 PROCEDURE — U0003 INFECTIOUS AGENT DETECTION BY NUCLEIC ACID (DNA OR RNA); SEVERE ACUTE RESPIRATORY SYNDROME CORONAVIRUS 2 (SARS-COV-2) (CORONAVIRUS DISEASE [COVID-19]), AMPLIFIED PROBE TECHNIQUE, MAKING USE OF HIGH THROUGHPUT TECHNOLOGIES AS DESCRIBED BY CMS-2020-01-R: HCPCS

## 2020-10-14 LAB
COVID LABCORP PRIORITY: NORMAL
SARS-COV-2 RNA RESP QL NAA+PROBE: NOT DETECTED

## 2020-10-14 RX ORDER — SIMVASTATIN 10 MG/1
10 TABLET, FILM COATED ORAL NIGHTLY
COMMUNITY
End: 2022-07-26

## 2020-10-16 ENCOUNTER — ANESTHESIA (OUTPATIENT)
Dept: PERIOP | Facility: HOSPITAL | Age: 75
End: 2020-10-16

## 2020-10-16 ENCOUNTER — ANESTHESIA EVENT (OUTPATIENT)
Dept: PERIOP | Facility: HOSPITAL | Age: 75
End: 2020-10-16

## 2020-10-16 ENCOUNTER — HOSPITAL ENCOUNTER (OUTPATIENT)
Facility: HOSPITAL | Age: 75
Setting detail: HOSPITAL OUTPATIENT SURGERY
Discharge: HOME | End: 2020-10-16
Attending: OPHTHALMOLOGY | Admitting: OPHTHALMOLOGY

## 2020-10-16 VITALS
TEMPERATURE: 96.9 F | HEART RATE: 59 BPM | BODY MASS INDEX: 30.51 KG/M2 | SYSTOLIC BLOOD PRESSURE: 141 MMHG | RESPIRATION RATE: 18 BRPM | OXYGEN SATURATION: 96 % | DIASTOLIC BLOOD PRESSURE: 63 MMHG | WEIGHT: 165.79 LBS | HEIGHT: 62 IN

## 2020-10-16 PROCEDURE — 25010000002 MIDAZOLAM PER 1 MG: Performed by: NURSE ANESTHETIST, CERTIFIED REGISTERED

## 2020-10-16 PROCEDURE — 25010000002 VANCOMYCIN 1 G RECONSTITUTED SOLUTION 1 EACH VIAL: Performed by: OPHTHALMOLOGY

## 2020-10-16 PROCEDURE — 25010000002 EPINEPHRINE PER 0.1 MG: Performed by: OPHTHALMOLOGY

## 2020-10-16 PROCEDURE — V2632 POST CHMBR INTRAOCULAR LENS: HCPCS | Performed by: OPHTHALMOLOGY

## 2020-10-16 DEVICE — LENS ACRYSOF IQ 6X13MM SN60WF 21.0: Type: IMPLANTABLE DEVICE | Site: EYE | Status: FUNCTIONAL

## 2020-10-16 RX ORDER — SODIUM CHLORIDE 0.9 % (FLUSH) 0.9 %
10 SYRINGE (ML) INJECTION AS NEEDED
Status: DISCONTINUED | OUTPATIENT
Start: 2020-10-16 | End: 2020-10-16 | Stop reason: HOSPADM

## 2020-10-16 RX ORDER — TETRACAINE HYDROCHLORIDE 5 MG/ML
SOLUTION OPHTHALMIC AS NEEDED
Status: DISCONTINUED | OUTPATIENT
Start: 2020-10-16 | End: 2020-10-16 | Stop reason: HOSPADM

## 2020-10-16 RX ORDER — MOXIFLOXACIN 5 MG/ML
SOLUTION/ DROPS OPHTHALMIC AS NEEDED
Status: DISCONTINUED | OUTPATIENT
Start: 2020-10-16 | End: 2020-10-16 | Stop reason: HOSPADM

## 2020-10-16 RX ORDER — TETRACAINE HYDROCHLORIDE 5 MG/ML
1 SOLUTION OPHTHALMIC
Status: COMPLETED | OUTPATIENT
Start: 2020-10-16 | End: 2020-10-16

## 2020-10-16 RX ORDER — BRIMONIDINE TARTRATE 0.15 %
DROPS OPHTHALMIC (EYE) AS NEEDED
Status: DISCONTINUED | OUTPATIENT
Start: 2020-10-16 | End: 2020-10-16 | Stop reason: HOSPADM

## 2020-10-16 RX ORDER — MIDAZOLAM HYDROCHLORIDE 1 MG/ML
INJECTION INTRAMUSCULAR; INTRAVENOUS AS NEEDED
Status: DISCONTINUED | OUTPATIENT
Start: 2020-10-16 | End: 2020-10-16 | Stop reason: SURG

## 2020-10-16 RX ORDER — PHENYLEPHRINE HCL 2.5 %
1 DROPS OPHTHALMIC (EYE)
Status: COMPLETED | OUTPATIENT
Start: 2020-10-16 | End: 2020-10-16

## 2020-10-16 RX ORDER — CYCLOPENTOLATE HYDROCHLORIDE 20 MG/ML
1 SOLUTION/ DROPS OPHTHALMIC
Status: COMPLETED | OUTPATIENT
Start: 2020-10-16 | End: 2020-10-16

## 2020-10-16 RX ORDER — PREDNISOLONE ACETATE 10 MG/ML
SUSPENSION/ DROPS OPHTHALMIC AS NEEDED
Status: DISCONTINUED | OUTPATIENT
Start: 2020-10-16 | End: 2020-10-16 | Stop reason: HOSPADM

## 2020-10-16 RX ADMIN — PHENYLEPHRINE HYDROCHLORIDE 1 DROP: 25 SOLUTION/ DROPS OPHTHALMIC at 10:05

## 2020-10-16 RX ADMIN — MIDAZOLAM HYDROCHLORIDE 1 MG: 2 INJECTION, SOLUTION INTRAMUSCULAR; INTRAVENOUS at 11:17

## 2020-10-16 RX ADMIN — SODIUM CHLORIDE, PRESERVATIVE FREE 10 ML: 5 INJECTION INTRAVENOUS at 09:51

## 2020-10-16 RX ADMIN — TETRACAINE HYDROCHLORIDE 1 DROP: 5 SOLUTION OPHTHALMIC at 10:05

## 2020-10-16 RX ADMIN — SODIUM CHLORIDE, PRESERVATIVE FREE 5 ML: 5 INJECTION INTRAVENOUS at 11:21

## 2020-10-16 RX ADMIN — CYCLOPENTOLATE HYDROCHLORIDE 1 DROP: 20 SOLUTION/ DROPS OPHTHALMIC at 09:43

## 2020-10-16 RX ADMIN — CYCLOPENTOLATE HYDROCHLORIDE 1 DROP: 20 SOLUTION/ DROPS OPHTHALMIC at 09:53

## 2020-10-16 RX ADMIN — PHENYLEPHRINE HYDROCHLORIDE 1 DROP: 25 SOLUTION/ DROPS OPHTHALMIC at 09:43

## 2020-10-16 RX ADMIN — MIDAZOLAM HYDROCHLORIDE 1 MG: 2 INJECTION, SOLUTION INTRAMUSCULAR; INTRAVENOUS at 11:21

## 2020-10-16 RX ADMIN — CYCLOPENTOLATE HYDROCHLORIDE 1 DROP: 20 SOLUTION/ DROPS OPHTHALMIC at 10:05

## 2020-10-16 RX ADMIN — PHENYLEPHRINE HYDROCHLORIDE 1 DROP: 25 SOLUTION/ DROPS OPHTHALMIC at 09:53

## 2020-10-16 RX ADMIN — TETRACAINE HYDROCHLORIDE 1 DROP: 5 SOLUTION OPHTHALMIC at 09:43

## 2020-10-16 NOTE — ANESTHESIA PREPROCEDURE EVALUATION
Anesthesia Evaluation     Patient summary reviewed   NPO Solid Status: > 8 hours             Airway   Mallampati: II  TM distance: <3 FB  Neck ROM: full  Anterior  Dental    (+) lower dentures and upper dentures    Pulmonary - normal exam   (+) a smoker, COPD,   Cardiovascular - normal exam  Exercise tolerance: poor (<4 METS)    NYHA Classification: III  ECG reviewed    (+) hypertension, BHATIA, hyperlipidemia,       Neuro/Psych  (+) weakness, numbness, psychiatric history Anxiety and Depression,       ROS Comment: Neuropathy from the oxaplatin noted in chart, neurontin prescribed.  GI/Hepatic/Renal/Endo    (+)  GERD well controlled,      Musculoskeletal     (+) back pain,   Abdominal  - normal exam   Substance History - negative use     OB/GYN negative ob/gyn ROS         Other      history of cancer      Other Comment: Colon cancer treated with resection and chemo.                    Anesthesia Plan    ASA 3     MAC     intravenous induction     Anesthetic plan, all risks, benefits, and alternatives have been provided, discussed and informed consent has been obtained with: patient.

## 2020-10-16 NOTE — ANESTHESIA POSTPROCEDURE EVALUATION
Patient: Mary Ann Condon    Procedure Summary     Date: 10/16/20 Room / Location: [facility] OR 06 / [facility] OR    Anesthesia Start: 1118 Anesthesia Stop: 1148    Procedure: REMOVE CATARACT AND IMPLANT  INTRAOCULAR LENS (Right Eye) Diagnosis:       Age-related nuclear cataract of right eye      (age-related nuclear cataract of right eye)    Surgeon: Gustavo Yañez MD Provider: Eric Juarez MD    Anesthesia Type: MAC ASA Status: 3          Anesthesia Type: MAC    Vitals  No vitals data found for the desired time range.          Post Anesthesia Care and Evaluation    Patient location during evaluation: bedside  Patient participation: complete - patient participated  Level of consciousness: awake and alert  Pain score: 0  Pain management: adequate  Airway patency: patent  Anesthetic complications: No anesthetic complications  PONV Status: none  Cardiovascular status: acceptable  Respiratory status: acceptable  Hydration status: acceptable

## 2020-11-16 ENCOUNTER — OFFICE VISIT (OUTPATIENT)
Dept: ONCOLOGY | Facility: CLINIC | Age: 75
End: 2020-11-16

## 2020-11-16 ENCOUNTER — LAB (OUTPATIENT)
Dept: ONCOLOGY | Facility: HOSPITAL | Age: 75
End: 2020-11-16

## 2020-11-16 VITALS
SYSTOLIC BLOOD PRESSURE: 135 MMHG | RESPIRATION RATE: 18 BRPM | WEIGHT: 166.9 LBS | DIASTOLIC BLOOD PRESSURE: 69 MMHG | BODY MASS INDEX: 30.71 KG/M2 | TEMPERATURE: 97.6 F | HEIGHT: 62 IN | HEART RATE: 73 BPM

## 2020-11-16 DIAGNOSIS — C19 CANCER OF RECTOSIGMOID (COLON) (HCC): ICD-10-CM

## 2020-11-16 DIAGNOSIS — C18.2 MALIGNANT NEOPLASM OF ASCENDING COLON (HCC): Primary | ICD-10-CM

## 2020-11-16 LAB
ALBUMIN SERPL-MCNC: 4.1 G/DL (ref 3.5–5.2)
ALBUMIN/GLOB SERPL: 1.3 G/DL
ALP SERPL-CCNC: 74 U/L (ref 39–117)
ALT SERPL W P-5'-P-CCNC: 18 U/L (ref 1–33)
ANION GAP SERPL CALCULATED.3IONS-SCNC: 8 MMOL/L (ref 5–15)
AST SERPL-CCNC: 18 U/L (ref 1–32)
BASOPHILS # BLD AUTO: 0.04 10*3/MM3 (ref 0–0.2)
BASOPHILS NFR BLD AUTO: 0.6 % (ref 0–1.5)
BILIRUB SERPL-MCNC: 0.4 MG/DL (ref 0–1.2)
BUN SERPL-MCNC: 14 MG/DL (ref 8–23)
BUN/CREAT SERPL: 21.9 (ref 7–25)
CALCIUM SPEC-SCNC: 9.3 MG/DL (ref 8.6–10.5)
CEA SERPL-MCNC: 2.07 NG/ML
CHLORIDE SERPL-SCNC: 102 MMOL/L (ref 98–107)
CO2 SERPL-SCNC: 31 MMOL/L (ref 22–29)
CREAT SERPL-MCNC: 0.64 MG/DL (ref 0.57–1)
DEPRECATED RDW RBC AUTO: 45.8 FL (ref 37–54)
EOSINOPHIL # BLD AUTO: 0.08 10*3/MM3 (ref 0–0.4)
EOSINOPHIL NFR BLD AUTO: 1.1 % (ref 0.3–6.2)
ERYTHROCYTE [DISTWIDTH] IN BLOOD BY AUTOMATED COUNT: 14.9 % (ref 12.3–15.4)
GFR SERPL CREATININE-BSD FRML MDRD: 91 ML/MIN/1.73
GLOBULIN UR ELPH-MCNC: 3.1 GM/DL
GLUCOSE SERPL-MCNC: 101 MG/DL (ref 65–99)
HCT VFR BLD AUTO: 42.3 % (ref 34–46.6)
HGB BLD-MCNC: 13.2 G/DL (ref 12–15.9)
HOLD SPECIMEN: NORMAL
IMM GRANULOCYTES # BLD AUTO: 0.03 10*3/MM3 (ref 0–0.05)
IMM GRANULOCYTES NFR BLD AUTO: 0.4 % (ref 0–0.5)
LYMPHOCYTES # BLD AUTO: 1.36 10*3/MM3 (ref 0.7–3.1)
LYMPHOCYTES NFR BLD AUTO: 18.7 % (ref 19.6–45.3)
MCH RBC QN AUTO: 26.6 PG (ref 26.6–33)
MCHC RBC AUTO-ENTMCNC: 31.2 G/DL (ref 31.5–35.7)
MCV RBC AUTO: 85.3 FL (ref 79–97)
MONOCYTES # BLD AUTO: 0.66 10*3/MM3 (ref 0.1–0.9)
MONOCYTES NFR BLD AUTO: 9.1 % (ref 5–12)
NEUTROPHILS NFR BLD AUTO: 5.09 10*3/MM3 (ref 1.7–7)
NEUTROPHILS NFR BLD AUTO: 70.1 % (ref 42.7–76)
NRBC BLD AUTO-RTO: 0 /100 WBC (ref 0–0.2)
PLATELET # BLD AUTO: 318 10*3/MM3 (ref 140–450)
PMV BLD AUTO: 10.4 FL (ref 6–12)
POTASSIUM SERPL-SCNC: 3.9 MMOL/L (ref 3.5–5.2)
PROT SERPL-MCNC: 7.2 G/DL (ref 6–8.5)
RBC # BLD AUTO: 4.96 10*6/MM3 (ref 3.77–5.28)
SODIUM SERPL-SCNC: 141 MMOL/L (ref 136–145)
WBC # BLD AUTO: 7.26 10*3/MM3 (ref 3.4–10.8)

## 2020-11-16 PROCEDURE — 85025 COMPLETE CBC W/AUTO DIFF WBC: CPT

## 2020-11-16 PROCEDURE — 80053 COMPREHEN METABOLIC PANEL: CPT

## 2020-11-16 PROCEDURE — G0463 HOSPITAL OUTPT CLINIC VISIT: HCPCS | Performed by: NURSE PRACTITIONER

## 2020-11-16 PROCEDURE — 99213 OFFICE O/P EST LOW 20 MIN: CPT | Performed by: NURSE PRACTITIONER

## 2020-11-16 PROCEDURE — 82378 CARCINOEMBRYONIC ANTIGEN: CPT

## 2020-11-16 NOTE — PROGRESS NOTES
"DATE OF VISIT: 2020    REASON FOR VISIT:  Sigmoid cancer on surveillance, Oxaliplatin induced neuropathy        HISTORY OF PRESENT ILLNESS:    74-year-old female with a past medical history significant for cardiomegaly was initially seen in consultation on 2017 for newly diagnosed rectosigmoid cancer.  Patient was started on adjuvant chemotherapy with FOLFOX on 2017.  Patient finished cycle 12 of FOLFOX on 2018.  Patient started complaining of neuropathy month after finishing chemotherapy for which she has been on Neurontin 100 mg 3 times daily.   Denies any new lymph node enlargement.  Denies any blood in the stool or urine.  Denies any recent weight loss. She had yearly CT scan in 2020 for surveillance that was unremarkable. Next colonoscopy per Dr. Arriola is due 2021.      PAST MEDICAL HISTORY:    Past Medical History:   Diagnosis Date   • Colon cancer (CMS/HCC)    • COPD (chronic obstructive pulmonary disease) (CMS/HCC)    • Hyperlipidemia        SOCIAL HISTORY:    Social History     Tobacco Use   • Smoking status: Former Smoker     Quit date: 2012     Years since quittin.9   • Smokeless tobacco: Never Used   Substance Use Topics   • Alcohol use: Yes     Comment: socially   • Drug use: No       Surgical History :  Past Surgical History:   Procedure Laterality Date   • CATARACT EXTRACTION W/ INTRAOCULAR LENS IMPLANT Right 10/16/2020    Procedure: REMOVE CATARACT AND IMPLANT  INTRAOCULAR LENS;  Surgeon: Gustavo Yañez MD;  Location: [facility] OR;  Service: Ophthalmology;  Laterality: Right;   • COLON SURGERY     • COLONOSCOPY N/A 2018    Procedure: COLONOSCOPY;  Surgeon: Lobo Arriola DO;  Location: [facility] ENDOSCOPY;  Service: Gastroenterology   • GALLBLADDER SURGERY     • RETINAL DETACHMENT SURGERY Right        ALLERGIES:    Allergies   Allergen Reactions   • Codeine Other (See Comments)     \"dont remember\"       REVIEW OF SYSTEMS:  " "    CONSTITUTIONAL:  No fever, chills, or night sweats.     HEENT:  No epistaxis, mouth sores, or difficulty swallowing.    RESPIRATORY:  No new shortness of breath or cough at present.    CARDIOVASCULAR:  No chest pain or palpitations.    GASTROINTESTINAL:  No abdominal pain, nausea, vomiting, or blood in the stool.    GENITOURINARY:  No dysuria or hematuria.    MUSCULOSKELETAL:  No any new back pain or arthralgias.     NEUROLOGICAL:  No tingling or numbness. No new headache or dizziness.     LYMPHATICS:  Denies any abnormal swollen and anywhere in the body.    SKIN:  Denies any new skin rash.    PHYSICAL EXAMINATION:      VITAL SIGNS:  /69 Comment: right arm  Pulse 73   Temp 97.6 °F (36.4 °C) (Temporal)   Resp 18   Ht 157.5 cm (62.01\")   Wt 75.7 kg (166 lb 14.4 oz)   BMI 30.52 kg/m²     GENERAL:  Not in any distress.    HEENT:  Normocephalic, Atraumatic.Mild Conjunctival pallor. No icterus. No Facial Asymmetry noted.    NECK:  No adenopathy. No JVD.    RESPIRATORY:  Fair air entry bilateral. No rhonchi or wheezing.    CARDIOVASCULAR:  S1, S2. Regular rate and rhythm. No murmur or gallop appreciated.    ABDOMEN:  Soft, obese, nontender. Bowel sounds present in all four quadrants.  No organomegaly appreciated.    EXTREMITIES:  No edema.No Calf Tenderness.    NEUROLOGIC:  Alert, awake and oriented ×3.      SKIN : No new skin lesion identified  DIAGNOSTIC DATA:    Glucose   Date Value Ref Range Status   11/16/2020 101 (H) 65 - 99 mg/dL Final     Sodium   Date Value Ref Range Status   11/16/2020 141 136 - 145 mmol/L Final     Potassium   Date Value Ref Range Status   11/16/2020 3.9 3.5 - 5.2 mmol/L Final     CO2   Date Value Ref Range Status   11/16/2020 31.0 (H) 22.0 - 29.0 mmol/L Final     Chloride   Date Value Ref Range Status   11/16/2020 102 98 - 107 mmol/L Final     Anion Gap   Date Value Ref Range Status   11/16/2020 8.0 5.0 - 15.0 mmol/L Final     Creatinine   Date Value Ref Range Status "   11/16/2020 0.64 0.57 - 1.00 mg/dL Final     BUN   Date Value Ref Range Status   11/16/2020 14 8 - 23 mg/dL Final     BUN/Creatinine Ratio   Date Value Ref Range Status   11/16/2020 21.9 7.0 - 25.0 Final     Calcium   Date Value Ref Range Status   11/16/2020 9.3 8.6 - 10.5 mg/dL Final     eGFR Non  Amer   Date Value Ref Range Status   11/16/2020 91 >60 mL/min/1.73 Final     Alkaline Phosphatase   Date Value Ref Range Status   11/16/2020 74 39 - 117 U/L Final     Total Protein   Date Value Ref Range Status   11/16/2020 7.2 6.0 - 8.5 g/dL Final     ALT (SGPT)   Date Value Ref Range Status   11/16/2020 18 1 - 33 U/L Final     AST (SGOT)   Date Value Ref Range Status   11/16/2020 18 1 - 32 U/L Final     Total Bilirubin   Date Value Ref Range Status   11/16/2020 0.4 0.0 - 1.2 mg/dL Final     Albumin   Date Value Ref Range Status   11/16/2020 4.10 3.50 - 5.20 g/dL Final     Globulin   Date Value Ref Range Status   11/16/2020 3.1 gm/dL Final     Lab Results   Component Value Date    WBC 7.26 11/16/2020    HGB 13.2 11/16/2020    HCT 42.3 11/16/2020    MCV 85.3 11/16/2020     11/16/2020     Lab Results   Component Value Date    NEUTROABS 5.09 11/16/2020    IRON 58 12/18/2017    TIBC 312 12/18/2017    LABIRON 19 12/18/2017    FERRITIN 96.10 12/18/2017    [id number] 616 12/18/2017    FOLATE 7.20 12/18/2017     Lab Results   Component Value Date    CEA 2.05 08/12/2020   ]        RADIOLOGY DATA :     CT chest, abdomen and pelvis with contrast 8/12/2020 reviewed and discussed with patient:  Background of COPD with areas of scarring redemonstrated. No  acute pulmonary or pleural finding.     No pulmonary mass, suspicious nodule, or adenopathy.     No CT evidence of acute abdominal or pelvic finding.     Postsurgical changes with anastomosis at the rectosigmoid. No  recurrent mass or metastatic disease identified.     Stable mild hepatic enlargement and fatty infiltration.     Moderate size hiatal  hernia.     Cholecystectomy.      ASSESSMENT AND PLAN:       1. 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  Lymphatic invasion present.  No large vessel invasion or perineural invasion seen.  Margins were negative.   She was started on chemotherapy with FOLFOX on December 27, 2017.   In view of leukopenia with white blood cell count of 2.9 with absolute neutrophil count of 1700 we will decrease the dose of oxaliplatin by 20% and 5-FU combined 20% from cycle 2 on January 10, 2018.  Case was discussed with Dr. Michael by Dr. Hercules on January 15, 2018 and as perDr. Michael patient's tumor was mostly in sigmoid colon not in the rectum.  So she does not need any radiation which was discussed with patient and his family.    -Patient received 12 cycles of chemotherapy consisting of FOLFOX from December 27, 2017 until June 13, 2018.  -Colonoscopy done by Dr. Arriola on August 27, 2018 does not show any evidence of recurrence.  Next colonoscopy will be due in August 2021  -Recently done CT of chest, abdomen and pelvis with contrast on August 2020 does not show any evidence of recurrence.  -CEA level has been drawn earlier today; pending.    -patient will have next yearly surveillance CT scan in August 2021 along with repeat labs; orders placed today; will RTC in May with repeat labs.     2.  Oxaliplatin-induced neuropathy:  -Patient started having tingling numbness affecting upper and lower extremity 1 month after finishing chemotherapy.  -Currently patient is on Neurontin 100 mg 3 times daily.    3. Health maintenance: Patient quit smoking around 2012.  Had a colonoscopy in 08/2018 by Dr Arriola.Will be due for repeat colonoscopy in 8/2021.  Had a mammogram in 2018.    This document has been signed by CHRISTINA Paredes on November 16, 2020 12:14 CST

## 2020-11-18 RX ORDER — SIMVASTATIN 10 MG
TABLET ORAL
Qty: 90 TABLET | Refills: 1 | OUTPATIENT
Start: 2020-11-18

## 2020-12-08 DIAGNOSIS — T45.1X5A NEUROPATHY DUE TO CHEMOTHERAPEUTIC DRUG (HCC): ICD-10-CM

## 2020-12-08 DIAGNOSIS — G62.0 NEUROPATHY DUE TO CHEMOTHERAPEUTIC DRUG (HCC): ICD-10-CM

## 2020-12-08 RX ORDER — GABAPENTIN 100 MG/1
100 CAPSULE ORAL 3 TIMES DAILY
Qty: 270 CAPSULE | Refills: 0 | Status: SHIPPED | OUTPATIENT
Start: 2020-12-08 | End: 2021-05-18 | Stop reason: SDUPTHER

## 2021-03-15 ENCOUNTER — BULK ORDERING (OUTPATIENT)
Dept: CASE MANAGEMENT | Facility: OTHER | Age: 76
End: 2021-03-15

## 2021-03-15 DIAGNOSIS — Z23 IMMUNIZATION DUE: ICD-10-CM

## 2021-05-17 ENCOUNTER — OFFICE VISIT (OUTPATIENT)
Dept: ONCOLOGY | Facility: CLINIC | Age: 76
End: 2021-05-17

## 2021-05-17 ENCOUNTER — LAB (OUTPATIENT)
Dept: ONCOLOGY | Facility: HOSPITAL | Age: 76
End: 2021-05-17

## 2021-05-17 VITALS
HEART RATE: 66 BPM | HEIGHT: 62 IN | SYSTOLIC BLOOD PRESSURE: 114 MMHG | RESPIRATION RATE: 20 BRPM | BODY MASS INDEX: 30.09 KG/M2 | TEMPERATURE: 98.7 F | DIASTOLIC BLOOD PRESSURE: 86 MMHG | WEIGHT: 163.5 LBS

## 2021-05-17 DIAGNOSIS — T45.1X5A NEUROPATHY DUE TO CHEMOTHERAPEUTIC DRUG (HCC): ICD-10-CM

## 2021-05-17 DIAGNOSIS — Z12.31 ENCOUNTER FOR SCREENING MAMMOGRAM FOR MALIGNANT NEOPLASM OF BREAST: ICD-10-CM

## 2021-05-17 DIAGNOSIS — C19 CANCER OF RECTOSIGMOID (COLON) (HCC): Primary | ICD-10-CM

## 2021-05-17 DIAGNOSIS — Z12.39 SCREENING BREAST EXAMINATION: ICD-10-CM

## 2021-05-17 DIAGNOSIS — G62.0 NEUROPATHY DUE TO CHEMOTHERAPEUTIC DRUG (HCC): ICD-10-CM

## 2021-05-17 DIAGNOSIS — C18.2 MALIGNANT NEOPLASM OF ASCENDING COLON (HCC): ICD-10-CM

## 2021-05-17 LAB
ALBUMIN SERPL-MCNC: 4.4 G/DL (ref 3.5–5.2)
ALBUMIN/GLOB SERPL: 1.7 G/DL
ALP SERPL-CCNC: 75 U/L (ref 39–117)
ALT SERPL W P-5'-P-CCNC: 17 U/L (ref 1–33)
ANION GAP SERPL CALCULATED.3IONS-SCNC: 6 MMOL/L (ref 5–15)
AST SERPL-CCNC: 20 U/L (ref 1–32)
BASOPHILS # BLD AUTO: 0.05 10*3/MM3 (ref 0–0.2)
BASOPHILS NFR BLD AUTO: 0.8 % (ref 0–1.5)
BILIRUB SERPL-MCNC: 0.5 MG/DL (ref 0–1.2)
BUN SERPL-MCNC: 14 MG/DL (ref 8–23)
BUN/CREAT SERPL: 18.4 (ref 7–25)
CALCIUM SPEC-SCNC: 9.2 MG/DL (ref 8.6–10.5)
CEA SERPL-MCNC: 3.2 NG/ML
CHLORIDE SERPL-SCNC: 102 MMOL/L (ref 98–107)
CO2 SERPL-SCNC: 27 MMOL/L (ref 22–29)
CREAT SERPL-MCNC: 0.76 MG/DL (ref 0.57–1)
DEPRECATED RDW RBC AUTO: 45.1 FL (ref 37–54)
EOSINOPHIL # BLD AUTO: 0.1 10*3/MM3 (ref 0–0.4)
EOSINOPHIL NFR BLD AUTO: 1.6 % (ref 0.3–6.2)
ERYTHROCYTE [DISTWIDTH] IN BLOOD BY AUTOMATED COUNT: 14.8 % (ref 12.3–15.4)
GFR SERPL CREATININE-BSD FRML MDRD: 74 ML/MIN/1.73
GLOBULIN UR ELPH-MCNC: 2.6 GM/DL
GLUCOSE SERPL-MCNC: 105 MG/DL (ref 65–99)
HCT VFR BLD AUTO: 43.3 % (ref 34–46.6)
HGB BLD-MCNC: 13.4 G/DL (ref 12–15.9)
HOLD SPECIMEN: NORMAL
IMM GRANULOCYTES # BLD AUTO: 0.02 10*3/MM3 (ref 0–0.05)
IMM GRANULOCYTES NFR BLD AUTO: 0.3 % (ref 0–0.5)
LYMPHOCYTES # BLD AUTO: 1.46 10*3/MM3 (ref 0.7–3.1)
LYMPHOCYTES NFR BLD AUTO: 22.8 % (ref 19.6–45.3)
MCH RBC QN AUTO: 26.1 PG (ref 26.6–33)
MCHC RBC AUTO-ENTMCNC: 30.9 G/DL (ref 31.5–35.7)
MCV RBC AUTO: 84.4 FL (ref 79–97)
MONOCYTES # BLD AUTO: 0.6 10*3/MM3 (ref 0.1–0.9)
MONOCYTES NFR BLD AUTO: 9.4 % (ref 5–12)
NEUTROPHILS NFR BLD AUTO: 4.16 10*3/MM3 (ref 1.7–7)
NEUTROPHILS NFR BLD AUTO: 65.1 % (ref 42.7–76)
NRBC BLD AUTO-RTO: 0 /100 WBC (ref 0–0.2)
PLATELET # BLD AUTO: 306 10*3/MM3 (ref 140–450)
PMV BLD AUTO: 10.3 FL (ref 6–12)
POTASSIUM SERPL-SCNC: 3.6 MMOL/L (ref 3.5–5.2)
PROT SERPL-MCNC: 7 G/DL (ref 6–8.5)
RBC # BLD AUTO: 5.13 10*6/MM3 (ref 3.77–5.28)
SODIUM SERPL-SCNC: 135 MMOL/L (ref 136–145)
WBC # BLD AUTO: 6.39 10*3/MM3 (ref 3.4–10.8)

## 2021-05-17 PROCEDURE — 80053 COMPREHEN METABOLIC PANEL: CPT

## 2021-05-17 PROCEDURE — 85025 COMPLETE CBC W/AUTO DIFF WBC: CPT

## 2021-05-17 PROCEDURE — 82378 CARCINOEMBRYONIC ANTIGEN: CPT

## 2021-05-17 PROCEDURE — 99213 OFFICE O/P EST LOW 20 MIN: CPT | Performed by: NURSE PRACTITIONER

## 2021-05-17 PROCEDURE — G0463 HOSPITAL OUTPT CLINIC VISIT: HCPCS | Performed by: NURSE PRACTITIONER

## 2021-05-18 RX ORDER — GABAPENTIN 100 MG/1
100 CAPSULE ORAL 3 TIMES DAILY
Qty: 270 CAPSULE | Refills: 0 | Status: SHIPPED | OUTPATIENT
Start: 2021-05-18 | End: 2021-09-16 | Stop reason: SDUPTHER

## 2021-05-18 NOTE — PROGRESS NOTES
"DATE OF VISIT: 5/18/2021      REASON FOR VISIT:    Sigmoid cancer on surveillance, Oxaliplatin induced neuropathy        HISTORY OF PRESENT ILLNESS:    75-year-old female with a past medical history significant for cardiomegaly was initially seen in consultation on December 18, 2017 for newly diagnosed rectosigmoid cancer.  Patient was started on adjuvant chemotherapy with FOLFOX on December 27, 2017.  Patient finished cycle 12 of FOLFOX on June 13, 2018.  Patient started complaining of neuropathy month after finishing chemotherapy for which she has been on Neurontin 100 mg 3 times daily.   Denies any new lymph node enlargement.  Denies any blood in the stool or urine.  Denies any recent weight loss. She had yearly CT scan in August 2020 for surveillance that was unremarkable. Next colonoscopy per Dr. Arriola is due August 2021.              Past Medical History, Past Surgical History, Social History, Family History have been reviewed and are without significant changes except as mentioned.    Review of Systems   A comprehensive 14 point review of systems was performed and was negative except as mentioned.  Continues with neuropathy in hand and feet secondary to oxaliplatin, stable with Neurontin.  Medications:  The current medication list was reviewed in the EMR    ALLERGIES:    Allergies   Allergen Reactions   • Codeine Other (See Comments)     \"dont remember\"       Objective      Vitals:    05/17/21 1021   BP: 114/86   Pulse: 66   Resp: 20   Temp: 98.7 °F (37.1 °C)   TempSrc: Temporal   Weight: 74.2 kg (163 lb 8 oz)   Height: 157.5 cm (62.01\")   PainSc: 0-No pain     Current Status 5/17/2021   ECOG score 0       Physical Exam  GENERAL:  Not in any distress.     HEENT:  Normocephalic, Atraumatic.Mild Conjunctival pallor. No icterus. No Facial Asymmetry noted.     NECK:  No adenopathy. No JVD.     RESPIRATORY:  Fair air entry bilateral. No rhonchi or wheezing.     CARDIOVASCULAR:  S1, S2. Regular rate and rhythm. No " murmur or gallop appreciated.     ABDOMEN:  Soft, obese, nontender. Bowel sounds present in all four quadrants.  No organomegaly appreciated.     EXTREMITIES:  No edema.No Calf Tenderness.     NEUROLOGIC:  Alert, awake and oriented ×3.       SKIN : No new skin lesion identified    RECENT LABS:  Glucose   Date Value Ref Range Status   05/17/2021 105 (H) 65 - 99 mg/dL Final     Sodium   Date Value Ref Range Status   05/17/2021 135 (L) 136 - 145 mmol/L Final     Potassium   Date Value Ref Range Status   05/17/2021 3.6 3.5 - 5.2 mmol/L Final     Comment:     Slight hemolysis detected by analyzer. Results may be affected.     CO2   Date Value Ref Range Status   05/17/2021 27.0 22.0 - 29.0 mmol/L Final     Chloride   Date Value Ref Range Status   05/17/2021 102 98 - 107 mmol/L Final     Anion Gap   Date Value Ref Range Status   05/17/2021 6.0 5.0 - 15.0 mmol/L Final     Creatinine   Date Value Ref Range Status   05/17/2021 0.76 0.57 - 1.00 mg/dL Final     BUN   Date Value Ref Range Status   05/17/2021 14 8 - 23 mg/dL Final     BUN/Creatinine Ratio   Date Value Ref Range Status   05/17/2021 18.4 7.0 - 25.0 Final     Calcium   Date Value Ref Range Status   05/17/2021 9.2 8.6 - 10.5 mg/dL Final     eGFR Non  Amer   Date Value Ref Range Status   05/17/2021 74 >60 mL/min/1.73 Final     Alkaline Phosphatase   Date Value Ref Range Status   05/17/2021 75 39 - 117 U/L Final     Total Protein   Date Value Ref Range Status   05/17/2021 7.0 6.0 - 8.5 g/dL Final     ALT (SGPT)   Date Value Ref Range Status   05/17/2021 17 1 - 33 U/L Final     AST (SGOT)   Date Value Ref Range Status   05/17/2021 20 1 - 32 U/L Final     Total Bilirubin   Date Value Ref Range Status   05/17/2021 0.5 0.0 - 1.2 mg/dL Final     Albumin   Date Value Ref Range Status   05/17/2021 4.40 3.50 - 5.20 g/dL Final     Globulin   Date Value Ref Range Status   05/17/2021 2.6 gm/dL Final     Lab Results   Component Value Date    WBC 6.39 05/17/2021    HGB 13.4  05/17/2021    HCT 43.3 05/17/2021    MCV 84.4 05/17/2021     05/17/2021     Lab Results   Component Value Date    NEUTROABS 4.16 05/17/2021    IRON 58 12/18/2017    TIBC 312 12/18/2017    LABIRON 19 12/18/2017    FERRITIN 96.10 12/18/2017    [id number] 616 12/18/2017    FOLATE 7.20 12/18/2017     Lab Results   Component Value Date    CEA 3.20 05/17/2021               RADIOLOGY DATA :  No radiology results for the last 7 days        Assessment/Plan     1. 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  Lymphatic invasion present.  No large vessel invasion or perineural invasion seen.  Margins were negative.   She was started on chemotherapy with FOLFOX on December 27, 2017.   In view of leukopenia with white blood cell count of 2.9 with absolute neutrophil count of 1700 we will decrease the dose of oxaliplatin by 20% and 5-FU combined 20% from cycle 2 on January 10, 2018.  Case was discussed with Dr. Michael by Dr. Hercules on January 15, 2018 and as perDr. Michael patient's tumor was mostly in sigmoid colon not in the rectum.  So she does not need any radiation which was discussed with patient and his family.    -Patient received 12 cycles of chemotherapy consisting of FOLFOX from December 27, 2017 until June 13, 2018.  -Colonoscopy done by Dr. Arriola on August 27, 2018 does not show any evidence of recurrence.  Next colonoscopy will be due in August 2021  -Recently done CT of chest, abdomen and pelvis with contrast on August 2020 does not show any evidence of recurrence.  -CEA level has been drawn earlier today; pending.    -patient will have next yearly surveillance CT scan in August 2021 along with repeat labs. Will RTC in November before she leaves for [place] for winter holiday..     2.  Oxaliplatin-induced neuropathy:  -Patient started having tingling numbness affecting upper and lower extremity 1 month after finishing chemotherapy.  -Currently patient is on Neurontin 100  mg 3 times daily. New prescription sent today.     3. Health maintenance: Patient quit smoking around 2012.  Had a colonoscopy in 08/2018 by Dr Arriola.Will be due for repeat colonoscopy in 8/2021. Order for yearly mammogram placed today along with bone denisty n 2018.               PHQ-9 Total Score: 0     Mary Ann Condon reports a pain score of 0.  Given her pain assessment as noted, treatment options were discussed and the following options were decided upon as a follow-up plan to address the patient's pain: continuation of current treatment plan for pain.         Mia Juarez, APRN  5/18/2021  13:28 CDT        Part of this note may be an electronic transcription/translation of spoken language to printed text using the Dragon Dictation System.          CC:

## 2021-05-26 DIAGNOSIS — R93.6 ABNORMAL FINDINGS ON DIAGNOSTIC IMAGING OF LIMBS: ICD-10-CM

## 2021-05-26 DIAGNOSIS — Z13.9 SCREENING DUE: Primary | ICD-10-CM

## 2021-09-02 ENCOUNTER — TRANSCRIBE ORDERS (OUTPATIENT)
Dept: GENERAL RADIOLOGY | Facility: HOSPITAL | Age: 76
End: 2021-09-02

## 2021-09-02 DIAGNOSIS — C19 CANCER OF RECTOSIGMOID (COLON) (HCC): Primary | ICD-10-CM

## 2021-09-07 ENCOUNTER — HOSPITAL ENCOUNTER (OUTPATIENT)
Dept: CT IMAGING | Facility: HOSPITAL | Age: 76
Discharge: HOME | End: 2021-09-07

## 2021-09-07 ENCOUNTER — LAB (OUTPATIENT)
Dept: LAB | Facility: HOSPITAL | Age: 76
End: 2021-09-07

## 2021-09-07 DIAGNOSIS — C19 CANCER OF RECTOSIGMOID (COLON) (HCC): ICD-10-CM

## 2021-09-07 LAB
BUN SERPL-MCNC: 13 MG/DL (ref 8–23)
CREAT SERPL-MCNC: 0.68 MG/DL (ref 0.57–1)
GFR SERPL CREATININE-BSD FRML MDRD: 84 ML/MIN/1.73

## 2021-09-07 PROCEDURE — 82565 ASSAY OF CREATININE: CPT

## 2021-09-07 PROCEDURE — 84520 ASSAY OF UREA NITROGEN: CPT

## 2021-09-07 PROCEDURE — 36415 COLL VENOUS BLD VENIPUNCTURE: CPT

## 2021-09-07 PROCEDURE — 71260 CT THORAX DX C+: CPT

## 2021-09-07 PROCEDURE — 74177 CT ABD & PELVIS W/CONTRAST: CPT

## 2021-09-07 PROCEDURE — 25010000002 IOPAMIDOL 61 % SOLUTION: Performed by: NURSE PRACTITIONER

## 2021-09-07 RX ADMIN — IOPAMIDOL 90 ML: 612 INJECTION, SOLUTION INTRAVENOUS at 10:51

## 2021-09-14 ENCOUNTER — OFFICE VISIT (OUTPATIENT)
Dept: ONCOLOGY | Facility: CLINIC | Age: 76
End: 2021-09-14

## 2021-09-14 ENCOUNTER — LAB (OUTPATIENT)
Dept: ONCOLOGY | Facility: HOSPITAL | Age: 76
End: 2021-09-14

## 2021-09-14 VITALS
HEART RATE: 72 BPM | TEMPERATURE: 96.5 F | WEIGHT: 163.6 LBS | OXYGEN SATURATION: 93 % | BODY MASS INDEX: 29.92 KG/M2 | DIASTOLIC BLOOD PRESSURE: 71 MMHG | RESPIRATION RATE: 18 BRPM | SYSTOLIC BLOOD PRESSURE: 158 MMHG

## 2021-09-14 DIAGNOSIS — C19 CANCER OF RECTOSIGMOID (COLON) (HCC): ICD-10-CM

## 2021-09-14 DIAGNOSIS — C19 CANCER OF RECTOSIGMOID (COLON) (HCC): Primary | ICD-10-CM

## 2021-09-14 LAB
ALBUMIN SERPL-MCNC: 4 G/DL (ref 3.5–5.2)
ALBUMIN/GLOB SERPL: 1.3 G/DL
ALP SERPL-CCNC: 87 U/L (ref 39–117)
ALT SERPL W P-5'-P-CCNC: 16 U/L (ref 1–33)
ANION GAP SERPL CALCULATED.3IONS-SCNC: 10 MMOL/L (ref 5–15)
AST SERPL-CCNC: 16 U/L (ref 1–32)
BASOPHILS # BLD AUTO: 0.05 10*3/MM3 (ref 0–0.2)
BASOPHILS NFR BLD AUTO: 0.7 % (ref 0–1.5)
BILIRUB SERPL-MCNC: 0.5 MG/DL (ref 0–1.2)
BUN SERPL-MCNC: 10 MG/DL (ref 8–23)
BUN/CREAT SERPL: 14.1 (ref 7–25)
CALCIUM SPEC-SCNC: 8.9 MG/DL (ref 8.6–10.5)
CEA SERPL-MCNC: 4.63 NG/ML
CHLORIDE SERPL-SCNC: 101 MMOL/L (ref 98–107)
CO2 SERPL-SCNC: 28 MMOL/L (ref 22–29)
CREAT SERPL-MCNC: 0.71 MG/DL (ref 0.57–1)
DEPRECATED RDW RBC AUTO: 46.7 FL (ref 37–54)
EOSINOPHIL # BLD AUTO: 0.07 10*3/MM3 (ref 0–0.4)
EOSINOPHIL NFR BLD AUTO: 1 % (ref 0.3–6.2)
ERYTHROCYTE [DISTWIDTH] IN BLOOD BY AUTOMATED COUNT: 15.6 % (ref 12.3–15.4)
GFR SERPL CREATININE-BSD FRML MDRD: 80 ML/MIN/1.73
GLOBULIN UR ELPH-MCNC: 3 GM/DL
GLUCOSE SERPL-MCNC: 98 MG/DL (ref 65–99)
HCT VFR BLD AUTO: 43.9 % (ref 34–46.6)
HGB BLD-MCNC: 13.8 G/DL (ref 12–15.9)
IMM GRANULOCYTES # BLD AUTO: 0.04 10*3/MM3 (ref 0–0.05)
IMM GRANULOCYTES NFR BLD AUTO: 0.6 % (ref 0–0.5)
LYMPHOCYTES # BLD AUTO: 1.4 10*3/MM3 (ref 0.7–3.1)
LYMPHOCYTES NFR BLD AUTO: 20.5 % (ref 19.6–45.3)
MCH RBC QN AUTO: 26.2 PG (ref 26.6–33)
MCHC RBC AUTO-ENTMCNC: 31.4 G/DL (ref 31.5–35.7)
MCV RBC AUTO: 83.3 FL (ref 79–97)
MONOCYTES # BLD AUTO: 0.62 10*3/MM3 (ref 0.1–0.9)
MONOCYTES NFR BLD AUTO: 9.1 % (ref 5–12)
NEUTROPHILS NFR BLD AUTO: 4.64 10*3/MM3 (ref 1.7–7)
NEUTROPHILS NFR BLD AUTO: 68.1 % (ref 42.7–76)
NRBC BLD AUTO-RTO: 0 /100 WBC (ref 0–0.2)
PLATELET # BLD AUTO: 302 10*3/MM3 (ref 140–450)
PMV BLD AUTO: 10.7 FL (ref 6–12)
POTASSIUM SERPL-SCNC: 3.5 MMOL/L (ref 3.5–5.2)
PROT SERPL-MCNC: 7 G/DL (ref 6–8.5)
RBC # BLD AUTO: 5.27 10*6/MM3 (ref 3.77–5.28)
SODIUM SERPL-SCNC: 139 MMOL/L (ref 136–145)
WBC # BLD AUTO: 6.82 10*3/MM3 (ref 3.4–10.8)

## 2021-09-14 PROCEDURE — G0463 HOSPITAL OUTPT CLINIC VISIT: HCPCS | Performed by: NURSE PRACTITIONER

## 2021-09-14 PROCEDURE — 99212 OFFICE O/P EST SF 10 MIN: CPT | Performed by: NURSE PRACTITIONER

## 2021-09-14 PROCEDURE — 85025 COMPLETE CBC W/AUTO DIFF WBC: CPT

## 2021-09-14 PROCEDURE — 80053 COMPREHEN METABOLIC PANEL: CPT

## 2021-09-14 PROCEDURE — 82378 CARCINOEMBRYONIC ANTIGEN: CPT

## 2021-09-15 NOTE — PROGRESS NOTES
"DATE OF VISIT: 9/14/2021      REASON FOR VISIT:  Sigmoid cancer on surveillance, Oxaliplatin induced neuropathy        HISTORY OF PRESENT ILLNESS:    75-year-old female with a past medical history significant for cardiomegaly was initially seen in consultation on December 18, 2017 for newly diagnosed rectosigmoid cancer.  Patient was started on adjuvant chemotherapy with FOLFOX on December 27, 2017.  Patient finished cycle 12 of FOLFOX on June 13, 2018.  Patient started complaining of neuropathy month after finishing chemotherapy for which she has been on Neurontin 100 mg 3 times daily.   Denies any new lymph node enlargement.  Denies any blood in the stool or urine.  Denies any recent weight loss. She had yearly CT scan in August 2020 for surveillance that was unremarkable. Next colonoscopy per Dr. Arriola is due August 2021.      Past Medical History, Past Surgical History, Social History, Family History have been reviewed and are without significant changes except as mentioned.    Review of Systems   A comprehensive 14 point review of systems was performed and was negative except as mentioned.    Medications:  The current medication list was reviewed in the EMR    ALLERGIES:    Allergies   Allergen Reactions   • Codeine Other (See Comments)     \"dont remember\"       Objective      Vitals:    09/14/21 1041   BP: 158/71   Pulse: 72   Resp: 18   Temp: 96.5 °F (35.8 °C)   SpO2: 93%   Weight: 74.2 kg (163 lb 9.6 oz)   PainSc: 0-No pain     Current Status 5/17/2021   ECOG score 0       Physical Exam  GENERAL:  Not in any distress.     HEENT:  Normocephalic, Atraumatic.Mild Conjunctival pallor. No icterus. No Facial Asymmetry noted.     NECK:  No adenopathy. No JVD.     RESPIRATORY:  Fair air entry bilateral. No rhonchi or wheezing.     CARDIOVASCULAR:  S1, S2. Regular rate and rhythm. No murmur or gallop appreciated.     ABDOMEN:  Soft, obese, nontender. Bowel sounds present in all four quadrants.  No organomegaly " appreciated.     EXTREMITIES:  No edema.No Calf Tenderness.     NEUROLOGIC:  Alert, awake and oriented ×3.       SKIN : No new skin lesion identified    RECENT LABS:  Glucose   Date Value Ref Range Status   09/14/2021 98 65 - 99 mg/dL Final     Sodium   Date Value Ref Range Status   09/14/2021 139 136 - 145 mmol/L Final     Potassium   Date Value Ref Range Status   09/14/2021 3.5 3.5 - 5.2 mmol/L Final     CO2   Date Value Ref Range Status   09/14/2021 28.0 22.0 - 29.0 mmol/L Final     Chloride   Date Value Ref Range Status   09/14/2021 101 98 - 107 mmol/L Final     Anion Gap   Date Value Ref Range Status   09/14/2021 10.0 5.0 - 15.0 mmol/L Final     Creatinine   Date Value Ref Range Status   09/14/2021 0.71 0.57 - 1.00 mg/dL Final     BUN   Date Value Ref Range Status   09/14/2021 10 8 - 23 mg/dL Final     BUN/Creatinine Ratio   Date Value Ref Range Status   09/14/2021 14.1 7.0 - 25.0 Final     Calcium   Date Value Ref Range Status   09/14/2021 8.9 8.6 - 10.5 mg/dL Final     eGFR Non  Amer   Date Value Ref Range Status   09/14/2021 80 >60 mL/min/1.73 Final     Alkaline Phosphatase   Date Value Ref Range Status   09/14/2021 87 39 - 117 U/L Final     Total Protein   Date Value Ref Range Status   09/14/2021 7.0 6.0 - 8.5 g/dL Final     ALT (SGPT)   Date Value Ref Range Status   09/14/2021 16 1 - 33 U/L Final     AST (SGOT)   Date Value Ref Range Status   09/14/2021 16 1 - 32 U/L Final     Total Bilirubin   Date Value Ref Range Status   09/14/2021 0.5 0.0 - 1.2 mg/dL Final     Albumin   Date Value Ref Range Status   09/14/2021 4.00 3.50 - 5.20 g/dL Final     Globulin   Date Value Ref Range Status   09/14/2021 3.0 gm/dL Final     Lab Results   Component Value Date    WBC 6.82 09/14/2021    HGB 13.8 09/14/2021    HCT 43.9 09/14/2021    MCV 83.3 09/14/2021     09/14/2021     Lab Results   Component Value Date    NEUTROABS 4.64 09/14/2021    IRON 58 12/18/2017    TIBC 312 12/18/2017    LABIRON 19 12/18/2017     FERRITIN 96.10 12/18/2017    [id number] 616 12/18/2017    FOLATE 7.20 12/18/2017     Lab Results   Component Value Date    CEA 4.63 09/14/2021         PATHOLOGY:  * Cannot find OR log *         RADIOLOGY DATA :  CT Chest With Contrast Diagnostic    Result Date: 9/7/2021  CONCLUSION: No evidence of disease recurrence or metastasis. Moderate sized hiatal hernia. Centrilobular emphysema. Electronically signed by:  Jeb Jiang MD  9/7/2021 2:13 PM CDT Workstation: [id number]    CT Abdomen Pelvis With Contrast    Result Date: 9/7/2021  CONCLUSION: No evidence of disease recurrence or metastasis. Moderate sized hiatal hernia. Centrilobular emphysema. Electronically signed by:  Jeb Jiang MD  9/7/2021 2:13 PM CDT Workstation: [id number]          Assessment/Plan     1. 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  Lymphatic invasion present.  No large vessel invasion or perineural invasion seen.  Margins were negative.   She was started on chemotherapy with FOLFOX on December 27, 2017.   In view of leukopenia with white blood cell count of 2.9 with absolute neutrophil count of 1700 we will decrease the dose of oxaliplatin by 20% and 5-FU combined 20% from cycle 2 on January 10, 2018.  Case was discussed with Dr. Michael by Dr. Hercules on January 15, 2018 and as perDr. Michael patient's tumor was mostly in sigmoid colon not in the rectum.  So she does not need any radiation which was discussed with patient and his family.    -Patient received 12 cycles of chemotherapy consisting of FOLFOX from December 27, 2017 until June 13, 2018.  -Colonoscopy done by Dr. Arriola on August 27, 2018 does not show any evidence of recurrence.  Next colonoscopy will be due in August 2021  -Recently done CT of chest, abdomen and pelvis with contrast on 9/7/2021 does  not show any evidence of recurrence.  -CEA level has been drawn earlier today; pending.    -patient will have next yearly surveillance  CT scan in August 2022 along with repeat labs. Will RTC in May after she returns from her winter [place] holiday.     2.  Oxaliplatin-induced neuropathy:  -Patient started having tingling numbness affecting upper and lower extremity 1 month after finishing chemotherapy.  -Currently patient is on Neurontin 100 mg 3 times daily. New prescription sent today.     3. Health maintenance: Patient quit smoking around 2012.  Had a colonoscopy in 08/2018 by Dr Arriola.Will be due for repeat colonoscopy in 8/2021. Order for yearly mammogram placed today along with bone denisty n 2018.               PHQ-9 Total Score: 0     Mary Ann Condon reports a pain score of 0.  Given her pain assessment as noted, treatment options were discussed and the following options were decided upon as a follow-up plan to address the patient's pain: no pain today         Mia Juarez, CHRISTINA  9/15/2021  10:02 CDT        Part of this note may be an electronic transcription/translation of spoken language to printed text using the Dragon Dictation System.          CC:

## 2021-09-16 DIAGNOSIS — G62.0 NEUROPATHY DUE TO CHEMOTHERAPEUTIC DRUG (HCC): ICD-10-CM

## 2021-09-16 DIAGNOSIS — T45.1X5A NEUROPATHY DUE TO CHEMOTHERAPEUTIC DRUG (HCC): ICD-10-CM

## 2021-09-16 RX ORDER — GABAPENTIN 100 MG/1
100 CAPSULE ORAL 3 TIMES DAILY
Qty: 270 CAPSULE | Refills: 0 | Status: SHIPPED | OUTPATIENT
Start: 2021-09-16 | End: 2022-05-11 | Stop reason: SDUPTHER

## 2021-09-16 NOTE — TELEPHONE ENCOUNTER
Rx Refill Note  Requested Prescriptions     Pending Prescriptions Disp Refills   • gabapentin (NEURONTIN) 100 MG capsule 270 capsule 0     Sig: Take 1 capsule by mouth 3 (Three) Times a Day.      Last office visit with prescribing clinician: 9/14/2021      Next office visit with prescribing clinician: 5/10/2022            Tarah Gunn RN  09/16/21, 10:55 CDT

## 2021-09-16 NOTE — TELEPHONE ENCOUNTER
Incoming Refill Request      Medication requested (name and dose): gabapentin 100 mg     Pharmacy where request should be sent: [facility]    Additional details provided by patient: would like 90 day supply     Best call back number: [phone]    Does the patient have less than a 3 day supply:  [] Yes  [x] No    Kia Obrein  09/16/21, 08:42 CDT

## 2021-09-24 ENCOUNTER — HOSPITAL ENCOUNTER (OUTPATIENT)
Facility: HOSPITAL | Age: 76
Setting detail: HOSPITAL OUTPATIENT SURGERY
End: 2021-09-24
Attending: INTERNAL MEDICINE | Admitting: INTERNAL MEDICINE

## 2021-11-04 RX ORDER — LISINOPRIL 5 MG/1
10 TABLET ORAL DAILY
COMMUNITY
End: 2022-07-26

## 2021-11-08 ENCOUNTER — HOSPITAL ENCOUNTER (OUTPATIENT)
Facility: HOSPITAL | Age: 76
Setting detail: HOSPITAL OUTPATIENT SURGERY
Discharge: HOME | End: 2021-11-08
Attending: INTERNAL MEDICINE | Admitting: INTERNAL MEDICINE

## 2021-11-08 ENCOUNTER — ANESTHESIA (OUTPATIENT)
Dept: GASTROENTEROLOGY | Facility: HOSPITAL | Age: 76
End: 2021-11-08

## 2021-11-08 ENCOUNTER — ANESTHESIA EVENT (OUTPATIENT)
Dept: GASTROENTEROLOGY | Facility: HOSPITAL | Age: 76
End: 2021-11-08

## 2021-11-08 VITALS
HEIGHT: 62 IN | TEMPERATURE: 97 F | RESPIRATION RATE: 20 BRPM | SYSTOLIC BLOOD PRESSURE: 108 MMHG | WEIGHT: 153 LBS | HEART RATE: 69 BPM | OXYGEN SATURATION: 95 % | BODY MASS INDEX: 28.16 KG/M2 | DIASTOLIC BLOOD PRESSURE: 65 MMHG

## 2021-11-08 DIAGNOSIS — Z85.038 HISTORY OF COLON CANCER: ICD-10-CM

## 2021-11-08 DIAGNOSIS — Z86.010 PERSONAL HISTORY OF COLONIC POLYPS: ICD-10-CM

## 2021-11-08 PROCEDURE — 88305 TISSUE EXAM BY PATHOLOGIST: CPT

## 2021-11-08 PROCEDURE — 25010000002 PROPOFOL 10 MG/ML EMULSION: Performed by: NURSE ANESTHETIST, CERTIFIED REGISTERED

## 2021-11-08 RX ORDER — PROPOFOL 10 MG/ML
VIAL (ML) INTRAVENOUS AS NEEDED
Status: DISCONTINUED | OUTPATIENT
Start: 2021-11-08 | End: 2021-11-08 | Stop reason: SURG

## 2021-11-08 RX ORDER — DEXTROSE AND SODIUM CHLORIDE 5; .45 G/100ML; G/100ML
30 INJECTION, SOLUTION INTRAVENOUS CONTINUOUS PRN
Status: DISCONTINUED | OUTPATIENT
Start: 2021-11-08 | End: 2021-11-08 | Stop reason: HOSPADM

## 2021-11-08 RX ORDER — LIDOCAINE HYDROCHLORIDE 20 MG/ML
INJECTION, SOLUTION EPIDURAL; INFILTRATION; INTRACAUDAL; PERINEURAL AS NEEDED
Status: DISCONTINUED | OUTPATIENT
Start: 2021-11-08 | End: 2021-11-08 | Stop reason: SURG

## 2021-11-08 RX ADMIN — PROPOFOL 80 MG: 10 INJECTION, EMULSION INTRAVENOUS at 14:25

## 2021-11-08 RX ADMIN — DEXTROSE AND SODIUM CHLORIDE 30 ML/HR: 5; 450 INJECTION, SOLUTION INTRAVENOUS at 13:59

## 2021-11-08 RX ADMIN — LIDOCAINE HYDROCHLORIDE 60 MG: 20 INJECTION, SOLUTION EPIDURAL; INFILTRATION; INTRACAUDAL; PERINEURAL at 14:25

## 2021-11-08 RX ADMIN — PROPOFOL 30 MG: 10 INJECTION, EMULSION INTRAVENOUS at 14:28

## 2021-11-08 RX ADMIN — PROPOFOL 30 MG: 10 INJECTION, EMULSION INTRAVENOUS at 14:31

## 2021-11-08 RX ADMIN — PROPOFOL 30 MG: 10 INJECTION, EMULSION INTRAVENOUS at 14:34

## 2021-11-08 NOTE — ANESTHESIA POSTPROCEDURE EVALUATION
Patient: Mary Ann Condon    Procedure Summary     Date: 11/08/21 Room / Location: [facility] ENDOSCOPY 2 / [facility] ENDOSCOPY    Anesthesia Start: 1421 Anesthesia Stop: 1435    Procedure: COLONOSCOPY (N/A ) Diagnosis:       History of colon cancer      Personal history of colonic polyps      (History of colon cancer [Z85.038])      (Personal history of colonic polyps [Z86.010])    Surgeons: Lobo Arriola DO Provider: Lion Vincent CRNA    Anesthesia Type: MAC ASA Status: 3          Anesthesia Type: MAC    Vitals  No vitals data found for the desired time range.          Post Anesthesia Care and Evaluation    Patient location during evaluation: bedside  Patient participation: complete - patient participated  Level of consciousness: awake and awake and alert  Pain score: 0  Pain management: adequate  Airway patency: patent  Anesthetic complications: No anesthetic complications  PONV Status: none  Cardiovascular status: acceptable and stable  Respiratory status: acceptable, room air and spontaneous ventilation  Hydration status: acceptable    Comments: BP:  124/58  HR:  72  SAT:  95  RR:  20  TEMP: 97.0

## 2021-11-08 NOTE — H&P
Mei Chowdhury DO,[facility]  Gastroenterology  Hepatology  Endoscopy  Board Certified in Internal Medicine and gastroenterology  [address], [address]  [place]. [address]  - [phone]   F - [phone]     GASTROENTEROLOGY HISTORY AND PHYSICAL  NOTE   MEI CHOWDHURY DO.         SUBJECTIVE:   2021    Name: Mary Ann Condon  DOD: 1945        Chief Complaint:       Subjective : History of colon cancer    Patient is 75 y.o. female presents with desire for elective colonoscopy.      ROS/HISTORY/ CURRENT MEDICATIONS/OBJECTIVE/VS/PE:   Review of Systems:  All systems unremarkable unless specified below.  Constitutional   HENT  Eyes   Respiratory    Cardiovascular  Gastrointestinal   Endocrine  Genitourinary    Musculoskeletal   Skin  Allergic/Immunologic    Neurological    Hematological  Psychiatric/Behavioral    History:     Past Medical History:   Diagnosis Date   • Asthma    • Colon cancer (HCC)    • COPD (chronic obstructive pulmonary disease) (HCC)    • Hyperlipidemia    • Hypertension      Past Surgical History:   Procedure Laterality Date   • CATARACT EXTRACTION W/ INTRAOCULAR LENS IMPLANT Right 10/16/2020    Procedure: REMOVE CATARACT AND IMPLANT  INTRAOCULAR LENS;  Surgeon: Gustavo Yañez MD;  Location: [facility] OR;  Service: Ophthalmology;  Laterality: Right;   • COLON SURGERY     • COLONOSCOPY N/A 2018    Procedure: COLONOSCOPY;  Surgeon: Mei Chowdhury DO;  Location: [facility] ENDOSCOPY;  Service: Gastroenterology   • GALLBLADDER SURGERY     • RETINAL DETACHMENT SURGERY Right      Family History   Problem Relation Age of Onset   • Heart attack Brother    • Cancer Maternal Aunt      Social History     Tobacco Use   • Smoking status: Former Smoker     Quit date: 2012     Years since quittin.9   • Smokeless tobacco: Never Used   Substance Use Topics   • Alcohol use: Yes     Comment: socially   • Drug use: No     Prior to Admission medications    Medication  Sig Start Date End Date Taking? Authorizing Provider   gabapentin (NEURONTIN) 100 MG capsule Take 1 capsule by mouth 3 (Three) Times a Day. 9/16/21  Yes Mia Juarez APRN   lisinopril (PRINIVIL,ZESTRIL) 5 MG tablet Take 5 mg by mouth Daily.   Yes Provider, MD Roshan   simvastatin (Zocor) 10 MG tablet Take 10 mg by mouth Every Night.   Yes Provider, MD Roshan     Allergies:  Codeine    I have reviewed the patients medical history, surgical history and family history in the available medical record system.     Current Medications:     Current Facility-Administered Medications   Medication Dose Route Frequency Provider Last Rate Last Admin   • dextrose 5 % and sodium chloride 0.45 % infusion  30 mL/hr Intravenous Continuous PRN Lobo Arriola DO 30 mL/hr at 11/08/21 1359 30 mL/hr at 11/08/21 1359       Objective     Physical Exam:   Temp:  [97 °F (36.1 °C)] 97 °F (36.1 °C)  Heart Rate:  [81] 81  Resp:  [18] 18  BP: (135)/(63) 135/63    Physical Exam:  General Appearance:    Alert, cooperative, in no acute distress   Head:    Normocephalic, without obvious abnormality, atraumatic   Eyes:            Lids and lashes normal, conjunctivae and sclerae normal, no icterus, no pallor, corneas clear, PERRLA   Ears:    Ears appear intact with no abnormalities noted   Throat:   No oral lesions, no thrush, oral mucosa moist   Neck:   No adenopathy, supple, trachea midline, no thyromegaly, no  carotid bruit, no JVD   Back:     No kyphosis present, no scoliosis present, no skin lesions,   erythema or scars, no tenderness to percussion or                 palpation,  range of motion normal   Lungs:     Clear to auscultation,respirations regular, even and         unlabored    Heart:    Regular rhythm and normal rate, normal S1 and S2, no  murmur, no gallop, no rub, no click   Breast Exam:    Deferred   Abdomen:     Normal bowel sounds, no masses, no organomegaly, soft  nontender, nondistended, no guarding, no rebound                  tenderness   Genitalia:    Deferred   Extremities:   Moves all extremities well, no edema, no cyanosis, no          redness   Pulses:   Pulses palpable and equal bilaterally   Skin:   No bleeding, bruising or rash   Lymph nodes:   No palpable adenopathy   Neurologic:   Cranial nerves 2 - 12 grossly intact, sensation intact, DTR     present and equal bilaterally      Results Review:     Lab Results   Component Value Date    WBC 6.82 09/14/2021    WBC 6.39 05/17/2021    WBC 7.26 11/16/2020    HGB 13.8 09/14/2021    HGB 13.4 05/17/2021    HGB 13.2 11/16/2020    HCT 43.9 09/14/2021    HCT 43.3 05/17/2021    HCT 42.3 11/16/2020     09/14/2021     05/17/2021     11/16/2020             No results found for: LIPASE  No results found for: INR  No results found for: CULTURE    Radiology Review:  Imaging Results (Last 72 Hours)     ** No results found for the last 72 hours. **           I reviewed the patient's new clinical results.  I reviewed the patient's new imaging results and agree with the interpretation.     ASSESSMENT/PLAN:   ASSESSMENT:  1.  History of colon answer    PLAN:  1.  Colonoscopy    Risk and benefits associated with the procedure are reviewed with the patient.  The patient wished to proceed     Lobo Arriola DO  11/08/21  14:15 CST

## 2021-11-08 NOTE — ANESTHESIA PREPROCEDURE EVALUATION
Anesthesia Evaluation     Patient summary reviewed and Nursing notes reviewed   NPO Solid Status: > 8 hours  NPO Liquid Status: > 2 hours           Airway   Mallampati: II  TM distance: <3 FB  Neck ROM: full  Anterior  Dental    (+) lower dentures and upper dentures    Pulmonary - normal exam   (+) a smoker Former, COPD, asthma,  Cardiovascular - normal exam  Exercise tolerance: poor (<4 METS)    NYHA Classification: III  ECG reviewed    (+) hypertension, BHATIA, hyperlipidemia,       Neuro/Psych  (+) weakness, numbness, psychiatric history Anxiety and Depression,       ROS Comment: Neuropathy from the oxaplatin noted in chart, neurontin prescribed.  GI/Hepatic/Renal/Endo    (+)  GERD well controlled,      Musculoskeletal     (+) back pain,   Abdominal  - normal exam   Substance History   (+) alcohol use,      OB/GYN negative ob/gyn ROS         Other      history of cancer      Other Comment: Colon cancer treated with resection and chemo.                    Anesthesia Plan    ASA 3     MAC     intravenous induction     Anesthetic plan, all risks, benefits, and alternatives have been provided, discussed and informed consent has been obtained with: patient.

## 2021-11-11 LAB
LAB AP CASE REPORT: NORMAL
PATH REPORT.FINAL DX SPEC: NORMAL

## 2022-05-10 ENCOUNTER — LAB (OUTPATIENT)
Dept: ONCOLOGY | Facility: HOSPITAL | Age: 77
End: 2022-05-10

## 2022-05-10 ENCOUNTER — OFFICE VISIT (OUTPATIENT)
Dept: ONCOLOGY | Facility: CLINIC | Age: 77
End: 2022-05-10

## 2022-05-10 VITALS
BODY MASS INDEX: 28.35 KG/M2 | OXYGEN SATURATION: 95 % | WEIGHT: 155 LBS | RESPIRATION RATE: 18 BRPM | HEART RATE: 64 BPM | TEMPERATURE: 96.3 F | DIASTOLIC BLOOD PRESSURE: 73 MMHG | SYSTOLIC BLOOD PRESSURE: 162 MMHG

## 2022-05-10 DIAGNOSIS — C19 CANCER OF RECTOSIGMOID (COLON): Primary | ICD-10-CM

## 2022-05-10 DIAGNOSIS — G62.0 NEUROPATHY DUE TO CHEMOTHERAPEUTIC DRUG: ICD-10-CM

## 2022-05-10 DIAGNOSIS — C19 CANCER OF RECTOSIGMOID (COLON): ICD-10-CM

## 2022-05-10 DIAGNOSIS — T45.1X5A NEUROPATHY DUE TO CHEMOTHERAPEUTIC DRUG: ICD-10-CM

## 2022-05-10 LAB
ALBUMIN SERPL-MCNC: 4.2 G/DL (ref 3.5–5.2)
ALBUMIN/GLOB SERPL: 1.4 G/DL
ALP SERPL-CCNC: 82 U/L (ref 39–117)
ALT SERPL W P-5'-P-CCNC: 15 U/L (ref 1–33)
ANION GAP SERPL CALCULATED.3IONS-SCNC: 10 MMOL/L (ref 5–15)
AST SERPL-CCNC: 19 U/L (ref 1–32)
BASOPHILS # BLD AUTO: 0.04 10*3/MM3 (ref 0–0.2)
BASOPHILS NFR BLD AUTO: 0.5 % (ref 0–1.5)
BILIRUB SERPL-MCNC: 0.5 MG/DL (ref 0–1.2)
BUN SERPL-MCNC: 13 MG/DL (ref 8–23)
BUN/CREAT SERPL: 20 (ref 7–25)
CALCIUM SPEC-SCNC: 8.9 MG/DL (ref 8.6–10.5)
CEA SERPL-MCNC: 23.4 NG/ML
CHLORIDE SERPL-SCNC: 103 MMOL/L (ref 98–107)
CO2 SERPL-SCNC: 29 MMOL/L (ref 22–29)
CREAT SERPL-MCNC: 0.65 MG/DL (ref 0.57–1)
DEPRECATED RDW RBC AUTO: 45.5 FL (ref 37–54)
EGFRCR SERPLBLD CKD-EPI 2021: 91.4 ML/MIN/1.73
EOSINOPHIL # BLD AUTO: 0.1 10*3/MM3 (ref 0–0.4)
EOSINOPHIL NFR BLD AUTO: 1.3 % (ref 0.3–6.2)
ERYTHROCYTE [DISTWIDTH] IN BLOOD BY AUTOMATED COUNT: 14.8 % (ref 12.3–15.4)
GLOBULIN UR ELPH-MCNC: 2.9 GM/DL
GLUCOSE SERPL-MCNC: 93 MG/DL (ref 65–99)
HCT VFR BLD AUTO: 41.8 % (ref 34–46.6)
HGB BLD-MCNC: 13.5 G/DL (ref 12–15.9)
IMM GRANULOCYTES # BLD AUTO: 0.02 10*3/MM3 (ref 0–0.05)
IMM GRANULOCYTES NFR BLD AUTO: 0.3 % (ref 0–0.5)
LYMPHOCYTES # BLD AUTO: 1.47 10*3/MM3 (ref 0.7–3.1)
LYMPHOCYTES NFR BLD AUTO: 18.7 % (ref 19.6–45.3)
MCH RBC QN AUTO: 27.4 PG (ref 26.6–33)
MCHC RBC AUTO-ENTMCNC: 32.3 G/DL (ref 31.5–35.7)
MCV RBC AUTO: 85 FL (ref 79–97)
MONOCYTES # BLD AUTO: 0.6 10*3/MM3 (ref 0.1–0.9)
MONOCYTES NFR BLD AUTO: 7.6 % (ref 5–12)
NEUTROPHILS NFR BLD AUTO: 5.64 10*3/MM3 (ref 1.7–7)
NEUTROPHILS NFR BLD AUTO: 71.6 % (ref 42.7–76)
NRBC BLD AUTO-RTO: 0 /100 WBC (ref 0–0.2)
PLATELET # BLD AUTO: 300 10*3/MM3 (ref 140–450)
PMV BLD AUTO: 10.1 FL (ref 6–12)
POTASSIUM SERPL-SCNC: 3.6 MMOL/L (ref 3.5–5.2)
PROT SERPL-MCNC: 7.1 G/DL (ref 6–8.5)
RBC # BLD AUTO: 4.92 10*6/MM3 (ref 3.77–5.28)
SODIUM SERPL-SCNC: 142 MMOL/L (ref 136–145)
WBC NRBC COR # BLD: 7.87 10*3/MM3 (ref 3.4–10.8)

## 2022-05-10 PROCEDURE — 80053 COMPREHEN METABOLIC PANEL: CPT

## 2022-05-10 PROCEDURE — 85025 COMPLETE CBC W/AUTO DIFF WBC: CPT

## 2022-05-10 PROCEDURE — 82378 CARCINOEMBRYONIC ANTIGEN: CPT

## 2022-05-10 PROCEDURE — G0463 HOSPITAL OUTPT CLINIC VISIT: HCPCS | Performed by: NURSE PRACTITIONER

## 2022-05-10 PROCEDURE — 99214 OFFICE O/P EST MOD 30 MIN: CPT | Performed by: NURSE PRACTITIONER

## 2022-05-11 ENCOUNTER — TELEPHONE (OUTPATIENT)
Dept: ONCOLOGY | Facility: HOSPITAL | Age: 77
End: 2022-05-11

## 2022-05-11 DIAGNOSIS — C19 CANCER OF RECTOSIGMOID (COLON): Primary | ICD-10-CM

## 2022-05-11 RX ORDER — GABAPENTIN 100 MG/1
100 CAPSULE ORAL 3 TIMES DAILY
Qty: 270 CAPSULE | Refills: 0 | Status: SHIPPED | OUTPATIENT
Start: 2022-05-11 | End: 2022-10-12

## 2022-05-11 NOTE — PROGRESS NOTES
Let her know that her CEA level is elevated and I would like to go ahead and do her CT scan instead of waiting until September

## 2022-05-12 NOTE — PROGRESS NOTES
"DATE OF VISIT: 5/10/2022      REASON FOR VISIT:  Sigmoid cancer on surveillance, Oxaliplatin induced neuropathy        HISTORY OF PRESENT ILLNESS:    76-year-old female with a past medical history significant for cardiomegaly was initially seen in consultation on December 18, 2017 for newly diagnosed rectosigmoid cancer.  Patient was started on adjuvant chemotherapy with FOLFOX on December 27, 2017.  Patient finished cycle 12 of FOLFOX on June 13, 2018.  Patient started complaining of neuropathy month after finishing chemotherapy for which she has been on Neurontin 100 mg 3 times daily.   Denies any new lymph node enlargement.  Denies any blood in the stool or urine.  Denies any recent weight loss. Her last CT scan in September 2021 showed KAYLIN; she had colonoscopy in November 2021 right before she left for [place] for the winter; she has just returned back to [place].  She denies any new pain or problems with extreme constipation or diarrhea; denies any blood in stool  Neuropathy is stable with gabapentin.      Past Medical History, Past Surgical History, Social History, Family History have been reviewed and are without significant changes except as mentioned.    Review of Systems   A comprehensive 14 point review of systems was performed and was negative except as mentioned.    Medications:  The current medication list was reviewed in the EMR    ALLERGIES:    Allergies   Allergen Reactions   • Codeine Other (See Comments)     \"dont remember\"       Objective      Vitals:    05/10/22 1251   BP: 162/73   Pulse: 64   Resp: 18   Temp: 96.3 °F (35.7 °C)   SpO2: 95%   Weight: 70.3 kg (155 lb)   PainSc: 0-No pain     Current Status 5/17/2021   ECOG score 0       Physical Exam  General:alert and oriented no acute distress  Lungs; CTAB   Card: RRR  Ext: no edema    RECENT LABS:  Glucose   Date Value Ref Range Status   05/10/2022 93 65 - 99 mg/dL Final     Sodium   Date Value Ref Range Status   05/10/2022 142 136 - 145 " mmol/L Final     Potassium   Date Value Ref Range Status   05/10/2022 3.6 3.5 - 5.2 mmol/L Final     CO2   Date Value Ref Range Status   05/10/2022 29.0 22.0 - 29.0 mmol/L Final     Chloride   Date Value Ref Range Status   05/10/2022 103 98 - 107 mmol/L Final     Anion Gap   Date Value Ref Range Status   05/10/2022 10.0 5.0 - 15.0 mmol/L Final     Creatinine   Date Value Ref Range Status   05/10/2022 0.65 0.57 - 1.00 mg/dL Final     BUN   Date Value Ref Range Status   05/10/2022 13 8 - 23 mg/dL Final     BUN/Creatinine Ratio   Date Value Ref Range Status   05/10/2022 20.0 7.0 - 25.0 Final     Calcium   Date Value Ref Range Status   05/10/2022 8.9 8.6 - 10.5 mg/dL Final     eGFR Non  Amer   Date Value Ref Range Status   09/14/2021 80 >60 mL/min/1.73 Final     Alkaline Phosphatase   Date Value Ref Range Status   05/10/2022 82 39 - 117 U/L Final     Total Protein   Date Value Ref Range Status   05/10/2022 7.1 6.0 - 8.5 g/dL Final     ALT (SGPT)   Date Value Ref Range Status   05/10/2022 15 1 - 33 U/L Final     AST (SGOT)   Date Value Ref Range Status   05/10/2022 19 1 - 32 U/L Final     Total Bilirubin   Date Value Ref Range Status   05/10/2022 0.5 0.0 - 1.2 mg/dL Final     Albumin   Date Value Ref Range Status   05/10/2022 4.20 3.50 - 5.20 g/dL Final     Globulin   Date Value Ref Range Status   05/10/2022 2.9 gm/dL Final     Lab Results   Component Value Date    WBC 7.87 05/10/2022    HGB 13.5 05/10/2022    HCT 41.8 05/10/2022    MCV 85.0 05/10/2022     05/10/2022     Lab Results   Component Value Date    NEUTROABS 5.64 05/10/2022    IRON 58 12/18/2017    TIBC 312 12/18/2017    LABIRON 19 12/18/2017    FERRITIN 96.10 12/18/2017    [id number] 616 12/18/2017    FOLATE 7.20 12/18/2017     Lab Results   Component Value Date    CEA 23.40 05/10/2022       RADIOLOGY DATA :  No radiology results for the last 7 days        Assessment & Plan     1. 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  Lymphatic invasion present.  No large vessel invasion or perineural invasion seen.  Margins were negative.   She was started on chemotherapy with FOLFOX on December 27, 2017.   In view of leukopenia with white blood cell count of 2.9 with absolute neutrophil count of 1700 we will decrease the dose of oxaliplatin by 20% and 5-FU combined 20% from cycle 2 on January 10, 2018.  Case was discussed with Dr. Michael by Dr. Hercules on January 15, 2018 and as perDr. Michael patient's tumor was mostly in sigmoid colon not in the rectum.  So she does not need any radiation which was discussed with patient and his family.    -Patient received 12 cycles of chemotherapy consisting of FOLFOX from December 27, 2017 until June 13, 2018.  -Colonoscopy done by Dr. Arriola on August 27, 2018 does not show any evidence of recurrence.  Next colonoscopy will be due in August 2021  -Recently done CT of chest, abdomen and pelvis with contrast on 9/7/2021 does  not show any evidence of recurrence and her recet CEA levels have been normal/stable  -will plan to repeat CT scan in September and follow-up with patient after scans.      2.  Oxaliplatin-induced neuropathy:  -Patient started having tingling numbness affecting upper and lower extremity 1 month after finishing chemotherapy.  -Currently patient is on Neurontin 100 mg 3 times daily. New prescription sent today.     3. Health maintenance: Patient quit smoking around 2012.  Had a colonoscopy in 11/2021 by Dr Arriola.               PHQ-9 Total Score: 0     Mary Ann Condon reports a pain score of 0.  Given her pain assessment as noted, treatment options were discussed and the following options were decided upon as a follow-up plan to address the patient's pain: no pain today.         CHRISTINA Paredes  5/12/2022  09:01 CDT        Part of this note may be an electronic transcription/translation of spoken language to printed text using the Dragon Dictation  System.          CC:

## 2022-05-16 ENCOUNTER — HOSPITAL ENCOUNTER (OUTPATIENT)
Dept: CT IMAGING | Facility: HOSPITAL | Age: 77
Discharge: HOME | End: 2022-05-16
Admitting: NURSE PRACTITIONER

## 2022-05-16 DIAGNOSIS — C19 CANCER OF RECTOSIGMOID (COLON): ICD-10-CM

## 2022-05-16 PROCEDURE — 71260 CT THORAX DX C+: CPT

## 2022-05-16 PROCEDURE — 25010000002 IOPAMIDOL 61 % SOLUTION: Performed by: NURSE PRACTITIONER

## 2022-05-16 PROCEDURE — 74177 CT ABD & PELVIS W/CONTRAST: CPT

## 2022-05-16 RX ADMIN — IOPAMIDOL 90 ML: 612 INJECTION, SOLUTION INTRAVENOUS at 17:08

## 2022-05-24 ENCOUNTER — OFFICE VISIT (OUTPATIENT)
Dept: ONCOLOGY | Facility: CLINIC | Age: 77
End: 2022-05-24

## 2022-05-24 VITALS
DIASTOLIC BLOOD PRESSURE: 83 MMHG | BODY MASS INDEX: 28.72 KG/M2 | TEMPERATURE: 97 F | WEIGHT: 157 LBS | OXYGEN SATURATION: 93 % | RESPIRATION RATE: 18 BRPM | HEART RATE: 91 BPM | SYSTOLIC BLOOD PRESSURE: 144 MMHG

## 2022-05-24 DIAGNOSIS — C19 CANCER OF RECTOSIGMOID (COLON): Primary | ICD-10-CM

## 2022-05-24 PROCEDURE — G0463 HOSPITAL OUTPT CLINIC VISIT: HCPCS | Performed by: NURSE PRACTITIONER

## 2022-05-24 PROCEDURE — 99213 OFFICE O/P EST LOW 20 MIN: CPT | Performed by: NURSE PRACTITIONER

## 2022-05-24 NOTE — PROGRESS NOTES
"DATE OF VISIT: 5/24/2022      REASON FOR VISIT:  Here for scan results      HISTORY OF PRESENT ILLNESS:   History of Stage III B rectosigmoid cancer  She received adjuvant cgemotherapy with FOLFOX on December 27, 2017.  Patient finished cycle 12 of FOLFOX on June 13, 2018. Has been followed at [facility] per NCCN guidelines for surveillance.  She was here last week and her CEA Level jumped from 3-4 to 23. This was concerning and order for CT scan was placed. pateint had a colonoscopy in November 2021 before leaving for [place] that was unremarkable.  She is not a current smoker; she did state that she had a CEA level checked while in [place] in November and it was 4.  She also just recently received her 2nd Covid booster shot.    Past Medical History, Past Surgical History, Social History, Family History have been reviewed and are without significant changes except as mentioned.    Review of Systems   A comprehensive 14 point review of systems was performed and was negative except as mentioned.    Medications:  The current medication list was reviewed in the EMR    ALLERGIES:    Allergies   Allergen Reactions   • Codeine Other (See Comments)     \"dont remember\"       Objective      Vitals:    05/24/22 1354   BP: 144/83   Pulse: 91   Resp: 18   Temp: 97 °F (36.1 °C)   SpO2: 93%   Weight: 71.2 kg (157 lb)   PainSc: 0-No pain     Current Status 5/17/2021   ECOG score 0       Physical Exam  General: alert and oriented  Ext; no edema    RECENT LABS:  Glucose   Date Value Ref Range Status   05/10/2022 93 65 - 99 mg/dL Final     Sodium   Date Value Ref Range Status   05/10/2022 142 136 - 145 mmol/L Final     Potassium   Date Value Ref Range Status   05/10/2022 3.6 3.5 - 5.2 mmol/L Final     CO2   Date Value Ref Range Status   05/10/2022 29.0 22.0 - 29.0 mmol/L Final     Chloride   Date Value Ref Range Status   05/10/2022 103 98 - 107 mmol/L Final     Anion Gap   Date Value Ref Range Status   05/10/2022 10.0 5.0 - 15.0 " mmol/L Final     Creatinine   Date Value Ref Range Status   05/10/2022 0.65 0.57 - 1.00 mg/dL Final     BUN   Date Value Ref Range Status   05/10/2022 13 8 - 23 mg/dL Final     BUN/Creatinine Ratio   Date Value Ref Range Status   05/10/2022 20.0 7.0 - 25.0 Final     Calcium   Date Value Ref Range Status   05/10/2022 8.9 8.6 - 10.5 mg/dL Final     eGFR Non  Amer   Date Value Ref Range Status   09/14/2021 80 >60 mL/min/1.73 Final     Alkaline Phosphatase   Date Value Ref Range Status   05/10/2022 82 39 - 117 U/L Final     Total Protein   Date Value Ref Range Status   05/10/2022 7.1 6.0 - 8.5 g/dL Final     ALT (SGPT)   Date Value Ref Range Status   05/10/2022 15 1 - 33 U/L Final     AST (SGOT)   Date Value Ref Range Status   05/10/2022 19 1 - 32 U/L Final     Total Bilirubin   Date Value Ref Range Status   05/10/2022 0.5 0.0 - 1.2 mg/dL Final     Albumin   Date Value Ref Range Status   05/10/2022 4.20 3.50 - 5.20 g/dL Final     Globulin   Date Value Ref Range Status   05/10/2022 2.9 gm/dL Final     Lab Results   Component Value Date    WBC 7.87 05/10/2022    HGB 13.5 05/10/2022    HCT 41.8 05/10/2022    MCV 85.0 05/10/2022     05/10/2022     Lab Results   Component Value Date    NEUTROABS 5.64 05/10/2022    IRON 58 12/18/2017    TIBC 312 12/18/2017    LABIRON 19 12/18/2017    FERRITIN 96.10 12/18/2017    [id number] 616 12/18/2017    FOLATE 7.20 12/18/2017     Lab Results   Component Value Date    CEA 23.40 05/10/2022         PATHOLOGY:  * Cannot find OR log *         RADIOLOGY DATA :  CT Chest With Contrast Diagnostic    Result Date: 5/17/2022  No evidence of residual or recurrent neoplastic process along the postsurgical site. Unremarkable postsurgical sites in the rectosigmoid colon junction. There is no evidence of metastatic process in the chest, abdomen or pelvis. 5 mm right thyroid nodule. Correlation with sonogram is recommended. Diffuse centrilobular emphysematous change of the lung parenchyma.  Mild degree of consolidation in the left lingula, also visualized on prior study.. Mild to moderate hiatal hernia. Electronically signed by:  Nik Hinson MD  5/17/2022 6:34 AM CDT Workstation: [id number]    CT Abdomen Pelvis With Contrast    Result Date: 5/17/2022  No evidence of residual or recurrent neoplastic process along the postsurgical site. Unremarkable postsurgical sites in the rectosigmoid colon junction. There is no evidence of metastatic process in the chest, abdomen or pelvis. 5 mm right thyroid nodule. Correlation with sonogram is recommended. Diffuse centrilobular emphysematous change of the lung parenchyma. Mild degree of consolidation in the left lingula, also visualized on prior study.. Mild to moderate hiatal hernia. 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  Lymphatic invasion present.  No large vessel invasion or perineural invasion seen.  Margins were negative.   She was started on chemotherapy with FOLFOX on December 27, 2017.   In view of leukopenia with white blood cell count of 2.9 with absolute neutrophil count of 1700 we will decrease the dose of oxaliplatin by 20% and 5-FU combined 20% from cycle 2 on January 10, 2018.  Case was discussed with Dr. Michael by Dr. Hercules on January 15, 2018 and as perDrKane Michael patient's tumor was mostly in sigmoid colon not in the rectum.  So she does not need any radiation which was discussed with patient and his family.    -Patient received 12 cycles of chemotherapy consisting of FOLFOX from December 27, 2017 until June 13, 2018.  -recent colonoscopy in November 2021 showed no recurrence.  -recent labs done after returning from [place] showed CEA elevated at 23; her normal range is 2-4  -CT C/A/P was obtained and shows no evidence of recurrence however pt had large amount of stool in  colon.  discussed results with patient and her . Plan is to repeat CEA around June 10th; if still elevated will have her return to see          PHQ-9 Total Score: 0     Mary Ann Condon reports a pain score of 0.  Given her pain assessment as noted, treatment options were discussed and the following options were decided upon as a follow-up plan to address the patient's pain: no pain today.         Mia Juarez, APRN  5/24/2022  14:27 CDT        Part of this note may be an electronic transcription/translation of spoken language to printed text using the Dragon Dictation System.          CC:

## 2022-06-09 ENCOUNTER — LAB (OUTPATIENT)
Dept: LAB | Facility: HOSPITAL | Age: 77
End: 2022-06-09

## 2022-06-09 DIAGNOSIS — C19 CANCER OF RECTOSIGMOID (COLON): ICD-10-CM

## 2022-06-09 LAB
ALBUMIN SERPL-MCNC: 4.2 G/DL (ref 3.5–5.2)
ALBUMIN/GLOB SERPL: 1.4 G/DL
ALP SERPL-CCNC: 87 U/L (ref 39–117)
ALT SERPL W P-5'-P-CCNC: 16 U/L (ref 1–33)
ANION GAP SERPL CALCULATED.3IONS-SCNC: 9 MMOL/L (ref 5–15)
AST SERPL-CCNC: 19 U/L (ref 1–32)
BASOPHILS # BLD AUTO: 0.03 10*3/MM3 (ref 0–0.2)
BASOPHILS NFR BLD AUTO: 0.5 % (ref 0–1.5)
BILIRUB SERPL-MCNC: 0.4 MG/DL (ref 0–1.2)
BUN SERPL-MCNC: 15 MG/DL (ref 8–23)
BUN/CREAT SERPL: 19.2 (ref 7–25)
CALCIUM SPEC-SCNC: 9 MG/DL (ref 8.6–10.5)
CEA SERPL-MCNC: 23.6 NG/ML
CHLORIDE SERPL-SCNC: 103 MMOL/L (ref 98–107)
CO2 SERPL-SCNC: 30 MMOL/L (ref 22–29)
CREAT SERPL-MCNC: 0.78 MG/DL (ref 0.57–1)
DEPRECATED RDW RBC AUTO: 46.5 FL (ref 37–54)
EGFRCR SERPLBLD CKD-EPI 2021: 78.8 ML/MIN/1.73
EOSINOPHIL # BLD AUTO: 0.11 10*3/MM3 (ref 0–0.4)
EOSINOPHIL NFR BLD AUTO: 1.7 % (ref 0.3–6.2)
ERYTHROCYTE [DISTWIDTH] IN BLOOD BY AUTOMATED COUNT: 15 % (ref 12.3–15.4)
GLOBULIN UR ELPH-MCNC: 3.1 GM/DL
GLUCOSE SERPL-MCNC: 109 MG/DL (ref 65–99)
HCT VFR BLD AUTO: 42.9 % (ref 34–46.6)
HGB BLD-MCNC: 13.5 G/DL (ref 12–15.9)
IMM GRANULOCYTES # BLD AUTO: 0.02 10*3/MM3 (ref 0–0.05)
IMM GRANULOCYTES NFR BLD AUTO: 0.3 % (ref 0–0.5)
LYMPHOCYTES # BLD AUTO: 1.35 10*3/MM3 (ref 0.7–3.1)
LYMPHOCYTES NFR BLD AUTO: 21 % (ref 19.6–45.3)
MCH RBC QN AUTO: 26.8 PG (ref 26.6–33)
MCHC RBC AUTO-ENTMCNC: 31.5 G/DL (ref 31.5–35.7)
MCV RBC AUTO: 85.3 FL (ref 79–97)
MONOCYTES # BLD AUTO: 0.64 10*3/MM3 (ref 0.1–0.9)
MONOCYTES NFR BLD AUTO: 10 % (ref 5–12)
NEUTROPHILS NFR BLD AUTO: 4.28 10*3/MM3 (ref 1.7–7)
NEUTROPHILS NFR BLD AUTO: 66.5 % (ref 42.7–76)
NRBC BLD AUTO-RTO: 0 /100 WBC (ref 0–0.2)
PLATELET # BLD AUTO: 264 10*3/MM3 (ref 140–450)
PMV BLD AUTO: 10 FL (ref 6–12)
POTASSIUM SERPL-SCNC: 3.8 MMOL/L (ref 3.5–5.2)
PROT SERPL-MCNC: 7.3 G/DL (ref 6–8.5)
RBC # BLD AUTO: 5.03 10*6/MM3 (ref 3.77–5.28)
SODIUM SERPL-SCNC: 142 MMOL/L (ref 136–145)
WBC NRBC COR # BLD: 6.43 10*3/MM3 (ref 3.4–10.8)

## 2022-06-09 PROCEDURE — 82378 CARCINOEMBRYONIC ANTIGEN: CPT

## 2022-06-09 PROCEDURE — 36415 COLL VENOUS BLD VENIPUNCTURE: CPT

## 2022-06-09 PROCEDURE — 85025 COMPLETE CBC W/AUTO DIFF WBC: CPT

## 2022-06-09 PROCEDURE — 80053 COMPREHEN METABOLIC PANEL: CPT

## 2022-06-10 ENCOUNTER — TELEPHONE (OUTPATIENT)
Dept: ONCOLOGY | Facility: HOSPITAL | Age: 77
End: 2022-06-10

## 2022-06-10 NOTE — TELEPHONE ENCOUNTER
----- Message from CHRISTINA Paredes sent at 6/10/2022 10:58 AM CDT -----  Let her know that her CEA is still elevated and we want there to go back and see Dr. Arriola

## 2022-06-13 ENCOUNTER — TELEPHONE (OUTPATIENT)
Dept: ONCOLOGY | Facility: HOSPITAL | Age: 77
End: 2022-06-13

## 2022-06-13 NOTE — TELEPHONE ENCOUNTER
Called pt and confirmed Dr Arriola office had called pt with appt. PT denies any further questions.

## 2022-06-16 ENCOUNTER — PREP FOR SURGERY (OUTPATIENT)
Dept: OTHER | Facility: HOSPITAL | Age: 77
End: 2022-06-16

## 2022-06-16 DIAGNOSIS — R97.8 ABNORMAL TUMOR MARKERS: Primary | ICD-10-CM

## 2022-06-16 RX ORDER — DEXTROSE AND SODIUM CHLORIDE 5; .45 G/100ML; G/100ML
30 INJECTION, SOLUTION INTRAVENOUS CONTINUOUS PRN
Status: CANCELLED | OUTPATIENT
Start: 2022-07-05

## 2022-06-20 ENCOUNTER — HOSPITAL ENCOUNTER (OUTPATIENT)
Dept: PET IMAGING | Facility: HOSPITAL | Age: 77
Discharge: HOME | End: 2022-06-20
Admitting: INTERNAL MEDICINE

## 2022-06-20 DIAGNOSIS — C18.7 MALIGNANT NEOPLASM OF SIGMOID COLON: ICD-10-CM

## 2022-06-20 PROCEDURE — A9552 F18 FDG: HCPCS | Performed by: INTERNAL MEDICINE

## 2022-06-20 PROCEDURE — 78816 PET IMAGE W/CT FULL BODY: CPT

## 2022-06-20 PROCEDURE — 0 FLUDEOXYGLUCOSE F18 SOLUTION: Performed by: INTERNAL MEDICINE

## 2022-06-20 RX ADMIN — FLUDEOXYGLUCOSE F18 1 DOSE: 300 INJECTION INTRAVENOUS at 16:01

## 2022-06-30 RX ORDER — SERTRALINE HYDROCHLORIDE 25 MG/1
25 TABLET, FILM COATED ORAL DAILY
COMMUNITY
End: 2022-07-26

## 2022-07-05 ENCOUNTER — HOSPITAL ENCOUNTER (OUTPATIENT)
Facility: HOSPITAL | Age: 77
Setting detail: HOSPITAL OUTPATIENT SURGERY
Discharge: HOME | End: 2022-07-05
Attending: INTERNAL MEDICINE | Admitting: INTERNAL MEDICINE

## 2022-07-05 ENCOUNTER — ANESTHESIA (OUTPATIENT)
Dept: GASTROENTEROLOGY | Facility: HOSPITAL | Age: 77
End: 2022-07-05

## 2022-07-05 ENCOUNTER — ANESTHESIA EVENT (OUTPATIENT)
Dept: GASTROENTEROLOGY | Facility: HOSPITAL | Age: 77
End: 2022-07-05

## 2022-07-05 VITALS
HEART RATE: 60 BPM | OXYGEN SATURATION: 98 % | TEMPERATURE: 97.1 F | DIASTOLIC BLOOD PRESSURE: 54 MMHG | WEIGHT: 152.56 LBS | SYSTOLIC BLOOD PRESSURE: 104 MMHG | RESPIRATION RATE: 16 BRPM | BODY MASS INDEX: 28.07 KG/M2 | HEIGHT: 62 IN

## 2022-07-05 DIAGNOSIS — R97.8 ABNORMAL TUMOR MARKERS: ICD-10-CM

## 2022-07-05 PROCEDURE — 88305 TISSUE EXAM BY PATHOLOGIST: CPT

## 2022-07-05 PROCEDURE — 25010000002 PROPOFOL 10 MG/ML EMULSION

## 2022-07-05 RX ORDER — LIDOCAINE HYDROCHLORIDE 20 MG/ML
INJECTION, SOLUTION INTRAVENOUS AS NEEDED
Status: DISCONTINUED | OUTPATIENT
Start: 2022-07-05 | End: 2022-07-05 | Stop reason: SURG

## 2022-07-05 RX ORDER — DEXTROSE AND SODIUM CHLORIDE 5; .45 G/100ML; G/100ML
30 INJECTION, SOLUTION INTRAVENOUS CONTINUOUS PRN
Status: DISCONTINUED | OUTPATIENT
Start: 2022-07-05 | End: 2022-07-05 | Stop reason: HOSPADM

## 2022-07-05 RX ORDER — PROPOFOL 10 MG/ML
VIAL (ML) INTRAVENOUS AS NEEDED
Status: DISCONTINUED | OUTPATIENT
Start: 2022-07-05 | End: 2022-07-05 | Stop reason: SURG

## 2022-07-05 RX ADMIN — PROPOFOL 20 MG: 10 INJECTION, EMULSION INTRAVENOUS at 09:15

## 2022-07-05 RX ADMIN — LIDOCAINE HYDROCHLORIDE 80 MG: 20 INJECTION, SOLUTION INTRAVENOUS at 09:03

## 2022-07-05 RX ADMIN — PROPOFOL 20 MG: 10 INJECTION, EMULSION INTRAVENOUS at 09:11

## 2022-07-05 RX ADMIN — PROPOFOL 20 MG: 10 INJECTION, EMULSION INTRAVENOUS at 09:13

## 2022-07-05 RX ADMIN — PROPOFOL 20 MG: 10 INJECTION, EMULSION INTRAVENOUS at 09:07

## 2022-07-05 RX ADMIN — PROPOFOL 10 MG: 10 INJECTION, EMULSION INTRAVENOUS at 09:05

## 2022-07-05 RX ADMIN — PROPOFOL 60 MG: 10 INJECTION, EMULSION INTRAVENOUS at 09:03

## 2022-07-05 RX ADMIN — PROPOFOL 20 MG: 10 INJECTION, EMULSION INTRAVENOUS at 09:09

## 2022-07-05 RX ADMIN — DEXTROSE AND SODIUM CHLORIDE 30 ML/HR: 5; 450 INJECTION, SOLUTION INTRAVENOUS at 08:14

## 2022-07-05 RX ADMIN — PROPOFOL 30 MG: 10 INJECTION, EMULSION INTRAVENOUS at 09:04

## 2022-07-05 NOTE — H&P
Mei Chowdhury DO,[facility]  Gastroenterology  Hepatology  Endoscopy  Board Certified in Internal Medicine and gastroenterology  [address], [address]  [place]. [address]  T- [phone]   F - [phone]     GASTROENTEROLOGY HISTORY AND PHYSICAL  NOTE   MEI CHOWDHURY DO.         SUBJECTIVE:   7/5/2022    Name: Mary Ann Condon  DOD: 1945        Chief Complaint:       Subjective : Rising CEA level.  Personal history of colon cancer.  PET scan  Shows a 4.5 cm mass arising from the omentum, just to the right of the midline  Patient is 76 y.o. female presents with desire for elective EGD and colonoscopy.      ROS/HISTORY/ CURRENT MEDICATIONS/OBJECTIVE/VS/PE:   Review of Systems:  All systems unremarkable unless specified below.  Constitutional   HENT  Eyes   Respiratory    Cardiovascular  Gastrointestinal   Endocrine  Genitourinary    Musculoskeletal   Skin  Allergic/Immunologic    Neurological    Hematological  Psychiatric/Behavioral    History:     Past Medical History:   Diagnosis Date   • Asthma    • Colon cancer (HCC) 2017   • COPD (chronic obstructive pulmonary disease) (HCC)    • Hyperlipidemia    • Hypertension      Past Surgical History:   Procedure Laterality Date   • CATARACT EXTRACTION W/ INTRAOCULAR LENS IMPLANT Right 10/16/2020    Procedure: REMOVE CATARACT AND IMPLANT  INTRAOCULAR LENS;  Surgeon: Gustavo Yañez MD;  Location: [facility] OR;  Service: Ophthalmology;  Laterality: Right;   • COLON SURGERY     • COLONOSCOPY N/A 8/27/2018    Procedure: COLONOSCOPY;  Surgeon: Mei Chowdhury DO;  Location: [facility] ENDOSCOPY;  Service: Gastroenterology   • COLONOSCOPY N/A 11/8/2021    Procedure: COLONOSCOPY;  Surgeon: Mei Chowdhury DO;  Location: [facility] ENDOSCOPY;  Service: Gastroenterology;  Laterality: N/A;   • GALLBLADDER SURGERY     • RETINAL DETACHMENT SURGERY Right      Family History   Problem Relation Age of Onset   • Heart attack Brother    • Cancer Maternal Aunt       Social History     Tobacco Use   • Smoking status: Former Smoker     Quit date: 2012     Years since quittin.5   • Smokeless tobacco: Never Used   Vaping Use   • Vaping Use: Never used   Substance Use Topics   • Alcohol use: Yes     Comment: socially   • Drug use: No     Prior to Admission medications    Medication Sig Start Date End Date Taking? Authorizing Provider   gabapentin (NEURONTIN) 100 MG capsule Take 1 capsule by mouth 3 (Three) Times a Day. 22  Yes Mia Juarez APRN   lisinopril (PRINIVIL,ZESTRIL) 5 MG tablet Take 10 mg by mouth Daily.   Yes Provider, MD Roshan   sertraline (ZOLOFT) 25 MG tablet Take 25 mg by mouth Daily.   Yes ProviderRoshan MD   simvastatin (Zocor) 10 MG tablet Take 10 mg by mouth Every Night.   Yes ProviderRoshan MD     Allergies:  Codeine    I have reviewed the patients medical history, surgical history and family history in the available medical record system.     Current Medications:     Current Facility-Administered Medications   Medication Dose Route Frequency Provider Last Rate Last Admin   • dextrose 5 % and sodium chloride 0.45 % infusion  30 mL/hr Intravenous Continuous PRN Lobo Arriola,            Objective     Physical Exam:   Temp:  [96.8 °F (36 °C)] 96.8 °F (36 °C)  Heart Rate:  [67] 67  Resp:  [16] 16  BP: (145)/(65) 145/65    Physical Exam:  General Appearance:    Alert, cooperative, in no acute distress   Head:    Normocephalic, without obvious abnormality, atraumatic   Eyes:            Lids and lashes normal, conjunctivae and sclerae normal, no icterus, no pallor, corneas clear, PERRLA   Ears:    Ears appear intact with no abnormalities noted   Throat:   No oral lesions, no thrush, oral mucosa moist   Neck:   No adenopathy, supple, trachea midline, no thyromegaly, no  carotid bruit, no JVD   Back:     No kyphosis present, no scoliosis present, no skin lesions,   erythema or scars, no tenderness to percussion or                  palpation,  range of motion normal   Lungs:     Clear to auscultation,respirations regular, even and         unlabored    Heart:    Regular rhythm and normal rate, normal S1 and S2, no  murmur, no gallop, no rub, no click   Breast Exam:    Deferred   Abdomen:     Normal bowel sounds, no masses, no organomegaly, soft  nontender, nondistended, no guarding, no rebound                 tenderness   Genitalia:    Deferred   Extremities:   Moves all extremities well, no edema, no cyanosis, no          redness   Pulses:   Pulses palpable and equal bilaterally   Skin:   No bleeding, bruising or rash   Lymph nodes:   No palpable adenopathy   Neurologic:   Cranial nerves 2 - 12 grossly intact, sensation intact, DTR     present and equal bilaterally      Results Review:     Lab Results   Component Value Date    WBC 6.43 06/09/2022    WBC 7.87 05/10/2022    WBC 6.82 09/14/2021    HGB 13.5 06/09/2022    HGB 13.5 05/10/2022    HGB 13.8 09/14/2021    HCT 42.9 06/09/2022    HCT 41.8 05/10/2022    HCT 43.9 09/14/2021     06/09/2022     05/10/2022     09/14/2021     Results from last 7 days   Lab Units 06/28/22  0947   ALK PHOS U/L 76   ALT (SGPT) U/L 14   AST (SGOT) U/L 17     Results from last 7 days   Lab Units 06/28/22  0947   BILIRUBIN mg/dL 0.59   ALK PHOS U/L 76     No results found for: LIPASE  No results found for: INR  No results found for: CULTURE    Radiology Review:  Imaging Results (Last 72 Hours)     ** No results found for the last 72 hours. **           I reviewed the patient's new clinical results.  I reviewed the patient's new imaging results and agree with the interpretation.     ASSESSMENT/PLAN:   ASSESSMENT:  1.  Abnormal CEA level  2.  Possible recurrence at the level of the omentum    PLAN:  1.  Esophagogastroduodenoscopy  2.  Colonoscopy    Risk and benefits associated with the procedure are reviewed with the patient.  The patient wished to proceed     Lobo Arriola,    07/05/22  08:09 CDT

## 2022-07-05 NOTE — ANESTHESIA POSTPROCEDURE EVALUATION
Patient: Mary Ann Condon    Procedure Summary     Date: 07/05/22 Room / Location: [facility] ENDOSCOPY 2 / [facility] ENDOSCOPY    Anesthesia Start: 0857 Anesthesia Stop: 0918    Procedures:       ESOPHAGOGASTRODUODENOSCOPY 8:30 (N/A )      COLONOSCOPY 8:30 (N/A ) Diagnosis:       Abnormal tumor markers      (Abnormal tumor markers [R97.8])    Surgeons: Lobo Arriola DO Provider: Cammy Hammond CRNA    Anesthesia Type: MAC ASA Status: 3          Anesthesia Type: MAC    Vitals  No vitals data found for the desired time range.          Post Anesthesia Care and Evaluation    Patient location during evaluation: bedside  Patient participation: waiting for patient participation  Level of consciousness: responsive to verbal stimuli  Pain management: adequate    Airway patency: patent  Anesthetic complications: No anesthetic complications  PONV Status: none  Cardiovascular status: acceptable  Respiratory status: acceptable  Hydration status: acceptable    Comments: -------------------------              07/05/22 0802        -------------------------   BP:         145/65        Pulse:        67          Resp:         16          Temp:   96.8 °F (36 °C)   SpO2:         95%        -------------------------

## 2022-07-05 NOTE — ANESTHESIA PREPROCEDURE EVALUATION
Anesthesia Evaluation     Patient summary reviewed and Nursing notes reviewed   NPO Solid Status: > 8 hours  NPO Liquid Status: > 4 hours           Airway   Mallampati: II  TM distance: <3 FB  Neck ROM: full  Anterior  Dental    (+) lower dentures and upper dentures    Pulmonary     breath sounds clear to auscultation  (+) a smoker Former, COPD, asthma,  Cardiovascular   Exercise tolerance: poor (<4 METS)    NYHA Classification: III  ECG reviewed  Rhythm: regular  Rate: normal    (+) hypertension, BHATIA, hyperlipidemia,       Neuro/Psych  (+) weakness, numbness, psychiatric history Anxiety and Depression,      ROS Comment: Neuropathy from the oxaplatin noted in chart, neurontin prescribed.  GI/Hepatic/Renal/Endo    (+)  GERD well controlled,      Musculoskeletal     (+) back pain,   Abdominal  - normal exam   Substance History   (+) alcohol use,      OB/GYN negative ob/gyn ROS         Other      history of cancer      Other Comment: Colon cancer treated with resection and chemo.                    Anesthesia Plan    ASA 3     MAC     intravenous induction     Anesthetic plan, risks, benefits, and alternatives have been provided, discussed and informed consent has been obtained with: patient.    Plan discussed with CRNA.

## 2022-07-05 NOTE — DISCHARGE INSTR - APPOINTMENTS
Dr. Lobo Arriola, DO  [address]  [place] [address]  [phone]    Appointment date and time:     October 24, 2022  @   10:30  am

## 2022-07-07 LAB — REF LAB TEST METHOD: NORMAL

## 2022-07-18 ENCOUNTER — TELEPHONE (OUTPATIENT)
Dept: ONCOLOGY | Facility: CLINIC | Age: 77
End: 2022-07-18

## 2022-07-25 ENCOUNTER — TELEPHONE (OUTPATIENT)
Dept: ONCOLOGY | Facility: CLINIC | Age: 77
End: 2022-07-25

## 2022-07-25 ENCOUNTER — APPOINTMENT (OUTPATIENT)
Dept: ONCOLOGY | Facility: CLINIC | Age: 77
End: 2022-07-25

## 2022-07-26 ENCOUNTER — OFFICE VISIT (OUTPATIENT)
Dept: ONCOLOGY | Facility: CLINIC | Age: 77
End: 2022-07-26

## 2022-07-26 VITALS
TEMPERATURE: 97.7 F | WEIGHT: 153.3 LBS | SYSTOLIC BLOOD PRESSURE: 163 MMHG | DIASTOLIC BLOOD PRESSURE: 74 MMHG | HEART RATE: 64 BPM | OXYGEN SATURATION: 94 % | BODY MASS INDEX: 28.04 KG/M2

## 2022-07-26 DIAGNOSIS — T45.1X5A CHEMOTHERAPY-INDUCED NEUTROPENIA: Chronic | ICD-10-CM

## 2022-07-26 DIAGNOSIS — T45.1X5A NEUROPATHY DUE TO CHEMOTHERAPEUTIC DRUG: ICD-10-CM

## 2022-07-26 DIAGNOSIS — D70.1 CHEMOTHERAPY-INDUCED NEUTROPENIA: Chronic | ICD-10-CM

## 2022-07-26 DIAGNOSIS — G62.0 NEUROPATHY DUE TO CHEMOTHERAPEUTIC DRUG: ICD-10-CM

## 2022-07-26 DIAGNOSIS — C19 CANCER OF RECTOSIGMOID (COLON): Primary | ICD-10-CM

## 2022-07-26 DIAGNOSIS — C18.9 LOCAL RECURRENCE OF COLON CANCER: ICD-10-CM

## 2022-07-26 PROCEDURE — 1126F AMNT PAIN NOTED NONE PRSNT: CPT | Performed by: INTERNAL MEDICINE

## 2022-07-26 PROCEDURE — 1123F ACP DISCUSS/DSCN MKR DOCD: CPT | Performed by: INTERNAL MEDICINE

## 2022-07-26 PROCEDURE — G0463 HOSPITAL OUTPT CLINIC VISIT: HCPCS | Performed by: INTERNAL MEDICINE

## 2022-07-26 PROCEDURE — 99215 OFFICE O/P EST HI 40 MIN: CPT | Performed by: INTERNAL MEDICINE

## 2022-07-26 RX ORDER — SIMVASTATIN 10 MG
10 TABLET ORAL DAILY
COMMUNITY
Start: 2022-05-11

## 2022-07-26 RX ORDER — HYDROCODONE BITARTRATE AND ACETAMINOPHEN 5; 325 MG/1; MG/1
1 TABLET ORAL
COMMUNITY
Start: 2022-07-06 | End: 2022-07-26

## 2022-07-26 RX ORDER — SERTRALINE HYDROCHLORIDE 25 MG/1
1 TABLET, FILM COATED ORAL DAILY
COMMUNITY
Start: 2022-05-11 | End: 2022-07-28

## 2022-07-26 RX ORDER — HYDROCODONE BITARTRATE AND ACETAMINOPHEN 5; 325 MG/1; MG/1
1 TABLET ORAL EVERY 4 HOURS PRN
COMMUNITY
Start: 2022-07-06 | End: 2022-07-28

## 2022-07-26 RX ORDER — LISINOPRIL 10 MG/1
10 TABLET ORAL DAILY
COMMUNITY
Start: 2022-05-11

## 2022-07-26 RX ORDER — GABAPENTIN 100 MG/1
1 CAPSULE ORAL EVERY 8 HOURS
COMMUNITY
Start: 2022-06-15 | End: 2022-07-28 | Stop reason: SDUPTHER

## 2022-07-26 RX ORDER — ONDANSETRON 4 MG/1
4 TABLET, FILM COATED ORAL 4 TIMES DAILY PRN
Qty: 40 TABLET | Refills: 3 | Status: SHIPPED | OUTPATIENT
Start: 2022-07-26 | End: 2022-10-24

## 2022-07-26 RX ORDER — METRONIDAZOLE 500 MG/1
TABLET ORAL
COMMUNITY
Start: 2022-07-01 | End: 2022-07-28

## 2022-07-26 RX ORDER — NEOMYCIN SULFATE 500 MG/1
TABLET ORAL
COMMUNITY
Start: 2022-07-01 | End: 2022-07-28

## 2022-07-26 RX ORDER — POLYETHYLENE GLYCOL 3350 17 G/17G
POWDER, FOR SOLUTION ORAL
COMMUNITY
Start: 2022-06-15 | End: 2022-07-28

## 2022-07-26 NOTE — PROGRESS NOTES
"DATE OF VISIT: 7/26/2022      REASON FOR VISIT: Sigmoid cancer with local recurrence involving omentum s/p surgery, oxaliplatin induced neuropathy      HISTORY OF PRESENT ILLNESS:   76-year-old female with past medical history significant for cardiomegaly who was initially seen in consultation on December 18, 2017 for rectosigmoid cancer for which patient completed adjuvant chemotherapy on June 13, 2000 2018, oxaliplatin neuropathy has been referred back to [facility] for further evaluation and recommendation regarding local recurrence of colon cancer involving the mesenteric area.  Patient had a surgical excision of omental mass on July 6, 2022.  She is here to discuss the result of pathology and further recommendation.  Complains of minimal abdominal soreness due to surgery without any nausea or vomiting or constipation.  Denies any bleeding.  Continues to have tingling and numbness affecting upper and lower extremity.  Denies any new lymph node enlargement.  Denies any new chest pain or shortness of breath.  Denies any major weight fluctuations recently.  Denies any new headache or dizziness.              Past Medical History, Past Surgical History, Social History, Family History have been reviewed and are without significant changes except as mentioned.    Review of Systems   A comprehensive 14 point review of systems was performed and was negative except as mentioned in HPI.    Medications:  The current medication list was reviewed in the EMR    ALLERGIES:    Allergies   Allergen Reactions   • Codeine Other (See Comments)     \"dont remember\"       Objective      Vitals:    07/26/22 0936   BP: 163/74   Pulse: 64   Temp: 97.7 °F (36.5 °C)   TempSrc: Temporal   SpO2: 94%   Weight: 69.5 kg (153 lb 4.8 oz)   PainSc: 0-No pain     Current Status 5/17/2021   ECOG score 0       Physical Exam  Pulmonary:      Breath sounds: Normal breath sounds.   Abdominal:      Palpations: Abdomen is soft.      Tenderness: " There is no abdominal tenderness.      Comments: Well-healing surgical scar present   Neurological:      Mental Status: She is alert and oriented to person, place, and time.           RECENT LABS:  Glucose   Date Value Ref Range Status   06/09/2022 109 (H) 65 - 99 mg/dL Final     Sodium   Date Value Ref Range Status   06/28/2022 143 136 - 145 mmol/L Final     Potassium   Date Value Ref Range Status   06/28/2022 3.9 3.5 - 5.1 mmol/L Final     Total CO2   Date Value Ref Range Status   06/28/2022 35 (H) 21 - 31 mmol/L Final     Chloride   Date Value Ref Range Status   06/28/2022 103 98 - 107 mmol/L Final     Anion Gap   Date Value Ref Range Status   06/09/2022 9.0 5.0 - 15.0 mmol/L Final     Creatinine   Date Value Ref Range Status   06/28/2022 0.6 (L) 0.7 - 1.3 mg/dL Final     BUN   Date Value Ref Range Status   06/28/2022 12 7 - 25 mg/dL Final     BUN/Creatinine Ratio   Date Value Ref Range Status   06/09/2022 19.2 7.0 - 25.0 Final     Calcium   Date Value Ref Range Status   06/28/2022 9.3 8.6 - 10.3 mg/dL Final     eGFR Non  Amer   Date Value Ref Range Status   09/14/2021 80 >60 mL/min/1.73 Final     Alkaline Phosphatase   Date Value Ref Range Status   06/28/2022 76 34 - 104 U/L Final     Total Protein   Date Value Ref Range Status   06/09/2022 7.3 6.0 - 8.5 g/dL Final     ALT (SGPT)   Date Value Ref Range Status   06/28/2022 14 7 - 52 U/L Final     AST (SGOT)   Date Value Ref Range Status   06/28/2022 17 13 - 39 U/L Final     Total Bilirubin   Date Value Ref Range Status   06/28/2022 0.59 0.3 - 1.0 mg/dL Final     Albumin   Date Value Ref Range Status   06/28/2022 4.1 3.5 - 5.7 g/dL Final     Globulin   Date Value Ref Range Status   06/09/2022 3.1 gm/dL Final     Lab Results   Component Value Date    WBC 6.43 06/09/2022    HGB 13.5 06/09/2022    HCT 42.9 06/09/2022    MCV 85.3 06/09/2022     06/09/2022     Lab Results   Component Value Date    NEUTROABS 4.28 06/09/2022    IRON 58 12/18/2017    TIBC  "312 2017    LABIRON 19 2017    FERRITIN 96.10 2017    [id number] 616 2017    FOLATE 7.20 2017     Lab Results   Component Value Date    CEA 23.60 2022    REFLABREPO  2022     [facility]  [address] [place] [address]  Phone: [phone] or [phone]  Fax: [phone]  Jeff Bustamante M.D., Medical Director    PATIENT NAME                                     LABORATORY NO.  [id number]   SEVEN ANDERSON                             [id number]  [id number]                                 AGE                    SEX   SSN              CLIENT REF #  [facility]                   76        1945      F     [id number]      [id number]    [place]                               REQUESTING M.D.           ATTENDING MANAYELI         COPY TO.  [address] [place] [address]                         [place] [address]                     DATE COLLECTED            DATE RECEIVED          DATE REPORTED  2022    DIAGNOSIS:  DESCENDING COLON POLYPS:  Tubular adenoma    JBS/pah    CLINICAL HISTORY:  Abnormal tumor markers    SPECIMENS RECEIVED:  DESCENDING COLON POLYPS    MICROSCOPIC DESCRIPTION:  Tissue blocks are prepared and slides are examined microscopically on all  specimens. See diagnosis for details.    Professional interpretation rendered by Rupert Steen M.D. at [employer],  [place], [address] [place] [address].    GROSS DESCRIPTION:  Specimen is received in 1 formalin filled container \"large intestine,  left/descending colon polyps\" consists of 3 pieces of tan soft tissue measuring  0.4 x 0.3 x 0.2 cm.  Specimen is submitted entirely in 1 cassette.  MM    REVIEWED, DIAGNOSED AND ELECTRONICALLY  SIGNED BY:    Rupert Steen M.D.  CPT CODES:  44067           PET/CT done on 2022 showed:    IMPRESSION:  1.  4.5 cm " tumor mass lower anterior abdomen probably arising  from the omentum, peritoneum just to the right of midline.        2. Hiatus hernia. Prior cholecystectomy. Nuclear medicine PET/CT  is otherwise unremarkable.          PATHOLOGY:  Pathology report from July 6, 2022 showed:    DIAGNOSIS:   Omental lesion, omentectomy:   -  Metastatic adenocarcinoma with mucinous features, compatible with   colorectal primary, see comment.   -  No tumor seen in one (1) adjacent lymph node (0/1).                 RADIOLOGY DATA :  No radiology results for the last 7 days        Assessment & Plan     1.  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  - Patient was started on FOLFOX on December 27, 2017.  Patient received total 12 cycles of FOLFOX between December 27, 2017 until June 13, 2018.  Dose of 5-FU and oxaliplatin was decreased by 20% with cycle 2 due to development of neutropenia.  - Patient was on surveillance since then.  - In June 2022, patient was found to have rising CEA level with CEA being at 23.  Initial CT scan did not show any evidence of recurrence.  - Patient had a colonoscopy by Dr. Arriola in July 2022 which was negative for any recurrence except for tubular adenoma with.  - PET/CT done on June 20 2022 showed isolated uptake in omental region.  Patient was evaluated by Dr. Packer and underwent omentectomy with solitary lymph node sampling which was negative for metastasis  - Result of pathology were discussed with patient and her family.  - Due to local recurrence of colon cancer within 4 years of her diagnosis and completion of treatment patient will be high risk of recurrence without any systemic adjuvant treatment which is potentially life-threatening.  - Case has been discussed with Dr. Arriola.  - Recommend adjuvant chemotherapy consisting of 5-FU and leucovorin for total of 6-month.  - Other option would be observation which is not favorable due  to her recurrence of disease within 4 years of completion of adjuvant chemotherapy.  - Patient and family is agreeable to adjuvant treatment consisting of 5-FU and leucovorin.  Due to her grade 2 neuropathy she cannot get any oxaliplatin at present  - Chemotherapy side effects of 5-FU and leucovorin including but not limited to risk of low blood counts, risk of infection, need for blood transfusion, risk of bleeding, risk of kidney failure, coronary vasospasm and heart attack, diarrhea, nausea, vomiting, risk of neuropathy and risk of allergic reaction which can be life-threatening were discussed with patient and family. We will also provide them some information today to read about the chemotherapy.  - Patient wants port to be placed by Dr. Griffin's at [facility].  Referral has been placed today.  - Recommend starting chemotherapy on August 8, 2022.  We will recheck CBC CMP on that day prior to starting chemotherapy.    2.  Oxaliplatin induced neuropathy  - Remains on gabapentin 100 mg 3 times a day      3.  Hypertension    4.  Health maintenance: Patient quit smoking in 2012.    5. Advance Care Planning: For now patient remains full code and is able to make decisions.  Patient has health care surrogate mentioned on chart.    6.  Prescriptions: Prescription for Zofran has been sent to her pharmacy today.  Patient has enough prescription for Neurontin               PHQ-9 Total Score: 0   -Patient is not homicidal or suicidal.  No acute intervention required.    Mary Ann Condon reports a pain score of 0.  Given her pain assessment as noted, treatment options were discussed and the following options were decided upon as a follow-up plan to address the patient's pain: continuation of current treatment plan for pain.         Wil Abraham MD  7/26/2022  09:48 CDT        Part of this note may be an electronic transcription/translation of spoken language to printed text using the Dragon Dictation System.          CC:

## 2022-07-27 ENCOUNTER — DOCUMENTATION (OUTPATIENT)
Dept: ONCOLOGY | Facility: HOSPITAL | Age: 77
End: 2022-07-27

## 2022-07-28 ENCOUNTER — OFFICE VISIT (OUTPATIENT)
Dept: ONCOLOGY | Facility: CLINIC | Age: 77
End: 2022-07-28

## 2022-07-28 VITALS
DIASTOLIC BLOOD PRESSURE: 67 MMHG | RESPIRATION RATE: 18 BRPM | BODY MASS INDEX: 27.78 KG/M2 | WEIGHT: 151.9 LBS | SYSTOLIC BLOOD PRESSURE: 128 MMHG | OXYGEN SATURATION: 94 % | TEMPERATURE: 96.9 F | HEART RATE: 99 BPM

## 2022-07-28 DIAGNOSIS — C19 CANCER OF RECTOSIGMOID (COLON): Chronic | ICD-10-CM

## 2022-07-28 PROCEDURE — G0463 HOSPITAL OUTPT CLINIC VISIT: HCPCS | Performed by: NURSE PRACTITIONER

## 2022-07-28 PROCEDURE — 99214 OFFICE O/P EST MOD 30 MIN: CPT | Performed by: NURSE PRACTITIONER

## 2022-07-28 RX ORDER — FLUOCINOLONE ACETONIDE 0.11 MG/ML
5 OIL AURICULAR (OTIC)
COMMUNITY
Start: 2022-07-26 | End: 2022-08-06

## 2022-07-28 RX ORDER — AMOXICILLIN 500 MG/1
CAPSULE ORAL
COMMUNITY
Start: 2022-07-26 | End: 2022-07-28 | Stop reason: SDUPTHER

## 2022-07-28 RX ORDER — AMOXICILLIN 500 MG/1
CAPSULE ORAL
COMMUNITY
Start: 2022-07-26 | End: 2022-09-12 | Stop reason: ALTCHOICE

## 2022-07-28 RX ORDER — SERTRALINE HYDROCHLORIDE 25 MG/1
50 TABLET, FILM COATED ORAL DAILY
COMMUNITY
End: 2022-07-28

## 2022-07-28 NOTE — PROGRESS NOTES
7/28/2022    CHEMOTHERAPY PREPARATION    Mary Ann Condon  [id number]  1945    Chief Complaint: chemo education     History of present illness:  Mary Ann Condon is a 76 y.o. year old female who is here today for chemotherapy preparation and needs assessment. The patient has been diagnosed with colon cancer and is scheduled to begin treatment with 5 FU and Leucovorin.     Oncology History:    Oncology/Hematology History Overview Note   1. 72-year-old female with a past medical history significant for cardiomegaly, was found to have stool positive on routine testing done by primary medical provider.   2. Subsequently patient was referred to colonoscopy on November 5, 2017 by Dr. Corey and was found to have large polyp 15 cm from anal verge which showed high-grade dysplasia with adenocarcinoma.   3. Subsequently patient was referred to Dr. Hutchison had lower anterior resection done by Dr. Hutchison on November 22, 2017 which showed a stage III colon cancer with 2 out of 29 lymph nodes positive.     Cancer of rectosigmoid (colon) (HCC)   11/5/2017 Procedure    Colonscopy with Dr. Corey    Diagnosis: Adenocarcinoma     11/22/2017 Surgery    Surgery       Procedure: Low anterior Colon Resection with Dr. Hutchison      Location:  Colon      Completeness of resection:  No evidence of residual tumor  Diagnosis:   Stage III Colon Cancer with 2 out of 29 lymph nodes positive     12/18/2017 Initial Diagnosis    Cancer of rectosigmoid (colon)     12/27/2017 -  Chemotherapy    OP COLON [id number] OXALIplatin / Leucovorin / Fluorouracil       5/2/2018 Cancer Staged    Cancer Staging  Cancer of rectosigmoid (colon)  Staging form: Colon And Rectum, AJCC 8th Edition  - Clinical: No stage assigned - Unsigned  - Pathologic: Stage IIIB (pT3, pN1b, cM0) - Signed by Wil Abraham MD on 5/2/2018 8/8/2022 -  Chemotherapy    OP COLORECTAL Leucovorin / Fluorouracil ([facility])     Local recurrence of colon cancer (HCC)   7/26/2022  Initial Diagnosis    Local recurrence of colon cancer (HCC)     8/8/2022 -  Chemotherapy    OP COLORECTAL Leucovorin / Fluorouracil ([facility])         Past Medical History:   Diagnosis Date   • Asthma    • Colon cancer (HCC) 2017   • COPD (chronic obstructive pulmonary disease) (HCC)    • Hyperlipidemia    • Hypertension        Past Surgical History:   Procedure Laterality Date   • CATARACT EXTRACTION W/ INTRAOCULAR LENS IMPLANT Right 10/16/2020    Procedure: REMOVE CATARACT AND IMPLANT  INTRAOCULAR LENS;  Surgeon: Gustavo Yañez MD;  Location: [facility] OR;  Service: Ophthalmology;  Laterality: Right;   • COLON SURGERY     • COLONOSCOPY N/A 8/27/2018    Procedure: COLONOSCOPY;  Surgeon: Lobo Arriola DO;  Location: [facility] ENDOSCOPY;  Service: Gastroenterology   • COLONOSCOPY N/A 11/8/2021    Procedure: COLONOSCOPY;  Surgeon: Lobo Arriola DO;  Location: [facility] ENDOSCOPY;  Service: Gastroenterology;  Laterality: N/A;   • COLONOSCOPY N/A 7/5/2022    Procedure: COLONOSCOPY 8:30;  Surgeon: Lobo Arriola DO;  Location: [facility] ENDOSCOPY;  Service: Gastroenterology;  Laterality: N/A;   • ENDOSCOPY N/A 7/5/2022    Procedure: ESOPHAGOGASTRODUODENOSCOPY 8:30;  Surgeon: Lobo Arriola DO;  Location: [facility] ENDOSCOPY;  Service: Gastroenterology;  Laterality: N/A;   • GALLBLADDER SURGERY     • RETINAL DETACHMENT SURGERY Right        MEDICATIONS: The current medication list was reviewed and reconciled.     Allergies:  is allergic to codeine.    Family History   Problem Relation Age of Onset   • Heart attack Brother    • Cancer Maternal Aunt          Review of Systems    Physical Exam  Vital Signs: /67   Pulse 99   Temp 96.9 °F (36.1 °C)   Resp 18   Wt 68.9 kg (151 lb 14.4 oz)   SpO2 94%   BMI 27.78 kg/m²    General Appearance:  alert, cooperative, no apparent distress, appears stated age and normal weight   Neurologic/Psychiatric: A&O x 3, gait steady, appropriate affect   HEENT:   "Normocephalic, without obvious abnormality, mucous membranes moist   Lungs:   Clear to auscultation bilaterally; respirations regular, even, and unlabored bilaterally   Heart:  Regular rate and rhythm, no murmurs appreciated   Extremities: Normal, atraumatic; no clubbing, cyanosis, or edema    Skin: No rashes, lesions, or abnormal coloration noted     ECOG Performance Status: (0) Fully Active - Able to Carry On All Pre-disease Performance Without Restriction          NEEDS ASSESSMENTS    Genetics  The patient's new diagnosis and family history have been reviewed for genetic counseling needs. A genetic referral is not recommended.     Psychosocial  The patient has completed a PHQ-9 Depression Screening today.   PHQ-9 results show 0 (No Depression).   Copies of patient's questionnaires will be scanned into EMR for details and further reference.    Barriers to care  A barriers form was also completed by the patient today. We discussed services offered by our facility to help her have adequate access to care. Based upon barriers assessment today, the patient will not require a follow-up call from the  to further discuss needs.   A copy of the barriers form will also be scanned into EMR for details and further reference.     VAD Assessment  The patient is scheduled for medi port placement on 8/2/2022.     Advanced Care Planning  The patient and I discussed advanced care planning, \"Conversations that Matter\".   This service was offered, free of charge, for development of advance directives with a certified ACP facilitator.  The patient does not have an up-to-date advanced directive. This document is not on file with our office. The patient is not interested in an appointment with one of our facilitators to create or update their advanced directives.      Palliative Care  The patient and I discussed palliative care services. Palliative care is not the same as Hospice care. This is specialized medical care for " people living with serious illness with the goal of improving quality of life for the patient and their family. [facility] has partnered with [facility] to offer our patients outpatient palliative care early along with their treatment to assist in coordination of care, symptom management, pain management, and medical decision making.  Oncology criteria for palliative care referral is not met at this time.   Additional Referral needs  none      CHEMOTHERAPY EDUCATION    Booklets Given: Chemo cares education sheet on 5 Fu and Leucovorin and [facility] chemotherapy binder.  Consent form signed.     Chemotherapy/Biotherapy Education Sheets: (list all that apply)  nausea management, acid reflux management, diarrhea management, Cancer resourse contacts information, skin and mouth care and vaccination information                                                                                                                                                                 Chemotherapy Regimen:   Treatment Plans     Name Type Plan Dates Plan Provider         Active    OP COLORECTAL Leucovorin / Fluorouracil ([facility]) ONCOLOGY TREATMENT  8/7/2022 - Present Wil Abraham MD                    TOPICS EDUCATION PROVIDED COMMENTS   ANEMIA:  role of RBC, cause, s/s, ways to manage, role of transfusion [x]    THROMBOCYTOPENIA:  role of platelet, cause, s/s, ways to prevent bleeding, things to avoid, when to seek help [x]    NEUTROPENIA:  role of WBC, cause, infection precautions, s/s of infection, when to call MD [x]    NUTRITION & APPETITE CHANGES:  importance of maintaining healthy diet & weight, ways to manage to improve intake, dietary consult, exercise regimen [x]    DIARRHEA:  causes, s/s of dehydration, ways to manage, dietary changes, when to call MD [x]    CONSTIPATION:  causes, ways to manage, dietary changes, when to call MD [x]    NAUSEA & VOMITING:  cause, use of antiemetics, dietary  changes, when to call MD [x]    MOUTH SORES:  causes, oral care, ways to manage [x]    ALOPECIA:  cause, ways to manage, resources [x]    INFERTILITY & SEXUALITY:  causes, fertility preservation options, sexuality changes, ways to manage, importance of birth control [x]    NERVOUS SYSTEM CHANGES:  causes, s/s, neuropathies, cognitive changes, ways to manage [x]    PAIN:  causes, ways to manage [x] ????   SKIN & NAIL CHANGES:  cause, s/s, ways to manage [x]    ORGAN TOXICITIES:  cause, s/s, need for diagnostic tests, labs, when to notify MD [x]    SURVIVORSHIP:  distress, distress assessment, secondary malignancies, early/late effects, follow-up, social issues, social support [x]    HOME CARE:  use of spill kits, storing of PO chemo, how to manage bodily fluids [x]    MISCELLANEOUS:  drug interactions, administration, vesicant, et [x]        Assessment and Plan:    Diagnoses and all orders for this visit:    1. Cancer of rectosigmoid (colon) (HCC)        This was a 30 minute face-to-face visit with 30 minutes spent in  counseling and coordination of care as documented above.   The patient and I have reviewed their new cancer diagnosis and scheduled treatment plan. Needs assessment was completed including genetics, psychosocial needs, barriers to care, VAD evaluation, advanced care planning, and palliative care services. Referrals have been ordered as appropriate based upon our evaluation and patient desires.     Chemotherapy teaching was also completed today as documented above. Adequate time was given to answer all questions to her satisfaction. Patient and family are aware of their care team members and contact information if they have questions or problems throughout the treatment course. Needs assessments and education has been completed. The patient is adequately prepared to begin treatment as scheduled.       Mia Juarez, APRN

## 2022-08-06 NOTE — PROGRESS NOTES
DATE OF VISIT: 8/8/2022      REASON FOR VISIT: Sigmoid cancer with local recurrence involving omentum s/p surgery, oxaliplatin induced neuropathy      HISTORY OF PRESENT ILLNESS:   76-year-old female with medical problems significant for cardiomegaly who was initially seen in consultation on December 18, 2017 for rectosigmoid cancer, patient was recently diagnosed with recurrence on July 6, 2022 when she had a mastectomy done.  Patient is here to start cycle 1 of adjuvant 5-FU and leucovorin.  Denies any bleeding.  Complains of chronic tingling and numbness affecting upper and lower extremity.  Denies any new lymph node enlargement.  Denies any new chest pain or shortness of breath.  Denies any new headache or dizziness.            Oncology history:    1.  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  - Patient was started on FOLFOX on December 27, 2017.  Patient received total 12 cycles of FOLFOX between December 27, 2017 until June 13, 2018.  Dose of 5-FU and oxaliplatin was decreased by 20% with cycle 2 due to development of neutropenia.  - Patient was on surveillance since then.  - In June 2022, patient was found to have rising CEA level with CEA being at 23.  Initial CT scan did not show any evidence of recurrence.  - Patient had a colonoscopy by Dr. Arriola in July 2022 which was negative for any recurrence except for tubular adenoma with.  - PET/CT done on June 20 2022 showed isolated uptake in omental region.  Patient was evaluated by Dr. Packer and underwent omentectomy with solitary lymph node sampling which was negative for metastasis            Past Medical History, Past Surgical History, Social History, Family History have been reviewed and are without significant changes except as mentioned.    Review of Systems   A comprehensive 14 point review of systems was performed and was negative except as mentioned in HPI.    Medications:  The  "current medication list was reviewed in the EMR    ALLERGIES:    Allergies   Allergen Reactions   • Codeine Other (See Comments)     \"dont remember\"       Objective      Vitals:    08/08/22 0847   BP: 147/67   Pulse: 74   Temp: 97.5 °F (36.4 °C)   TempSrc: Temporal   SpO2: 93%   Weight: 68.1 kg (150 lb 3.2 oz)   PainSc: 0-No pain     Current Status 5/17/2021   ECOG score 0       Physical Exam  Cardiovascular:      Comments: Port-A-Cath present  Pulmonary:      Breath sounds: Normal breath sounds.   Neurological:      Mental Status: She is alert and oriented to person, place, and time.           RECENT LABS:  Glucose   Date Value Ref Range Status   06/09/2022 109 (H) 65 - 99 mg/dL Final     Sodium   Date Value Ref Range Status   06/28/2022 143 136 - 145 mmol/L Final     Potassium   Date Value Ref Range Status   06/28/2022 3.9 3.5 - 5.1 mmol/L Final     Total CO2   Date Value Ref Range Status   06/28/2022 35 (H) 21 - 31 mmol/L Final     Chloride   Date Value Ref Range Status   06/28/2022 103 98 - 107 mmol/L Final     Anion Gap   Date Value Ref Range Status   06/09/2022 9.0 5.0 - 15.0 mmol/L Final     Creatinine   Date Value Ref Range Status   06/28/2022 0.6 (L) 0.7 - 1.3 mg/dL Final     BUN   Date Value Ref Range Status   06/28/2022 12 7 - 25 mg/dL Final     BUN/Creatinine Ratio   Date Value Ref Range Status   06/09/2022 19.2 7.0 - 25.0 Final     Calcium   Date Value Ref Range Status   06/28/2022 9.3 8.6 - 10.3 mg/dL Final     eGFR Non  Amer   Date Value Ref Range Status   09/14/2021 80 >60 mL/min/1.73 Final     Alkaline Phosphatase   Date Value Ref Range Status   06/28/2022 76 34 - 104 U/L Final     Total Protein   Date Value Ref Range Status   06/09/2022 7.3 6.0 - 8.5 g/dL Final     ALT (SGPT)   Date Value Ref Range Status   06/28/2022 14 7 - 52 U/L Final     AST (SGOT)   Date Value Ref Range Status   06/28/2022 17 13 - 39 U/L Final     Total Bilirubin   Date Value Ref Range Status   06/28/2022 0.59 0.3 - 1.0 " mg/dL Final     Albumin   Date Value Ref Range Status   2022 4.1 3.5 - 5.7 g/dL Final     Globulin   Date Value Ref Range Status   2022 3.1 gm/dL Final     Lab Results   Component Value Date    WBC 6.92 2022    HGB 13.0 2022    HCT 41.1 2022    MCV 84.7 2022     2022     Lab Results   Component Value Date    NEUTROABS 4.74 2022    IRON 58 2017    TIBC 312 2017    LABIRON 19 2017    FERRITIN 96.10 2017    [id number] 616 2017    FOLATE 7.20 2017     Lab Results   Component Value Date    CEA 23.60 2022    REFLABREPO  2022     [facility]  [address] [place] [address]  Phone: [phone] or [phone]  Fax: [phone]  Jeff Bustamante M.D., Medical Director    PATIENT NAME                                     LABORATORY NO.  [id number]   SEVEN ANDERSON ABDI.                             [id number]  [id number]                                 AGE                    SEX   N              CLIENT REF #  [facility]                   76        1945      F     [id number]      [id number]    [place]                               REQUESTING M.D.           ATTENDING M.D.         COPY TO.  [address] [place] [address]                         [place] [address]                     DATE COLLECTED            DATE RECEIVED          DATE REPORTED  2022    DIAGNOSIS:  DESCENDING COLON POLYPS:  Tubular adenoma    JBS/pah    CLINICAL HISTORY:  Abnormal tumor markers    SPECIMENS RECEIVED:  DESCENDING COLON POLYPS    MICROSCOPIC DESCRIPTION:  Tissue blocks are prepared and slides are examined microscopically on all  specimens. See diagnosis for details.    Professional interpretation rendered by Rupert Steen M.D. at [facility] [facility],  [facility], [address] [place] [address]  "[id number].    GROSS DESCRIPTION:  Specimen is received in 1 formalin filled container \"large intestine,  left/descending colon polyps\" consists of 3 pieces of tan soft tissue measuring  0.4 x 0.3 x 0.2 cm.  Specimen is submitted entirely in 1 cassette.  MM    REVIEWED, DIAGNOSED AND ELECTRONICALLY  SIGNED BY:    Rupert Steen M.D.  CPT CODES:  73009           PATHOLOGY:  * Cannot find OR log *         RADIOLOGY DATA :  No radiology results for the last 7 days        Assessment & Plan     1.  Adenocarcinoma of sigmoid colon with recurrence,  - Review oncology history for prior treatment details  - Due to local recurrence involving omentum for which patient had omentectomy done on July 6, 2022 by Dr. Packer  -Patient will be started on adjuvant treatment with 5-FU and leucovorin today on August 8, 2022  - Patient has been provided information to read about chemotherapy, she does not have any question or concern about chemotherapy  - We will continue with close monitoring for chemotherapy related side effect  - Patient will return to clinic with cycle 2 of chemotherapy.  - We will recheck CBC, CMP upon next clinic visit in 2 weeks    2.  Oxaliplatin induced neuropathy  - Remains on gabapentin 100 mg 3 times day.    3.  Hypertension    4.  Health maintenance: Patient quit smoking in 2020    5. Advance Care Planning: For now patient remains full code and is able to make decisions.  Patient has health care surrogate mentioned on chart.    6.  Hypokalemia  - Potassium is 3.3.  Will prescribe patient K. Dur 40 polygamous p.o. x1 here today.             PHQ-9 Total Score: 0   -Patient is not homicidal or suicidal.  No acute intervention required.    Mary Ann Condon reports a pain score of 0.  Given her pain assessment as noted, treatment options were discussed and the following options were decided upon as a follow-up plan to address the patient's pain: continuation of current treatment plan for pain.         Wil BENTLEY" MD Jarad  8/8/2022  09:09 CDT        Part of this note may be an electronic transcription/translation of spoken language to printed text using the Dragon Dictation System.          CC:

## 2022-08-08 ENCOUNTER — OFFICE VISIT (OUTPATIENT)
Dept: ONCOLOGY | Facility: CLINIC | Age: 77
End: 2022-08-08

## 2022-08-08 ENCOUNTER — INFUSION (OUTPATIENT)
Dept: ONCOLOGY | Facility: HOSPITAL | Age: 77
End: 2022-08-08

## 2022-08-08 VITALS
OXYGEN SATURATION: 93 % | HEART RATE: 74 BPM | BODY MASS INDEX: 27.47 KG/M2 | TEMPERATURE: 97.5 F | SYSTOLIC BLOOD PRESSURE: 147 MMHG | WEIGHT: 150.2 LBS | DIASTOLIC BLOOD PRESSURE: 67 MMHG

## 2022-08-08 DIAGNOSIS — C19 CANCER OF RECTOSIGMOID (COLON): Primary | ICD-10-CM

## 2022-08-08 DIAGNOSIS — C19 CANCER OF RECTOSIGMOID (COLON): Primary | Chronic | ICD-10-CM

## 2022-08-08 DIAGNOSIS — E87.6 HYPOKALEMIA: ICD-10-CM

## 2022-08-08 DIAGNOSIS — C18.9 LOCAL RECURRENCE OF COLON CANCER: ICD-10-CM

## 2022-08-08 DIAGNOSIS — G62.0 NEUROPATHY DUE TO CHEMOTHERAPEUTIC DRUG: Chronic | ICD-10-CM

## 2022-08-08 DIAGNOSIS — T45.1X5A NEUROPATHY DUE TO CHEMOTHERAPEUTIC DRUG: Chronic | ICD-10-CM

## 2022-08-08 LAB
ALBUMIN SERPL-MCNC: 3.9 G/DL (ref 3.5–5.2)
ALBUMIN/GLOB SERPL: 1.3 G/DL
ALP SERPL-CCNC: 82 U/L (ref 39–117)
ALT SERPL W P-5'-P-CCNC: 12 U/L (ref 1–33)
ANION GAP SERPL CALCULATED.3IONS-SCNC: 10 MMOL/L (ref 5–15)
AST SERPL-CCNC: 18 U/L (ref 1–32)
BASOPHILS # BLD AUTO: 0.03 10*3/MM3 (ref 0–0.2)
BASOPHILS NFR BLD AUTO: 0.4 % (ref 0–1.5)
BILIRUB SERPL-MCNC: 0.5 MG/DL (ref 0–1.2)
BUN SERPL-MCNC: 12 MG/DL (ref 8–23)
BUN/CREAT SERPL: 18.5 (ref 7–25)
CALCIUM SPEC-SCNC: 8.8 MG/DL (ref 8.6–10.5)
CHLORIDE SERPL-SCNC: 105 MMOL/L (ref 98–107)
CO2 SERPL-SCNC: 29 MMOL/L (ref 22–29)
CREAT SERPL-MCNC: 0.65 MG/DL (ref 0.57–1)
DEPRECATED RDW RBC AUTO: 45.5 FL (ref 37–54)
EGFRCR SERPLBLD CKD-EPI 2021: 91.4 ML/MIN/1.73
EOSINOPHIL # BLD AUTO: 0.16 10*3/MM3 (ref 0–0.4)
EOSINOPHIL NFR BLD AUTO: 2.3 % (ref 0.3–6.2)
ERYTHROCYTE [DISTWIDTH] IN BLOOD BY AUTOMATED COUNT: 14.8 % (ref 12.3–15.4)
GLOBULIN UR ELPH-MCNC: 3.1 GM/DL
GLUCOSE SERPL-MCNC: 120 MG/DL (ref 65–99)
HCT VFR BLD AUTO: 41.1 % (ref 34–46.6)
HGB BLD-MCNC: 13 G/DL (ref 12–15.9)
HOLD SPECIMEN: NORMAL
IMM GRANULOCYTES # BLD AUTO: 0.03 10*3/MM3 (ref 0–0.05)
IMM GRANULOCYTES NFR BLD AUTO: 0.4 % (ref 0–0.5)
LYMPHOCYTES # BLD AUTO: 1.28 10*3/MM3 (ref 0.7–3.1)
LYMPHOCYTES NFR BLD AUTO: 18.5 % (ref 19.6–45.3)
MCH RBC QN AUTO: 26.8 PG (ref 26.6–33)
MCHC RBC AUTO-ENTMCNC: 31.6 G/DL (ref 31.5–35.7)
MCV RBC AUTO: 84.7 FL (ref 79–97)
MONOCYTES # BLD AUTO: 0.68 10*3/MM3 (ref 0.1–0.9)
MONOCYTES NFR BLD AUTO: 9.8 % (ref 5–12)
NEUTROPHILS NFR BLD AUTO: 4.74 10*3/MM3 (ref 1.7–7)
NEUTROPHILS NFR BLD AUTO: 68.6 % (ref 42.7–76)
NRBC BLD AUTO-RTO: 0 /100 WBC (ref 0–0.2)
PLATELET # BLD AUTO: 257 10*3/MM3 (ref 140–450)
PMV BLD AUTO: 10.2 FL (ref 6–12)
POTASSIUM SERPL-SCNC: 3.3 MMOL/L (ref 3.5–5.2)
PROT SERPL-MCNC: 7 G/DL (ref 6–8.5)
RBC # BLD AUTO: 4.85 10*6/MM3 (ref 3.77–5.28)
SODIUM SERPL-SCNC: 144 MMOL/L (ref 136–145)
WBC NRBC COR # BLD: 6.92 10*3/MM3 (ref 3.4–10.8)

## 2022-08-08 PROCEDURE — 25010000002 FLUOROURACIL PER 500 MG: Performed by: INTERNAL MEDICINE

## 2022-08-08 PROCEDURE — 96416 CHEMO PROLONG INFUSE W/PUMP: CPT | Performed by: INTERNAL MEDICINE

## 2022-08-08 PROCEDURE — 96367 TX/PROPH/DG ADDL SEQ IV INF: CPT | Performed by: INTERNAL MEDICINE

## 2022-08-08 PROCEDURE — 25010000002 DEXAMETHASONE SODIUM PHOSPHATE 100 MG/10ML SOLUTION: Performed by: INTERNAL MEDICINE

## 2022-08-08 PROCEDURE — 99214 OFFICE O/P EST MOD 30 MIN: CPT | Performed by: INTERNAL MEDICINE

## 2022-08-08 PROCEDURE — 96375 TX/PRO/DX INJ NEW DRUG ADDON: CPT | Performed by: INTERNAL MEDICINE

## 2022-08-08 PROCEDURE — 1126F AMNT PAIN NOTED NONE PRSNT: CPT | Performed by: INTERNAL MEDICINE

## 2022-08-08 PROCEDURE — 25010000002 LEUCOVORIN CALCIUM PER 50 MG: Performed by: INTERNAL MEDICINE

## 2022-08-08 PROCEDURE — 80053 COMPREHEN METABOLIC PANEL: CPT

## 2022-08-08 PROCEDURE — 85025 COMPLETE CBC W/AUTO DIFF WBC: CPT

## 2022-08-08 PROCEDURE — 96409 CHEMO IV PUSH SNGL DRUG: CPT | Performed by: INTERNAL MEDICINE

## 2022-08-08 PROCEDURE — 1123F ACP DISCUSS/DSCN MKR DOCD: CPT | Performed by: INTERNAL MEDICINE

## 2022-08-08 RX ORDER — HYDROCODONE BITARTRATE AND ACETAMINOPHEN 5; 325 MG/1; MG/1
1 TABLET ORAL EVERY 4 HOURS PRN
COMMUNITY
Start: 2022-08-03 | End: 2022-11-07

## 2022-08-08 RX ORDER — FLUOROURACIL 50 MG/ML
400 INJECTION, SOLUTION INTRAVENOUS ONCE
Status: CANCELLED | OUTPATIENT
Start: 2022-08-08

## 2022-08-08 RX ORDER — POTASSIUM CHLORIDE 750 MG/1
40 CAPSULE, EXTENDED RELEASE ORAL ONCE
Status: CANCELLED
Start: 2022-08-08 | End: 2022-08-08

## 2022-08-08 RX ORDER — SODIUM CHLORIDE 9 MG/ML
250 INJECTION, SOLUTION INTRAVENOUS ONCE
Status: COMPLETED | OUTPATIENT
Start: 2022-08-08 | End: 2022-08-08

## 2022-08-08 RX ORDER — POTASSIUM CHLORIDE 750 MG/1
40 CAPSULE, EXTENDED RELEASE ORAL ONCE
Status: COMPLETED | OUTPATIENT
Start: 2022-08-08 | End: 2022-08-08

## 2022-08-08 RX ORDER — FLUOROURACIL 50 MG/ML
400 INJECTION, SOLUTION INTRAVENOUS ONCE
Status: COMPLETED | OUTPATIENT
Start: 2022-08-08 | End: 2022-08-08

## 2022-08-08 RX ORDER — SODIUM CHLORIDE 9 MG/ML
250 INJECTION, SOLUTION INTRAVENOUS ONCE
Status: CANCELLED | OUTPATIENT
Start: 2022-08-08

## 2022-08-08 RX ORDER — SODIUM CHLORIDE 0.9 % (FLUSH) 0.9 %
10 SYRINGE (ML) INJECTION AS NEEDED
Status: CANCELLED | OUTPATIENT
Start: 2022-08-10

## 2022-08-08 RX ORDER — HEPARIN SODIUM (PORCINE) LOCK FLUSH IV SOLN 100 UNIT/ML 100 UNIT/ML
500 SOLUTION INTRAVENOUS AS NEEDED
Status: CANCELLED | OUTPATIENT
Start: 2022-08-10

## 2022-08-08 RX ADMIN — SODIUM CHLORIDE 250 ML: 9 INJECTION, SOLUTION INTRAVENOUS at 09:42

## 2022-08-08 RX ADMIN — FLUOROURACIL 4100 MG: 50 INJECTION, SOLUTION INTRAVENOUS at 12:04

## 2022-08-08 RX ADMIN — LEUCOVORIN CALCIUM 680 MG: 350 INJECTION, POWDER, LYOPHILIZED, FOR SOLUTION INTRAMUSCULAR; INTRAVENOUS at 10:37

## 2022-08-08 RX ADMIN — FLUOROURACIL 680 MG: 50 INJECTION, SOLUTION INTRAVENOUS at 11:49

## 2022-08-08 RX ADMIN — DEXAMETHASONE SODIUM PHOSPHATE 12 MG: 10 INJECTION, SOLUTION INTRAMUSCULAR; INTRAVENOUS at 09:53

## 2022-08-08 RX ADMIN — POTASSIUM CHLORIDE 40 MEQ: 750 CAPSULE, EXTENDED RELEASE ORAL at 09:42

## 2022-08-10 ENCOUNTER — INFUSION (OUTPATIENT)
Dept: ONCOLOGY | Facility: HOSPITAL | Age: 77
End: 2022-08-10

## 2022-08-10 DIAGNOSIS — C19 CANCER OF RECTOSIGMOID (COLON): ICD-10-CM

## 2022-08-10 DIAGNOSIS — Z45.2 ENCOUNTER FOR VENOUS ACCESS DEVICE CARE: ICD-10-CM

## 2022-08-10 DIAGNOSIS — C18.9 LOCAL RECURRENCE OF COLON CANCER: Primary | ICD-10-CM

## 2022-08-10 PROCEDURE — 25010000002 HEPARIN LOCK FLUSH PER 10 UNITS: Performed by: INTERNAL MEDICINE

## 2022-08-10 RX ORDER — SODIUM CHLORIDE 0.9 % (FLUSH) 0.9 %
10 SYRINGE (ML) INJECTION AS NEEDED
Status: DISCONTINUED | OUTPATIENT
Start: 2022-08-10 | End: 2022-08-10 | Stop reason: HOSPADM

## 2022-08-10 RX ORDER — HEPARIN SODIUM (PORCINE) LOCK FLUSH IV SOLN 100 UNIT/ML 100 UNIT/ML
500 SOLUTION INTRAVENOUS AS NEEDED
Status: DISCONTINUED | OUTPATIENT
Start: 2022-08-10 | End: 2022-08-10 | Stop reason: HOSPADM

## 2022-08-10 RX ORDER — HEPARIN SODIUM (PORCINE) LOCK FLUSH IV SOLN 100 UNIT/ML 100 UNIT/ML
500 SOLUTION INTRAVENOUS AS NEEDED
Status: CANCELLED | OUTPATIENT
Start: 2022-08-22

## 2022-08-10 RX ORDER — SODIUM CHLORIDE 0.9 % (FLUSH) 0.9 %
10 SYRINGE (ML) INJECTION AS NEEDED
Status: CANCELLED | OUTPATIENT
Start: 2022-08-10

## 2022-08-10 RX ADMIN — Medication 10 ML: at 10:46

## 2022-08-10 RX ADMIN — HEPARIN 500 UNITS: 100 SYRINGE at 10:47

## 2022-08-20 NOTE — PROGRESS NOTES
DATE OF VISIT: 8/22/2022      REASON FOR VISIT: Sigmoid cancer with local recurrence involving omentum s/p surgery , oxaliplatin induced neuropathy      HISTORY OF PRESENT ILLNESS:   76-year-old female with medical problems significant for cardiomegaly who was initially seen in consultation on December 18, 2017 for rectosigmoid cancer, patient was diagnosed with recurrence on July 6, 2022 for which patient had omentectomy done with removal of mass.  Patient was started on cycle 1 of adjuvant 5-FU and leucovorin on August 8, 2022.  Patient is here to get cycle 2 of chemotherapy today.  Complains of chronic tingling and numbness affecting upper and lower extremity from oxaliplatin.  Denies any excessive nausea or vomiting or diarrhea with cycle 1 of chemotherapy.  Denies any new lymph node enlargement.  Denies any new chest pain or shortness of breath.              Oncology history:    1.  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  - Patient was started on FOLFOX on December 27, 2017.  Patient received total 12 cycles of FOLFOX between December 27, 2017 until June 13, 2018.  Dose of 5-FU and oxaliplatin was decreased by 20% with cycle 2 due to development of neutropenia.  - Patient was on surveillance since then.  - In June 2022, patient was found to have rising CEA level with CEA being at 23.  Initial CT scan did not show any evidence of recurrence.  - Patient had a colonoscopy by Dr. Arriloa in July 2022 which was negative for any recurrence except for tubular adenoma with.  - PET/CT done on June 20 2022 showed isolated uptake in omental region.  Patient was evaluated by Dr. Packer and underwent omentectomy with solitary lymph node sampling which was negative for metastasis            Past Medical History, Past Surgical History, Social History, Family History have been reviewed and are without significant changes except as mentioned.    Review of  "Systems   A comprehensive 14 point review of systems was performed and was negative except as mentioned in HPI.    Medications:  The current medication list was reviewed in the EMR    ALLERGIES:    Allergies   Allergen Reactions   • Codeine Other (See Comments)     \"dont remember\"       Objective      Vitals:    08/22/22 0836   BP: 162/78   Pulse: 57   Temp: 97.4 °F (36.3 °C)   TempSrc: Temporal   SpO2: 94%   Weight: 68.8 kg (151 lb 11.2 oz)   PainSc: 0-No pain     Current Status 8/8/2022   ECOG score 0       Physical Exam  Cardiovascular:      Comments: Port-A-Cath present  Pulmonary:      Breath sounds: Normal breath sounds.   Neurological:      Mental Status: She is alert and oriented to person, place, and time.           RECENT LABS:  Glucose   Date Value Ref Range Status   08/22/2022 100 (H) 65 - 99 mg/dL Final     Sodium   Date Value Ref Range Status   08/22/2022 145 136 - 145 mmol/L Final   06/28/2022 143 136 - 145 mmol/L Final     Potassium   Date Value Ref Range Status   08/22/2022 2.9 (L) 3.5 - 5.2 mmol/L Final   06/28/2022 3.9 3.5 - 5.1 mmol/L Final     CO2   Date Value Ref Range Status   08/22/2022 32.0 (H) 22.0 - 29.0 mmol/L Final     Total CO2   Date Value Ref Range Status   06/28/2022 35 (H) 21 - 31 mmol/L Final     Chloride   Date Value Ref Range Status   08/22/2022 106 98 - 107 mmol/L Final   06/28/2022 103 98 - 107 mmol/L Final     Anion Gap   Date Value Ref Range Status   08/22/2022 7.0 5.0 - 15.0 mmol/L Final     Creatinine   Date Value Ref Range Status   08/22/2022 0.59 0.57 - 1.00 mg/dL Final   06/28/2022 0.6 (L) 0.7 - 1.3 mg/dL Final     BUN   Date Value Ref Range Status   08/22/2022 11 8 - 23 mg/dL Final   06/28/2022 12 7 - 25 mg/dL Final     BUN/Creatinine Ratio   Date Value Ref Range Status   08/22/2022 18.6 7.0 - 25.0 Final     Calcium   Date Value Ref Range Status   08/22/2022 8.6 8.6 - 10.5 mg/dL Final   06/28/2022 9.3 8.6 - 10.3 mg/dL Final     eGFR Non  Amer   Date Value Ref " Range Status   2021 80 >60 mL/min/1.73 Final     Alkaline Phosphatase   Date Value Ref Range Status   2022 77 39 - 117 U/L Final   2022 76 34 - 104 U/L Final     Total Protein   Date Value Ref Range Status   2022 6.5 6.0 - 8.5 g/dL Final     ALT (SGPT)   Date Value Ref Range Status   2022 12 1 - 33 U/L Final   2022 14 7 - 52 U/L Final     AST (SGOT)   Date Value Ref Range Status   2022 14 1 - 32 U/L Final   2022 17 13 - 39 U/L Final     Total Bilirubin   Date Value Ref Range Status   2022 0.4 0.0 - 1.2 mg/dL Final   2022 0.59 0.3 - 1.0 mg/dL Final     Albumin   Date Value Ref Range Status   2022 3.60 3.50 - 5.20 g/dL Final   2022 4.1 3.5 - 5.7 g/dL Final     Globulin   Date Value Ref Range Status   2022 2.9 gm/dL Final     Lab Results   Component Value Date    WBC 4.51 2022    HGB 12.2 2022    HCT 38.0 2022    MCV 84.3 2022     2022     Lab Results   Component Value Date    NEUTROABS 2.72 2022    IRON 58 2017    TIBC 312 2017    LABIRON 19 2017    FERRITIN 96.10 2017    [id number] 616 2017    FOLATE 7.20 2017     Lab Results   Component Value Date    CEA 23.60 2022    REFLABREPO  2022     [facility]  [address] [place] [address]  Phone: [phone] or [phone]  Fax: [phone]  Jeff Bustamante M.D., Medical Director    PATIENT NAME                                     LABORATORY NO.  1800   SEVEN ANDERSON [facility]                             [id number]  [id number]                                 AGE                    SEX   SSN              CLIENT REF #  [facility]                   76        1945      F     [id number]      [id number]    [place]                               REQUESTING M.D.           ATTENDING MANAYELI         COPY TO[address]                          "[place] [address]                     DATE COLLECTED            DATE RECEIVED          DATE REPORTED  07/05/2022 07/05/2022 07/07/2022    DIAGNOSIS:  DESCENDING COLON POLYPS:  Tubular adenoma    JBS/pah    CLINICAL HISTORY:  Abnormal tumor markers    SPECIMENS RECEIVED:  DESCENDING COLON POLYPS    MICROSCOPIC DESCRIPTION:  Tissue blocks are prepared and slides are examined microscopically on all  specimens. See diagnosis for details.    Professional interpretation rendered by Rupert Steen M.D. at [facility],  [facility], [address] [place] [address].    GROSS DESCRIPTION:  Specimen is received in 1 formalin filled container \"large intestine,  left/descending colon polyps\" consists of 3 pieces of tan soft tissue measuring  0.4 x 0.3 x 0.2 cm.  Specimen is submitted entirely in 1 cassette.  MM    REVIEWED, DIAGNOSED AND ELECTRONICALLY  SIGNED BY:    Rupert Steen M.D.  CPT CODES:  24928           PATHOLOGY:  * Cannot find OR log *         RADIOLOGY DATA :  No radiology results for the last 7 days        Assessment & Plan     1.  Adenocarcinoma of sigmoid colon with recurrence  - Review oncology history for prior treatment details  - Due to local recurrence involving omentum patient had omentectomy done on July 6, 2022  - Has been started on adjuvant 5-FU and leucovorin on August 8, 2022.  - We will proceed with cycle 2 of chemotherapy today.  - We will continue with close monitoring for chemotherapy related side effect  - We will repeat CBC and CMP upon next clinic visit in 2 weeks.    2.  Oxaliplatin induced neuropathy  - Remains on gabapentin 100 mg 3 times a day    3.  Hypertension    4.  Health maintenance: Patient quit smoking in 2020    5. Advance Care Planning: For now patient remains full code and is able to make decisions.  Patient has health care surrogate mentioned on chart.    6.  Hypokalemia:  - Potassium is 2.9.  - We " will provide her with potassium 20 mEq IV x1 here.  We will provide her with potassium 40 mEq p.o. x1 here.  We will start her on potassium 10 mEq p.o. daily     7.  Prescriptions: Prescription for potassium has been sent to her pharmacy today             PHQ-9 Total Score: 0   -Patient is not homicidal or suicidal.  No acute intervention required.    Mary Ann Condon reports a pain score of 0.  Given her pain assessment as noted, treatment options were discussed and the following options were decided upon as a follow-up plan to address the patient's pain: continuation of current treatment plan for pain.         Wil Abraham MD  8/22/2022  08:47 CDT        Part of this note may be an electronic transcription/translation of spoken language to printed text using the Dragon Dictation System.          CC:

## 2022-08-22 ENCOUNTER — OFFICE VISIT (OUTPATIENT)
Dept: ONCOLOGY | Facility: CLINIC | Age: 77
End: 2022-08-22

## 2022-08-22 ENCOUNTER — INFUSION (OUTPATIENT)
Dept: ONCOLOGY | Facility: HOSPITAL | Age: 77
End: 2022-08-22

## 2022-08-22 VITALS
WEIGHT: 151.7 LBS | BODY MASS INDEX: 27.75 KG/M2 | DIASTOLIC BLOOD PRESSURE: 78 MMHG | SYSTOLIC BLOOD PRESSURE: 162 MMHG | HEART RATE: 57 BPM | OXYGEN SATURATION: 94 % | TEMPERATURE: 97.4 F

## 2022-08-22 DIAGNOSIS — C18.9 LOCAL RECURRENCE OF COLON CANCER: Chronic | ICD-10-CM

## 2022-08-22 DIAGNOSIS — E87.6 HYPOKALEMIA: ICD-10-CM

## 2022-08-22 DIAGNOSIS — C19 CANCER OF RECTOSIGMOID (COLON): Primary | Chronic | ICD-10-CM

## 2022-08-22 DIAGNOSIS — C18.9 LOCAL RECURRENCE OF COLON CANCER: ICD-10-CM

## 2022-08-22 DIAGNOSIS — T45.1X5A NEUROPATHY DUE TO CHEMOTHERAPEUTIC DRUG: Chronic | ICD-10-CM

## 2022-08-22 DIAGNOSIS — C19 CANCER OF RECTOSIGMOID (COLON): Primary | ICD-10-CM

## 2022-08-22 DIAGNOSIS — G62.0 NEUROPATHY DUE TO CHEMOTHERAPEUTIC DRUG: Chronic | ICD-10-CM

## 2022-08-22 LAB
ALBUMIN SERPL-MCNC: 3.6 G/DL (ref 3.5–5.2)
ALBUMIN/GLOB SERPL: 1.2 G/DL
ALP SERPL-CCNC: 77 U/L (ref 39–117)
ALT SERPL W P-5'-P-CCNC: 12 U/L (ref 1–33)
ANION GAP SERPL CALCULATED.3IONS-SCNC: 7 MMOL/L (ref 5–15)
AST SERPL-CCNC: 14 U/L (ref 1–32)
BASOPHILS # BLD AUTO: 0.03 10*3/MM3 (ref 0–0.2)
BASOPHILS NFR BLD AUTO: 0.7 % (ref 0–1.5)
BILIRUB SERPL-MCNC: 0.4 MG/DL (ref 0–1.2)
BUN SERPL-MCNC: 11 MG/DL (ref 8–23)
BUN/CREAT SERPL: 18.6 (ref 7–25)
CALCIUM SPEC-SCNC: 8.6 MG/DL (ref 8.6–10.5)
CHLORIDE SERPL-SCNC: 106 MMOL/L (ref 98–107)
CO2 SERPL-SCNC: 32 MMOL/L (ref 22–29)
CREAT SERPL-MCNC: 0.59 MG/DL (ref 0.57–1)
DEPRECATED RDW RBC AUTO: 45.2 FL (ref 37–54)
EGFRCR SERPLBLD CKD-EPI 2021: 93.5 ML/MIN/1.73
EOSINOPHIL # BLD AUTO: 0.13 10*3/MM3 (ref 0–0.4)
EOSINOPHIL NFR BLD AUTO: 2.9 % (ref 0.3–6.2)
ERYTHROCYTE [DISTWIDTH] IN BLOOD BY AUTOMATED COUNT: 15 % (ref 12.3–15.4)
GLOBULIN UR ELPH-MCNC: 2.9 GM/DL
GLUCOSE SERPL-MCNC: 100 MG/DL (ref 65–99)
HCT VFR BLD AUTO: 38 % (ref 34–46.6)
HGB BLD-MCNC: 12.2 G/DL (ref 12–15.9)
HOLD SPECIMEN: NORMAL
IMM GRANULOCYTES # BLD AUTO: 0.01 10*3/MM3 (ref 0–0.05)
IMM GRANULOCYTES NFR BLD AUTO: 0.2 % (ref 0–0.5)
LYMPHOCYTES # BLD AUTO: 1.19 10*3/MM3 (ref 0.7–3.1)
LYMPHOCYTES NFR BLD AUTO: 26.4 % (ref 19.6–45.3)
MCH RBC QN AUTO: 27.1 PG (ref 26.6–33)
MCHC RBC AUTO-ENTMCNC: 32.1 G/DL (ref 31.5–35.7)
MCV RBC AUTO: 84.3 FL (ref 79–97)
MONOCYTES # BLD AUTO: 0.43 10*3/MM3 (ref 0.1–0.9)
MONOCYTES NFR BLD AUTO: 9.5 % (ref 5–12)
NEUTROPHILS NFR BLD AUTO: 2.72 10*3/MM3 (ref 1.7–7)
NEUTROPHILS NFR BLD AUTO: 60.3 % (ref 42.7–76)
NRBC BLD AUTO-RTO: 0 /100 WBC (ref 0–0.2)
PLATELET # BLD AUTO: 258 10*3/MM3 (ref 140–450)
PMV BLD AUTO: 9.5 FL (ref 6–12)
POTASSIUM SERPL-SCNC: 2.9 MMOL/L (ref 3.5–5.2)
PROT SERPL-MCNC: 6.5 G/DL (ref 6–8.5)
RBC # BLD AUTO: 4.51 10*6/MM3 (ref 3.77–5.28)
REF LAB TEST RESULTS: NORMAL
SODIUM SERPL-SCNC: 145 MMOL/L (ref 136–145)
WBC NRBC COR # BLD: 4.51 10*3/MM3 (ref 3.4–10.8)

## 2022-08-22 PROCEDURE — 99214 OFFICE O/P EST MOD 30 MIN: CPT | Performed by: INTERNAL MEDICINE

## 2022-08-22 PROCEDURE — 1126F AMNT PAIN NOTED NONE PRSNT: CPT | Performed by: INTERNAL MEDICINE

## 2022-08-22 PROCEDURE — 25010000002 LEUCOVORIN CALCIUM PER 50 MG: Performed by: INTERNAL MEDICINE

## 2022-08-22 PROCEDURE — 80053 COMPREHEN METABOLIC PANEL: CPT

## 2022-08-22 PROCEDURE — 1123F ACP DISCUSS/DSCN MKR DOCD: CPT | Performed by: INTERNAL MEDICINE

## 2022-08-22 PROCEDURE — 36415 COLL VENOUS BLD VENIPUNCTURE: CPT

## 2022-08-22 PROCEDURE — 96367 TX/PROPH/DG ADDL SEQ IV INF: CPT | Performed by: INTERNAL MEDICINE

## 2022-08-22 PROCEDURE — 0 POTASSIUM CHLORIDE 10 MEQ/100ML SOLUTION: Performed by: INTERNAL MEDICINE

## 2022-08-22 PROCEDURE — 25010000002 FLUOROURACIL PER 500 MG: Performed by: INTERNAL MEDICINE

## 2022-08-22 PROCEDURE — 85025 COMPLETE CBC W/AUTO DIFF WBC: CPT

## 2022-08-22 PROCEDURE — 96409 CHEMO IV PUSH SNGL DRUG: CPT | Performed by: INTERNAL MEDICINE

## 2022-08-22 PROCEDURE — 96375 TX/PRO/DX INJ NEW DRUG ADDON: CPT | Performed by: INTERNAL MEDICINE

## 2022-08-22 PROCEDURE — 96366 THER/PROPH/DIAG IV INF ADDON: CPT | Performed by: INTERNAL MEDICINE

## 2022-08-22 PROCEDURE — 25010000002 DEXAMETHASONE SODIUM PHOSPHATE 100 MG/10ML SOLUTION: Performed by: INTERNAL MEDICINE

## 2022-08-22 PROCEDURE — 96416 CHEMO PROLONG INFUSE W/PUMP: CPT | Performed by: INTERNAL MEDICINE

## 2022-08-22 RX ORDER — POTASSIUM CHLORIDE 750 MG/1
40 CAPSULE, EXTENDED RELEASE ORAL ONCE
Status: COMPLETED | OUTPATIENT
Start: 2022-08-22 | End: 2022-08-22

## 2022-08-22 RX ORDER — POTASSIUM CHLORIDE 29.8 MG/ML
20 INJECTION INTRAVENOUS ONCE
Status: CANCELLED
Start: 2022-08-22 | End: 2022-08-22

## 2022-08-22 RX ORDER — POTASSIUM CHLORIDE 7.45 MG/ML
10 INJECTION INTRAVENOUS
Status: COMPLETED | OUTPATIENT
Start: 2022-08-22 | End: 2022-08-22

## 2022-08-22 RX ORDER — SODIUM CHLORIDE 9 MG/ML
250 INJECTION, SOLUTION INTRAVENOUS ONCE
Status: COMPLETED | OUTPATIENT
Start: 2022-08-22 | End: 2022-08-22

## 2022-08-22 RX ORDER — POTASSIUM CHLORIDE 750 MG/1
40 CAPSULE, EXTENDED RELEASE ORAL ONCE
Status: CANCELLED
Start: 2022-08-22 | End: 2022-08-22

## 2022-08-22 RX ORDER — FLUOROURACIL 50 MG/ML
400 INJECTION, SOLUTION INTRAVENOUS ONCE
Status: CANCELLED | OUTPATIENT
Start: 2022-08-22

## 2022-08-22 RX ORDER — FLUOROURACIL 50 MG/ML
400 INJECTION, SOLUTION INTRAVENOUS ONCE
Status: COMPLETED | OUTPATIENT
Start: 2022-08-22 | End: 2022-08-22

## 2022-08-22 RX ORDER — POTASSIUM CHLORIDE 750 MG/1
10 CAPSULE, EXTENDED RELEASE ORAL DAILY
Qty: 30 CAPSULE | Refills: 0 | Status: SHIPPED | OUTPATIENT
Start: 2022-08-22 | End: 2022-09-12

## 2022-08-22 RX ORDER — SODIUM CHLORIDE 9 MG/ML
250 INJECTION, SOLUTION INTRAVENOUS ONCE
Status: CANCELLED | OUTPATIENT
Start: 2022-08-22

## 2022-08-22 RX ADMIN — FLUOROURACIL 680 MG: 50 INJECTION, SOLUTION INTRAVENOUS at 12:57

## 2022-08-22 RX ADMIN — POTASSIUM CHLORIDE 10 MEQ: 7.46 INJECTION, SOLUTION INTRAVENOUS at 10:14

## 2022-08-22 RX ADMIN — POTASSIUM CHLORIDE 40 MEQ: 750 CAPSULE, EXTENDED RELEASE ORAL at 09:20

## 2022-08-22 RX ADMIN — SODIUM CHLORIDE 250 ML: 9 INJECTION, SOLUTION INTRAVENOUS at 09:18

## 2022-08-22 RX ADMIN — LEUCOVORIN CALCIUM 680 MG: 350 INJECTION, POWDER, LYOPHILIZED, FOR SOLUTION INTRAMUSCULAR; INTRAVENOUS at 11:57

## 2022-08-22 RX ADMIN — POTASSIUM CHLORIDE 10 MEQ: 7.46 INJECTION, SOLUTION INTRAVENOUS at 09:18

## 2022-08-22 RX ADMIN — FLUOROURACIL 4100 MG: 50 INJECTION, SOLUTION INTRAVENOUS at 13:09

## 2022-08-22 RX ADMIN — DEXAMETHASONE SODIUM PHOSPHATE 12 MG: 10 INJECTION, SOLUTION INTRAMUSCULAR; INTRAVENOUS at 11:20

## 2022-08-24 ENCOUNTER — INFUSION (OUTPATIENT)
Dept: ONCOLOGY | Facility: HOSPITAL | Age: 77
End: 2022-08-24

## 2022-08-24 DIAGNOSIS — Z45.2 ENCOUNTER FOR VENOUS ACCESS DEVICE CARE: Primary | ICD-10-CM

## 2022-08-24 PROCEDURE — 25010000002 HEPARIN LOCK FLUSH PER 10 UNITS: Performed by: INTERNAL MEDICINE

## 2022-08-24 RX ORDER — HEPARIN SODIUM (PORCINE) LOCK FLUSH IV SOLN 100 UNIT/ML 100 UNIT/ML
500 SOLUTION INTRAVENOUS AS NEEDED
Status: DISCONTINUED | OUTPATIENT
Start: 2022-08-24 | End: 2022-08-24 | Stop reason: HOSPADM

## 2022-08-24 RX ORDER — SODIUM CHLORIDE 0.9 % (FLUSH) 0.9 %
10 SYRINGE (ML) INJECTION AS NEEDED
Status: DISCONTINUED | OUTPATIENT
Start: 2022-08-24 | End: 2022-08-24 | Stop reason: HOSPADM

## 2022-08-24 RX ORDER — HEPARIN SODIUM (PORCINE) LOCK FLUSH IV SOLN 100 UNIT/ML 100 UNIT/ML
500 SOLUTION INTRAVENOUS AS NEEDED
Status: CANCELLED | OUTPATIENT
Start: 2022-09-12

## 2022-08-24 RX ORDER — SODIUM CHLORIDE 0.9 % (FLUSH) 0.9 %
10 SYRINGE (ML) INJECTION AS NEEDED
Status: CANCELLED | OUTPATIENT
Start: 2022-08-24

## 2022-08-24 RX ADMIN — HEPARIN 500 UNITS: 100 SYRINGE at 12:50

## 2022-08-24 RX ADMIN — Medication 10 ML: at 12:50

## 2022-09-02 DIAGNOSIS — C18.9 LOCAL RECURRENCE OF COLON CANCER: Primary | ICD-10-CM

## 2022-09-02 DIAGNOSIS — C19 CANCER OF RECTOSIGMOID (COLON): ICD-10-CM

## 2022-09-07 ENCOUNTER — APPOINTMENT (OUTPATIENT)
Dept: ONCOLOGY | Facility: HOSPITAL | Age: 77
End: 2022-09-07

## 2022-09-09 ENCOUNTER — APPOINTMENT (OUTPATIENT)
Dept: ONCOLOGY | Facility: HOSPITAL | Age: 77
End: 2022-09-09

## 2022-09-12 ENCOUNTER — INFUSION (OUTPATIENT)
Dept: ONCOLOGY | Facility: HOSPITAL | Age: 77
End: 2022-09-12

## 2022-09-12 ENCOUNTER — OFFICE VISIT (OUTPATIENT)
Dept: ONCOLOGY | Facility: CLINIC | Age: 77
End: 2022-09-12

## 2022-09-12 VITALS
OXYGEN SATURATION: 95 % | DIASTOLIC BLOOD PRESSURE: 70 MMHG | WEIGHT: 147.3 LBS | HEART RATE: 65 BPM | BODY MASS INDEX: 26.94 KG/M2 | SYSTOLIC BLOOD PRESSURE: 154 MMHG | TEMPERATURE: 97.1 F

## 2022-09-12 DIAGNOSIS — C19 CANCER OF RECTOSIGMOID (COLON): Primary | ICD-10-CM

## 2022-09-12 DIAGNOSIS — E87.6 HYPOKALEMIA: Chronic | ICD-10-CM

## 2022-09-12 DIAGNOSIS — T45.1X5A NEUROPATHY DUE TO CHEMOTHERAPEUTIC DRUG: Chronic | ICD-10-CM

## 2022-09-12 DIAGNOSIS — G62.0 NEUROPATHY DUE TO CHEMOTHERAPEUTIC DRUG: Chronic | ICD-10-CM

## 2022-09-12 DIAGNOSIS — C18.9 LOCAL RECURRENCE OF COLON CANCER: Chronic | ICD-10-CM

## 2022-09-12 DIAGNOSIS — C18.9 LOCAL RECURRENCE OF COLON CANCER: ICD-10-CM

## 2022-09-12 DIAGNOSIS — C19 CANCER OF RECTOSIGMOID (COLON): Primary | Chronic | ICD-10-CM

## 2022-09-12 DIAGNOSIS — D70.1 CHEMOTHERAPY-INDUCED NEUTROPENIA: Chronic | ICD-10-CM

## 2022-09-12 DIAGNOSIS — T45.1X5A CHEMOTHERAPY-INDUCED NEUTROPENIA: Chronic | ICD-10-CM

## 2022-09-12 LAB
ALBUMIN SERPL-MCNC: 4.2 G/DL (ref 3.5–5.2)
ALBUMIN/GLOB SERPL: 1.1 G/DL
ALP SERPL-CCNC: 93 U/L (ref 39–117)
ALT SERPL W P-5'-P-CCNC: 14 U/L (ref 1–33)
ANION GAP SERPL CALCULATED.3IONS-SCNC: 9 MMOL/L (ref 5–15)
AST SERPL-CCNC: 23 U/L (ref 1–32)
BASOPHILS # BLD AUTO: 0.04 10*3/MM3 (ref 0–0.2)
BASOPHILS NFR BLD AUTO: 1 % (ref 0–1.5)
BILIRUB SERPL-MCNC: 0.3 MG/DL (ref 0–1.2)
BUN SERPL-MCNC: 17 MG/DL (ref 8–23)
BUN/CREAT SERPL: 22.1 (ref 7–25)
CALCIUM SPEC-SCNC: 9.1 MG/DL (ref 8.6–10.5)
CHLORIDE SERPL-SCNC: 105 MMOL/L (ref 98–107)
CO2 SERPL-SCNC: 30 MMOL/L (ref 22–29)
CREAT SERPL-MCNC: 0.77 MG/DL (ref 0.57–1)
DEPRECATED RDW RBC AUTO: 48.5 FL (ref 37–54)
EGFRCR SERPLBLD CKD-EPI 2021: 80.1 ML/MIN/1.73
EOSINOPHIL # BLD AUTO: 0.12 10*3/MM3 (ref 0–0.4)
EOSINOPHIL NFR BLD AUTO: 2.9 % (ref 0.3–6.2)
ERYTHROCYTE [DISTWIDTH] IN BLOOD BY AUTOMATED COUNT: 16 % (ref 12.3–15.4)
GLOBULIN UR ELPH-MCNC: 3.7 GM/DL
GLUCOSE SERPL-MCNC: 122 MG/DL (ref 65–99)
HCT VFR BLD AUTO: 41.6 % (ref 34–46.6)
HGB BLD-MCNC: 13.2 G/DL (ref 12–15.9)
HOLD SPECIMEN: NORMAL
IMM GRANULOCYTES # BLD AUTO: 0.06 10*3/MM3 (ref 0–0.05)
IMM GRANULOCYTES NFR BLD AUTO: 1.4 % (ref 0–0.5)
LYMPHOCYTES # BLD AUTO: 1.56 10*3/MM3 (ref 0.7–3.1)
LYMPHOCYTES NFR BLD AUTO: 37.1 % (ref 19.6–45.3)
MCH RBC QN AUTO: 27.3 PG (ref 26.6–33)
MCHC RBC AUTO-ENTMCNC: 31.7 G/DL (ref 31.5–35.7)
MCV RBC AUTO: 86 FL (ref 79–97)
MONOCYTES # BLD AUTO: 0.74 10*3/MM3 (ref 0.1–0.9)
MONOCYTES NFR BLD AUTO: 17.6 % (ref 5–12)
NEUTROPHILS NFR BLD AUTO: 1.68 10*3/MM3 (ref 1.7–7)
NEUTROPHILS NFR BLD AUTO: 40 % (ref 42.7–76)
NRBC BLD AUTO-RTO: 0 /100 WBC (ref 0–0.2)
PLATELET # BLD AUTO: 350 10*3/MM3 (ref 140–450)
PMV BLD AUTO: 10.2 FL (ref 6–12)
POTASSIUM SERPL-SCNC: 3.4 MMOL/L (ref 3.5–5.2)
PROT SERPL-MCNC: 7.9 G/DL (ref 6–8.5)
RBC # BLD AUTO: 4.84 10*6/MM3 (ref 3.77–5.28)
SODIUM SERPL-SCNC: 144 MMOL/L (ref 136–145)
WBC NRBC COR # BLD: 4.2 10*3/MM3 (ref 3.4–10.8)

## 2022-09-12 PROCEDURE — 96367 TX/PROPH/DG ADDL SEQ IV INF: CPT | Performed by: INTERNAL MEDICINE

## 2022-09-12 PROCEDURE — 99214 OFFICE O/P EST MOD 30 MIN: CPT | Performed by: INTERNAL MEDICINE

## 2022-09-12 PROCEDURE — 96409 CHEMO IV PUSH SNGL DRUG: CPT | Performed by: INTERNAL MEDICINE

## 2022-09-12 PROCEDURE — G0498 CHEMO EXTEND IV INFUS W/PUMP: HCPCS | Performed by: INTERNAL MEDICINE

## 2022-09-12 PROCEDURE — 25010000002 LEUCOVORIN CALCIUM PER 50 MG: Performed by: INTERNAL MEDICINE

## 2022-09-12 PROCEDURE — 96375 TX/PRO/DX INJ NEW DRUG ADDON: CPT | Performed by: INTERNAL MEDICINE

## 2022-09-12 PROCEDURE — 85025 COMPLETE CBC W/AUTO DIFF WBC: CPT

## 2022-09-12 PROCEDURE — 25010000002 DEXAMETHASONE SODIUM PHOSPHATE 100 MG/10ML SOLUTION: Performed by: INTERNAL MEDICINE

## 2022-09-12 PROCEDURE — 1123F ACP DISCUSS/DSCN MKR DOCD: CPT | Performed by: INTERNAL MEDICINE

## 2022-09-12 PROCEDURE — 96416 CHEMO PROLONG INFUSE W/PUMP: CPT | Performed by: INTERNAL MEDICINE

## 2022-09-12 PROCEDURE — 1126F AMNT PAIN NOTED NONE PRSNT: CPT | Performed by: INTERNAL MEDICINE

## 2022-09-12 PROCEDURE — 25010000002 FLUOROURACIL PER 500 MG: Performed by: INTERNAL MEDICINE

## 2022-09-12 PROCEDURE — 80053 COMPREHEN METABOLIC PANEL: CPT

## 2022-09-12 RX ORDER — CEFPROZIL 500 MG/1
500 TABLET, FILM COATED ORAL 2 TIMES DAILY
COMMUNITY
Start: 2022-09-07 | End: 2022-11-07

## 2022-09-12 RX ORDER — SODIUM CHLORIDE 9 MG/ML
250 INJECTION, SOLUTION INTRAVENOUS ONCE
Status: CANCELLED | OUTPATIENT
Start: 2022-09-12

## 2022-09-12 RX ORDER — SODIUM CHLORIDE 9 MG/ML
250 INJECTION, SOLUTION INTRAVENOUS ONCE
Status: COMPLETED | OUTPATIENT
Start: 2022-09-12 | End: 2022-09-12

## 2022-09-12 RX ORDER — NYSTATIN 100000 [USP'U]/G
POWDER TOPICAL
COMMUNITY
Start: 2022-09-07 | End: 2022-11-07

## 2022-09-12 RX ORDER — PROMETHAZINE HYDROCHLORIDE 12.5 MG/1
12.5 TABLET ORAL EVERY 6 HOURS PRN
Qty: 40 TABLET | Refills: 3 | Status: SHIPPED | OUTPATIENT
Start: 2022-09-12

## 2022-09-12 RX ORDER — FLUOROURACIL 50 MG/ML
400 INJECTION, SOLUTION INTRAVENOUS ONCE
Status: CANCELLED | OUTPATIENT
Start: 2022-09-12

## 2022-09-12 RX ORDER — FLUOROURACIL 50 MG/ML
400 INJECTION, SOLUTION INTRAVENOUS ONCE
Status: COMPLETED | OUTPATIENT
Start: 2022-09-12 | End: 2022-09-12

## 2022-09-12 RX ORDER — DEXTROMETHORPHAN HYDROBROMIDE AND PROMETHAZINE HYDROCHLORIDE 15; 6.25 MG/5ML; MG/5ML
SYRUP ORAL
COMMUNITY
Start: 2022-09-07 | End: 2022-11-07

## 2022-09-12 RX ORDER — POTASSIUM CHLORIDE 750 MG/1
20 CAPSULE, EXTENDED RELEASE ORAL DAILY
Qty: 60 CAPSULE | Refills: 1 | Status: SHIPPED | OUTPATIENT
Start: 2022-09-12 | End: 2022-10-04

## 2022-09-12 RX ADMIN — DEXAMETHASONE SODIUM PHOSPHATE 12 MG: 10 INJECTION, SOLUTION INTRAMUSCULAR; INTRAVENOUS at 09:58

## 2022-09-12 RX ADMIN — SODIUM CHLORIDE 250 ML: 9 INJECTION, SOLUTION INTRAVENOUS at 09:36

## 2022-09-12 RX ADMIN — FLUOROURACIL 680 MG: 50 INJECTION, SOLUTION INTRAVENOUS at 11:41

## 2022-09-12 RX ADMIN — FLUOROURACIL 4100 MG: 50 INJECTION, SOLUTION INTRAVENOUS at 11:59

## 2022-09-12 RX ADMIN — LEUCOVORIN CALCIUM 680 MG: 350 INJECTION, POWDER, LYOPHILIZED, FOR SOLUTION INTRAMUSCULAR; INTRAVENOUS at 10:30

## 2022-09-12 NOTE — PROGRESS NOTES
DATE OF VISIT: 9/12/2022      REASON FOR VISIT: Sigmoid colon cancer with recurrence involving omentum s/p surgery, oxaliplatin induced neuropathy      HISTORY OF PRESENT ILLNESS:   76-year-old female with medical problems significant for cardiomegaly who was initially seen in consultation on December 18, 2017 for rectosigmoid cancer, patient was diagnosed with recurrence on July 6, 2022 for which patient had omentectomy done.  Patient is currently on chemotherapy with 5-FU and leucovorin since August 8, 2022.  Patient is here to get cycle 3 of chemotherapy today.  Complains of chronic tingling and numbness affecting upper and lower extremity from oxaliplatin.  States she had severe fatigue with last chemotherapy.  Complains of persistent nausea without vomiting with cycle 2 of chemotherapy.  Complains of intermittent diarrhea.  States she had blister affecting her private area which is resolved now. Denies any new lymph node enlargement.  Denies any new chest pain or shortness of breath.              Oncology history:    1.  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  - Patient was started on FOLFOX on December 27, 2017.  Patient received total 12 cycles of FOLFOX between December 27, 2017 until June 13, 2018.  Dose of 5-FU and oxaliplatin was decreased by 20% with cycle 2 due to development of neutropenia.  - Patient was on surveillance since then.  - In June 2022, patient was found to have rising CEA level with CEA being at 23.  Initial CT scan did not show any evidence of recurrence.  - Patient had a colonoscopy by Dr. Arriola in July 2022 which was negative for any recurrence except for tubular adenoma with.  - PET/CT done on June 20 2022 showed isolated uptake in omental region.  Patient was evaluated by Dr. Packer and underwent omentectomy with solitary lymph node sampling which was negative for metastasis              Past Medical History,  "Past Surgical History, Social History, Family History have been reviewed and are without significant changes except as mentioned.    Review of Systems   Constitutional: Positive for unexpected weight change (Has lost 4 pounds since last clinic visit).      A comprehensive 14 point review of systems was performed and was negative except as mentioned in HPI.    Medications:  The current medication list was reviewed in the EMR    ALLERGIES:    Allergies   Allergen Reactions   • Codeine Other (See Comments)     \"dont remember\"       Objective      Vitals:    09/12/22 0907   BP: 154/70   Pulse: 65   Temp: 97.1 °F (36.2 °C)   TempSrc: Temporal   SpO2: 95%   Weight: 66.8 kg (147 lb 4.8 oz)   PainSc: 0-No pain     Current Status 8/8/2022   ECOG score 0       Physical Exam  Pulmonary:      Breath sounds: Normal breath sounds.   Neurological:      Mental Status: She is alert and oriented to person, place, and time.           RECENT LABS:  Glucose   Date Value Ref Range Status   09/12/2022 122 (H) 65 - 99 mg/dL Final     Sodium   Date Value Ref Range Status   09/12/2022 144 136 - 145 mmol/L Final   06/28/2022 143 136 - 145 mmol/L Final     Potassium   Date Value Ref Range Status   09/12/2022 3.4 (L) 3.5 - 5.2 mmol/L Final   06/28/2022 3.9 3.5 - 5.1 mmol/L Final     CO2   Date Value Ref Range Status   09/12/2022 30.0 (H) 22.0 - 29.0 mmol/L Final     Total CO2   Date Value Ref Range Status   06/28/2022 35 (H) 21 - 31 mmol/L Final     Chloride   Date Value Ref Range Status   09/12/2022 105 98 - 107 mmol/L Final   06/28/2022 103 98 - 107 mmol/L Final     Anion Gap   Date Value Ref Range Status   09/12/2022 9.0 5.0 - 15.0 mmol/L Final     Creatinine   Date Value Ref Range Status   09/12/2022 0.77 0.57 - 1.00 mg/dL Final   06/28/2022 0.6 (L) 0.7 - 1.3 mg/dL Final     BUN   Date Value Ref Range Status   09/12/2022 17 8 - 23 mg/dL Final   06/28/2022 12 7 - 25 mg/dL Final     BUN/Creatinine Ratio   Date Value Ref Range Status "   2022 22.1 7.0 - 25.0 Final     Calcium   Date Value Ref Range Status   2022 9.1 8.6 - 10.5 mg/dL Final   2022 9.3 8.6 - 10.3 mg/dL Final     eGFR Non  Amer   Date Value Ref Range Status   2021 80 >60 mL/min/1.73 Final     Alkaline Phosphatase   Date Value Ref Range Status   2022 93 39 - 117 U/L Final   2022 76 34 - 104 U/L Final     Total Protein   Date Value Ref Range Status   2022 7.9 6.0 - 8.5 g/dL Final     ALT (SGPT)   Date Value Ref Range Status   2022 14 1 - 33 U/L Final   2022 14 7 - 52 U/L Final     AST (SGOT)   Date Value Ref Range Status   2022 23 1 - 32 U/L Final   2022 17 13 - 39 U/L Final     Total Bilirubin   Date Value Ref Range Status   2022 0.3 0.0 - 1.2 mg/dL Final   2022 0.59 0.3 - 1.0 mg/dL Final     Albumin   Date Value Ref Range Status   2022 4.20 3.50 - 5.20 g/dL Final   2022 4.1 3.5 - 5.7 g/dL Final     Globulin   Date Value Ref Range Status   2022 3.7 gm/dL Final     Lab Results   Component Value Date    WBC 4.20 2022    HGB 13.2 2022    HCT 41.6 2022    MCV 86.0 2022     2022     Lab Results   Component Value Date    NEUTROABS 1.68 (L) 2022    IRON 58 2017    TIBC 312 2017    LABIRON 19 2017    FERRITIN 96.10 2017    [id number] 616 2017    FOLATE 7.20 2017     Lab Results   Component Value Date    CEA 23.60 2022    REFLABREPO  2022     [facility]  [address] [id number]  Phone: [phone] or [phone]  Fax: [phone]  Jeff Bustamante M.D., Medical Director    PATIENT NAME                                     LABORATORY NO.  1800   SEVEN ANDERSON.                             [id number]  [id number]                                 AGE                    SEX   SSN              CLIENT REF #  [place] [id number]         "1945           [id number]      [id number]    [place]                               REQUESTING M.D.           ATTENDING M.D.         COPY TO.  [address] [place] [address]                         MEI CHOWDHURY PEGGY  [place] [id number]                     DATE COLLECTED            DATE RECEIVED          DATE REPORTED  07/05/2022 07/05/2022 07/07/2022    DIAGNOSIS:  DESCENDING COLON POLYPS:  Tubular adenoma    JBS/pah    CLINICAL HISTORY:  Abnormal tumor markers    SPECIMENS RECEIVED:  DESCENDING COLON POLYPS    MICROSCOPIC DESCRIPTION:  Tissue blocks are prepared and slides are examined microscopically on all  specimens. See diagnosis for details.    Professional interpretation rendered by Rupert Steen M.D. at [facility], [address] [place] [address] , [place] [address].    GROSS DESCRIPTION:  Specimen is received in 1 formalin filled container \"large intestine,  left/descending colon polyps\" consists of 3 pieces of tan soft tissue measuring  0.4 x 0.3 x 0.2 cm.  Specimen is submitted entirely in 1 cassette.  MM    REVIEWED, DIAGNOSED AND ELECTRONICALLY  SIGNED BY:    Rupert Steen M.D.  CPT CODES:  [id number]           PATHOLOGY:  * Cannot find OR log *         RADIOLOGY DATA :  No radiology results for the last 7 days        Assessment & Plan     1.  Adenocarcinoma of sigmoid colon with recurrence  - Review oncology history for prior treatment details  - Due to local recurrence involving omentum, patient had omentectomy done on July 6, 2022  - Has been started on adjuvant 5-FU and leucovorin on August 8, 2022  - We will proceed with cycle 3 of chemotherapy today.  Since patient had excessive nausea and fatigue with last chemotherapy if continues to have problem with nausea upon next clinic visit in 2 weeks.  Neck step would be to decrease dose of 5-FU by 20% which was discussed with patient and her family.  - We will continue with close monitoring for " chemotherapy related cardiac effects.  - We will repeat CBC, CMP, CEA upon next clinic visit in 2 weeks.    2.  Oxaliplatin induced neuropathy  - Remains on gabapentin 100 mg 3 times a day    3.  Hypokalemia  - Potassium is 3.4  - Currently on potassium 10 mEq p.o. daily.  Will recommend increasing potassium to 20 mg p.o. daily at home from today.  Prescription for new potassium has been sent to her pharmacy today    4.  Hypertension    5.  Health maintenance: Patient quit smoking in 2020    6. Advance Care Planning: For now patient remains full code and is able to make decisions.  Patient has health care surrogate mentioned on chart.    7.  Chemotherapy-induced neutropenia:  - CBC from today shows white blood cell count is 4.20 with absolute neutrophil count of 1.68.  Since neutrophil count is greater than 1500 we will continue with chemotherapy as scheduled    8.  Nausea:  - Patient is currently on Zofran with minimal relief for nausea  - Patient will be started on Phenergan 12.5 mg every 6 hours as needed for nausea.      9.  Prescriptions: Prescription for Phenergan and potassium has been sent to her pharmacy today             PHQ-9 Total Score: 0   -Patient is not homicidal or suicidal.  No acute intervention required.    Mary Ann Condon reports a pain score of 0.  Given her pain assessment as noted, treatment options were discussed and the following options were decided upon as a follow-up plan to address the patient's pain: continuation of current treatment plan for pain.         Wil Abraham MD  9/12/2022  09:15 CDT        Part of this note may be an electronic transcription/translation of spoken language to printed text using the Dragon Dictation System.          CC:

## 2022-09-14 ENCOUNTER — APPOINTMENT (OUTPATIENT)
Dept: ONCOLOGY | Facility: HOSPITAL | Age: 77
End: 2022-09-14

## 2022-09-14 ENCOUNTER — INFUSION (OUTPATIENT)
Dept: ONCOLOGY | Facility: HOSPITAL | Age: 77
End: 2022-09-14

## 2022-09-14 DIAGNOSIS — Z45.2 ENCOUNTER FOR VENOUS ACCESS DEVICE CARE: Primary | ICD-10-CM

## 2022-09-14 PROCEDURE — 25010000002 HEPARIN LOCK FLUSH PER 10 UNITS: Performed by: INTERNAL MEDICINE

## 2022-09-14 RX ORDER — HEPARIN SODIUM (PORCINE) LOCK FLUSH IV SOLN 100 UNIT/ML 100 UNIT/ML
500 SOLUTION INTRAVENOUS AS NEEDED
Status: CANCELLED | OUTPATIENT
Start: 2022-09-26

## 2022-09-14 RX ORDER — SODIUM CHLORIDE 0.9 % (FLUSH) 0.9 %
10 SYRINGE (ML) INJECTION AS NEEDED
Status: DISCONTINUED | OUTPATIENT
Start: 2022-09-14 | End: 2022-09-14 | Stop reason: HOSPADM

## 2022-09-14 RX ORDER — HEPARIN SODIUM (PORCINE) LOCK FLUSH IV SOLN 100 UNIT/ML 100 UNIT/ML
500 SOLUTION INTRAVENOUS AS NEEDED
Status: DISCONTINUED | OUTPATIENT
Start: 2022-09-14 | End: 2022-09-14 | Stop reason: HOSPADM

## 2022-09-14 RX ORDER — SODIUM CHLORIDE 0.9 % (FLUSH) 0.9 %
10 SYRINGE (ML) INJECTION AS NEEDED
Status: CANCELLED | OUTPATIENT
Start: 2022-09-14

## 2022-09-14 RX ADMIN — Medication 10 ML: at 10:41

## 2022-09-14 RX ADMIN — HEPARIN 500 UNITS: 100 SYRINGE at 10:41

## 2022-09-23 NOTE — PROGRESS NOTES
DATE OF VISIT: 9/26/2022      REASON FOR VISIT: Colon cancer with recurrence involving omentum currently on chemotherapy, oxaliplatin induced neuropathy      HISTORY OF PRESENT ILLNESS:   76-year-old female with medical problems significant for cardiomegaly, rectosigmoid cancer initially diagnosed in December 2017 with recurrence diagnosed on July 6, 2022 for which patient had omentectomy done.  Patient is currently on chemotherapy with 5-FU and leucovorin since August 8, 2022.  Patient is here to get cycle 4 of chemotherapy today.  Complains of chronic neuropathy affecting upper and lower extremity.  Complains of worsening fatigue.  Denies any excessive nausea or vomiting or diarrhea with last cycle of chemotherapy.  Denies any new lymph node enlargement.  Denies any new chest pain or shortness of breath.            Oncology history:    1.  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  - Patient was started on FOLFOX on December 27, 2017.  Patient received total 12 cycles of FOLFOX between December 27, 2017 until June 13, 2018.  Dose of 5-FU and oxaliplatin was decreased by 20% with cycle 2 due to development of neutropenia.  - Patient was on surveillance since then.  - In June 2022, patient was found to have rising CEA level with CEA being at 23.  Initial CT scan did not show any evidence of recurrence.  - Patient had a colonoscopy by Dr. Arriola in July 2022 which was negative for any recurrence except for tubular adenoma with.  - PET/CT done on June 20 2022 showed isolated uptake in omental region.  Patient was evaluated by Dr. Packer and underwent omentectomy with solitary lymph node sampling which was negative for metastasis          Past Medical History, Past Surgical History, Social History, Family History have been reviewed and are without significant changes except as mentioned.    Review of Systems   A comprehensive 14 point review of systems  "was performed and was negative except as mentioned in HPI.    Medications:  The current medication list was reviewed in the EMR    ALLERGIES:    Allergies   Allergen Reactions   • Codeine Other (See Comments)     \"dont remember\"       Objective      Vitals:    09/26/22 1028   BP: 128/62   Pulse: 80   Temp: 97.5 °F (36.4 °C)   TempSrc: Temporal   SpO2: 97%   Weight: 66.8 kg (147 lb 4.8 oz)   PainSc: 0-No pain     Current Status 9/12/2022   ECOG score 0       Physical Exam  Pulmonary:      Breath sounds: Normal breath sounds.   Neurological:      Mental Status: She is alert and oriented to person, place, and time.           RECENT LABS:  Glucose   Date Value Ref Range Status   09/26/2022 110 (H) 65 - 99 mg/dL Final     Sodium   Date Value Ref Range Status   09/26/2022 141 136 - 145 mmol/L Final   06/28/2022 143 136 - 145 mmol/L Final     Potassium   Date Value Ref Range Status   09/26/2022 3.8 3.5 - 5.2 mmol/L Final   06/28/2022 3.9 3.5 - 5.1 mmol/L Final     CO2   Date Value Ref Range Status   09/26/2022 28.0 22.0 - 29.0 mmol/L Final     Total CO2   Date Value Ref Range Status   06/28/2022 35 (H) 21 - 31 mmol/L Final     Chloride   Date Value Ref Range Status   09/26/2022 104 98 - 107 mmol/L Final   06/28/2022 103 98 - 107 mmol/L Final     Anion Gap   Date Value Ref Range Status   09/26/2022 9.0 5.0 - 15.0 mmol/L Final     Creatinine   Date Value Ref Range Status   09/26/2022 0.81 0.57 - 1.00 mg/dL Final   06/28/2022 0.6 (L) 0.7 - 1.3 mg/dL Final     BUN   Date Value Ref Range Status   09/26/2022 14 8 - 23 mg/dL Final   06/28/2022 12 7 - 25 mg/dL Final     BUN/Creatinine Ratio   Date Value Ref Range Status   09/26/2022 17.3 7.0 - 25.0 Final     Calcium   Date Value Ref Range Status   09/26/2022 9.3 8.6 - 10.5 mg/dL Final   06/28/2022 9.3 8.6 - 10.3 mg/dL Final     eGFR Non  Amer   Date Value Ref Range Status   09/14/2021 80 >60 mL/min/1.73 Final     Alkaline Phosphatase   Date Value Ref Range Status "   2022 101 39 - 117 U/L Final   2022 76 34 - 104 U/L Final     Total Protein   Date Value Ref Range Status   2022 7.8 6.0 - 8.5 g/dL Final     ALT (SGPT)   Date Value Ref Range Status   2022 16 1 - 33 U/L Final   2022 14 7 - 52 U/L Final     AST (SGOT)   Date Value Ref Range Status   2022 20 1 - 32 U/L Final   2022 17 13 - 39 U/L Final     Total Bilirubin   Date Value Ref Range Status   2022 0.5 0.0 - 1.2 mg/dL Final   2022 0.59 0.3 - 1.0 mg/dL Final     Albumin   Date Value Ref Range Status   2022 4.20 3.50 - 5.20 g/dL Final   2022 4.1 3.5 - 5.7 g/dL Final     Globulin   Date Value Ref Range Status   2022 3.6 gm/dL Final     Lab Results   Component Value Date    WBC 7.20 2022    HGB 12.5 2022    HCT 39.2 2022    MCV 85.4 2022     2022     Lab Results   Component Value Date    NEUTROABS 5.21 2022    IRON 58 2017    TIBC 312 2017    LABIRON 19 2017    FERRITIN 96.10 2017    [id number] 616 2017    FOLATE 7.20 2017     Lab Results   Component Value Date    CEA 23.60 2022    REFLABREPO  2022     [facility]  [address] [place] [address]  Phone: [phone] or [phone]  Fax: [phone]  Jeff Bustamante M.D., Medical Director    PATIENT NAME                                     LABORATORY NO.  1800   SEVEN ANDERSON ABDI.                             [id number]  [id number]                                 AGE                    SEX   SSN              CLIENT REF #  [facility]                   76        1945      F     [id number]      [id number]    [place]                               REQUESTING M.D.           ATTENDING M.D.         COPY TO.  [address] [place] [address]                         [place] [address]                     DATE COLLECTED             "DATE RECEIVED          DATE REPORTED  07/05/2022 07/05/2022 07/07/2022    DIAGNOSIS:  DESCENDING COLON POLYPS:  Tubular adenoma    JBS/pah    CLINICAL HISTORY:  Abnormal tumor markers    SPECIMENS RECEIVED:  DESCENDING COLON POLYPS    MICROSCOPIC DESCRIPTION:  Tissue blocks are prepared and slides are examined microscopically on all  specimens. See diagnosis for details.    Professional interpretation rendered by Rupert Steen M.D. at [facility],  [email or web address], [address] [place] [address].    GROSS DESCRIPTION:  Specimen is received in 1 formalin filled container \"large intestine,  left/descending colon polyps\" consists of 3 pieces of tan soft tissue measuring  0.4 x 0.3 x 0.2 cm.  Specimen is submitted entirely in 1 cassette.  MM    REVIEWED, DIAGNOSED AND ELECTRONICALLY  SIGNED BY:    Rupert Steen M.D.  CPT CODES:  [id number]           PATHOLOGY:  * Cannot find OR log *         RADIOLOGY DATA :  No radiology results for the last 7 days        Assessment & Plan     1.  Adenocarcinoma of sigmoid colon with recurrence  - Review oncology history for prior treatment details  - Due to local recurrence involving omentum, patient had omentectomy done on July 6, 2022  - Has been started on adjuvant 5-FU and leucovorin on August 6, 2022.  - Patient denies any excessive nausea or vomiting or diarrhea with cycle 3 of chemotherapy.  -CEA level today is normal at 2.26  - We will continue with cycle 4 of chemotherapy with full dose of 5-FU and leucovorin.  If patient starts having any worsening tolerance to chemo or significant cytopenia we can cut dose of 5-FU by 20% which has been discussed with patient  - Patient will return to clinic in 2 weeks with repeat CBC, CMP on that day.  - We will continue with close monitoring for chemotherapy related side effects    2.  Oxaliplatin induced neuropathy  - Remains on gabapentin 100 mg 3 times a day    3.  Hypokalemia  - Potassium 3.8  - Currently on " potassium 20 mg p.o. daily at home    4.  Hypertension    5.  Health maintenance: Patient quit smoking in 2020    6. Advance Care Planning: For now patient remains full code and is able to make decisions.  Patient has health care surrogate mentioned on chart.             PHQ-9 Total Score: 0   -Patient is not homicidal or suicidal.  No acute intervention required.    Mary Ann Condon reports a pain score of 0.  Given her pain assessment as noted, treatment options were discussed and the following options were decided upon as a follow-up plan to address the patient's pain: continuation of current treatment plan for pain.         Wil Abraham MD  9/26/2022  10:36 CDT        Part of this note may be an electronic transcription/translation of spoken language to printed text using the Dragon Dictation System.          CC:

## 2022-09-26 ENCOUNTER — OFFICE VISIT (OUTPATIENT)
Dept: ONCOLOGY | Facility: CLINIC | Age: 77
End: 2022-09-26

## 2022-09-26 ENCOUNTER — INFUSION (OUTPATIENT)
Dept: ONCOLOGY | Facility: HOSPITAL | Age: 77
End: 2022-09-26

## 2022-09-26 VITALS
BODY MASS INDEX: 26.94 KG/M2 | TEMPERATURE: 97.5 F | WEIGHT: 147.3 LBS | SYSTOLIC BLOOD PRESSURE: 128 MMHG | OXYGEN SATURATION: 97 % | DIASTOLIC BLOOD PRESSURE: 62 MMHG | HEART RATE: 80 BPM

## 2022-09-26 DIAGNOSIS — E87.6 HYPOKALEMIA: Chronic | ICD-10-CM

## 2022-09-26 DIAGNOSIS — C19 CANCER OF RECTOSIGMOID (COLON): Primary | ICD-10-CM

## 2022-09-26 DIAGNOSIS — C18.9 LOCAL RECURRENCE OF COLON CANCER: ICD-10-CM

## 2022-09-26 DIAGNOSIS — C19 CANCER OF RECTOSIGMOID (COLON): Primary | Chronic | ICD-10-CM

## 2022-09-26 DIAGNOSIS — C18.9 LOCAL RECURRENCE OF COLON CANCER: Chronic | ICD-10-CM

## 2022-09-26 DIAGNOSIS — T45.1X5A NEUROPATHY DUE TO CHEMOTHERAPEUTIC DRUG: Chronic | ICD-10-CM

## 2022-09-26 DIAGNOSIS — Z45.2 ENCOUNTER FOR VENOUS ACCESS DEVICE CARE: ICD-10-CM

## 2022-09-26 DIAGNOSIS — G62.0 NEUROPATHY DUE TO CHEMOTHERAPEUTIC DRUG: Chronic | ICD-10-CM

## 2022-09-26 LAB
ALBUMIN SERPL-MCNC: 4.2 G/DL (ref 3.5–5.2)
ALBUMIN/GLOB SERPL: 1.2 G/DL
ALP SERPL-CCNC: 101 U/L (ref 39–117)
ALT SERPL W P-5'-P-CCNC: 16 U/L (ref 1–33)
ANION GAP SERPL CALCULATED.3IONS-SCNC: 9 MMOL/L (ref 5–15)
AST SERPL-CCNC: 20 U/L (ref 1–32)
BASOPHILS # BLD AUTO: 0.02 10*3/MM3 (ref 0–0.2)
BASOPHILS NFR BLD AUTO: 0.3 % (ref 0–1.5)
BILIRUB SERPL-MCNC: 0.5 MG/DL (ref 0–1.2)
BUN SERPL-MCNC: 14 MG/DL (ref 8–23)
BUN/CREAT SERPL: 17.3 (ref 7–25)
CALCIUM SPEC-SCNC: 9.3 MG/DL (ref 8.6–10.5)
CEA SERPL-MCNC: 2.26 NG/ML
CHLORIDE SERPL-SCNC: 104 MMOL/L (ref 98–107)
CO2 SERPL-SCNC: 28 MMOL/L (ref 22–29)
CREAT SERPL-MCNC: 0.81 MG/DL (ref 0.57–1)
DEPRECATED RDW RBC AUTO: 47.5 FL (ref 37–54)
EGFRCR SERPLBLD CKD-EPI 2021: 75.3 ML/MIN/1.73
EOSINOPHIL # BLD AUTO: 0.1 10*3/MM3 (ref 0–0.4)
EOSINOPHIL NFR BLD AUTO: 1.4 % (ref 0.3–6.2)
ERYTHROCYTE [DISTWIDTH] IN BLOOD BY AUTOMATED COUNT: 15.9 % (ref 12.3–15.4)
GLOBULIN UR ELPH-MCNC: 3.6 GM/DL
GLUCOSE SERPL-MCNC: 110 MG/DL (ref 65–99)
HCT VFR BLD AUTO: 39.2 % (ref 34–46.6)
HGB BLD-MCNC: 12.5 G/DL (ref 12–15.9)
IMM GRANULOCYTES # BLD AUTO: 0.02 10*3/MM3 (ref 0–0.05)
IMM GRANULOCYTES NFR BLD AUTO: 0.3 % (ref 0–0.5)
LYMPHOCYTES # BLD AUTO: 1.26 10*3/MM3 (ref 0.7–3.1)
LYMPHOCYTES NFR BLD AUTO: 17.5 % (ref 19.6–45.3)
MCH RBC QN AUTO: 27.2 PG (ref 26.6–33)
MCHC RBC AUTO-ENTMCNC: 31.9 G/DL (ref 31.5–35.7)
MCV RBC AUTO: 85.4 FL (ref 79–97)
MONOCYTES # BLD AUTO: 0.59 10*3/MM3 (ref 0.1–0.9)
MONOCYTES NFR BLD AUTO: 8.2 % (ref 5–12)
NEUTROPHILS NFR BLD AUTO: 5.21 10*3/MM3 (ref 1.7–7)
NEUTROPHILS NFR BLD AUTO: 72.3 % (ref 42.7–76)
NRBC BLD AUTO-RTO: 0 /100 WBC (ref 0–0.2)
PLATELET # BLD AUTO: 357 10*3/MM3 (ref 140–450)
PMV BLD AUTO: 9.4 FL (ref 6–12)
POTASSIUM SERPL-SCNC: 3.8 MMOL/L (ref 3.5–5.2)
PROT SERPL-MCNC: 7.8 G/DL (ref 6–8.5)
RBC # BLD AUTO: 4.59 10*6/MM3 (ref 3.77–5.28)
SODIUM SERPL-SCNC: 141 MMOL/L (ref 136–145)
WBC NRBC COR # BLD: 7.2 10*3/MM3 (ref 3.4–10.8)

## 2022-09-26 PROCEDURE — 82378 CARCINOEMBRYONIC ANTIGEN: CPT

## 2022-09-26 PROCEDURE — 85025 COMPLETE CBC W/AUTO DIFF WBC: CPT

## 2022-09-26 PROCEDURE — 80053 COMPREHEN METABOLIC PANEL: CPT

## 2022-09-26 PROCEDURE — 96367 TX/PROPH/DG ADDL SEQ IV INF: CPT | Performed by: INTERNAL MEDICINE

## 2022-09-26 PROCEDURE — 1123F ACP DISCUSS/DSCN MKR DOCD: CPT | Performed by: INTERNAL MEDICINE

## 2022-09-26 PROCEDURE — 1126F AMNT PAIN NOTED NONE PRSNT: CPT | Performed by: INTERNAL MEDICINE

## 2022-09-26 PROCEDURE — 96416 CHEMO PROLONG INFUSE W/PUMP: CPT | Performed by: INTERNAL MEDICINE

## 2022-09-26 PROCEDURE — 96409 CHEMO IV PUSH SNGL DRUG: CPT | Performed by: INTERNAL MEDICINE

## 2022-09-26 PROCEDURE — 25010000002 LEUCOVORIN CALCIUM PER 50 MG: Performed by: INTERNAL MEDICINE

## 2022-09-26 PROCEDURE — G0498 CHEMO EXTEND IV INFUS W/PUMP: HCPCS | Performed by: INTERNAL MEDICINE

## 2022-09-26 PROCEDURE — 99214 OFFICE O/P EST MOD 30 MIN: CPT | Performed by: INTERNAL MEDICINE

## 2022-09-26 PROCEDURE — 96375 TX/PRO/DX INJ NEW DRUG ADDON: CPT | Performed by: INTERNAL MEDICINE

## 2022-09-26 PROCEDURE — 25010000002 DEXAMETHASONE SODIUM PHOSPHATE 100 MG/10ML SOLUTION: Performed by: INTERNAL MEDICINE

## 2022-09-26 PROCEDURE — 25010000002 FLUOROURACIL PER 500 MG: Performed by: INTERNAL MEDICINE

## 2022-09-26 RX ORDER — FLUOROURACIL 50 MG/ML
400 INJECTION, SOLUTION INTRAVENOUS ONCE
Status: COMPLETED | OUTPATIENT
Start: 2022-09-26 | End: 2022-09-26

## 2022-09-26 RX ORDER — SODIUM CHLORIDE 0.9 % (FLUSH) 0.9 %
10 SYRINGE (ML) INJECTION AS NEEDED
Status: DISCONTINUED | OUTPATIENT
Start: 2022-09-26 | End: 2022-09-26 | Stop reason: HOSPADM

## 2022-09-26 RX ORDER — HEPARIN SODIUM (PORCINE) LOCK FLUSH IV SOLN 100 UNIT/ML 100 UNIT/ML
500 SOLUTION INTRAVENOUS AS NEEDED
Status: DISCONTINUED | OUTPATIENT
Start: 2022-09-26 | End: 2022-09-26 | Stop reason: HOSPADM

## 2022-09-26 RX ORDER — SODIUM CHLORIDE 0.9 % (FLUSH) 0.9 %
10 SYRINGE (ML) INJECTION AS NEEDED
Status: CANCELLED | OUTPATIENT
Start: 2022-09-26

## 2022-09-26 RX ORDER — SODIUM CHLORIDE 9 MG/ML
250 INJECTION, SOLUTION INTRAVENOUS ONCE
Status: CANCELLED | OUTPATIENT
Start: 2022-09-26

## 2022-09-26 RX ORDER — HEPARIN SODIUM (PORCINE) LOCK FLUSH IV SOLN 100 UNIT/ML 100 UNIT/ML
500 SOLUTION INTRAVENOUS AS NEEDED
Status: CANCELLED | OUTPATIENT
Start: 2022-09-28

## 2022-09-26 RX ORDER — SODIUM CHLORIDE 9 MG/ML
250 INJECTION, SOLUTION INTRAVENOUS ONCE
Status: COMPLETED | OUTPATIENT
Start: 2022-09-26 | End: 2022-09-26

## 2022-09-26 RX ORDER — FLUOROURACIL 50 MG/ML
400 INJECTION, SOLUTION INTRAVENOUS ONCE
Status: CANCELLED | OUTPATIENT
Start: 2022-09-26

## 2022-09-26 RX ADMIN — FLUOROURACIL 680 MG: 50 INJECTION, SOLUTION INTRAVENOUS at 13:21

## 2022-09-26 RX ADMIN — FLUOROURACIL 4100 MG: 50 INJECTION, SOLUTION INTRAVENOUS at 13:43

## 2022-09-26 RX ADMIN — LEUCOVORIN CALCIUM 680 MG: 350 INJECTION, POWDER, LYOPHILIZED, FOR SUSPENSION INTRAMUSCULAR; INTRAVENOUS at 12:37

## 2022-09-26 RX ADMIN — SODIUM CHLORIDE 250 ML: 9 INJECTION, SOLUTION INTRAVENOUS at 11:43

## 2022-09-26 RX ADMIN — DEXAMETHASONE SODIUM PHOSPHATE 12 MG: 10 INJECTION, SOLUTION INTRAMUSCULAR; INTRAVENOUS at 11:54

## 2022-09-27 ENCOUNTER — TELEPHONE (OUTPATIENT)
Dept: ONCOLOGY | Facility: HOSPITAL | Age: 77
End: 2022-09-27

## 2022-09-27 NOTE — TELEPHONE ENCOUNTER
----- Message from Wil Abraham MD sent at 9/27/2022  7:19 AM CDT -----  Regarding: Labs  Please let patient know, CEA level is normal at 2.26.  Thank you

## 2022-09-28 ENCOUNTER — DOCUMENTATION (OUTPATIENT)
Dept: ONCOLOGY | Facility: HOSPITAL | Age: 77
End: 2022-09-28

## 2022-09-28 ENCOUNTER — INFUSION (OUTPATIENT)
Dept: ONCOLOGY | Facility: HOSPITAL | Age: 77
End: 2022-09-28

## 2022-09-28 ENCOUNTER — HOSPITAL ENCOUNTER (OUTPATIENT)
Dept: ULTRASOUND IMAGING | Facility: HOSPITAL | Age: 77
Discharge: HOME | End: 2022-09-28
Admitting: INTERNAL MEDICINE

## 2022-09-28 DIAGNOSIS — Z45.2 ENCOUNTER FOR VENOUS ACCESS DEVICE CARE: Primary | ICD-10-CM

## 2022-09-28 DIAGNOSIS — M79.89 SWELLING OF RIGHT UPPER EXTREMITY: Primary | ICD-10-CM

## 2022-09-28 PROCEDURE — 93971 EXTREMITY STUDY: CPT

## 2022-09-28 PROCEDURE — 25010000002 HEPARIN LOCK FLUSH PER 10 UNITS: Performed by: INTERNAL MEDICINE

## 2022-09-28 RX ORDER — SODIUM CHLORIDE 0.9 % (FLUSH) 0.9 %
10 SYRINGE (ML) INJECTION AS NEEDED
Status: DISCONTINUED | OUTPATIENT
Start: 2022-09-28 | End: 2022-09-28 | Stop reason: HOSPADM

## 2022-09-28 RX ORDER — HEPARIN SODIUM (PORCINE) LOCK FLUSH IV SOLN 100 UNIT/ML 100 UNIT/ML
500 SOLUTION INTRAVENOUS AS NEEDED
Status: CANCELLED | OUTPATIENT
Start: 2022-10-10

## 2022-09-28 RX ORDER — SODIUM CHLORIDE 0.9 % (FLUSH) 0.9 %
10 SYRINGE (ML) INJECTION AS NEEDED
Status: CANCELLED | OUTPATIENT
Start: 2022-10-10

## 2022-09-28 RX ORDER — HEPARIN SODIUM (PORCINE) LOCK FLUSH IV SOLN 100 UNIT/ML 100 UNIT/ML
500 SOLUTION INTRAVENOUS AS NEEDED
Status: DISCONTINUED | OUTPATIENT
Start: 2022-09-28 | End: 2022-09-28 | Stop reason: HOSPADM

## 2022-09-28 RX ADMIN — HEPARIN 500 UNITS: 100 SYRINGE at 13:37

## 2022-09-28 RX ADMIN — Medication 10 ML: at 13:37

## 2022-09-28 NOTE — NURSING NOTE
Pt arrived to pump unhook appt and complained of pain to right arm, especially at elbow area.  Significant swelling noted compared to left arm. Port placement verified with blood return and flushed easily for pump unhook.  Dr Abraham notified and assessed in person.

## 2022-09-28 NOTE — PROGRESS NOTES
Patient came to infusion today for pump unhooked for 5-FU.  Patient mention it to nurse that her right elbow and arm is swollen since this morning.  Patient had a Doppler ultrasound ordered that showed DVT involving right upper extremity.  Result of Doppler ultrasound showing DVT was discussed with patient.  Recommend anticoagulation in view of DVT.  Anticoagulation option with Lovenox plus Coumadin versus newer anticoagulant like Xarelto or Eliquis were discussed with patient.  Risk and benefit of each anticoagulation were discussed with patient.  After lengthy discussion patient opted for newer anticoagulant.  Prescription for Xarelto 15 mg twice daily for 21 days has been sent to her pharmacy today.  Plan is to change her to 20 mg once a day after initial 3 weeks of twice daily dosing of Xarelto.  Patient was instructed to come to the emergency room if she starts having any abnormal bleeding after starting Xarelto.

## 2022-09-30 ENCOUNTER — APPOINTMENT (OUTPATIENT)
Dept: CT IMAGING | Facility: HOSPITAL | Age: 77
End: 2022-09-30

## 2022-10-04 RX ORDER — POTASSIUM CHLORIDE 750 MG/1
CAPSULE, EXTENDED RELEASE ORAL
Qty: 60 CAPSULE | Refills: 1 | Status: SHIPPED | OUTPATIENT
Start: 2022-10-04 | End: 2022-11-02

## 2022-10-04 NOTE — TELEPHONE ENCOUNTER
Rx Refill Note  Requested Prescriptions     Pending Prescriptions Disp Refills   • potassium chloride (MICRO-K) 10 MEQ CR capsule [Pharmacy Med Name: POTASSIUM CL ER 10 MEQ CAPSULE] 60 capsule 1     Sig: TAKE 2 CAPSULES BY MOUTH EVERY DAY      Last office visit with prescribing clinician: 9/26/2022      Next office visit with prescribing clinician: Visit date not found            Jagdeep Wagoner Rep  10/04/22, 08:34 CDT

## 2022-10-10 ENCOUNTER — OFFICE VISIT (OUTPATIENT)
Dept: ONCOLOGY | Facility: CLINIC | Age: 77
End: 2022-10-10

## 2022-10-10 ENCOUNTER — INFUSION (OUTPATIENT)
Dept: ONCOLOGY | Facility: HOSPITAL | Age: 77
End: 2022-10-10

## 2022-10-10 VITALS
RESPIRATION RATE: 18 BRPM | OXYGEN SATURATION: 99 % | DIASTOLIC BLOOD PRESSURE: 56 MMHG | SYSTOLIC BLOOD PRESSURE: 122 MMHG | BODY MASS INDEX: 27.62 KG/M2 | WEIGHT: 151 LBS | TEMPERATURE: 96.8 F | HEART RATE: 66 BPM

## 2022-10-10 DIAGNOSIS — C19 CANCER OF RECTOSIGMOID (COLON): Primary | ICD-10-CM

## 2022-10-10 DIAGNOSIS — T45.1X5A ANEMIA DUE TO ANTINEOPLASTIC CHEMOTHERAPY: Primary | ICD-10-CM

## 2022-10-10 DIAGNOSIS — T45.1X5A ANEMIA DUE TO ANTINEOPLASTIC CHEMOTHERAPY: ICD-10-CM

## 2022-10-10 DIAGNOSIS — D64.81 ANEMIA DUE TO ANTINEOPLASTIC CHEMOTHERAPY: Primary | ICD-10-CM

## 2022-10-10 DIAGNOSIS — D64.81 ANEMIA DUE TO ANTINEOPLASTIC CHEMOTHERAPY: ICD-10-CM

## 2022-10-10 DIAGNOSIS — C18.9 LOCAL RECURRENCE OF COLON CANCER: ICD-10-CM

## 2022-10-10 LAB
ALBUMIN SERPL-MCNC: 4 G/DL (ref 3.5–5.2)
ALBUMIN/GLOB SERPL: 1.4 G/DL
ALP SERPL-CCNC: 81 U/L (ref 39–117)
ALT SERPL W P-5'-P-CCNC: 11 U/L (ref 1–33)
ANION GAP SERPL CALCULATED.3IONS-SCNC: 10 MMOL/L (ref 5–15)
AST SERPL-CCNC: 16 U/L (ref 1–32)
BASOPHILS # BLD AUTO: 0.04 10*3/MM3 (ref 0–0.2)
BASOPHILS NFR BLD AUTO: 1 % (ref 0–1.5)
BILIRUB SERPL-MCNC: 0.4 MG/DL (ref 0–1.2)
BUN SERPL-MCNC: 14 MG/DL (ref 8–23)
BUN/CREAT SERPL: 20.6 (ref 7–25)
CALCIUM SPEC-SCNC: 9.1 MG/DL (ref 8.6–10.5)
CHLORIDE SERPL-SCNC: 105 MMOL/L (ref 98–107)
CO2 SERPL-SCNC: 27 MMOL/L (ref 22–29)
CREAT SERPL-MCNC: 0.68 MG/DL (ref 0.57–1)
DEPRECATED RDW RBC AUTO: 49.3 FL (ref 37–54)
EGFRCR SERPLBLD CKD-EPI 2021: 90.4 ML/MIN/1.73
EOSINOPHIL # BLD AUTO: 0.17 10*3/MM3 (ref 0–0.4)
EOSINOPHIL NFR BLD AUTO: 4.4 % (ref 0.3–6.2)
ERYTHROCYTE [DISTWIDTH] IN BLOOD BY AUTOMATED COUNT: 16.5 % (ref 12.3–15.4)
FERRITIN SERPL-MCNC: 161.7 NG/ML (ref 13–150)
FOLATE SERPL-MCNC: >20 NG/ML (ref 4.78–24.2)
GLOBULIN UR ELPH-MCNC: 2.9 GM/DL
GLUCOSE SERPL-MCNC: 100 MG/DL (ref 65–99)
HCT VFR BLD AUTO: 34.9 % (ref 34–46.6)
HGB BLD-MCNC: 10.7 G/DL (ref 12–15.9)
HOLD SPECIMEN: NORMAL
IMM GRANULOCYTES # BLD AUTO: 0.01 10*3/MM3 (ref 0–0.05)
IMM GRANULOCYTES NFR BLD AUTO: 0.3 % (ref 0–0.5)
IRON 24H UR-MRATE: 64 MCG/DL (ref 37–145)
IRON SATN MFR SERPL: 16 % (ref 20–50)
LYMPHOCYTES # BLD AUTO: 0.95 10*3/MM3 (ref 0.7–3.1)
LYMPHOCYTES NFR BLD AUTO: 24.5 % (ref 19.6–45.3)
MCH RBC QN AUTO: 26 PG (ref 26.6–33)
MCHC RBC AUTO-ENTMCNC: 30.7 G/DL (ref 31.5–35.7)
MCV RBC AUTO: 84.9 FL (ref 79–97)
MONOCYTES # BLD AUTO: 0.32 10*3/MM3 (ref 0.1–0.9)
MONOCYTES NFR BLD AUTO: 8.3 % (ref 5–12)
NEUTROPHILS NFR BLD AUTO: 2.38 10*3/MM3 (ref 1.7–7)
NEUTROPHILS NFR BLD AUTO: 61.5 % (ref 42.7–76)
NRBC BLD AUTO-RTO: 0 /100 WBC (ref 0–0.2)
PLATELET # BLD AUTO: 822 10*3/MM3 (ref 140–450)
PMV BLD AUTO: 8.8 FL (ref 6–12)
POTASSIUM SERPL-SCNC: 3.5 MMOL/L (ref 3.5–5.2)
PROT SERPL-MCNC: 6.9 G/DL (ref 6–8.5)
RBC # BLD AUTO: 4.11 10*6/MM3 (ref 3.77–5.28)
SODIUM SERPL-SCNC: 142 MMOL/L (ref 136–145)
TIBC SERPL-MCNC: 408 MCG/DL (ref 298–536)
TRANSFERRIN SERPL-MCNC: 274 MG/DL (ref 200–360)
VIT B12 BLD-MCNC: 482 PG/ML (ref 211–946)
WBC NRBC COR # BLD: 3.87 10*3/MM3 (ref 3.4–10.8)

## 2022-10-10 PROCEDURE — 82607 VITAMIN B-12: CPT

## 2022-10-10 PROCEDURE — 96367 TX/PROPH/DG ADDL SEQ IV INF: CPT | Performed by: NURSE PRACTITIONER

## 2022-10-10 PROCEDURE — 96375 TX/PRO/DX INJ NEW DRUG ADDON: CPT | Performed by: NURSE PRACTITIONER

## 2022-10-10 PROCEDURE — 96416 CHEMO PROLONG INFUSE W/PUMP: CPT | Performed by: NURSE PRACTITIONER

## 2022-10-10 PROCEDURE — G0498 CHEMO EXTEND IV INFUS W/PUMP: HCPCS | Performed by: NURSE PRACTITIONER

## 2022-10-10 PROCEDURE — 99214 OFFICE O/P EST MOD 30 MIN: CPT | Performed by: NURSE PRACTITIONER

## 2022-10-10 PROCEDURE — 82728 ASSAY OF FERRITIN: CPT

## 2022-10-10 PROCEDURE — 25010000002 DEXAMETHASONE SODIUM PHOSPHATE 100 MG/10ML SOLUTION: Performed by: NURSE PRACTITIONER

## 2022-10-10 PROCEDURE — 84466 ASSAY OF TRANSFERRIN: CPT

## 2022-10-10 PROCEDURE — 36415 COLL VENOUS BLD VENIPUNCTURE: CPT

## 2022-10-10 PROCEDURE — 25010000002 FLUOROURACIL PER 500 MG: Performed by: NURSE PRACTITIONER

## 2022-10-10 PROCEDURE — 85025 COMPLETE CBC W/AUTO DIFF WBC: CPT

## 2022-10-10 PROCEDURE — 83540 ASSAY OF IRON: CPT

## 2022-10-10 PROCEDURE — 96409 CHEMO IV PUSH SNGL DRUG: CPT | Performed by: NURSE PRACTITIONER

## 2022-10-10 PROCEDURE — 25010000002 LEUCOVORIN CALCIUM PER 50 MG: Performed by: NURSE PRACTITIONER

## 2022-10-10 PROCEDURE — 82746 ASSAY OF FOLIC ACID SERUM: CPT

## 2022-10-10 PROCEDURE — 80053 COMPREHEN METABOLIC PANEL: CPT

## 2022-10-10 RX ORDER — FLUOROURACIL 50 MG/ML
400 INJECTION, SOLUTION INTRAVENOUS ONCE
Status: COMPLETED | OUTPATIENT
Start: 2022-10-10 | End: 2022-10-10

## 2022-10-10 RX ORDER — FLUOROURACIL 50 MG/ML
400 INJECTION, SOLUTION INTRAVENOUS ONCE
Status: CANCELLED | OUTPATIENT
Start: 2022-10-10

## 2022-10-10 RX ORDER — SODIUM CHLORIDE 9 MG/ML
250 INJECTION, SOLUTION INTRAVENOUS ONCE
Status: CANCELLED | OUTPATIENT
Start: 2022-10-10

## 2022-10-10 RX ORDER — SODIUM CHLORIDE 9 MG/ML
250 INJECTION, SOLUTION INTRAVENOUS ONCE
Status: COMPLETED | OUTPATIENT
Start: 2022-10-10 | End: 2022-10-10

## 2022-10-10 RX ADMIN — DEXAMETHASONE SODIUM PHOSPHATE 12 MG: 10 INJECTION, SOLUTION INTRAMUSCULAR; INTRAVENOUS at 10:35

## 2022-10-10 RX ADMIN — FLUOROURACIL 4100 MG: 50 INJECTION, SOLUTION INTRAVENOUS at 12:00

## 2022-10-10 RX ADMIN — LEUCOVORIN CALCIUM 680 MG: 350 INJECTION, POWDER, LYOPHILIZED, FOR SOLUTION INTRAMUSCULAR; INTRAVENOUS at 11:10

## 2022-10-10 RX ADMIN — FLUOROURACIL 680 MG: 50 INJECTION, SOLUTION INTRAVENOUS at 11:49

## 2022-10-10 RX ADMIN — SODIUM CHLORIDE 250 ML: 9 INJECTION, SOLUTION INTRAVENOUS at 10:35

## 2022-10-10 NOTE — PROGRESS NOTES
DATE OF VISIT: 10/10/2022      REASON FOR VISIT:  Colon cancer with recurrence involving omentum currently on chemotherapy, oxaliplatin induced neuropathy        HISTORY OF PRESENT ILLNESS:   76-year-old female with medical problems significant for cardiomegaly, rectosigmoid cancer initially diagnosed in December 2017 with recurrence diagnosed on July 6, 2022 for which patient had omentectomy done.  Patient is currently on chemotherapy with 5-FU and leucovorin since August 8, 2022.  Patient is here to get cycle 5 of chemotherapy today.     On 9/28 patient came into clinic to have in fusional pump removed.  She had pain and swelling in right elbow and arm and was sent for doppler and found to have DVT; results discussed with patient and she was ultimately place on Xarelto 15 mg twice daily for 21 days and then plan is to change to 20 mg daily after her initial 3 week bid dosing.      Oncology history:     1.  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  - Patient was started on FOLFOX on December 27, 2017.  Patient received total 12 cycles of FOLFOX between December 27, 2017 until June 13, 2018.  Dose of 5-FU and oxaliplatin was decreased by 20% with cycle 2 due to development of neutropenia.  - Patient was on surveillance since then.  - In June 2022, patient was found to have rising CEA level with CEA being at 23.  Initial CT scan did not show any evidence of recurrence.  - Patient had a colonoscopy by Dr. Arriola in July 2022 which was negative for any recurrence except for tubular adenoma with.  - PET/CT done on June 20 2022 showed isolated uptake in omental region.  Patient was evaluated by Dr. Packer and underwent omentectomy with solitary lymph node sampling which was negative for metastasis        Past Medical History, Past Surgical History, Social History, Family History have been reviewed and are without significant changes except as  "mentioned.    Review of Systems   A comprehensive 14 point review of systems was performed and was negative except as mentioned.    Medications:  The current medication list was reviewed in the EMR    ALLERGIES:    Allergies   Allergen Reactions   • Codeine Other (See Comments)     \"dont remember\"       Objective      Vitals:    10/10/22 0836   BP: 122/56   Pulse: 66   Resp: 18   Temp: 96.8 °F (36 °C)   SpO2: 99%   Weight: 68.5 kg (151 lb)   PainSc: 0-No pain     Current Status 9/26/2022   ECOG score 0       Physical Exam  General: alert and oriented   Lungs; normal breath sounds  Ext; right arm swelling almost resolved    RECENT LABS:  Glucose   Date Value Ref Range Status   10/10/2022 100 (H) 65 - 99 mg/dL Final     Sodium   Date Value Ref Range Status   10/10/2022 142 136 - 145 mmol/L Final   06/28/2022 143 136 - 145 mmol/L Final     Potassium   Date Value Ref Range Status   10/10/2022 3.5 3.5 - 5.2 mmol/L Final   06/28/2022 3.9 3.5 - 5.1 mmol/L Final     CO2   Date Value Ref Range Status   10/10/2022 27.0 22.0 - 29.0 mmol/L Final     Total CO2   Date Value Ref Range Status   06/28/2022 35 (H) 21 - 31 mmol/L Final     Chloride   Date Value Ref Range Status   10/10/2022 105 98 - 107 mmol/L Final   06/28/2022 103 98 - 107 mmol/L Final     Anion Gap   Date Value Ref Range Status   10/10/2022 10.0 5.0 - 15.0 mmol/L Final     Creatinine   Date Value Ref Range Status   10/10/2022 0.68 0.57 - 1.00 mg/dL Final   06/28/2022 0.6 (L) 0.7 - 1.3 mg/dL Final     BUN   Date Value Ref Range Status   10/10/2022 14 8 - 23 mg/dL Final   06/28/2022 12 7 - 25 mg/dL Final     BUN/Creatinine Ratio   Date Value Ref Range Status   10/10/2022 20.6 7.0 - 25.0 Final     Calcium   Date Value Ref Range Status   10/10/2022 9.1 8.6 - 10.5 mg/dL Final   06/28/2022 9.3 8.6 - 10.3 mg/dL Final     eGFR Non  Amer   Date Value Ref Range Status   09/14/2021 80 >60 mL/min/1.73 Final     Alkaline Phosphatase   Date Value Ref Range Status "   10/10/2022 81 39 - 117 U/L Final   2022 76 34 - 104 U/L Final     Total Protein   Date Value Ref Range Status   10/10/2022 6.9 6.0 - 8.5 g/dL Final     ALT (SGPT)   Date Value Ref Range Status   10/10/2022 11 1 - 33 U/L Final   2022 14 7 - 52 U/L Final     AST (SGOT)   Date Value Ref Range Status   10/10/2022 16 1 - 32 U/L Final   2022 17 13 - 39 U/L Final     Total Bilirubin   Date Value Ref Range Status   10/10/2022 0.4 0.0 - 1.2 mg/dL Final   2022 0.59 0.3 - 1.0 mg/dL Final     Albumin   Date Value Ref Range Status   10/10/2022 4.00 3.50 - 5.20 g/dL Final   2022 4.1 3.5 - 5.7 g/dL Final     Globulin   Date Value Ref Range Status   10/10/2022 2.9 gm/dL Final     Lab Results   Component Value Date    WBC 3.87 10/10/2022    HGB 10.7 (L) 10/10/2022    HCT 34.9 10/10/2022    MCV 84.9 10/10/2022     (H) 10/10/2022     Lab Results   Component Value Date    NEUTROABS 2.38 10/10/2022    IRON 64 10/10/2022    IRON 58 2017    TIBC 408 10/10/2022    TIBC 312 2017    LABIRON 16 (L) 10/10/2022    LABIRON 19 2017    FERRITIN 161.70 (H) 10/10/2022    FERRITIN 96.10 2017    [id number] 616 2017    FOLATE 7.20 2017     Lab Results   Component Value Date    CEA 2.26 2022    REFLABREPO  2022     [facility]  [address] [place] [address]  Phone: [phone] or [phone]  Fax: [phone]  Jeff Bustamante M.D., Medical Director    PATIENT NAME                                     LABORATORY NO.  1800   SEVEN ANDERSON CHAITANYA [id number]  [id number]                                 AGE                    SEX   SSN              CLIENT REF #  [facility]                   76        1945      F     [id number]      [id number]    [place]                               REQUESTING M.D.           ATTENDING MVIRGEN.         COPY TO[address]                       "   [place] [address]                     DATE COLLECTED            DATE RECEIVED          DATE REPORTED  07/05/2022 07/05/2022 07/07/2022    DIAGNOSIS:  DESCENDING COLON POLYPS:  Tubular adenoma    JBS/pah    CLINICAL HISTORY:  Abnormal tumor markers    SPECIMENS RECEIVED:  DESCENDING COLON POLYPS    MICROSCOPIC DESCRIPTION:  Tissue blocks are prepared and slides are examined microscopically on all  specimens. See diagnosis for details.    Professional interpretation rendered by Rupert Steen M.D. at [email or web address],  [facility], [address] [place] [address].    GROSS DESCRIPTION:  Specimen is received in 1 formalin filled container \"large intestine,  left/descending colon polyps\" consists of 3 pieces of tan soft tissue measuring  0.4 x 0.3 x 0.2 cm.  Specimen is submitted entirely in 1 cassette.  MM    REVIEWED, DIAGNOSED AND ELECTRONICALLY  SIGNED BY:    Rupert Steen M.D.  CPT CODES:  [id number]         RADIOLOGY DATA :  No radiology results for the last 7 days        Assessment & Plan     1.  Adenocarcinoma of sigmoid colon with recurrence  - Review oncology history for prior treatment details  - Due to local recurrence involving omentum, patient had omentectomy done on July 6, 2022  - Has been started on adjuvant 5-FU and leucovorin on August 6, 2022.  - Patient denies any excessive nausea or vomiting or diarrhea with cycle 3 of chemotherapy.  -Last CEA level on 9/26 was normal at  2.26  - She is due for cycle 5 today; no dose reduction today as she was not having any significant side effects from previous cycle   - Patient will return to clinic in 2 weeks with repeat CBC, CMP on that day.  - We will continue with close monitoring for chemotherapy related side effects     2.  Oxaliplatin induced neuropathy  - Remains on gabapentin 100 mg 3 times a day     3.  Hypokalemia  - Potassium 3.5  - Currently on potassium 20 mg p.o. daily at " home     4.  Health maintenance: Patient quit smoking in 2020    5. DVT right arm; pt is currently on Xarelto 15 mg BID for 21 days then followed by 20 mg daily; reports arm swelling has improved.    6. Thrombocytosis; labs reviewed with Dr. Rojas; felt to be reactive; will check labs today for any iron deficiency.     7. Advance Care Planning: For now patient remains full code and is able to make decisions.  Patient has health care surrogate mentioned on chart.                  PHQ-9 Total Score: 0     Mary Ann Condon reports a pain score of 0.  Given her pain assessment as noted, treatment options were discussed and the following options were decided upon as a follow-up plan to address the patient's pain: no pain today'.         Mia Juarez, APRN  10/10/2022  13:17 CDT        Part of this note may be an electronic transcription/translation of spoken language to printed text using the Dragon Dictation System.          CC:

## 2022-10-11 ENCOUNTER — LAB (OUTPATIENT)
Dept: ONCOLOGY | Facility: HOSPITAL | Age: 77
End: 2022-10-11

## 2022-10-11 DIAGNOSIS — T45.1X5A NEUROPATHY DUE TO CHEMOTHERAPEUTIC DRUG: ICD-10-CM

## 2022-10-11 DIAGNOSIS — G62.0 NEUROPATHY DUE TO CHEMOTHERAPEUTIC DRUG: ICD-10-CM

## 2022-10-11 LAB — HEMOCCULT STL QL: NEGATIVE

## 2022-10-11 PROCEDURE — 82272 OCCULT BLD FECES 1-3 TESTS: CPT | Performed by: NURSE PRACTITIONER

## 2022-10-11 NOTE — TELEPHONE ENCOUNTER
Rx Refill Note  Requested Prescriptions     Pending Prescriptions Disp Refills   • gabapentin (NEURONTIN) 100 MG capsule [Pharmacy Med Name: GABAPENTIN 100 MG CAPSULE] 270 capsule 0     Sig: TAKE 1 CAPSULE BY MOUTH THREE TIMES A DAY      Last office visit with prescribing clinician: 10/10/2022      Next office visit with prescribing clinician: 10/24/2022            Jagdeep Wagoner Rep  10/11/22, 14:53 CDT

## 2022-10-12 ENCOUNTER — INFUSION (OUTPATIENT)
Dept: ONCOLOGY | Facility: HOSPITAL | Age: 77
End: 2022-10-12

## 2022-10-12 DIAGNOSIS — C19 CANCER OF RECTOSIGMOID (COLON): ICD-10-CM

## 2022-10-12 DIAGNOSIS — Z45.2 ENCOUNTER FOR VENOUS ACCESS DEVICE CARE: Primary | ICD-10-CM

## 2022-10-12 DIAGNOSIS — C18.9 LOCAL RECURRENCE OF COLON CANCER: ICD-10-CM

## 2022-10-12 PROCEDURE — 25010000002 HEPARIN LOCK FLUSH PER 10 UNITS: Performed by: INTERNAL MEDICINE

## 2022-10-12 RX ORDER — GABAPENTIN 100 MG/1
CAPSULE ORAL
Qty: 270 CAPSULE | Refills: 0 | Status: SHIPPED | OUTPATIENT
Start: 2022-10-12 | End: 2023-01-25 | Stop reason: SDUPTHER

## 2022-10-12 RX ORDER — LANOLIN ALCOHOL/MO/W.PET/CERES
1000 CREAM (GRAM) TOPICAL DAILY
Qty: 90 TABLET | Refills: 1 | Status: SHIPPED | OUTPATIENT
Start: 2022-10-12 | End: 2023-04-03

## 2022-10-12 RX ORDER — SODIUM CHLORIDE 0.9 % (FLUSH) 0.9 %
10 SYRINGE (ML) INJECTION AS NEEDED
Status: DISCONTINUED | OUTPATIENT
Start: 2022-10-12 | End: 2022-10-12 | Stop reason: HOSPADM

## 2022-10-12 RX ORDER — HEPARIN SODIUM (PORCINE) LOCK FLUSH IV SOLN 100 UNIT/ML 100 UNIT/ML
500 SOLUTION INTRAVENOUS AS NEEDED
Status: CANCELLED | OUTPATIENT
Start: 2022-10-24

## 2022-10-12 RX ORDER — SODIUM CHLORIDE 0.9 % (FLUSH) 0.9 %
10 SYRINGE (ML) INJECTION AS NEEDED
Status: CANCELLED | OUTPATIENT
Start: 2022-10-12

## 2022-10-12 RX ORDER — HEPARIN SODIUM (PORCINE) LOCK FLUSH IV SOLN 100 UNIT/ML 100 UNIT/ML
500 SOLUTION INTRAVENOUS AS NEEDED
Status: DISCONTINUED | OUTPATIENT
Start: 2022-10-12 | End: 2022-10-12 | Stop reason: HOSPADM

## 2022-10-12 RX ADMIN — HEPARIN 500 UNITS: 100 SYRINGE at 12:05

## 2022-10-12 RX ADMIN — Medication 10 ML: at 12:05

## 2022-10-17 DIAGNOSIS — C18.9 LOCAL RECURRENCE OF COLON CANCER: Primary | ICD-10-CM

## 2022-10-17 DIAGNOSIS — C19 CANCER OF RECTOSIGMOID (COLON): ICD-10-CM

## 2022-10-18 ENCOUNTER — APPOINTMENT (OUTPATIENT)
Dept: ONCOLOGY | Facility: CLINIC | Age: 77
End: 2022-10-18

## 2022-10-24 ENCOUNTER — INFUSION (OUTPATIENT)
Dept: ONCOLOGY | Facility: HOSPITAL | Age: 77
End: 2022-10-24

## 2022-10-24 ENCOUNTER — OFFICE VISIT (OUTPATIENT)
Dept: ONCOLOGY | Facility: CLINIC | Age: 77
End: 2022-10-24

## 2022-10-24 ENCOUNTER — APPOINTMENT (OUTPATIENT)
Dept: ONCOLOGY | Facility: HOSPITAL | Age: 77
End: 2022-10-24

## 2022-10-24 VITALS
HEART RATE: 83 BPM | TEMPERATURE: 97.7 F | WEIGHT: 148 LBS | SYSTOLIC BLOOD PRESSURE: 105 MMHG | OXYGEN SATURATION: 95 % | BODY MASS INDEX: 27.07 KG/M2 | DIASTOLIC BLOOD PRESSURE: 62 MMHG

## 2022-10-24 DIAGNOSIS — C18.9 LOCAL RECURRENCE OF COLON CANCER: ICD-10-CM

## 2022-10-24 DIAGNOSIS — C19 CANCER OF RECTOSIGMOID (COLON): Primary | ICD-10-CM

## 2022-10-24 DIAGNOSIS — C19 CANCER OF RECTOSIGMOID (COLON): Primary | Chronic | ICD-10-CM

## 2022-10-24 LAB
ALBUMIN SERPL-MCNC: 3.9 G/DL (ref 3.5–5.2)
ALBUMIN/GLOB SERPL: 1.3 G/DL
ALP SERPL-CCNC: 80 U/L (ref 39–117)
ALT SERPL W P-5'-P-CCNC: 14 U/L (ref 1–33)
ANION GAP SERPL CALCULATED.3IONS-SCNC: 11 MMOL/L (ref 5–15)
ANISOCYTOSIS BLD QL: NORMAL
AST SERPL-CCNC: 18 U/L (ref 1–32)
BASOPHILS # BLD AUTO: 0.08 10*3/MM3 (ref 0–0.2)
BASOPHILS NFR BLD AUTO: 1.9 % (ref 0–1.5)
BILIRUB SERPL-MCNC: 0.5 MG/DL (ref 0–1.2)
BUN SERPL-MCNC: 17 MG/DL (ref 8–23)
BUN/CREAT SERPL: 19.5 (ref 7–25)
CALCIUM SPEC-SCNC: 9.2 MG/DL (ref 8.6–10.5)
CEA SERPL-MCNC: 2.52 NG/ML
CHLORIDE SERPL-SCNC: 106 MMOL/L (ref 98–107)
CO2 SERPL-SCNC: 26 MMOL/L (ref 22–29)
CREAT SERPL-MCNC: 0.87 MG/DL (ref 0.57–1)
DEPRECATED RDW RBC AUTO: 52.8 FL (ref 37–54)
EGFRCR SERPLBLD CKD-EPI 2021: 69.1 ML/MIN/1.73
EOSINOPHIL # BLD AUTO: 0.23 10*3/MM3 (ref 0–0.4)
EOSINOPHIL NFR BLD AUTO: 5.4 % (ref 0.3–6.2)
ERYTHROCYTE [DISTWIDTH] IN BLOOD BY AUTOMATED COUNT: 17.8 % (ref 12.3–15.4)
GLOBULIN UR ELPH-MCNC: 3.1 GM/DL
GLUCOSE SERPL-MCNC: 104 MG/DL (ref 65–99)
HCT VFR BLD AUTO: 34.3 % (ref 34–46.6)
HGB BLD-MCNC: 10.9 G/DL (ref 12–15.9)
IMM GRANULOCYTES # BLD AUTO: 0.01 10*3/MM3 (ref 0–0.05)
IMM GRANULOCYTES NFR BLD AUTO: 0.2 % (ref 0–0.5)
LYMPHOCYTES # BLD AUTO: 1.14 10*3/MM3 (ref 0.7–3.1)
LYMPHOCYTES NFR BLD AUTO: 26.8 % (ref 19.6–45.3)
MACROCYTES BLD QL SMEAR: NORMAL
MCH RBC QN AUTO: 26.8 PG (ref 26.6–33)
MCHC RBC AUTO-ENTMCNC: 31.8 G/DL (ref 31.5–35.7)
MCV RBC AUTO: 84.3 FL (ref 79–97)
MONOCYTES # BLD AUTO: 0.37 10*3/MM3 (ref 0.1–0.9)
MONOCYTES NFR BLD AUTO: 8.7 % (ref 5–12)
NEUTROPHILS NFR BLD AUTO: 2.42 10*3/MM3 (ref 1.7–7)
NEUTROPHILS NFR BLD AUTO: 57 % (ref 42.7–76)
NRBC BLD AUTO-RTO: 0 /100 WBC (ref 0–0.2)
PLATELET # BLD AUTO: 656 10*3/MM3 (ref 140–450)
PMV BLD AUTO: 9 FL (ref 6–12)
POTASSIUM SERPL-SCNC: 3.7 MMOL/L (ref 3.5–5.2)
PROT SERPL-MCNC: 7 G/DL (ref 6–8.5)
RBC # BLD AUTO: 4.07 10*6/MM3 (ref 3.77–5.28)
SMALL PLATELETS BLD QL SMEAR: NORMAL
SODIUM SERPL-SCNC: 143 MMOL/L (ref 136–145)
WBC MORPH BLD: NORMAL
WBC NRBC COR # BLD: 4.25 10*3/MM3 (ref 3.4–10.8)

## 2022-10-24 PROCEDURE — 96367 TX/PROPH/DG ADDL SEQ IV INF: CPT | Performed by: NURSE PRACTITIONER

## 2022-10-24 PROCEDURE — 85007 BL SMEAR W/DIFF WBC COUNT: CPT

## 2022-10-24 PROCEDURE — 25010000002 LEUCOVORIN CALCIUM PER 50 MG: Performed by: NURSE PRACTITIONER

## 2022-10-24 PROCEDURE — 85025 COMPLETE CBC W/AUTO DIFF WBC: CPT

## 2022-10-24 PROCEDURE — 96416 CHEMO PROLONG INFUSE W/PUMP: CPT | Performed by: NURSE PRACTITIONER

## 2022-10-24 PROCEDURE — 25010000002 FLUOROURACIL PER 500 MG: Performed by: NURSE PRACTITIONER

## 2022-10-24 PROCEDURE — G0498 CHEMO EXTEND IV INFUS W/PUMP: HCPCS | Performed by: NURSE PRACTITIONER

## 2022-10-24 PROCEDURE — 25010000002 DEXAMETHASONE SODIUM PHOSPHATE 100 MG/10ML SOLUTION: Performed by: NURSE PRACTITIONER

## 2022-10-24 PROCEDURE — 82378 CARCINOEMBRYONIC ANTIGEN: CPT

## 2022-10-24 PROCEDURE — 80053 COMPREHEN METABOLIC PANEL: CPT

## 2022-10-24 PROCEDURE — 96375 TX/PRO/DX INJ NEW DRUG ADDON: CPT | Performed by: NURSE PRACTITIONER

## 2022-10-24 PROCEDURE — 96409 CHEMO IV PUSH SNGL DRUG: CPT | Performed by: NURSE PRACTITIONER

## 2022-10-24 PROCEDURE — 99214 OFFICE O/P EST MOD 30 MIN: CPT | Performed by: NURSE PRACTITIONER

## 2022-10-24 RX ORDER — FERROUS SULFATE 325(65) MG
325 TABLET ORAL
Qty: 30 TABLET | Refills: 3 | Status: SHIPPED | OUTPATIENT
Start: 2022-10-24 | End: 2023-01-19

## 2022-10-24 RX ORDER — NYSTATIN 100000 [USP'U]/G
1 POWDER TOPICAL 4 TIMES DAILY
COMMUNITY
End: 2022-11-07

## 2022-10-24 RX ORDER — SODIUM CHLORIDE 9 MG/ML
250 INJECTION, SOLUTION INTRAVENOUS ONCE
Status: CANCELLED | OUTPATIENT
Start: 2022-10-24

## 2022-10-24 RX ORDER — DOCUSATE SODIUM 100 MG/1
100 CAPSULE, LIQUID FILLED ORAL 2 TIMES DAILY PRN
Qty: 60 CAPSULE | Refills: 2 | Status: SHIPPED | OUTPATIENT
Start: 2022-10-24

## 2022-10-24 RX ORDER — SODIUM CHLORIDE 9 MG/ML
250 INJECTION, SOLUTION INTRAVENOUS ONCE
Status: COMPLETED | OUTPATIENT
Start: 2022-10-24 | End: 2022-10-24

## 2022-10-24 RX ORDER — FLUOROURACIL 50 MG/ML
400 INJECTION, SOLUTION INTRAVENOUS ONCE
Status: CANCELLED | OUTPATIENT
Start: 2022-10-24

## 2022-10-24 RX ORDER — SODIUM CHLORIDE 0.9 % (FLUSH) 0.9 %
10 SYRINGE (ML) INJECTION AS NEEDED
Status: CANCELLED | OUTPATIENT
Start: 2022-10-24

## 2022-10-24 RX ORDER — HEPARIN SODIUM (PORCINE) LOCK FLUSH IV SOLN 100 UNIT/ML 100 UNIT/ML
500 SOLUTION INTRAVENOUS AS NEEDED
Status: CANCELLED | OUTPATIENT
Start: 2022-10-26

## 2022-10-24 RX ORDER — ONDANSETRON 4 MG/1
4 TABLET, FILM COATED ORAL EVERY 8 HOURS PRN
COMMUNITY

## 2022-10-24 RX ORDER — FLUOROURACIL 50 MG/ML
400 INJECTION, SOLUTION INTRAVENOUS ONCE
Status: COMPLETED | OUTPATIENT
Start: 2022-10-24 | End: 2022-10-24

## 2022-10-24 RX ADMIN — FLUOROURACIL 4100 MG: 50 INJECTION, SOLUTION INTRAVENOUS at 13:00

## 2022-10-24 RX ADMIN — FLUOROURACIL 680 MG: 50 INJECTION, SOLUTION INTRAVENOUS at 12:41

## 2022-10-24 RX ADMIN — SODIUM CHLORIDE 250 ML: 9 INJECTION, SOLUTION INTRAVENOUS at 11:23

## 2022-10-24 RX ADMIN — LEUCOVORIN CALCIUM 680 MG: 350 INJECTION, POWDER, LYOPHILIZED, FOR SUSPENSION INTRAMUSCULAR; INTRAVENOUS at 11:58

## 2022-10-24 RX ADMIN — DEXAMETHASONE SODIUM PHOSPHATE 12 MG: 10 INJECTION, SOLUTION INTRAMUSCULAR; INTRAVENOUS at 11:23

## 2022-10-25 ENCOUNTER — TELEPHONE (OUTPATIENT)
Dept: ONCOLOGY | Facility: HOSPITAL | Age: 77
End: 2022-10-25

## 2022-10-25 NOTE — TELEPHONE ENCOUNTER
----- Message from CHRISTINA Paredes sent at 10/24/2022  1:30 PM CDT -----  Let her know her CEA is good

## 2022-10-26 ENCOUNTER — INFUSION (OUTPATIENT)
Dept: ONCOLOGY | Facility: HOSPITAL | Age: 77
End: 2022-10-26

## 2022-10-26 DIAGNOSIS — Z45.2 ENCOUNTER FOR VENOUS ACCESS DEVICE CARE: Primary | ICD-10-CM

## 2022-10-26 PROCEDURE — 25010000002 HEPARIN LOCK FLUSH PER 10 UNITS: Performed by: INTERNAL MEDICINE

## 2022-10-26 RX ORDER — HEPARIN SODIUM (PORCINE) LOCK FLUSH IV SOLN 100 UNIT/ML 100 UNIT/ML
500 SOLUTION INTRAVENOUS AS NEEDED
Status: CANCELLED | OUTPATIENT
Start: 2022-11-07

## 2022-10-26 RX ORDER — SODIUM CHLORIDE 0.9 % (FLUSH) 0.9 %
10 SYRINGE (ML) INJECTION AS NEEDED
Status: CANCELLED | OUTPATIENT
Start: 2022-10-26

## 2022-10-26 RX ORDER — HEPARIN SODIUM (PORCINE) LOCK FLUSH IV SOLN 100 UNIT/ML 100 UNIT/ML
500 SOLUTION INTRAVENOUS AS NEEDED
Status: DISCONTINUED | OUTPATIENT
Start: 2022-10-26 | End: 2022-10-26 | Stop reason: HOSPADM

## 2022-10-26 RX ORDER — SODIUM CHLORIDE 0.9 % (FLUSH) 0.9 %
10 SYRINGE (ML) INJECTION AS NEEDED
Status: DISCONTINUED | OUTPATIENT
Start: 2022-10-26 | End: 2022-10-26 | Stop reason: HOSPADM

## 2022-10-26 RX ADMIN — Medication 10 ML: at 11:04

## 2022-10-26 RX ADMIN — HEPARIN 500 UNITS: 100 SYRINGE at 11:05

## 2022-11-02 RX ORDER — POTASSIUM CHLORIDE 750 MG/1
CAPSULE, EXTENDED RELEASE ORAL
Qty: 60 CAPSULE | Refills: 1 | Status: SHIPPED | OUTPATIENT
Start: 2022-11-02 | End: 2022-12-06

## 2022-11-02 NOTE — TELEPHONE ENCOUNTER
Rx Refill Note  Requested Prescriptions     Pending Prescriptions Disp Refills   • potassium chloride (MICRO-K) 10 MEQ CR capsule [Pharmacy Med Name: POTASSIUM CL ER 10 MEQ CAPSULE] 60 capsule 1     Sig: TAKE 2 CAPSULES BY MOUTH EVERY DAY      Last office visit with prescribing clinician: 9/26/2022      Next office visit with prescribing clinician: 11/7/2022            Marley Potter  11/02/22, 13:13 CDT

## 2022-11-02 NOTE — PROGRESS NOTES
DATE OF VISIT: 10/24/2022      REASON FOR VISIT:   Colon cancer with recurrence involving omentum currently on chemotherapy, oxaliplatin induced neuropathy        HISTORY OF PRESENT ILLNESS:   76-year-old female with medical problems significant for cardiomegaly, rectosigmoid cancer initially diagnosed in December 2017 with recurrence diagnosed on July 6, 2022 for which patient had omentectomy done.  Patient is currently on chemotherapy with 5-FU and leucovorin since August 8, 2022.  Patient is here to get cycle 5 of chemotherapy today.   On 9/28 patient came into clinic to have in fusional pump removed.  She had pain and swelling in right elbow and arm and was sent for doppler and found to have DVT; results discussed with patient and she was ultimately place on Xarelto 15 mg twice daily for 21 days; she is now on Xarelto 20 mg daily. She denies any bleeding; denies any swelling in arm but states she does still have some pain in the arm.      Oncology history:     1.  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  - Patient was started on FOLFOX on December 27, 2017.  Patient received total 12 cycles of FOLFOX between December 27, 2017 until June 13, 2018.  Dose of 5-FU and oxaliplatin was decreased by 20% with cycle 2 due to development of neutropenia.  - Patient was on surveillance since then.  - In June 2022, patient was found to have rising CEA level with CEA being at 23.  Initial CT scan did not show any evidence of recurrence.  - Patient had a colonoscopy by Dr. Arriola in July 2022 which was negative for any recurrence except for tubular adenoma with.  - PET/CT done on June 20 2022 showed isolated uptake in omental region.  Patient was evaluated by Dr. Packer and underwent omentectomy with solitary lymph node sampling which was negative for metastasis              Past Medical History, Past Surgical History, Social History, Family History have  "been reviewed and are without significant changes except as mentioned.    Review of Systems   A comprehensive 14 point review of systems was performed and was negative except as mentioned.    Medications:  The current medication list was reviewed in the EMR    ALLERGIES:    Allergies   Allergen Reactions   • Codeine Other (See Comments)     \"dont remember\"       Objective      Vitals:    10/24/22 0829   BP: 105/62   Pulse: 83   Temp: 97.7 °F (36.5 °C)   TempSrc: Temporal   SpO2: 95%   Weight: 67.1 kg (148 lb)   PainSc: 0-No pain     Current Status 10/24/2022   ECOG score 0       Physical Exam  General: alert and oriented no acute distress  Lungs; normal breath sounds  Card; RRR  Ext; no edema    RECENT LABS:  Glucose   Date Value Ref Range Status   10/24/2022 104 (H) 65 - 99 mg/dL Final     Sodium   Date Value Ref Range Status   10/24/2022 143 136 - 145 mmol/L Final   06/28/2022 143 136 - 145 mmol/L Final     Potassium   Date Value Ref Range Status   10/24/2022 3.7 3.5 - 5.2 mmol/L Final   06/28/2022 3.9 3.5 - 5.1 mmol/L Final     CO2   Date Value Ref Range Status   10/24/2022 26.0 22.0 - 29.0 mmol/L Final     Total CO2   Date Value Ref Range Status   06/28/2022 35 (H) 21 - 31 mmol/L Final     Chloride   Date Value Ref Range Status   10/24/2022 106 98 - 107 mmol/L Final   06/28/2022 103 98 - 107 mmol/L Final     Anion Gap   Date Value Ref Range Status   10/24/2022 11.0 5.0 - 15.0 mmol/L Final     Creatinine   Date Value Ref Range Status   10/24/2022 0.87 0.57 - 1.00 mg/dL Final   06/28/2022 0.6 (L) 0.7 - 1.3 mg/dL Final     BUN   Date Value Ref Range Status   10/24/2022 17 8 - 23 mg/dL Final   06/28/2022 12 7 - 25 mg/dL Final     BUN/Creatinine Ratio   Date Value Ref Range Status   10/24/2022 19.5 7.0 - 25.0 Final     Calcium   Date Value Ref Range Status   10/24/2022 9.2 8.6 - 10.5 mg/dL Final   06/28/2022 9.3 8.6 - 10.3 mg/dL Final     eGFR Non  Amer   Date Value Ref Range Status   09/14/2021 80 >60 " mL/min/1.73 Final     Alkaline Phosphatase   Date Value Ref Range Status   10/24/2022 80 39 - 117 U/L Final   2022 76 34 - 104 U/L Final     Total Protein   Date Value Ref Range Status   10/24/2022 7.0 6.0 - 8.5 g/dL Final     ALT (SGPT)   Date Value Ref Range Status   10/24/2022 14 1 - 33 U/L Final   2022 14 7 - 52 U/L Final     AST (SGOT)   Date Value Ref Range Status   10/24/2022 18 1 - 32 U/L Final   2022 17 13 - 39 U/L Final     Total Bilirubin   Date Value Ref Range Status   10/24/2022 0.5 0.0 - 1.2 mg/dL Final   2022 0.59 0.3 - 1.0 mg/dL Final     Albumin   Date Value Ref Range Status   10/24/2022 3.90 3.50 - 5.20 g/dL Final   2022 4.1 3.5 - 5.7 g/dL Final     Globulin   Date Value Ref Range Status   10/24/2022 3.1 gm/dL Final     Lab Results   Component Value Date    WBC 4.25 10/24/2022    HGB 10.9 (L) 10/24/2022    HCT 34.3 10/24/2022    MCV 84.3 10/24/2022     (H) 10/24/2022     Lab Results   Component Value Date    NEUTROABS 2.42 10/24/2022    IRON 64 10/10/2022    IRON 58 2017    TIBC 408 10/10/2022    TIBC 312 2017    LABIRON 16 (L) 10/10/2022    LABIRON 19 2017    FERRITIN 161.70 (H) 10/10/2022    FERRITIN 96.10 2017    EDPYZVGR68 482 10/10/2022    GTIETJYF92 616 2017    FOLATE >20.00 10/10/2022    FOLATE 7.20 2017     Lab Results   Component Value Date    CEA 2.52 10/24/2022    REFLABREPO  2022     [facility]  [address] [place] [address]  Phone: [phone] or [phone]  Fax: [phone]  Jeff Bustamante M.D., Medical Director    PATIENT NAME                                     LABORATORY NO.  1800   SEVEN ANDERSON.                             [id number]  [id number]                                 AGE                    SEX   SSN              CLIENT REF #  [facility]                   76        1945      F     [id number]       [id number]    [place]                               REQUESTING M.D.           ATTENDING [facility].         COPY TO.  [address] [place] [address]                         [place] [address]                     DATE COLLECTED            DATE RECEIVED          DATE REPORTED  07/05/2022 07/05/2022 07/07/2022    DIAGNOSIS:  DESCENDING COLON POLYPS:  Tubular adenoma    JBS/pah    CLINICAL HISTORY:  Abnormal tumor markers    SPECIMENS RECEIVED:  DESCENDING COLON POLYPS    MICROSCOPIC DESCRIPTION:  Tissue blocks are prepared and slides are examined microscopically on all  specimens. See diagnosis for details.    Professional interpretation rendered by Rupert Steen M.D. at [facility], [address] [place] [address] , [place] [address].    GROSS DESCRIPTION:  Specimen is received in 1 formalin filled container \"large intestine,  left/descending colon polyps\" consists of 3 pieces of tan soft tissue measuring  0.4 x 0.3 x 0.2 cm.  Specimen is submitted entirely in 1 cassette.  MM    REVIEWED, DIAGNOSED AND ELECTRONICALLY  SIGNED BY:    Rupert Steen M.D.  CPT CODES:  20380         RADIOLOGY DATA :  No radiology results for the last 7 days        Assessment & Plan     1.  Adenocarcinoma of sigmoid colon with recurrence  - Review oncology history for prior treatment details  - Due to local recurrence involving omentum, patient had omentectomy done on July 6, 2022  - Has been started on adjuvant 5-FU and leucovorin on August 6, 2022.  - Patient denies any excessive nausea or vomiting or diarrhea with cycle 3 of chemotherapy.  -Last CEA level on 9/26 was normal at  2.26  - She is due for cycle 6 today; no dose reduction today as she was not having any significant side effects from previous cycle   - Patient will return to clinic in 2 weeks with repeat CBC, CMP on that day.  - We will continue with close monitoring for chemotherapy related side effects     2. "  Oxaliplatin induced neuropathy  - Remains on gabapentin 100 mg 3 times a day     3.  Hypokalemia  - Potassium 3.7  - Currently on potassium 20 mg p.o. daily at home     4.  Health maintenance: Patient quit smoking in 2020     5. DVT right arm; pt is currently on Xarelto 20 mg daily; denies any bleeding.     6. Thrombocytosis; platelet count is 656; reviewed with Dr. Rojas and felt to be reactive.    7. Mild iron deficiency; iron saturation 16% will start pt on iron tablet daily along with stool softener if needed.      8. Advance Care Planning: For now patient remains full code and is able to make decisions.  Patient has health care surrogate mentioned on chart.               PHQ-9 Total Score: 0     Mary Ann Condon reports a pain score of 0.  Given her pain assessment as noted, treatment options were discussed and the following options were decided upon as a follow-up plan to address the patient's pain: no pain today.         Mia Juarez, CHRISTINA  11/2/2022  11:02 CDT        Part of this note may be an electronic transcription/translation of spoken language to printed text using the Dragon Dictation System.          CC:

## 2022-11-04 ENCOUNTER — OFFICE VISIT (OUTPATIENT)
Dept: ENDOCRINOLOGY | Facility: CLINIC | Age: 77
End: 2022-11-04

## 2022-11-04 ENCOUNTER — LAB (OUTPATIENT)
Dept: LAB | Facility: HOSPITAL | Age: 77
End: 2022-11-04

## 2022-11-04 VITALS
DIASTOLIC BLOOD PRESSURE: 60 MMHG | SYSTOLIC BLOOD PRESSURE: 110 MMHG | BODY MASS INDEX: 26.5 KG/M2 | OXYGEN SATURATION: 97 % | HEART RATE: 105 BPM | HEIGHT: 62 IN | WEIGHT: 144 LBS

## 2022-11-04 DIAGNOSIS — R79.89 ABNORMAL TSH: ICD-10-CM

## 2022-11-04 DIAGNOSIS — M81.0 AGE-RELATED OSTEOPOROSIS WITHOUT CURRENT PATHOLOGICAL FRACTURE: ICD-10-CM

## 2022-11-04 DIAGNOSIS — E04.2 NONTOXIC MULTINODULAR GOITER: Primary | ICD-10-CM

## 2022-11-04 LAB
T3FREE SERPL-MCNC: 3.76 PG/ML (ref 2–4.4)
T4 FREE SERPL-MCNC: 1.41 NG/DL (ref 0.93–1.7)
TSH SERPL DL<=0.05 MIU/L-ACNC: 0.77 UIU/ML (ref 0.27–4.2)

## 2022-11-04 PROCEDURE — 84439 ASSAY OF FREE THYROXINE: CPT | Performed by: STUDENT IN AN ORGANIZED HEALTH CARE EDUCATION/TRAINING PROGRAM

## 2022-11-04 PROCEDURE — 99204 OFFICE O/P NEW MOD 45 MIN: CPT | Performed by: INTERNAL MEDICINE

## 2022-11-04 PROCEDURE — 84443 ASSAY THYROID STIM HORMONE: CPT | Performed by: STUDENT IN AN ORGANIZED HEALTH CARE EDUCATION/TRAINING PROGRAM

## 2022-11-04 PROCEDURE — 36415 COLL VENOUS BLD VENIPUNCTURE: CPT | Performed by: INTERNAL MEDICINE

## 2022-11-04 PROCEDURE — 84481 FREE ASSAY (FT-3): CPT | Performed by: STUDENT IN AN ORGANIZED HEALTH CARE EDUCATION/TRAINING PROGRAM

## 2022-11-04 PROCEDURE — 84445 ASSAY OF TSI GLOBULIN: CPT | Performed by: STUDENT IN AN ORGANIZED HEALTH CARE EDUCATION/TRAINING PROGRAM

## 2022-11-04 NOTE — PROGRESS NOTES
"Chief Complaint   Patient presents with   • Thyroid Problem         History of Present Illness    76 y.o. female   w colon cancer s p surgery now receiving chemo    In June 2022, mild suppression of TSH w MNG on US  Repeat TSH by us Nov 2022 is normal   Right mid 2.6 cm , Right inf 1.3 cm , left 3 nodules 5 mm each   Radiologist describes as benign    She doesn't have CV disease , Afib     She feels well    She has osteoporosis, at age 70 y she took fosamax for 1 year but stopped due to no refills       ==========================================  Physical Exam  /60   Pulse 105   Ht 157.5 cm (62\")   Wt 65.3 kg (144 lb)   SpO2 97%   BMI 26.34 kg/m²   AOx3  No Goiter , no carotid bruit  RRR  CTA  No Edema     ==========================================    Laboratory Workup    Lab Results   Component Value Date    WBC 4.25 10/24/2022    HGB 10.9 (L) 10/24/2022    HCT 34.3 10/24/2022    MCV 84.3 10/24/2022     (H) 10/24/2022       Lab Results   Component Value Date    GLUCOSE 104 (H) 10/24/2022    BUN 17 10/24/2022    CREATININE 0.87 10/24/2022    EGFR 69.1 10/24/2022    EGFRIFNONA 80 09/14/2021    BCR 19.5 10/24/2022     10/24/2022    K 3.7 10/24/2022     10/24/2022    CO2 26.0 10/24/2022    CALCIUM 9.2 10/24/2022    ALBUMIN 3.90 10/24/2022    GLOB 3.1 10/24/2022    BILITOT 0.5 10/24/2022    ALKPHOS 80 10/24/2022    AST 18 10/24/2022    ALT 14 10/24/2022    AGRATIO 1.3 10/24/2022   Impression         1. The 2 nodules in the right lobe and the 2 small upper nodules in the left lobe are benign in character and not of concern   2. The small nodule in the lower left lobe is also likely benign but this is not as characteristic so six-month ultrasound follow-up is needed     [id number]  Narrative    Nontoxic thyroid nodule     Ultrasound thyroid: In the lower medial right lobe there is a 1 x 2.6 x 1.1 cm well demarcated benign-appearing nodule . A little lower and laterally there is a 1.1 x " 0.9 x 1.3 cm nodule that is partly cystic     In the upper medial left lobe there is a benign-appearing 5 x 3 x 5 mm nodule in the mid level left lobe there is a 5-3 x 5 mm benign-appearing solid nodule . In the lower left lobe of the thyroid there is a 7 x 6 x 7 mm sonolucent nodule that is not as   sharply demarcated as the nodules higher in the left lobe .  Exam End: 06/22/22 14:22    Specimen Collected: 06/22/22 16:30 Last Resulted: 06/22/22 16:43   Received From: [facility]  Result Received: 08/10/22 11:40                 ==========================================      ICD-10-CM ICD-9-CM   1. Subclinical hyperthyroidism  E05.90 242.90   2. Toxic multinodular goiter  E05.20 242.20   3. Age-related osteoporosis without current pathological fracture  M81.0 733.01   -    Multinodular goiter     Repeat TSH is normal     Repeat Neck US to document stability of thyroid nodule and personally review nodule features     --    Osteoporosis    Once chemo is completed   prolia   She will start calcium plus D       Orders Placed This Encounter   Procedures   • TSH+Free T4     Order Specific Question:   Release to patient     Answer:   Routine Release   • T3, free     Order Specific Question:   Release to patient     Answer:   Routine Release   • Thyroid Stimulating Immunoglobulin     Order Specific Question:   Release to patient     Answer:   Routine Release                This document has been electronically signed by Saul Campos MD on November 4, 2022 09:54 CDT

## 2022-11-07 ENCOUNTER — OFFICE VISIT (OUTPATIENT)
Dept: ONCOLOGY | Facility: CLINIC | Age: 77
End: 2022-11-07

## 2022-11-07 ENCOUNTER — INFUSION (OUTPATIENT)
Dept: ONCOLOGY | Facility: HOSPITAL | Age: 77
End: 2022-11-07

## 2022-11-07 VITALS
WEIGHT: 145 LBS | TEMPERATURE: 96.3 F | HEART RATE: 85 BPM | DIASTOLIC BLOOD PRESSURE: 65 MMHG | SYSTOLIC BLOOD PRESSURE: 112 MMHG | BODY MASS INDEX: 26.52 KG/M2 | RESPIRATION RATE: 18 BRPM | OXYGEN SATURATION: 96 %

## 2022-11-07 DIAGNOSIS — C18.9 LOCAL RECURRENCE OF COLON CANCER: Primary | ICD-10-CM

## 2022-11-07 DIAGNOSIS — Z45.2 ENCOUNTER FOR VENOUS ACCESS DEVICE CARE: ICD-10-CM

## 2022-11-07 DIAGNOSIS — C18.9 LOCAL RECURRENCE OF COLON CANCER: Chronic | ICD-10-CM

## 2022-11-07 DIAGNOSIS — E87.6 HYPOKALEMIA: Chronic | ICD-10-CM

## 2022-11-07 DIAGNOSIS — C19 CANCER OF RECTOSIGMOID (COLON): ICD-10-CM

## 2022-11-07 DIAGNOSIS — G62.0 NEUROPATHY DUE TO CHEMOTHERAPEUTIC DRUG: Chronic | ICD-10-CM

## 2022-11-07 DIAGNOSIS — C19 CANCER OF RECTOSIGMOID (COLON): Primary | Chronic | ICD-10-CM

## 2022-11-07 DIAGNOSIS — T45.1X5A CHEMOTHERAPY-INDUCED NEUTROPENIA: Chronic | ICD-10-CM

## 2022-11-07 DIAGNOSIS — T45.1X5A NEUROPATHY DUE TO CHEMOTHERAPEUTIC DRUG: Chronic | ICD-10-CM

## 2022-11-07 DIAGNOSIS — D70.1 CHEMOTHERAPY-INDUCED NEUTROPENIA: Chronic | ICD-10-CM

## 2022-11-07 LAB
ALBUMIN SERPL-MCNC: 4.1 G/DL (ref 3.5–5.2)
ALBUMIN/GLOB SERPL: 1.4 G/DL
ALP SERPL-CCNC: 70 U/L (ref 39–117)
ALT SERPL W P-5'-P-CCNC: 12 U/L (ref 1–33)
ANION GAP SERPL CALCULATED.3IONS-SCNC: 9 MMOL/L (ref 5–15)
AST SERPL-CCNC: 18 U/L (ref 1–32)
BASOPHILS # BLD AUTO: 0.08 10*3/MM3 (ref 0–0.2)
BASOPHILS NFR BLD AUTO: 1.8 % (ref 0–1.5)
BILIRUB SERPL-MCNC: 0.6 MG/DL (ref 0–1.2)
BUN SERPL-MCNC: 19 MG/DL (ref 8–23)
BUN/CREAT SERPL: 22.1 (ref 7–25)
CALCIUM SPEC-SCNC: 9.3 MG/DL (ref 8.6–10.5)
CHLORIDE SERPL-SCNC: 105 MMOL/L (ref 98–107)
CO2 SERPL-SCNC: 26 MMOL/L (ref 22–29)
CREAT SERPL-MCNC: 0.86 MG/DL (ref 0.57–1)
DEPRECATED RDW RBC AUTO: 54.9 FL (ref 37–54)
EGFRCR SERPLBLD CKD-EPI 2021: 70.1 ML/MIN/1.73
EOSINOPHIL # BLD AUTO: 0.19 10*3/MM3 (ref 0–0.4)
EOSINOPHIL NFR BLD AUTO: 4.2 % (ref 0.3–6.2)
ERYTHROCYTE [DISTWIDTH] IN BLOOD BY AUTOMATED COUNT: 18.9 % (ref 12.3–15.4)
GLOBULIN UR ELPH-MCNC: 2.9 GM/DL
GLUCOSE SERPL-MCNC: 104 MG/DL (ref 65–99)
HCT VFR BLD AUTO: 34 % (ref 34–46.6)
HGB BLD-MCNC: 11.2 G/DL (ref 12–15.9)
HOLD SPECIMEN: NORMAL
IMM GRANULOCYTES # BLD AUTO: 0.01 10*3/MM3 (ref 0–0.05)
IMM GRANULOCYTES NFR BLD AUTO: 0.2 % (ref 0–0.5)
LYMPHOCYTES # BLD AUTO: 1.42 10*3/MM3 (ref 0.7–3.1)
LYMPHOCYTES NFR BLD AUTO: 31.1 % (ref 19.6–45.3)
MCH RBC QN AUTO: 27.5 PG (ref 26.6–33)
MCHC RBC AUTO-ENTMCNC: 32.9 G/DL (ref 31.5–35.7)
MCV RBC AUTO: 83.5 FL (ref 79–97)
MONOCYTES # BLD AUTO: 0.3 10*3/MM3 (ref 0.1–0.9)
MONOCYTES NFR BLD AUTO: 6.6 % (ref 5–12)
NEUTROPHILS NFR BLD AUTO: 2.57 10*3/MM3 (ref 1.7–7)
NEUTROPHILS NFR BLD AUTO: 56.1 % (ref 42.7–76)
NRBC BLD AUTO-RTO: 0 /100 WBC (ref 0–0.2)
PLATELET # BLD AUTO: 553 10*3/MM3 (ref 140–450)
PMV BLD AUTO: 9.3 FL (ref 6–12)
POTASSIUM SERPL-SCNC: 4 MMOL/L (ref 3.5–5.2)
PROT SERPL-MCNC: 7 G/DL (ref 6–8.5)
RBC # BLD AUTO: 4.07 10*6/MM3 (ref 3.77–5.28)
SODIUM SERPL-SCNC: 140 MMOL/L (ref 136–145)
WBC NRBC COR # BLD: 4.57 10*3/MM3 (ref 3.4–10.8)

## 2022-11-07 PROCEDURE — 1126F AMNT PAIN NOTED NONE PRSNT: CPT | Performed by: INTERNAL MEDICINE

## 2022-11-07 PROCEDURE — 25010000002 HEPARIN LOCK FLUSH PER 10 UNITS: Performed by: INTERNAL MEDICINE

## 2022-11-07 PROCEDURE — 1123F ACP DISCUSS/DSCN MKR DOCD: CPT | Performed by: INTERNAL MEDICINE

## 2022-11-07 PROCEDURE — G0463 HOSPITAL OUTPT CLINIC VISIT: HCPCS | Performed by: INTERNAL MEDICINE

## 2022-11-07 PROCEDURE — 36591 DRAW BLOOD OFF VENOUS DEVICE: CPT | Performed by: INTERNAL MEDICINE

## 2022-11-07 PROCEDURE — 36415 COLL VENOUS BLD VENIPUNCTURE: CPT

## 2022-11-07 PROCEDURE — 80053 COMPREHEN METABOLIC PANEL: CPT

## 2022-11-07 PROCEDURE — 85025 COMPLETE CBC W/AUTO DIFF WBC: CPT

## 2022-11-07 PROCEDURE — 99214 OFFICE O/P EST MOD 30 MIN: CPT | Performed by: INTERNAL MEDICINE

## 2022-11-07 RX ORDER — HEPARIN SODIUM (PORCINE) LOCK FLUSH IV SOLN 100 UNIT/ML 100 UNIT/ML
500 SOLUTION INTRAVENOUS AS NEEDED
Status: DISCONTINUED | OUTPATIENT
Start: 2022-11-07 | End: 2022-11-07 | Stop reason: HOSPADM

## 2022-11-07 RX ORDER — SODIUM CHLORIDE 0.9 % (FLUSH) 0.9 %
10 SYRINGE (ML) INJECTION AS NEEDED
Status: CANCELLED | OUTPATIENT
Start: 2022-11-15

## 2022-11-07 RX ORDER — HEPARIN SODIUM (PORCINE) LOCK FLUSH IV SOLN 100 UNIT/ML 100 UNIT/ML
500 SOLUTION INTRAVENOUS AS NEEDED
Status: CANCELLED | OUTPATIENT
Start: 2022-11-15

## 2022-11-07 RX ADMIN — HEPARIN 500 UNITS: 100 SYRINGE at 10:35

## 2022-11-07 NOTE — PROGRESS NOTES
DATE OF VISIT: 11/7/2022      REASON FOR VISIT: Colon cancer with recurrence involving omentum currently on chemotherapy, oxaliplatin induced neuropathy, DVT involving right upper extremity, thrombocytosis, iron deficiency, hypokalemia      HISTORY OF PRESENT ILLNESS:   76-year-old female with medical problems significant for cardiomegaly, rectosigmoid cancer initially diagnosed in December 2017 with recurrence diagnosed in July 6, 2022 for which patient had omentectomy done, hypokalemia, thrombocytosis, DVT involving right upper extremity is here for follow-up appointment today.  Patient is scheduled to get cycle 7 of 5-FU leucovorin today.  Complains of chronic neuropathy affecting upper and lower extremity.  Complains of worsening fatigue.  Complains of poor appetite.  Denies any bleeding.  Denies any new lymph node enlargement.  Denies any new chest pain or shortness of breath.            Oncology history:    1.  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  - Patient was started on FOLFOX on December 27, 2017.  Patient received total 12 cycles of FOLFOX between December 27, 2017 until June 13, 2018.  Dose of 5-FU and oxaliplatin was decreased by 20% with cycle 2 due to development of neutropenia.  - Patient was on surveillance since then.  - In June 2022, patient was found to have rising CEA level with CEA being at 23.  Initial CT scan did not show any evidence of recurrence.  - Patient had a colonoscopy by Dr. Arriola in July 2022 which was negative for any recurrence except for tubular adenoma with.  - PET/CT done on June 20 2022 showed isolated uptake in omental region.  Patient was evaluated by Dr. Packer and underwent omentectomy with solitary lymph node sampling which was negative for metastasis            Past Medical History, Past Surgical History, Social History, Family History have been reviewed and are without significant changes except  "as mentioned.    Review of Systems   Constitutional: Positive for unexpected weight change (Has lost 6 pounds in last 1 month).      A comprehensive 14 point review of systems was performed and was negative except as mentioned in HPI.    Medications:  The current medication list was reviewed in the EMR    ALLERGIES:    Allergies   Allergen Reactions   • Codeine Other (See Comments)     \"dont remember\"       Objective      Vitals:    11/07/22 1008   BP: 112/65   Pulse: 85   Resp: 18   Temp: 96.3 °F (35.7 °C)   SpO2: 96%   Weight: 65.8 kg (145 lb)   PainSc: 0-No pain     Current Status 10/24/2022   ECOG score 0       Physical Exam  Cardiovascular:      Comments: Port-A-Cath present  Pulmonary:      Breath sounds: Normal breath sounds.   Neurological:      Mental Status: She is alert and oriented to person, place, and time.           RECENT LABS:  Glucose   Date Value Ref Range Status   10/24/2022 104 (H) 65 - 99 mg/dL Final     Sodium   Date Value Ref Range Status   10/24/2022 143 136 - 145 mmol/L Final   06/28/2022 143 136 - 145 mmol/L Final     Potassium   Date Value Ref Range Status   10/24/2022 3.7 3.5 - 5.2 mmol/L Final   06/28/2022 3.9 3.5 - 5.1 mmol/L Final     CO2   Date Value Ref Range Status   10/24/2022 26.0 22.0 - 29.0 mmol/L Final     Total CO2   Date Value Ref Range Status   06/28/2022 35 (H) 21 - 31 mmol/L Final     Chloride   Date Value Ref Range Status   10/24/2022 106 98 - 107 mmol/L Final   06/28/2022 103 98 - 107 mmol/L Final     Anion Gap   Date Value Ref Range Status   10/24/2022 11.0 5.0 - 15.0 mmol/L Final     Creatinine   Date Value Ref Range Status   10/24/2022 0.87 0.57 - 1.00 mg/dL Final   06/28/2022 0.6 (L) 0.7 - 1.3 mg/dL Final     BUN   Date Value Ref Range Status   10/24/2022 17 8 - 23 mg/dL Final   06/28/2022 12 7 - 25 mg/dL Final     BUN/Creatinine Ratio   Date Value Ref Range Status   10/24/2022 19.5 7.0 - 25.0 Final     Calcium   Date Value Ref Range Status   10/24/2022 9.2 8.6 - " 10.5 mg/dL Final   2022 9.3 8.6 - 10.3 mg/dL Final     eGFR Non  Amer   Date Value Ref Range Status   2021 80 >60 mL/min/1.73 Final     Alkaline Phosphatase   Date Value Ref Range Status   10/24/2022 80 39 - 117 U/L Final   2022 76 34 - 104 U/L Final     Total Protein   Date Value Ref Range Status   10/24/2022 7.0 6.0 - 8.5 g/dL Final     ALT (SGPT)   Date Value Ref Range Status   10/24/2022 14 1 - 33 U/L Final   2022 14 7 - 52 U/L Final     AST (SGOT)   Date Value Ref Range Status   10/24/2022 18 1 - 32 U/L Final   2022 17 13 - 39 U/L Final     Total Bilirubin   Date Value Ref Range Status   10/24/2022 0.5 0.0 - 1.2 mg/dL Final   2022 0.59 0.3 - 1.0 mg/dL Final     Albumin   Date Value Ref Range Status   10/24/2022 3.90 3.50 - 5.20 g/dL Final   2022 4.1 3.5 - 5.7 g/dL Final     Globulin   Date Value Ref Range Status   10/24/2022 3.1 gm/dL Final     Lab Results   Component Value Date    WBC 4.57 2022    HGB 11.2 (L) 2022    HCT 34.0 2022    MCV 83.5 2022     (H) 2022     Lab Results   Component Value Date    NEUTROABS 2.57 2022    IRON 64 10/10/2022    IRON 58 2017    TIBC 408 10/10/2022    TIBC 312 2017    LABIRON 16 (L) 10/10/2022    LABIRON 19 2017    FERRITIN 161.70 (H) 10/10/2022    FERRITIN 96.10 2017    [id number] 482 10/10/2022    SQMEKLBH68 616 2017    FOLATE >20.00 10/10/2022    FOLATE 7.20 2017     Lab Results   Component Value Date    CEA 2.52 10/24/2022    REFLABREPO  2022     [facility]  [address] [place] [address]  Phone: [phone] or [phone]  Fax: [phone]  Jeff Bustamante M.D., Medical Director    PATIENT NAME                                     LABORATORY NO.  1800   SEVEN ANDERSON [facility].                             [id number]  [id number]                                 AGE                    SEX   SSN              " CLIENT REF #  [facility]                   76        1945      F     [id number]      [id number]    [place]                               REQUESTING M.D.           ATTENDING M.D.         COPY TO.  [address] [place] [address]                         [place] [address]                     DATE COLLECTED            DATE RECEIVED          DATE REPORTED  07/05/2022 07/05/2022 07/07/2022    DIAGNOSIS:  DESCENDING COLON POLYPS:  Tubular adenoma    JBS/pah    CLINICAL HISTORY:  Abnormal tumor markers    SPECIMENS RECEIVED:  DESCENDING COLON POLYPS    MICROSCOPIC DESCRIPTION:  Tissue blocks are prepared and slides are examined microscopically on all  specimens. See diagnosis for details.    Professional interpretation rendered by Rupert Steen M.D. at [facility], [address] [place] [address] , [place] [address].    GROSS DESCRIPTION:  Specimen is received in 1 formalin filled container \"large intestine,  left/descending colon polyps\" consists of 3 pieces of tan soft tissue measuring  0.4 x 0.3 x 0.2 cm.  Specimen is submitted entirely in 1 cassette.  MM    REVIEWED, DIAGNOSED AND ELECTRONICALLY  SIGNED BY:    Rupert Steen M.D.  CPT CODES:  [id number]           PATHOLOGY:  * Cannot find OR log *         RADIOLOGY DATA :  No radiology results for the last 7 days        Assessment & Plan     1.  Adenocarcinoma of sigmoid colon with recurrence:  - Review oncology history for prior treatment details  - Due to local recurrence involving omentum patient had omentectomy done on July 6, 2022  - Currently on 5-FU and leucovorin since August 6, 2022  - Patient is requesting a treatment break this week as she is feeling more fatigue with last cycle of chemotherapy and her son is visiting her this week and she does not want to feel bad when her son is visiting her.  - We will recheck lab and resume cycle 7 of chemotherapy next week.  - We will " continue with close monitoring for chemotherapy related side effect  - Patient will return to clinic in 3 weeks with repeat CBC and CMP on that day.      2.  Oxaliplatin induced neuropathy  - Remains on gabapentin 100 mg 3 times a day    3.  Hypokalemia:  - Potassium is 4.0  - Currently on potassium 20 mEq p.o. daily at home    4.  Right upper extremity DVT:  - Patient was diagnosed with right upper extremity DVT on September 28, 2022.  Patient is currently on Xarelto 20 mg p.o. daily    5.  Thrombocytosis:  - Reactive in nature  - Platelet count is 553,000    6.  Hypertension    7.  Health maintenance: Patient quit smoking in 2020    8. Advance Care Planning: For now patient remains full code and is able to make decisions.  Patient has health care surrogate mentioned on chart.    9.  Anemia:  - Hemoglobin is 11.2.  Currently on ferrous sulfate p.o. daily.  - We will repeat anemia work-up in December 2022           PHQ-9 Total Score: 0   -Patient is not homicidal or suicidal.  No acute intervention required.    Mary Ann Condon reports a pain score of 0.  Given her pain assessment as noted, treatment options were discussed and the following options were decided upon as a follow-up plan to address the patient's pain: continuation of current treatment plan for pain.         Wil Abraham MD  11/7/2022  10:17 CST        Part of this note may be an electronic transcription/translation of spoken language to printed text using the Dragon Dictation System.          CC:

## 2022-11-08 LAB — TSI SER-ACNC: <0.1 IU/L (ref 0–0.55)

## 2022-11-15 ENCOUNTER — INFUSION (OUTPATIENT)
Dept: ONCOLOGY | Facility: HOSPITAL | Age: 77
End: 2022-11-15

## 2022-11-15 ENCOUNTER — TELEPHONE (OUTPATIENT)
Dept: ONCOLOGY | Facility: CLINIC | Age: 77
End: 2022-11-15

## 2022-11-15 VITALS
HEART RATE: 91 BPM | DIASTOLIC BLOOD PRESSURE: 60 MMHG | TEMPERATURE: 96.5 F | SYSTOLIC BLOOD PRESSURE: 118 MMHG | RESPIRATION RATE: 18 BRPM

## 2022-11-15 DIAGNOSIS — C18.9 LOCAL RECURRENCE OF COLON CANCER: Primary | ICD-10-CM

## 2022-11-15 DIAGNOSIS — C19 CANCER OF RECTOSIGMOID (COLON): ICD-10-CM

## 2022-11-15 DIAGNOSIS — Z45.2 ENCOUNTER FOR VENOUS ACCESS DEVICE CARE: ICD-10-CM

## 2022-11-15 LAB
ALBUMIN SERPL-MCNC: 3.9 G/DL (ref 3.5–5.2)
ALBUMIN/GLOB SERPL: 1.4 G/DL
ALP SERPL-CCNC: 87 U/L (ref 39–117)
ALT SERPL W P-5'-P-CCNC: 12 U/L (ref 1–33)
ANION GAP SERPL CALCULATED.3IONS-SCNC: 10 MMOL/L (ref 5–15)
ANISOCYTOSIS BLD QL: ABNORMAL
AST SERPL-CCNC: 20 U/L (ref 1–32)
BASOPHILS # BLD MANUAL: 0.13 10*3/MM3 (ref 0–0.2)
BASOPHILS NFR BLD MANUAL: 4 % (ref 0–1.5)
BILIRUB SERPL-MCNC: 0.4 MG/DL (ref 0–1.2)
BUN SERPL-MCNC: 16 MG/DL (ref 8–23)
BUN/CREAT SERPL: 18.8 (ref 7–25)
CALCIUM SPEC-SCNC: 9.1 MG/DL (ref 8.6–10.5)
CHLORIDE SERPL-SCNC: 108 MMOL/L (ref 98–107)
CO2 SERPL-SCNC: 27 MMOL/L (ref 22–29)
CREAT SERPL-MCNC: 0.85 MG/DL (ref 0.57–1)
DEPRECATED RDW RBC AUTO: 59.9 FL (ref 37–54)
EGFRCR SERPLBLD CKD-EPI 2021: 71.1 ML/MIN/1.73
EOSINOPHIL # BLD MANUAL: 0.23 10*3/MM3 (ref 0–0.4)
EOSINOPHIL NFR BLD MANUAL: 7 % (ref 0.3–6.2)
ERYTHROCYTE [DISTWIDTH] IN BLOOD BY AUTOMATED COUNT: 19.6 % (ref 12.3–15.4)
GLOBULIN UR ELPH-MCNC: 2.8 GM/DL
GLUCOSE SERPL-MCNC: 108 MG/DL (ref 65–99)
HCT VFR BLD AUTO: 32.9 % (ref 34–46.6)
HGB BLD-MCNC: 10.8 G/DL (ref 12–15.9)
HOLD SPECIMEN: NORMAL
LYMPHOCYTES # BLD MANUAL: 1.88 10*3/MM3 (ref 0.7–3.1)
LYMPHOCYTES NFR BLD MANUAL: 22 % (ref 5–12)
MCH RBC QN AUTO: 28.1 PG (ref 26.6–33)
MCHC RBC AUTO-ENTMCNC: 32.8 G/DL (ref 31.5–35.7)
MCV RBC AUTO: 85.5 FL (ref 79–97)
METAMYELOCYTES NFR BLD MANUAL: 1 % (ref 0–0)
MONOCYTES # BLD: 0.74 10*3/MM3 (ref 0.1–0.9)
NEUTROPHILS # BLD AUTO: 0.34 10*3/MM3 (ref 1.7–7)
NEUTROPHILS NFR BLD MANUAL: 9 % (ref 42.7–76)
NEUTS BAND NFR BLD MANUAL: 1 % (ref 0–5)
PLATELET # BLD AUTO: 785 10*3/MM3 (ref 140–450)
PMV BLD AUTO: 9.7 FL (ref 6–12)
POTASSIUM SERPL-SCNC: 3.9 MMOL/L (ref 3.5–5.2)
PROT SERPL-MCNC: 6.7 G/DL (ref 6–8.5)
RBC # BLD AUTO: 3.85 10*6/MM3 (ref 3.77–5.28)
SMALL PLATELETS BLD QL SMEAR: ABNORMAL
SODIUM SERPL-SCNC: 145 MMOL/L (ref 136–145)
VARIANT LYMPHS NFR BLD MANUAL: 56 % (ref 19.6–45.3)
WBC MORPH BLD: NORMAL
WBC NRBC COR # BLD: 3.35 10*3/MM3 (ref 3.4–10.8)

## 2022-11-15 PROCEDURE — 36415 COLL VENOUS BLD VENIPUNCTURE: CPT

## 2022-11-15 PROCEDURE — 85007 BL SMEAR W/DIFF WBC COUNT: CPT

## 2022-11-15 PROCEDURE — 80053 COMPREHEN METABOLIC PANEL: CPT

## 2022-11-15 PROCEDURE — 85025 COMPLETE CBC W/AUTO DIFF WBC: CPT

## 2022-11-15 PROCEDURE — 25010000002 HEPARIN LOCK FLUSH PER 10 UNITS: Performed by: INTERNAL MEDICINE

## 2022-11-15 PROCEDURE — 36591 DRAW BLOOD OFF VENOUS DEVICE: CPT | Performed by: INTERNAL MEDICINE

## 2022-11-15 RX ORDER — HEPARIN SODIUM (PORCINE) LOCK FLUSH IV SOLN 100 UNIT/ML 100 UNIT/ML
500 SOLUTION INTRAVENOUS AS NEEDED
Status: CANCELLED | OUTPATIENT
Start: 2022-11-21

## 2022-11-15 RX ORDER — SODIUM CHLORIDE 0.9 % (FLUSH) 0.9 %
10 SYRINGE (ML) INJECTION AS NEEDED
Status: CANCELLED | OUTPATIENT
Start: 2022-11-15

## 2022-11-15 RX ORDER — SODIUM CHLORIDE 0.9 % (FLUSH) 0.9 %
10 SYRINGE (ML) INJECTION AS NEEDED
Status: DISCONTINUED | OUTPATIENT
Start: 2022-11-15 | End: 2022-11-15 | Stop reason: HOSPADM

## 2022-11-15 RX ORDER — HEPARIN SODIUM (PORCINE) LOCK FLUSH IV SOLN 100 UNIT/ML 100 UNIT/ML
500 SOLUTION INTRAVENOUS AS NEEDED
Status: DISCONTINUED | OUTPATIENT
Start: 2022-11-15 | End: 2022-11-15 | Stop reason: HOSPADM

## 2022-11-15 RX ADMIN — HEPARIN 500 UNITS: 100 SYRINGE at 09:09

## 2022-11-15 RX ADMIN — Medication 10 ML: at 09:09

## 2022-11-16 ENCOUNTER — HOSPITAL ENCOUNTER (OUTPATIENT)
Dept: ULTRASOUND IMAGING | Facility: HOSPITAL | Age: 77
Discharge: HOME | End: 2022-11-16
Admitting: INTERNAL MEDICINE

## 2022-11-16 PROCEDURE — 76536 US EXAM OF HEAD AND NECK: CPT

## 2022-11-17 ENCOUNTER — APPOINTMENT (OUTPATIENT)
Dept: ONCOLOGY | Facility: HOSPITAL | Age: 77
End: 2022-11-17

## 2022-11-18 ENCOUNTER — OFFICE VISIT (OUTPATIENT)
Dept: ENDOCRINOLOGY | Facility: CLINIC | Age: 77
End: 2022-11-18

## 2022-11-18 VITALS
HEIGHT: 62 IN | BODY MASS INDEX: 27.42 KG/M2 | DIASTOLIC BLOOD PRESSURE: 70 MMHG | WEIGHT: 149 LBS | SYSTOLIC BLOOD PRESSURE: 114 MMHG | HEART RATE: 77 BPM | OXYGEN SATURATION: 98 %

## 2022-11-18 DIAGNOSIS — E04.2 NONTOXIC MULTINODULAR GOITER: Primary | ICD-10-CM

## 2022-11-18 PROCEDURE — 99214 OFFICE O/P EST MOD 30 MIN: CPT | Performed by: INTERNAL MEDICINE

## 2022-11-18 NOTE — PROGRESS NOTES
"Chief Complaint   Patient presents with   • Goiter         History of Present Illness    76 y.o. female   w colon cancer s p surgery now receiving chemo    In June 2022, mild suppression of TSH w MNG on US  Repeat TSH by us Nov 2022 is normal     June 2022 US Report  Right mid 2.6 cm , Right inf 1.3 cm , left 3 nodules 5 mm each   Radiologist describes as benign    I repeated US , Nov 2022  Right 9 mm nodule  Left 1 cm nodule and several subcm nodules  All spongiform , no concern for malignancy     She doesn't have CV disease , Afib     She feels well    She has osteoporosis, at age 70 y she took fosamax for 1 year but stopped due to no refills       ==========================================  Physical Exam  /70   Pulse 77   Ht 157.5 cm (62\")   Wt 67.6 kg (149 lb)   SpO2 98%   BMI 27.25 kg/m²   AOx3  No Goiter , no carotid bruit  RRR  CTA  No Edema     ==========================================    Laboratory Workup    Lab Results   Component Value Date    WBC 3.35 (L) 11/15/2022    HGB 10.8 (L) 11/15/2022    HCT 32.9 (L) 11/15/2022    MCV 85.5 11/15/2022     (H) 11/15/2022       Lab Results   Component Value Date    GLUCOSE 108 (H) 11/15/2022    BUN 16 11/15/2022    CREATININE 0.85 11/15/2022    EGFR 71.1 11/15/2022    EGFRIFNONA 80 09/14/2021    BCR 18.8 11/15/2022     11/15/2022    K 3.9 11/15/2022     (H) 11/15/2022    CO2 27.0 11/15/2022    CALCIUM 9.1 11/15/2022    ALBUMIN 3.90 11/15/2022    GLOB 2.8 11/15/2022    BILITOT 0.4 11/15/2022    ALKPHOS 87 11/15/2022    AST 20 11/15/2022    ALT 12 11/15/2022    AGRATIO 1.4 11/15/2022   Impression         1. The 2 nodules in the right lobe and the 2 small upper nodules in the left lobe are benign in character and not of concern   2. The small nodule in the lower left lobe is also likely benign but this is not as characteristic so six-month ultrasound follow-up is needed     [id number]  Narrative    Nontoxic thyroid nodule "     Ultrasound thyroid: In the lower medial right lobe there is a 1 x 2.6 x 1.1 cm well demarcated benign-appearing nodule . A little lower and laterally there is a 1.1 x 0.9 x 1.3 cm nodule that is partly cystic     In the upper medial left lobe there is a benign-appearing 5 x 3 x 5 mm nodule in the mid level left lobe there is a 5-3 x 5 mm benign-appearing solid nodule . In the lower left lobe of the thyroid there is a 7 x 6 x 7 mm sonolucent nodule that is not as   sharply demarcated as the nodules higher in the left lobe .  Exam End: 06/22/22 14:22    Specimen Collected: 06/22/22 16:30 Last Resulted: 06/22/22 16:43   Received From: [facility]  Result Received: 08/10/22 11:40                 ==========================================      ICD-10-CM ICD-9-CM   1. Nontoxic multinodular goiter  E04.2 241.1   -    Multinodular goiter     Repeat TSH is normal     Repeat Neck US to document stability of thyroid nodule and personally review nodule features     --    Osteoporosis    Once chemo is completed   prolia   She will start calcium plus D       No orders of the defined types were placed in this encounter.               This document has been electronically signed by Saul Campos MD on November 18, 2022 09:06 CST

## 2022-11-21 ENCOUNTER — OFFICE VISIT (OUTPATIENT)
Dept: ONCOLOGY | Facility: CLINIC | Age: 77
End: 2022-11-21

## 2022-11-21 ENCOUNTER — INFUSION (OUTPATIENT)
Dept: ONCOLOGY | Facility: HOSPITAL | Age: 77
End: 2022-11-21

## 2022-11-21 VITALS
DIASTOLIC BLOOD PRESSURE: 62 MMHG | HEART RATE: 77 BPM | SYSTOLIC BLOOD PRESSURE: 115 MMHG | WEIGHT: 148.6 LBS | TEMPERATURE: 97.3 F | BODY MASS INDEX: 27.18 KG/M2 | OXYGEN SATURATION: 93 %

## 2022-11-21 DIAGNOSIS — D70.1 CHEMOTHERAPY-INDUCED NEUTROPENIA: Chronic | ICD-10-CM

## 2022-11-21 DIAGNOSIS — C18.9 LOCAL RECURRENCE OF COLON CANCER: Chronic | ICD-10-CM

## 2022-11-21 DIAGNOSIS — C18.9 LOCAL RECURRENCE OF COLON CANCER: ICD-10-CM

## 2022-11-21 DIAGNOSIS — D64.81 ANEMIA DUE TO ANTINEOPLASTIC CHEMOTHERAPY: Chronic | ICD-10-CM

## 2022-11-21 DIAGNOSIS — D69.6 THROMBOCYTOPENIA: Chronic | ICD-10-CM

## 2022-11-21 DIAGNOSIS — C19 CANCER OF RECTOSIGMOID (COLON): Primary | Chronic | ICD-10-CM

## 2022-11-21 DIAGNOSIS — G62.0 NEUROPATHY DUE TO CHEMOTHERAPEUTIC DRUG: Chronic | ICD-10-CM

## 2022-11-21 DIAGNOSIS — T45.1X5A CHEMOTHERAPY-INDUCED NEUTROPENIA: Chronic | ICD-10-CM

## 2022-11-21 DIAGNOSIS — T45.1X5A ANEMIA DUE TO ANTINEOPLASTIC CHEMOTHERAPY: Chronic | ICD-10-CM

## 2022-11-21 DIAGNOSIS — E87.6 HYPOKALEMIA: Chronic | ICD-10-CM

## 2022-11-21 DIAGNOSIS — T45.1X5A NEUROPATHY DUE TO CHEMOTHERAPEUTIC DRUG: Chronic | ICD-10-CM

## 2022-11-21 DIAGNOSIS — C19 CANCER OF RECTOSIGMOID (COLON): Primary | ICD-10-CM

## 2022-11-21 PROBLEM — D64.9 ANEMIA: Chronic | Status: ACTIVE | Noted: 2018-04-18

## 2022-11-21 LAB
ALBUMIN SERPL-MCNC: 4.1 G/DL (ref 3.5–5.2)
ALBUMIN/GLOB SERPL: 1.5 G/DL
ALP SERPL-CCNC: 83 U/L (ref 39–117)
ALT SERPL W P-5'-P-CCNC: 14 U/L (ref 1–33)
ANION GAP SERPL CALCULATED.3IONS-SCNC: 11 MMOL/L (ref 5–15)
ANISOCYTOSIS BLD QL: ABNORMAL
AST SERPL-CCNC: 20 U/L (ref 1–32)
BASOPHILS # BLD MANUAL: 0.08 10*3/MM3 (ref 0–0.2)
BASOPHILS NFR BLD MANUAL: 1 % (ref 0–1.5)
BILIRUB SERPL-MCNC: 0.4 MG/DL (ref 0–1.2)
BUN SERPL-MCNC: 14 MG/DL (ref 8–23)
BUN/CREAT SERPL: 15.6 (ref 7–25)
CALCIUM SPEC-SCNC: 9.1 MG/DL (ref 8.6–10.5)
CHLORIDE SERPL-SCNC: 106 MMOL/L (ref 98–107)
CO2 SERPL-SCNC: 28 MMOL/L (ref 22–29)
CREAT SERPL-MCNC: 0.9 MG/DL (ref 0.57–1)
DEPRECATED RDW RBC AUTO: 61.1 FL (ref 37–54)
EGFRCR SERPLBLD CKD-EPI 2021: 66.4 ML/MIN/1.73
EOSINOPHIL # BLD MANUAL: 0.08 10*3/MM3 (ref 0–0.4)
EOSINOPHIL NFR BLD MANUAL: 1 % (ref 0.3–6.2)
ERYTHROCYTE [DISTWIDTH] IN BLOOD BY AUTOMATED COUNT: 19.8 % (ref 12.3–15.4)
GIANT PLATELETS: ABNORMAL
GLOBULIN UR ELPH-MCNC: 2.7 GM/DL
GLUCOSE SERPL-MCNC: 109 MG/DL (ref 65–99)
HCT VFR BLD AUTO: 35.5 % (ref 34–46.6)
HGB BLD-MCNC: 11.2 G/DL (ref 12–15.9)
HOLD SPECIMEN: NORMAL
HYPOCHROMIA BLD QL: ABNORMAL
LYMPHOCYTES # BLD MANUAL: 2.85 10*3/MM3 (ref 0.7–3.1)
LYMPHOCYTES NFR BLD MANUAL: 13 % (ref 5–12)
MCH RBC QN AUTO: 27.3 PG (ref 26.6–33)
MCHC RBC AUTO-ENTMCNC: 31.5 G/DL (ref 31.5–35.7)
MCV RBC AUTO: 86.4 FL (ref 79–97)
MONOCYTES # BLD: 1.09 10*3/MM3 (ref 0.1–0.9)
NEUTROPHILS # BLD AUTO: 4.28 10*3/MM3 (ref 1.7–7)
NEUTROPHILS NFR BLD MANUAL: 51 % (ref 42.7–76)
NEUTS VAC BLD QL SMEAR: ABNORMAL
PLATELET # BLD AUTO: 742 10*3/MM3 (ref 140–450)
PMV BLD AUTO: 9.7 FL (ref 6–12)
POTASSIUM SERPL-SCNC: 3.7 MMOL/L (ref 3.5–5.2)
PROT SERPL-MCNC: 6.8 G/DL (ref 6–8.5)
RBC # BLD AUTO: 4.11 10*6/MM3 (ref 3.77–5.28)
SMALL PLATELETS BLD QL SMEAR: ABNORMAL
SODIUM SERPL-SCNC: 145 MMOL/L (ref 136–145)
STOMATOCYTES BLD QL SMEAR: ABNORMAL
VARIANT LYMPHS NFR BLD MANUAL: 34 % (ref 19.6–45.3)
WBC NRBC COR # BLD: 8.39 10*3/MM3 (ref 3.4–10.8)

## 2022-11-21 PROCEDURE — 36415 COLL VENOUS BLD VENIPUNCTURE: CPT

## 2022-11-21 PROCEDURE — 25010000002 LEUCOVORIN CALCIUM PER 50 MG: Performed by: INTERNAL MEDICINE

## 2022-11-21 PROCEDURE — 1123F ACP DISCUSS/DSCN MKR DOCD: CPT | Performed by: INTERNAL MEDICINE

## 2022-11-21 PROCEDURE — 96416 CHEMO PROLONG INFUSE W/PUMP: CPT | Performed by: INTERNAL MEDICINE

## 2022-11-21 PROCEDURE — 96409 CHEMO IV PUSH SNGL DRUG: CPT | Performed by: INTERNAL MEDICINE

## 2022-11-21 PROCEDURE — 99214 OFFICE O/P EST MOD 30 MIN: CPT | Performed by: INTERNAL MEDICINE

## 2022-11-21 PROCEDURE — 80053 COMPREHEN METABOLIC PANEL: CPT

## 2022-11-21 PROCEDURE — 85007 BL SMEAR W/DIFF WBC COUNT: CPT

## 2022-11-21 PROCEDURE — G0498 CHEMO EXTEND IV INFUS W/PUMP: HCPCS | Performed by: INTERNAL MEDICINE

## 2022-11-21 PROCEDURE — 25010000002 FLUOROURACIL PER 500 MG: Performed by: INTERNAL MEDICINE

## 2022-11-21 PROCEDURE — 1126F AMNT PAIN NOTED NONE PRSNT: CPT | Performed by: INTERNAL MEDICINE

## 2022-11-21 PROCEDURE — 25010000002 DEXAMETHASONE SODIUM PHOSPHATE 100 MG/10ML SOLUTION: Performed by: INTERNAL MEDICINE

## 2022-11-21 PROCEDURE — 85025 COMPLETE CBC W/AUTO DIFF WBC: CPT

## 2022-11-21 PROCEDURE — 96367 TX/PROPH/DG ADDL SEQ IV INF: CPT | Performed by: INTERNAL MEDICINE

## 2022-11-21 PROCEDURE — 96375 TX/PRO/DX INJ NEW DRUG ADDON: CPT | Performed by: INTERNAL MEDICINE

## 2022-11-21 RX ORDER — SODIUM CHLORIDE 9 MG/ML
250 INJECTION, SOLUTION INTRAVENOUS ONCE
Status: COMPLETED | OUTPATIENT
Start: 2022-11-21 | End: 2022-11-21

## 2022-11-21 RX ORDER — SODIUM CHLORIDE 9 MG/ML
250 INJECTION, SOLUTION INTRAVENOUS ONCE
Status: CANCELLED | OUTPATIENT
Start: 2022-11-21

## 2022-11-21 RX ORDER — FLUOROURACIL 50 MG/ML
400 INJECTION, SOLUTION INTRAVENOUS ONCE
Status: CANCELLED | OUTPATIENT
Start: 2022-11-21

## 2022-11-21 RX ORDER — FLUOROURACIL 50 MG/ML
400 INJECTION, SOLUTION INTRAVENOUS ONCE
Status: COMPLETED | OUTPATIENT
Start: 2022-11-21 | End: 2022-11-21

## 2022-11-21 RX ADMIN — FLUOROURACIL 4100 MG: 50 INJECTION, SOLUTION INTRAVENOUS at 12:18

## 2022-11-21 RX ADMIN — FLUOROURACIL 680 MG: 50 INJECTION, SOLUTION INTRAVENOUS at 12:03

## 2022-11-21 RX ADMIN — SODIUM CHLORIDE 250 ML: 9 INJECTION, SOLUTION INTRAVENOUS at 10:45

## 2022-11-21 RX ADMIN — DEXAMETHASONE SODIUM PHOSPHATE 12 MG: 10 INJECTION, SOLUTION INTRAMUSCULAR; INTRAVENOUS at 10:45

## 2022-11-21 RX ADMIN — LEUCOVORIN CALCIUM 680 MG: 350 INJECTION, POWDER, LYOPHILIZED, FOR SUSPENSION INTRAMUSCULAR; INTRAVENOUS at 11:18

## 2022-11-21 NOTE — PROGRESS NOTES
DATE OF VISIT: 11/21/2022      REASON FOR VISIT: Colon cancer with recurrence involving omentum currently on chemotherapy, oxaliplatin induced neuropathy, DVT involving right upper extremity, thrombocytosis, iron deficiency, hypokalemia, chemotherapy-induced neutropenia      HISTORY OF PRESENT ILLNESS:   76-year-old female with medical problems significant for cardiomegaly, rectosigmoid cancer initially diagnosed in December 2017 with recurrence diagnosed in July 6, 2022 for which patient had omentectomy done, hypokalemia, thrombocytosis, DVT involving right upper extremity is here for follow-up appointment today.  Patient is scheduled to get cycle 7 of chemotherapy today.  Chemotherapy was held last week secondary to neutropenia.  Complains of neuropathy affecting upper and lower extremity.  Complains of fatigue.  Complains of poor appetite.  Denies any bleeding.  Denies any new lymph node enlargement.  Denies any new chest pain or shortness of breath.            Oncology history:    1.1.  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  - Patient was started on FOLFOX on December 27, 2017.  Patient received total 12 cycles of FOLFOX between December 27, 2017 until June 13, 2018.  Dose of 5-FU and oxaliplatin was decreased by 20% with cycle 2 due to development of neutropenia.  - Patient was on surveillance since then.  - In June 2022, patient was found to have rising CEA level with CEA being at 23.  Initial CT scan did not show any evidence of recurrence.  - Patient had a colonoscopy by Dr. Arriola in July 2022 which was negative for any recurrence except for tubular adenoma with.  - PET/CT done on June 20 2022 showed isolated uptake in omental region.  Patient was evaluated by Dr. Packer and underwent omentectomy with solitary lymph node sampling which was negative for metastasis              Past Medical History, Past Surgical History, Social History,  "Family History have been reviewed and are without significant changes except as mentioned.    Review of Systems   A comprehensive 14 point review of systems was performed and was negative except as mentioned in HPI.    Medications:  The current medication list was reviewed in the EMR    ALLERGIES:    Allergies   Allergen Reactions   • Codeine Other (See Comments)     \"dont remember\"       Objective      Vitals:    11/21/22 0817   BP: 115/62   Pulse: 77   Temp: 97.3 °F (36.3 °C)   TempSrc: Temporal   SpO2: 93%   Weight: 67.4 kg (148 lb 9.6 oz)   PainSc: 0-No pain     Current Status 10/24/2022   ECOG score 0       Physical Exam  Pulmonary:      Breath sounds: Normal breath sounds.   Neurological:      Mental Status: She is alert and oriented to person, place, and time.           RECENT LABS:  Glucose   Date Value Ref Range Status   11/21/2022 109 (H) 65 - 99 mg/dL Final     Sodium   Date Value Ref Range Status   11/21/2022 145 136 - 145 mmol/L Final   06/28/2022 143 136 - 145 mmol/L Final     Potassium   Date Value Ref Range Status   11/21/2022 3.7 3.5 - 5.2 mmol/L Final   06/28/2022 3.9 3.5 - 5.1 mmol/L Final     CO2   Date Value Ref Range Status   11/21/2022 28.0 22.0 - 29.0 mmol/L Final     Total CO2   Date Value Ref Range Status   06/28/2022 35 (H) 21 - 31 mmol/L Final     Chloride   Date Value Ref Range Status   11/21/2022 106 98 - 107 mmol/L Final   06/28/2022 103 98 - 107 mmol/L Final     Anion Gap   Date Value Ref Range Status   11/21/2022 11.0 5.0 - 15.0 mmol/L Final     Creatinine   Date Value Ref Range Status   11/21/2022 0.90 0.57 - 1.00 mg/dL Final   06/28/2022 0.6 (L) 0.7 - 1.3 mg/dL Final     BUN   Date Value Ref Range Status   11/21/2022 14 8 - 23 mg/dL Final   06/28/2022 12 7 - 25 mg/dL Final     BUN/Creatinine Ratio   Date Value Ref Range Status   11/21/2022 15.6 7.0 - 25.0 Final     Calcium   Date Value Ref Range Status   11/21/2022 9.1 8.6 - 10.5 mg/dL Final   06/28/2022 9.3 8.6 - 10.3 mg/dL Final "     eGFR Non  Amer   Date Value Ref Range Status   2021 80 >60 mL/min/1.73 Final     Alkaline Phosphatase   Date Value Ref Range Status   2022 83 39 - 117 U/L Final   2022 76 34 - 104 U/L Final     Total Protein   Date Value Ref Range Status   2022 6.8 6.0 - 8.5 g/dL Final     ALT (SGPT)   Date Value Ref Range Status   2022 14 1 - 33 U/L Final   2022 14 7 - 52 U/L Final     AST (SGOT)   Date Value Ref Range Status   2022 20 1 - 32 U/L Final   2022 17 13 - 39 U/L Final     Total Bilirubin   Date Value Ref Range Status   2022 0.4 0.0 - 1.2 mg/dL Final   2022 0.59 0.3 - 1.0 mg/dL Final     Albumin   Date Value Ref Range Status   2022 4.10 3.50 - 5.20 g/dL Final   2022 4.1 3.5 - 5.7 g/dL Final     Globulin   Date Value Ref Range Status   2022 2.7 gm/dL Final     Lab Results   Component Value Date    WBC 8.39 2022    HGB 11.2 (L) 2022    HCT 35.5 2022    MCV 86.4 2022     (H) 2022     Lab Results   Component Value Date    NEUTROABS 0.34 (L) 11/15/2022    IRON 64 10/10/2022    IRON 58 2017    TIBC 408 10/10/2022    TIBC 312 2017    LABIRON 16 (L) 10/10/2022    LABIRON 19 2017    FERRITIN 161.70 (H) 10/10/2022    FERRITIN 96.10 2017    [id number] 482 10/10/2022    [id number] 616 2017    FOLATE >20.00 10/10/2022    FOLATE 7.20 2017     Lab Results   Component Value Date    CEA 2.52 10/24/2022    REFLABREPO  2022     [facility]  [address] [place] [address]  Phone: [phone] or [phone]  Fax: [phone]  Jeff Bustamante M.D., Medical Director    PATIENT NAME                                     LABORATORY NO.  1800   SEVEN ANDERSON.                             [id number]  [id number]                                 AGE                    SEX   SSN              CLIENT REF #  [facility]               "     76        1945      F     [id number]      [id number]    [place]                               REQUESTING M.D.           ATTENDING M.D.         COPY TO.  [address] [place] [address]                         [place] [address]                     DATE COLLECTED            DATE RECEIVED          DATE REPORTED  07/05/2022 07/05/2022 07/07/2022    DIAGNOSIS:  DESCENDING COLON POLYPS:  Tubular adenoma    JBS/pah    CLINICAL HISTORY:  Abnormal tumor markers    SPECIMENS RECEIVED:  DESCENDING COLON POLYPS    MICROSCOPIC DESCRIPTION:  Tissue blocks are prepared and slides are examined microscopically on all  specimens. See diagnosis for details.    Professional interpretation rendered by Rupert Steen M.D. at [facility], [address] [place] [id number].    GROSS DESCRIPTION:  Specimen is received in 1 formalin filled container \"large intestine,  left/descending colon polyps\" consists of 3 pieces of tan soft tissue measuring  0.4 x 0.3 x 0.2 cm.  Specimen is submitted entirely in 1 cassette.  MM    REVIEWED, DIAGNOSED AND ELECTRONICALLY  SIGNED BY:    Rupert Steen M.D.  CPT CODES:  16148           PATHOLOGY:  * Cannot find OR log *         RADIOLOGY DATA :  US Thyroid    Result Date: 11/18/2022  Stable multinodular goiter. Follow-up study in one year recommended to document more extended stability. See body of report for full details. Electronically signed by:  Salomón Thornton MD  11/18/2022 10:34 AM [facility] Workstation: [id number]          Assessment & Plan     1.  Adenocarcinoma of sigmoid colon with recurrence:  - Review oncology history for prior treatment details  - Due to local recurrence involving omentum, patient had omentectomy done on July 6, 2022  - Currently on 5-FU, leucovorin since August 6, 2022  - On November 7, patient requested a treatment break secondary to her son visiting her.  - On November 15 patient was " found to have neutropenia with treatment was held last week.  - CBC done today shows white blood cell count is 8.39, hemoglobin is 11.2  - We will proceed with cycle 7 of chemotherapy today  - We will continue with close monitoring for chemotherapy related side effect  - Patient will return to clinic in 2 weeks with repeat CBC and CMP on that day.    2.  Oxaliplatin induced neuropathy  - Remains on gabapentin 100 mg 3 times a day    3.  Hypokalemia:  - Currently on potassium 20 mEq p.o. daily at home  - Potassium is 3.7    4.  Right upper extremity DVT:  - Patient was diagnosed with right upper extremity DVT on September 28, 2022.  Currently on Xarelto 20 mg p.o. daily    5.  Anemia:  - Hemoglobin is 11.2  - Remains on ferrous sulfate p.o. daily  - We will repeat anemia work-up in December 2022    6.  Chemotherapy-induced neutropenia:  - CBC done today shows white blood cell count is 8.39 with ANC of 4.3    7.  Thrombocytosis:  - Reactive in nature  - Platelet count is 742,000    8.  Hypertension    9.  Health maintenance: Patient quit smoking in 2020    10. Advance Care Planning: For now patient remains full code and is able to make decisions.  Patient has health care surrogate mentioned on chart.                 PHQ-9 Total Score: 0   -Patient is not homicidal or suicidal.  No acute intervention required.    Mary Ann Condon reports a pain score of 0.  Given her pain assessment as noted, treatment options were discussed and the following options were decided upon as a follow-up plan to address the patient's pain: continuation of current treatment plan for pain.         Wil Abraham MD  11/21/2022  09:02 CST        Part of this note may be an electronic transcription/translation of spoken language to printed text using the Dragon Dictation System.          CC:

## 2022-11-22 ENCOUNTER — APPOINTMENT (OUTPATIENT)
Dept: ONCOLOGY | Facility: HOSPITAL | Age: 77
End: 2022-11-22

## 2022-11-23 ENCOUNTER — INFUSION (OUTPATIENT)
Dept: ONCOLOGY | Facility: HOSPITAL | Age: 77
End: 2022-11-23

## 2022-11-23 DIAGNOSIS — C18.9 LOCAL RECURRENCE OF COLON CANCER: Primary | ICD-10-CM

## 2022-11-23 DIAGNOSIS — Z45.2 ENCOUNTER FOR VENOUS ACCESS DEVICE CARE: ICD-10-CM

## 2022-11-23 DIAGNOSIS — C19 CANCER OF RECTOSIGMOID (COLON): ICD-10-CM

## 2022-11-23 PROCEDURE — 25010000002 HEPARIN LOCK FLUSH PER 10 UNITS: Performed by: INTERNAL MEDICINE

## 2022-11-23 RX ORDER — HEPARIN SODIUM (PORCINE) LOCK FLUSH IV SOLN 100 UNIT/ML 100 UNIT/ML
500 SOLUTION INTRAVENOUS AS NEEDED
Status: DISCONTINUED | OUTPATIENT
Start: 2022-11-23 | End: 2022-11-23 | Stop reason: HOSPADM

## 2022-11-23 RX ORDER — SODIUM CHLORIDE 0.9 % (FLUSH) 0.9 %
10 SYRINGE (ML) INJECTION AS NEEDED
Status: CANCELLED | OUTPATIENT
Start: 2022-12-05

## 2022-11-23 RX ORDER — SODIUM CHLORIDE 0.9 % (FLUSH) 0.9 %
10 SYRINGE (ML) INJECTION AS NEEDED
Status: DISCONTINUED | OUTPATIENT
Start: 2022-11-23 | End: 2022-11-23 | Stop reason: HOSPADM

## 2022-11-23 RX ORDER — HEPARIN SODIUM (PORCINE) LOCK FLUSH IV SOLN 100 UNIT/ML 100 UNIT/ML
500 SOLUTION INTRAVENOUS AS NEEDED
Status: CANCELLED | OUTPATIENT
Start: 2022-12-05

## 2022-11-23 RX ADMIN — HEPARIN 500 UNITS: 100 SYRINGE at 11:01

## 2022-11-23 RX ADMIN — Medication 10 ML: at 11:01

## 2022-11-29 ENCOUNTER — APPOINTMENT (OUTPATIENT)
Dept: ONCOLOGY | Facility: CLINIC | Age: 77
End: 2022-11-29

## 2022-12-05 ENCOUNTER — INFUSION (OUTPATIENT)
Dept: ONCOLOGY | Facility: HOSPITAL | Age: 77
End: 2022-12-05

## 2022-12-05 ENCOUNTER — OFFICE VISIT (OUTPATIENT)
Dept: ONCOLOGY | Facility: CLINIC | Age: 77
End: 2022-12-05

## 2022-12-05 VITALS
RESPIRATION RATE: 18 BRPM | HEART RATE: 83 BPM | BODY MASS INDEX: 26.98 KG/M2 | SYSTOLIC BLOOD PRESSURE: 117 MMHG | OXYGEN SATURATION: 97 % | WEIGHT: 147.5 LBS | DIASTOLIC BLOOD PRESSURE: 62 MMHG

## 2022-12-05 DIAGNOSIS — D64.81 ANEMIA DUE TO ANTINEOPLASTIC CHEMOTHERAPY: Primary | Chronic | ICD-10-CM

## 2022-12-05 DIAGNOSIS — C19 CANCER OF RECTOSIGMOID (COLON): Primary | Chronic | ICD-10-CM

## 2022-12-05 DIAGNOSIS — C18.9 LOCAL RECURRENCE OF COLON CANCER: ICD-10-CM

## 2022-12-05 DIAGNOSIS — T45.1X5A ANEMIA DUE TO ANTINEOPLASTIC CHEMOTHERAPY: Primary | Chronic | ICD-10-CM

## 2022-12-05 LAB
ALBUMIN SERPL-MCNC: 4 G/DL (ref 3.5–5.2)
ALBUMIN/GLOB SERPL: 1.4 G/DL
ALP SERPL-CCNC: 80 U/L (ref 39–117)
ALT SERPL W P-5'-P-CCNC: 13 U/L (ref 1–33)
ANION GAP SERPL CALCULATED.3IONS-SCNC: 9 MMOL/L (ref 5–15)
AST SERPL-CCNC: 18 U/L (ref 1–32)
BASOPHILS # BLD AUTO: 0.03 10*3/MM3 (ref 0–0.2)
BASOPHILS NFR BLD AUTO: 0.7 % (ref 0–1.5)
BILIRUB SERPL-MCNC: 0.5 MG/DL (ref 0–1.2)
BUN SERPL-MCNC: 16 MG/DL (ref 8–23)
BUN/CREAT SERPL: 20.5 (ref 7–25)
CALCIUM SPEC-SCNC: 9.2 MG/DL (ref 8.6–10.5)
CHLORIDE SERPL-SCNC: 104 MMOL/L (ref 98–107)
CO2 SERPL-SCNC: 28 MMOL/L (ref 22–29)
CREAT SERPL-MCNC: 0.78 MG/DL (ref 0.57–1)
DEPRECATED RDW RBC AUTO: 58.3 FL (ref 37–54)
EGFRCR SERPLBLD CKD-EPI 2021: 78.8 ML/MIN/1.73
EOSINOPHIL # BLD AUTO: 0.19 10*3/MM3 (ref 0–0.4)
EOSINOPHIL NFR BLD AUTO: 4.1 % (ref 0.3–6.2)
ERYTHROCYTE [DISTWIDTH] IN BLOOD BY AUTOMATED COUNT: 18.6 % (ref 12.3–15.4)
GLOBULIN UR ELPH-MCNC: 2.9 GM/DL
GLUCOSE SERPL-MCNC: 107 MG/DL (ref 65–99)
HCT VFR BLD AUTO: 33.8 % (ref 34–46.6)
HGB BLD-MCNC: 10.9 G/DL (ref 12–15.9)
IMM GRANULOCYTES # BLD AUTO: 0.01 10*3/MM3 (ref 0–0.05)
IMM GRANULOCYTES NFR BLD AUTO: 0.2 % (ref 0–0.5)
LYMPHOCYTES # BLD AUTO: 0.94 10*3/MM3 (ref 0.7–3.1)
LYMPHOCYTES NFR BLD AUTO: 20.5 % (ref 19.6–45.3)
MCH RBC QN AUTO: 27.9 PG (ref 26.6–33)
MCHC RBC AUTO-ENTMCNC: 32.2 G/DL (ref 31.5–35.7)
MCV RBC AUTO: 86.7 FL (ref 79–97)
MONOCYTES # BLD AUTO: 0.38 10*3/MM3 (ref 0.1–0.9)
MONOCYTES NFR BLD AUTO: 8.3 % (ref 5–12)
NEUTROPHILS NFR BLD AUTO: 3.04 10*3/MM3 (ref 1.7–7)
NEUTROPHILS NFR BLD AUTO: 66.2 % (ref 42.7–76)
NRBC BLD AUTO-RTO: 0 /100 WBC (ref 0–0.2)
PLATELET # BLD AUTO: 329 10*3/MM3 (ref 140–450)
PMV BLD AUTO: 9.8 FL (ref 6–12)
POTASSIUM SERPL-SCNC: 3.9 MMOL/L (ref 3.5–5.2)
PROT SERPL-MCNC: 6.9 G/DL (ref 6–8.5)
RBC # BLD AUTO: 3.9 10*6/MM3 (ref 3.77–5.28)
SODIUM SERPL-SCNC: 141 MMOL/L (ref 136–145)
WBC NRBC COR # BLD: 4.59 10*3/MM3 (ref 3.4–10.8)

## 2022-12-05 PROCEDURE — 80053 COMPREHEN METABOLIC PANEL: CPT

## 2022-12-05 PROCEDURE — 25010000002 DEXAMETHASONE SODIUM PHOSPHATE 100 MG/10ML SOLUTION: Performed by: NURSE PRACTITIONER

## 2022-12-05 PROCEDURE — 85025 COMPLETE CBC W/AUTO DIFF WBC: CPT | Performed by: INTERNAL MEDICINE

## 2022-12-05 PROCEDURE — 99213 OFFICE O/P EST LOW 20 MIN: CPT | Performed by: NURSE PRACTITIONER

## 2022-12-05 PROCEDURE — 96416 CHEMO PROLONG INFUSE W/PUMP: CPT | Performed by: INTERNAL MEDICINE

## 2022-12-05 PROCEDURE — 96367 TX/PROPH/DG ADDL SEQ IV INF: CPT | Performed by: INTERNAL MEDICINE

## 2022-12-05 PROCEDURE — 96409 CHEMO IV PUSH SNGL DRUG: CPT | Performed by: INTERNAL MEDICINE

## 2022-12-05 PROCEDURE — 25010000002 LEUCOVORIN CALCIUM PER 50 MG: Performed by: NURSE PRACTITIONER

## 2022-12-05 PROCEDURE — 96375 TX/PRO/DX INJ NEW DRUG ADDON: CPT | Performed by: INTERNAL MEDICINE

## 2022-12-05 PROCEDURE — 25010000002 FLUOROURACIL PER 500 MG: Performed by: NURSE PRACTITIONER

## 2022-12-05 RX ORDER — FLUOROURACIL 50 MG/ML
400 INJECTION, SOLUTION INTRAVENOUS ONCE
Status: CANCELLED | OUTPATIENT
Start: 2022-12-05

## 2022-12-05 RX ORDER — FLUOROURACIL 50 MG/ML
400 INJECTION, SOLUTION INTRAVENOUS ONCE
Status: COMPLETED | OUTPATIENT
Start: 2022-12-05 | End: 2022-12-05

## 2022-12-05 RX ORDER — SODIUM CHLORIDE 9 MG/ML
250 INJECTION, SOLUTION INTRAVENOUS ONCE
Status: CANCELLED | OUTPATIENT
Start: 2022-12-05

## 2022-12-05 RX ORDER — SODIUM CHLORIDE 9 MG/ML
250 INJECTION, SOLUTION INTRAVENOUS ONCE
Status: COMPLETED | OUTPATIENT
Start: 2022-12-05 | End: 2022-12-05

## 2022-12-05 RX ADMIN — FLUOROURACIL 4100 MG: 50 INJECTION, SOLUTION INTRAVENOUS at 12:44

## 2022-12-05 RX ADMIN — LEUCOVORIN CALCIUM 680 MG: 350 INJECTION, POWDER, LYOPHILIZED, FOR SUSPENSION INTRAMUSCULAR; INTRAVENOUS at 11:50

## 2022-12-05 RX ADMIN — DEXAMETHASONE SODIUM PHOSPHATE 12 MG: 10 INJECTION, SOLUTION INTRAMUSCULAR; INTRAVENOUS at 10:53

## 2022-12-05 RX ADMIN — FLUOROURACIL 680 MG: 50 INJECTION, SOLUTION INTRAVENOUS at 12:30

## 2022-12-05 RX ADMIN — SODIUM CHLORIDE 250 ML: 9 INJECTION, SOLUTION INTRAVENOUS at 10:53

## 2022-12-05 NOTE — PROGRESS NOTES
DATE OF VISIT: 12/5/2022      REASON FOR VISIT:  Colon cancer with recurrence involving omentum currently on chemotherapy, oxaliplatin induced neuropathy, DVT involving right upper extremity, thrombocytosis, iron deficiency, hypokalemia, chemotherapy-induced neutropenia        HISTORY OF PRESENT ILLNESS:   76-year-old female with medical problems significant for cardiomegaly, rectosigmoid cancer initially diagnosed in December 2017 with recurrence diagnosed in July 6, 2022 for which patient had omentectomy done, hypokalemia, thrombocytosis, DVT involving right upper extremity is here for follow-up appointment today.  Patient is scheduled to get cycle 9 of chemotherapy today.    States her neuropathy is stable, Denies any excessive nausea with chemotherapy. Reports fatigue for about 4 days following infusion but then improves.   Appetite is stable. Denies any bleeding.            Oncology history:     1.1.  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  - Patient was started on FOLFOX on December 27, 2017.  Patient received total 12 cycles of FOLFOX between December 27, 2017 until June 13, 2018.  Dose of 5-FU and oxaliplatin was decreased by 20% with cycle 2 due to development of neutropenia.  - Patient was on surveillance since then.  - In June 2022, patient was found to have rising CEA level with CEA being at 23.  Initial CT scan did not show any evidence of recurrence.  - Patient had a colonoscopy by Dr. Arriola in July 2022 which was negative for any recurrence except for tubular adenoma with.  - PET/CT done on June 20 2022 showed isolated uptake in omental region.  Patient was evaluated by Dr. Packer and underwent omentectomy with solitary lymph node sampling which was negative for metastasis               Past Medical History, Past Surgical History, Social History, Family History have been reviewed and are without significant changes except as  "mentioned.    Review of Systems   A comprehensive 14 point review of systems was performed and was negative except as mentioned.    Medications:  The current medication list was reviewed in the EMR    ALLERGIES:    Allergies   Allergen Reactions   • Codeine Other (See Comments)     \"dont remember\"       Objective      Vitals:    12/05/22 1021   BP: 117/62   Pulse: 83   Resp: 18   SpO2: 97%   Weight: 66.9 kg (147 lb 8 oz)   PainSc: 0-No pain     Current Status 12/5/2022   ECOG score 0       Physical Exam  General: alert and oriented no acute distress  Lungs; normal breath sounds  Card: RRR  Ext; no edema    RECENT LABS:  Glucose   Date Value Ref Range Status   12/05/2022 107 (H) 65 - 99 mg/dL Final     Sodium   Date Value Ref Range Status   12/05/2022 141 136 - 145 mmol/L Final   06/28/2022 143 136 - 145 mmol/L Final     Potassium   Date Value Ref Range Status   12/05/2022 3.9 3.5 - 5.2 mmol/L Final   06/28/2022 3.9 3.5 - 5.1 mmol/L Final     CO2   Date Value Ref Range Status   12/05/2022 28.0 22.0 - 29.0 mmol/L Final     Total CO2   Date Value Ref Range Status   06/28/2022 35 (H) 21 - 31 mmol/L Final     Chloride   Date Value Ref Range Status   12/05/2022 104 98 - 107 mmol/L Final   06/28/2022 103 98 - 107 mmol/L Final     Anion Gap   Date Value Ref Range Status   12/05/2022 9.0 5.0 - 15.0 mmol/L Final     Creatinine   Date Value Ref Range Status   12/05/2022 0.78 0.57 - 1.00 mg/dL Final   06/28/2022 0.6 (L) 0.7 - 1.3 mg/dL Final     BUN   Date Value Ref Range Status   12/05/2022 16 8 - 23 mg/dL Final   06/28/2022 12 7 - 25 mg/dL Final     BUN/Creatinine Ratio   Date Value Ref Range Status   12/05/2022 20.5 7.0 - 25.0 Final     Calcium   Date Value Ref Range Status   12/05/2022 9.2 8.6 - 10.5 mg/dL Final   06/28/2022 9.3 8.6 - 10.3 mg/dL Final     eGFR Non  Amer   Date Value Ref Range Status   09/14/2021 80 >60 mL/min/1.73 Final     Alkaline Phosphatase   Date Value Ref Range Status   12/05/2022 80 39 - 117 " U/L Final   2022 76 34 - 104 U/L Final     Total Protein   Date Value Ref Range Status   2022 6.9 6.0 - 8.5 g/dL Final     ALT (SGPT)   Date Value Ref Range Status   2022 13 1 - 33 U/L Final   2022 14 7 - 52 U/L Final     AST (SGOT)   Date Value Ref Range Status   2022 18 1 - 32 U/L Final   2022 17 13 - 39 U/L Final     Total Bilirubin   Date Value Ref Range Status   2022 0.5 0.0 - 1.2 mg/dL Final   2022 0.59 0.3 - 1.0 mg/dL Final     Albumin   Date Value Ref Range Status   2022 4.00 3.50 - 5.20 g/dL Final   2022 4.1 3.5 - 5.7 g/dL Final     Globulin   Date Value Ref Range Status   2022 2.9 gm/dL Final     Lab Results   Component Value Date    WBC 4.59 2022    HGB 10.9 (L) 2022    HCT 33.8 (L) 2022    MCV 86.7 2022     2022     Lab Results   Component Value Date    NEUTROABS 3.04 2022    IRON 64 10/10/2022    IRON 58 2017    TIBC 408 10/10/2022    TIBC 312 2017    LABIRON 16 (L) 10/10/2022    LABIRON 19 2017    FERRITIN 161.70 (H) 10/10/2022    FERRITIN 96.10 2017    [id number] 482 10/10/2022    [id number] 616 2017    FOLATE >20.00 10/10/2022    FOLATE 7.20 2017     Lab Results   Component Value Date    CEA 2.52 10/24/2022    REFLABREPO  2022     [facility]  [address] [place] [address]  Phone: [phone] or [phone]  Fax: [phone]  Jeff Bustamante M.D., Medical Director    PATIENT NAME                                     LABORATORY NO.  1800   SEVEN ANDERSON.                             [id number]  [id number]                                 AGE                    SEX   N              CLIENT REF #  [facility]                   76        1945      F     [id number]      [id number]    [place]                               REQUESTING M.D.           ATTENDING M.D.         COPY TO.  [address]                         [place] [address]                     DATE COLLECTED            DATE RECEIVED          DATE REPORTED  07/05/2022 07/05/2022 07/07/2022    DIAGNOSIS:  DESCENDING COLON POLYPS:  Tubular adenoma    JBS/pah    CLINICAL HISTORY:  Abnormal tumor markers    SPECIMENS RECEIVED:  DESCENDING COLON POLYPS    MICROSCOPIC DESCRIPTION:  Tissue blocks are prepared and slides are examined microscopically on all  specimens. See diagnosis for details.    Professional interpretation rendered by Rupert Steen M.D. at [facility],  [facility], [address] [place] [address] , [place] [address].    GROSS DESCRIPTION:  Specimen is received in 1 formalin filled container \"large intestine,  left/descending colon polyps\" consists of 3 pieces of tan soft tissue measuring  0.4 x 0.3 x 0.2 cm.  Specimen is submitted entirely in 1 cassette.  MM    REVIEWED, DIAGNOSED AND ELECTRONICALLY  SIGNED BY:    Rupert Steen M.D.  CPT CODES:  02769         RADIOLOGY DATA :  No radiology results for the last 7 days        Assessment & Plan     1.  Adenocarcinoma of sigmoid colon with recurrence:  - Review oncology history for prior treatment details  - Due to local recurrence involving omentum, patient had omentectomy done on July 6, 2022  - Currently on 5-FU, leucovorin since August 6, 2022  - On November 7, patient requested a treatment break secondary to her son visiting her.  - On November 15 patient was found to have neutropenia with treatment was held last week.  - Labs reviewed today and WBC is 4.59 with ANC 3,004. Hgb is 10.9 and platelets are 329,000.  - We will proceed with cycle 9 of chemotherapy today  - We will continue with close monitoring for chemotherapy related side effect  - Patient will return to clinic in 2 weeks with repeat CBC and CMP on that day.     2.  Oxaliplatin induced neuropathy  - Remains on gabapentin 100 mg 3 times a " day     3.  Hypokalemia:  - Currently on potassium 20 mEq p.o. daily at home  - Potassium is 3.9     4.  Right upper extremity DVT:  - Patient was diagnosed with right upper extremity DVT on September 28, 2022.  Currently on Xarelto 20 mg p.o. daily     5.  Anemia:  - Hemoglobin is 10.9  - Remains on ferrous sulfate p.o. daily  - Will repeat anemia work up at next visit; labs orders placed today.     6.  Chemotherapy-induced neutropenia:  - CBC done today shows white blood cell count is 4.59 with ANC of 3.04     7.  Thrombocytosis:  - Reactive in nature  - Platelet count is 329,000     8.  Health maintenance: Patient quit smoking in 2020     9. Advance Care Planning: For now patient remains full code and is able to make decisions.  Patient has health care surrogate mentioned on chart.               PHQ-9 Total Score: 0     Mary Ann Condon reports a pain score of 0.  Given her pain assessment as noted, treatment options were discussed and the following options were decided upon as a follow-up plan to address the patient's pain: no pain today.         Mia Juarez, APRN  12/5/2022  11:01 CST        Part of this note may be an electronic transcription/translation of spoken language to printed text using the Dragon Dictation System.          CC:

## 2022-12-06 RX ORDER — POTASSIUM CHLORIDE 750 MG/1
CAPSULE, EXTENDED RELEASE ORAL
Qty: 60 CAPSULE | Refills: 1 | Status: SHIPPED | OUTPATIENT
Start: 2022-12-06 | End: 2022-12-29

## 2022-12-06 NOTE — TELEPHONE ENCOUNTER
Rx Refill Note  Requested Prescriptions     Pending Prescriptions Disp Refills   • potassium chloride (MICRO-K) 10 MEQ CR capsule [Pharmacy Med Name: POTASSIUM CL ER 10 MEQ CAPSULE] 60 capsule 1     Sig: TAKE 2 CAPSULES BY MOUTH EVERY DAY      Last office visit with prescribing clinician: 11/21/2022   Last telemedicine visit with prescribing clinician: 12/19/2022   Next office visit with prescribing clinician: 12/19/2022                         Would you like a call back once the refill request has been completed: [] Yes [] No    If the office needs to give you a call back, can they leave a voicemail: [] Yes [] No    Marley Potter  12/06/22, 09:34 CST

## 2022-12-07 ENCOUNTER — INFUSION (OUTPATIENT)
Dept: ONCOLOGY | Facility: HOSPITAL | Age: 77
End: 2022-12-07

## 2022-12-07 DIAGNOSIS — Z45.2 ENCOUNTER FOR VENOUS ACCESS DEVICE CARE: Primary | ICD-10-CM

## 2022-12-07 PROCEDURE — 25010000002 HEPARIN LOCK FLUSH PER 10 UNITS: Performed by: INTERNAL MEDICINE

## 2022-12-07 RX ORDER — SODIUM CHLORIDE 0.9 % (FLUSH) 0.9 %
10 SYRINGE (ML) INJECTION AS NEEDED
Status: CANCELLED | OUTPATIENT
Start: 2022-12-19

## 2022-12-07 RX ORDER — HEPARIN SODIUM (PORCINE) LOCK FLUSH IV SOLN 100 UNIT/ML 100 UNIT/ML
500 SOLUTION INTRAVENOUS AS NEEDED
Status: DISCONTINUED | OUTPATIENT
Start: 2022-12-07 | End: 2022-12-07 | Stop reason: HOSPADM

## 2022-12-07 RX ORDER — HEPARIN SODIUM (PORCINE) LOCK FLUSH IV SOLN 100 UNIT/ML 100 UNIT/ML
500 SOLUTION INTRAVENOUS AS NEEDED
Status: CANCELLED | OUTPATIENT
Start: 2022-12-19

## 2022-12-07 RX ORDER — SODIUM CHLORIDE 0.9 % (FLUSH) 0.9 %
10 SYRINGE (ML) INJECTION AS NEEDED
Status: DISCONTINUED | OUTPATIENT
Start: 2022-12-07 | End: 2022-12-07 | Stop reason: HOSPADM

## 2022-12-07 RX ADMIN — Medication 10 ML: at 10:49

## 2022-12-07 RX ADMIN — HEPARIN 500 UNITS: 100 SYRINGE at 10:49

## 2022-12-14 DIAGNOSIS — C19 CANCER OF RECTOSIGMOID (COLON): Primary | Chronic | ICD-10-CM

## 2022-12-19 ENCOUNTER — OFFICE VISIT (OUTPATIENT)
Dept: ONCOLOGY | Facility: CLINIC | Age: 77
End: 2022-12-19

## 2022-12-19 ENCOUNTER — INFUSION (OUTPATIENT)
Dept: ONCOLOGY | Facility: HOSPITAL | Age: 77
End: 2022-12-19

## 2022-12-19 VITALS
BODY MASS INDEX: 27.07 KG/M2 | SYSTOLIC BLOOD PRESSURE: 120 MMHG | WEIGHT: 148 LBS | TEMPERATURE: 96.7 F | HEART RATE: 73 BPM | DIASTOLIC BLOOD PRESSURE: 60 MMHG | OXYGEN SATURATION: 100 % | RESPIRATION RATE: 18 BRPM

## 2022-12-19 DIAGNOSIS — T45.1X5A ANEMIA DUE TO ANTINEOPLASTIC CHEMOTHERAPY: Primary | Chronic | ICD-10-CM

## 2022-12-19 DIAGNOSIS — C18.9 LOCAL RECURRENCE OF COLON CANCER: ICD-10-CM

## 2022-12-19 DIAGNOSIS — D64.81 ANEMIA DUE TO ANTINEOPLASTIC CHEMOTHERAPY: Primary | Chronic | ICD-10-CM

## 2022-12-19 DIAGNOSIS — C19 CANCER OF RECTOSIGMOID (COLON): Primary | Chronic | ICD-10-CM

## 2022-12-19 DIAGNOSIS — C19 CANCER OF RECTOSIGMOID (COLON): ICD-10-CM

## 2022-12-19 LAB
ALBUMIN SERPL-MCNC: 4 G/DL (ref 3.5–5.2)
ALBUMIN/GLOB SERPL: 1.4 G/DL
ALP SERPL-CCNC: 75 U/L (ref 39–117)
ALT SERPL W P-5'-P-CCNC: 13 U/L (ref 1–33)
ANION GAP SERPL CALCULATED.3IONS-SCNC: 10 MMOL/L (ref 5–15)
AST SERPL-CCNC: 18 U/L (ref 1–32)
BASOPHILS # BLD AUTO: 0.05 10*3/MM3 (ref 0–0.2)
BASOPHILS NFR BLD AUTO: 1.2 % (ref 0–1.5)
BILIRUB SERPL-MCNC: 0.5 MG/DL (ref 0–1.2)
BUN SERPL-MCNC: 16 MG/DL (ref 8–23)
BUN/CREAT SERPL: 18.4 (ref 7–25)
CALCIUM SPEC-SCNC: 9.2 MG/DL (ref 8.6–10.5)
CHLORIDE SERPL-SCNC: 109 MMOL/L (ref 98–107)
CO2 SERPL-SCNC: 24 MMOL/L (ref 22–29)
CREAT SERPL-MCNC: 0.87 MG/DL (ref 0.57–1)
DEPRECATED RDW RBC AUTO: 59.1 FL (ref 37–54)
EGFRCR SERPLBLD CKD-EPI 2021: 68.7 ML/MIN/1.73
EOSINOPHIL # BLD AUTO: 0.22 10*3/MM3 (ref 0–0.4)
EOSINOPHIL NFR BLD AUTO: 5.2 % (ref 0.3–6.2)
ERYTHROCYTE [DISTWIDTH] IN BLOOD BY AUTOMATED COUNT: 18.5 % (ref 12.3–15.4)
FERRITIN SERPL-MCNC: 169.7 NG/ML (ref 13–150)
GLOBULIN UR ELPH-MCNC: 2.8 GM/DL
GLUCOSE SERPL-MCNC: 99 MG/DL (ref 65–99)
HCT VFR BLD AUTO: 33.6 % (ref 34–46.6)
HGB BLD-MCNC: 10.4 G/DL (ref 12–15.9)
IMM GRANULOCYTES # BLD AUTO: 0.01 10*3/MM3 (ref 0–0.05)
IMM GRANULOCYTES NFR BLD AUTO: 0.2 % (ref 0–0.5)
IRON 24H UR-MRATE: 78 MCG/DL (ref 37–145)
IRON SATN MFR SERPL: 19 % (ref 20–50)
LYMPHOCYTES # BLD AUTO: 1.27 10*3/MM3 (ref 0.7–3.1)
LYMPHOCYTES NFR BLD AUTO: 30.1 % (ref 19.6–45.3)
MCH RBC QN AUTO: 27.6 PG (ref 26.6–33)
MCHC RBC AUTO-ENTMCNC: 31 G/DL (ref 31.5–35.7)
MCV RBC AUTO: 89.1 FL (ref 79–97)
MONOCYTES # BLD AUTO: 0.4 10*3/MM3 (ref 0.1–0.9)
MONOCYTES NFR BLD AUTO: 9.5 % (ref 5–12)
NEUTROPHILS NFR BLD AUTO: 2.27 10*3/MM3 (ref 1.7–7)
NEUTROPHILS NFR BLD AUTO: 53.8 % (ref 42.7–76)
NRBC BLD AUTO-RTO: 0 /100 WBC (ref 0–0.2)
PLATELET # BLD AUTO: 483 10*3/MM3 (ref 140–450)
PMV BLD AUTO: 9.4 FL (ref 6–12)
POTASSIUM SERPL-SCNC: 3.5 MMOL/L (ref 3.5–5.2)
PROT SERPL-MCNC: 6.8 G/DL (ref 6–8.5)
RBC # BLD AUTO: 3.77 10*6/MM3 (ref 3.77–5.28)
SODIUM SERPL-SCNC: 143 MMOL/L (ref 136–145)
TIBC SERPL-MCNC: 405 MCG/DL (ref 298–536)
TRANSFERRIN SERPL-MCNC: 272 MG/DL (ref 200–360)
WBC NRBC COR # BLD: 4.22 10*3/MM3 (ref 3.4–10.8)

## 2022-12-19 PROCEDURE — 96416 CHEMO PROLONG INFUSE W/PUMP: CPT | Performed by: INTERNAL MEDICINE

## 2022-12-19 PROCEDURE — 96367 TX/PROPH/DG ADDL SEQ IV INF: CPT | Performed by: INTERNAL MEDICINE

## 2022-12-19 PROCEDURE — 96409 CHEMO IV PUSH SNGL DRUG: CPT | Performed by: INTERNAL MEDICINE

## 2022-12-19 PROCEDURE — 85025 COMPLETE CBC W/AUTO DIFF WBC: CPT

## 2022-12-19 PROCEDURE — 83540 ASSAY OF IRON: CPT

## 2022-12-19 PROCEDURE — 25010000002 DEXAMETHASONE SODIUM PHOSPHATE 100 MG/10ML SOLUTION: Performed by: NURSE PRACTITIONER

## 2022-12-19 PROCEDURE — 82728 ASSAY OF FERRITIN: CPT

## 2022-12-19 PROCEDURE — 99214 OFFICE O/P EST MOD 30 MIN: CPT | Performed by: NURSE PRACTITIONER

## 2022-12-19 PROCEDURE — 25010000002 LEUCOVORIN CALCIUM PER 50 MG: Performed by: NURSE PRACTITIONER

## 2022-12-19 PROCEDURE — 96375 TX/PRO/DX INJ NEW DRUG ADDON: CPT | Performed by: INTERNAL MEDICINE

## 2022-12-19 PROCEDURE — 80053 COMPREHEN METABOLIC PANEL: CPT

## 2022-12-19 PROCEDURE — 84466 ASSAY OF TRANSFERRIN: CPT

## 2022-12-19 PROCEDURE — 25010000002 FLUOROURACIL PER 500 MG: Performed by: NURSE PRACTITIONER

## 2022-12-19 RX ORDER — SODIUM CHLORIDE 9 MG/ML
250 INJECTION, SOLUTION INTRAVENOUS ONCE
Status: CANCELLED | OUTPATIENT
Start: 2022-12-19

## 2022-12-19 RX ORDER — RIVAROXABAN 20 MG/1
TABLET, FILM COATED ORAL
Qty: 90 TABLET | Refills: 0 | Status: SHIPPED | OUTPATIENT
Start: 2022-12-19 | End: 2023-02-22 | Stop reason: SDUPTHER

## 2022-12-19 RX ORDER — FLUOROURACIL 50 MG/ML
400 INJECTION, SOLUTION INTRAVENOUS ONCE
Status: COMPLETED | OUTPATIENT
Start: 2022-12-19 | End: 2022-12-19

## 2022-12-19 RX ORDER — SODIUM CHLORIDE 9 MG/ML
250 INJECTION, SOLUTION INTRAVENOUS ONCE
Status: COMPLETED | OUTPATIENT
Start: 2022-12-19 | End: 2022-12-19

## 2022-12-19 RX ORDER — FLUOROURACIL 50 MG/ML
400 INJECTION, SOLUTION INTRAVENOUS ONCE
Status: CANCELLED | OUTPATIENT
Start: 2022-12-19

## 2022-12-19 RX ADMIN — FLUOROURACIL 4100 MG: 50 INJECTION, SOLUTION INTRAVENOUS at 11:32

## 2022-12-19 RX ADMIN — FLUOROURACIL 680 MG: 50 INJECTION, SOLUTION INTRAVENOUS at 11:13

## 2022-12-19 RX ADMIN — SODIUM CHLORIDE 250 ML: 9 INJECTION, SOLUTION INTRAVENOUS at 09:51

## 2022-12-19 RX ADMIN — DEXAMETHASONE SODIUM PHOSPHATE 12 MG: 10 INJECTION, SOLUTION INTRAMUSCULAR; INTRAVENOUS at 09:51

## 2022-12-19 RX ADMIN — LEUCOVORIN CALCIUM 680 MG: 350 INJECTION, POWDER, LYOPHILIZED, FOR SUSPENSION INTRAMUSCULAR; INTRAVENOUS at 10:20

## 2022-12-19 NOTE — TELEPHONE ENCOUNTER
Rx Refill Note  Requested Prescriptions     Pending Prescriptions Disp Refills   • Xarelto 20 MG tablet [Pharmacy Med Name: XARELTO 20 MG TABLET] 90 tablet 0     Sig: TAKE 1 TABLET BY MOUTH EVERY DAY      Last office visit with prescribing clinician: 11/21/2022   Last telemedicine visit with prescribing clinician: 12/19/2022   Next office visit with prescribing clinician: Visit date not found                         Would you like a call back once the refill request has been completed: [] Yes [] No    If the office needs to give you a call back, can they leave a voicemail: [] Yes [] No    Jagdeep Wagoner Rep  12/19/22, 08:03 CST

## 2022-12-19 NOTE — PROGRESS NOTES
DATE OF VISIT: 12/19/2022      REASON FOR VISIT:   Colon cancer with recurrence involving omentum currently on chemotherapy, oxaliplatin induced neuropathy, DVT involving right upper extremity, thrombocytosis, iron deficiency, hypokalemia, chemotherapy-induced neutropenia        HISTORY OF PRESENT ILLNESS:   76-year-old female with medical problems significant for cardiomegaly, rectosigmoid cancer initially diagnosed in December 2017 with recurrence diagnosed in July 6, 2022 for which patient had omentectomy done, hypokalemia, thrombocytosis, DVT involving right upper extremity is here for follow-up appointment today.  Patient is scheduled to get cycle 10 of chemotherapy today.    States her neuropathy is stable, Denies any excessive nausea with chemotherapy.   States her fatigue is at its worse on Thursdays after her chemotherapy treatment but improves.   Appetite is stable. Denies any bleeding.            Oncology history:     1.1.  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  - Patient was started on FOLFOX on December 27, 2017.  Patient received total 12 cycles of FOLFOX between December 27, 2017 until June 13, 2018.  Dose of 5-FU and oxaliplatin was decreased by 20% with cycle 2 due to development of neutropenia.  - Patient was on surveillance since then.  - In June 2022, patient was found to have rising CEA level with CEA being at 23.  Initial CT scan did not show any evidence of recurrence.  - Patient had a colonoscopy by Dr. Arriola in July 2022 which was negative for any recurrence except for tubular adenoma with.  - PET/CT done on June 20 2022 showed isolated uptake in omental region.  Patient was evaluated by Dr. Packer and underwent omentectomy with solitary lymph node sampling which was negative for metastasis          Past Medical History, Past Surgical History, Social History, Family History have been reviewed and are without significant  "changes except as mentioned.    Review of Systems   A comprehensive 14 point review of systems was performed and was negative except as mentioned.    Medications:  The current medication list was reviewed in the EMR    ALLERGIES:    Allergies   Allergen Reactions   • Codeine Other (See Comments)     \"dont remember\"       Objective      Vitals:    12/19/22 0851   BP: 120/60   Pulse: 73   Resp: 18   Temp: 96.7 °F (35.9 °C)   SpO2: 100%   Weight: 67.1 kg (148 lb)   PainSc: 0-No pain     Current Status 12/19/2022   ECOG score 0       Physical Exam  General: alert and oriented no acute distress  Lungs;normal breath sounds  Card; RRR  Ext; no edema    RECENT LABS:  Glucose   Date Value Ref Range Status   12/19/2022 99 65 - 99 mg/dL Final     Sodium   Date Value Ref Range Status   12/19/2022 143 136 - 145 mmol/L Final   06/28/2022 143 136 - 145 mmol/L Final     Potassium   Date Value Ref Range Status   12/19/2022 3.5 3.5 - 5.2 mmol/L Final   06/28/2022 3.9 3.5 - 5.1 mmol/L Final     CO2   Date Value Ref Range Status   12/19/2022 24.0 22.0 - 29.0 mmol/L Final     Total CO2   Date Value Ref Range Status   06/28/2022 35 (H) 21 - 31 mmol/L Final     Chloride   Date Value Ref Range Status   12/19/2022 109 (H) 98 - 107 mmol/L Final   06/28/2022 103 98 - 107 mmol/L Final     Anion Gap   Date Value Ref Range Status   12/19/2022 10.0 5.0 - 15.0 mmol/L Final     Creatinine   Date Value Ref Range Status   12/19/2022 0.87 0.57 - 1.00 mg/dL Final   06/28/2022 0.6 (L) 0.7 - 1.3 mg/dL Final     BUN   Date Value Ref Range Status   12/19/2022 16 8 - 23 mg/dL Final   06/28/2022 12 7 - 25 mg/dL Final     BUN/Creatinine Ratio   Date Value Ref Range Status   12/19/2022 18.4 7.0 - 25.0 Final     Calcium   Date Value Ref Range Status   12/19/2022 9.2 8.6 - 10.5 mg/dL Final   06/28/2022 9.3 8.6 - 10.3 mg/dL Final     eGFR Non  Amer   Date Value Ref Range Status   09/14/2021 80 >60 mL/min/1.73 Final     Alkaline Phosphatase   Date Value Ref " Range Status   2022 75 39 - 117 U/L Final   2022 76 34 - 104 U/L Final     Total Protein   Date Value Ref Range Status   2022 6.8 6.0 - 8.5 g/dL Final     ALT (SGPT)   Date Value Ref Range Status   2022 13 1 - 33 U/L Final   2022 14 7 - 52 U/L Final     AST (SGOT)   Date Value Ref Range Status   2022 18 1 - 32 U/L Final   2022 17 13 - 39 U/L Final     Total Bilirubin   Date Value Ref Range Status   2022 0.5 0.0 - 1.2 mg/dL Final   2022 0.59 0.3 - 1.0 mg/dL Final     Albumin   Date Value Ref Range Status   2022 4.00 3.50 - 5.20 g/dL Final   2022 4.1 3.5 - 5.7 g/dL Final     Globulin   Date Value Ref Range Status   2022 2.8 gm/dL Final     Lab Results   Component Value Date    WBC 4.22 2022    HGB 10.4 (L) 2022    HCT 33.6 (L) 2022    MCV 89.1 2022     (H) 2022     Lab Results   Component Value Date    NEUTROABS 2.27 2022    IRON 78 2022    IRON 64 10/10/2022    IRON 58 2017    TIBC 405 2022    TIBC 408 10/10/2022    TIBC 312 2017    LABIRON 19 (L) 2022    LABIRON 16 (L) 10/10/2022    LABIRON 19 2017    FERRITIN 169.70 (H) 2022    FERRITIN 161.70 (H) 10/10/2022    FERRITIN 96.10 2017    [id number] 482 10/10/2022    AIECHYNL15 616 2017    FOLATE >20.00 10/10/2022    FOLATE 7.20 2017     Lab Results   Component Value Date    CEA 2.52 10/24/2022    REFLABREPO  2022     [facility]  [address] [place] [address]  Phone: [phone] or [phone]  Fax: [phone]  Jeff Bustamante M.D., Medical Director    PATIENT NAME                                     LABORATORY NO.  1800   SEVEN ANDERSON.                             [id number]  [id number]                                 AGE                    SEX   SSN              CLIENT REF #  [facility]                   76        1945    "   F     [id number]      [id number]    [place]                               REQUESTING M.D.           ATTENDING M.D.         COPY TO.  [address] [place] [address]                         [place] [address]                     DATE COLLECTED            DATE RECEIVED          DATE REPORTED  07/05/2022 07/05/2022 07/07/2022    DIAGNOSIS:  DESCENDING COLON POLYPS:  Tubular adenoma    JBS/pah    CLINICAL HISTORY:  Abnormal tumor markers    SPECIMENS RECEIVED:  DESCENDING COLON POLYPS    MICROSCOPIC DESCRIPTION:  Tissue blocks are prepared and slides are examined microscopically on all  specimens. See diagnosis for details.    Professional interpretation rendered by Rupert Steen M.D. at [facility], [address] [place] [address] , [place] [address].    GROSS DESCRIPTION:  Specimen is received in 1 formalin filled container \"large intestine,  left/descending colon polyps\" consists of 3 pieces of tan soft tissue measuring  0.4 x 0.3 x 0.2 cm.  Specimen is submitted entirely in 1 cassette.  MM    REVIEWED, DIAGNOSED AND ELECTRONICALLY  SIGNED BY:    Rupert Steen M.D.  CPT CODES:  34631           RADIOLOGY DATA :  No radiology results for the last 7 days        Assessment & Plan       1.  Adenocarcinoma of sigmoid colon with recurrence:  - Review oncology history for prior treatment details  - Due to local recurrence involving omentum, patient had omentectomy done on July 6, 2022  - Currently on 5-FU, leucovorin since August 6, 2022  - She is due for cycle #10 today; labs reviewed today and will proceed.  - We will continue with close monitoring for chemotherapy related side effect  - Patient will return to clinic in 2 weeks with repeat CBC and CMP on that day.     2.  Oxaliplatin induced neuropathy  - Remains on gabapentin 100 mg 3 times a day     3.  Hypokalemia:  - Currently on potassium 20 mEq p.o. daily at home  - Potassium is 3.5     4.  Right " upper extremity DVT:  - Patient was diagnosed with right upper extremity DVT on September 28, 2022.  Currently on Xarelto 20 mg p.o. daily     5.  Anemia:  - Hemoglobin is 10.4  - Remains on ferrous sulfate p.o. daily  - anemia profile shows improvement; will have her continue with oral iron once daily and will repeat anemia work up in February.      6.  Chemotherapy-induced neutropenia:  - CBC done today shows white blood cell count is 4.22 with ANC of 2.27     7.  Thrombocytosis:  - Reactive in nature  - Platelet count is 483,000     8.  Health maintenance: Patient quit smoking in 2020     9. Advance Care Planning: For now patient remains full code and is able to make decisions.  Patient has health care surrogate mentioned on chart.                      PHQ-9 Total Score: 0     Mary Ann Condon reports a pain score of 0.  Given her pain assessment as noted, treatment options were discussed and the following options were decided upon as a follow-up plan to address the patient's pain: no pain today.         Mia Juarez, APRN  12/19/2022  13:12 CST        Part of this note may be an electronic transcription/translation of spoken language to printed text using the Dragon Dictation System.          CC:

## 2022-12-21 ENCOUNTER — INFUSION (OUTPATIENT)
Dept: ONCOLOGY | Facility: HOSPITAL | Age: 77
End: 2022-12-21

## 2022-12-21 DIAGNOSIS — Z45.2 ENCOUNTER FOR VENOUS ACCESS DEVICE CARE: Primary | ICD-10-CM

## 2022-12-21 PROCEDURE — 25010000002 HEPARIN LOCK FLUSH PER 10 UNITS: Performed by: INTERNAL MEDICINE

## 2022-12-21 RX ORDER — HEPARIN SODIUM (PORCINE) LOCK FLUSH IV SOLN 100 UNIT/ML 100 UNIT/ML
500 SOLUTION INTRAVENOUS AS NEEDED
Status: CANCELLED | OUTPATIENT
Start: 2022-12-21

## 2022-12-21 RX ORDER — HEPARIN SODIUM (PORCINE) LOCK FLUSH IV SOLN 100 UNIT/ML 100 UNIT/ML
500 SOLUTION INTRAVENOUS AS NEEDED
Status: DISCONTINUED | OUTPATIENT
Start: 2022-12-21 | End: 2022-12-21 | Stop reason: HOSPADM

## 2022-12-21 RX ORDER — SODIUM CHLORIDE 0.9 % (FLUSH) 0.9 %
10 SYRINGE (ML) INJECTION AS NEEDED
Status: DISCONTINUED | OUTPATIENT
Start: 2022-12-21 | End: 2022-12-21 | Stop reason: HOSPADM

## 2022-12-21 RX ORDER — SODIUM CHLORIDE 0.9 % (FLUSH) 0.9 %
10 SYRINGE (ML) INJECTION AS NEEDED
Status: CANCELLED | OUTPATIENT
Start: 2022-12-21

## 2022-12-21 RX ADMIN — HEPARIN 500 UNITS: 100 SYRINGE at 11:13

## 2022-12-29 RX ORDER — POTASSIUM CHLORIDE 750 MG/1
CAPSULE, EXTENDED RELEASE ORAL
Qty: 60 CAPSULE | Refills: 1 | Status: SHIPPED | OUTPATIENT
Start: 2022-12-29 | End: 2023-01-27

## 2023-01-03 NOTE — PROGRESS NOTES
DATE OF VISIT: 1/4/2023      REASON FOR VISIT: Colon cancer with recurrence involving omentum currently on chemotherapy, oxaliplatin induced neuropathy, DVT involving right upper extremity, thrombocytosis, iron deficiency, hypokalemia      HISTORY OF PRESENT ILLNESS:   77-year-old female with medical problem consisting of cardiomegaly, rectosigmoid cancer initially diagnosed in December 2017 with recurrence diagnosed on July 6, 2022 for which patient had omentectomy done.  Patient also has a history of hypokalemia, thrombocytosis DVT involving right upper extremity for which patient is currently on treatment with Xarelto.  Patient is scheduled to get cycle 10 of 5-FU and leucovorin today on January 4, 2023.  Denies any recent worsening of neuropathy.  Complains of watering from the eye.  Complains of fatigue.  Complains of diarrhea alternating with constipation.  Denies any bleeding.  Denies any new lymph node enlargement.  Denies any new chest pain or shortness of breath.                  Oncology history:    1.  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  - Patient was started on FOLFOX on December 27, 2017.  Patient received total 12 cycles of FOLFOX between December 27, 2017 until June 13, 2018.  Dose of 5-FU and oxaliplatin was decreased by 20% with cycle 2 due to development of neutropenia.  - Patient was on surveillance since then.  - In June 2022, patient was found to have rising CEA level with CEA being at 23.  Initial CT scan did not show any evidence of recurrence.  - Patient had a colonoscopy by Dr. Arriola in July 2022 which was negative for any recurrence except for tubular adenoma with.  - PET/CT done on June 20 2022 showed isolated uptake in omental region.  Patient was evaluated by Dr. Packer and underwent omentectomy with solitary lymph node sampling which was negative for metastasis             Past Medical History, Past Surgical  History, Social History, Family History have been reviewed and are without significant changes except as mentioned.    Review of Systems   A comprehensive 14 point review of systems was performed and was negative except as mentioned in HPI.    Medications:  The current medication list was reviewed in the EMR    ALLERGIES:    Allergies   Allergen Reactions   • Codeine Other (See Comments)     \"dont remember\"       Objective      Vitals:    01/04/23 0800   BP: 126/55   Pulse: 67   Temp: 97 °F (36.1 °C)   TempSrc: Temporal   SpO2: 96%   Weight: 66.8 kg (147 lb 4.8 oz)   PainSc: 0-No pain     Current Status 12/19/2022   ECOG score 0       Physical Exam  Cardiovascular:      Comments: Port-A-Cath present  Pulmonary:      Breath sounds: Normal breath sounds.   Neurological:      Mental Status: She is alert and oriented to person, place, and time.           RECENT LABS:  Glucose   Date Value Ref Range Status   01/04/2023 93 65 - 99 mg/dL Final     Sodium   Date Value Ref Range Status   01/04/2023 143 136 - 145 mmol/L Final   06/28/2022 143 136 - 145 mmol/L Final     Potassium   Date Value Ref Range Status   01/04/2023 3.6 3.5 - 5.2 mmol/L Final   06/28/2022 3.9 3.5 - 5.1 mmol/L Final     CO2   Date Value Ref Range Status   01/04/2023 25.0 22.0 - 29.0 mmol/L Final     Total CO2   Date Value Ref Range Status   06/28/2022 35 (H) 21 - 31 mmol/L Final     Chloride   Date Value Ref Range Status   01/04/2023 107 98 - 107 mmol/L Final   06/28/2022 103 98 - 107 mmol/L Final     Anion Gap   Date Value Ref Range Status   01/04/2023 11.0 5.0 - 15.0 mmol/L Final     Creatinine   Date Value Ref Range Status   01/04/2023 0.99 0.57 - 1.00 mg/dL Final   06/28/2022 0.6 (L) 0.7 - 1.3 mg/dL Final     BUN   Date Value Ref Range Status   01/04/2023 20 8 - 23 mg/dL Final   06/28/2022 12 7 - 25 mg/dL Final     BUN/Creatinine Ratio   Date Value Ref Range Status   01/04/2023 20.2 7.0 - 25.0 Final     Calcium   Date Value Ref Range Status    01/04/2023 8.9 8.6 - 10.5 mg/dL Final   06/28/2022 9.3 8.6 - 10.3 mg/dL Final     eGFR Non  Amer   Date Value Ref Range Status   09/14/2021 80 >60 mL/min/1.73 Final     Alkaline Phosphatase   Date Value Ref Range Status   01/04/2023 72 39 - 117 U/L Final   06/28/2022 76 34 - 104 U/L Final     Total Protein   Date Value Ref Range Status   01/04/2023 6.7 6.0 - 8.5 g/dL Final     ALT (SGPT)   Date Value Ref Range Status   01/04/2023 13 1 - 33 U/L Final   06/28/2022 14 7 - 52 U/L Final     AST (SGOT)   Date Value Ref Range Status   01/04/2023 21 1 - 32 U/L Final   06/28/2022 17 13 - 39 U/L Final     Total Bilirubin   Date Value Ref Range Status   01/04/2023 0.6 0.0 - 1.2 mg/dL Final   06/28/2022 0.59 0.3 - 1.0 mg/dL Final     Albumin   Date Value Ref Range Status   01/04/2023 3.9 3.5 - 5.2 g/dL Final   06/28/2022 4.1 3.5 - 5.7 g/dL Final     Globulin   Date Value Ref Range Status   01/04/2023 2.8 gm/dL Final     Lab Results   Component Value Date    WBC 3.37 (L) 01/04/2023    HGB 10.9 (L) 01/04/2023    HCT 34.9 01/04/2023    MCV 89.9 01/04/2023     01/04/2023     Lab Results   Component Value Date    NEUTROABS 1.28 (L) 01/04/2023    IRON 78 12/19/2022    IRON 64 10/10/2022    IRON 58 12/18/2017    TIBC 405 12/19/2022    TIBC 408 10/10/2022    TIBC 312 12/18/2017    LABIRON 19 (L) 12/19/2022    LABIRON 16 (L) 10/10/2022    LABIRON 19 12/18/2017    FERRITIN 169.70 (H) 12/19/2022    FERRITIN 161.70 (H) 10/10/2022    FERRITIN 96.10 12/18/2017    [id number] 482 10/10/2022    [id number] 616 12/18/2017    FOLATE >20.00 10/10/2022    FOLATE 7.20 12/18/2017     Lab Results   Component Value Date    CEA 2.52 10/24/2022    REFLABREPO  07/05/2022     [facility]  [address] [place] [address]  Phone: [phone] or [phone]  Fax: [phone]  Jeff Bustamante M.D., Medical Director    PATIENT NAME                                     LABORATORY NO.  1800   SEVEN ANDERSON                              [id number]  [id number]                                 AGE                    SEX   SSN              CLIENT REF #  [facility]                   76        1945      F     [id number]      [id number]    Joes                               REQUESTING M.D.           ATTENDING M.D.         COPY TO.  [address] [place] [address]                         [place] [address]                     DATE COLLECTED            DATE RECEIVED          DATE REPORTED  2022    DIAGNOSIS:  DESCENDING COLON POLYPS:  Tubular adenoma    JBS/pah    CLINICAL HISTORY:  Abnormal tumor markers    SPECIMENS RECEIVED:  DESCENDING COLON POLYPS    MICROSCOPIC DESCRIPTION:  Tissue blocks are prepared and slides are examined microscopically on all  specimens. See diagnosis for details.    Professional interpretation rendered by Rupert Steen M.D. at [facility], [address] [place] [address] , [place] [address].    GROSS DESCRIPTION:  Specimen is received in 1 formalin filled container \"large intestine,  left/descending colon polyps\" consists of 3 pieces of tan soft tissue measuring  0.4 x 0.3 x 0.2 cm.  Specimen is submitted entirely in 1 cassette.  MM    REVIEWED, DIAGNOSED AND ELECTRONICALLY  SIGNED BY:    Rupert Steen M.D.  CPT CODES:  [id number]           PATHOLOGY:  * Cannot find OR log *         RADIOLOGY DATA :  No radiology results for the last 7 days        Assessment & Plan     1.  Adenocarcinoma of sigmoid colon with recurrence:  - Review oncology history for prior treatment details  - Patient is currently on 5-FU and leucovorin since 2022.  - Due to neutropenia with ANC of 1.28 will hold cycle 10 of chemotherapy today.  - We will have patient return to clinic in 1 week for recheck of CBC and resume cycle 10 if patient has adequate recovery of neutrophil by that time  - We will  continue close monitoring for chemotherapy related side effect  - We will have patient return to clinic in 3 weeks with repeat CBC, CMP on that day.    2.  Oxaliplatin induced neuropathy  - Remains on gabapentin 100 mg 3 times a day    3.  Hypokalemia:  - Currently on potassium 20 mEq p.o. daily at home  - Potassium is 3.6    4.  Right upper extremity DVT:  - Patient was diagnosed with right upper extremity DVT on September 28, 2022.  Currently on Xarelto 20 mg p.o. daily.  - Plan is to repeat Doppler ultrasound of right upper extremity around March 2023 prior to discontinuation of Xarelto.    5.  Anemia:  - Hemoglobin is 10.9  - Currently on ferrous sulfate p.o. daily  - We will repeat anemia work-up in February 2023    6.  Thrombocytosis:  - Reactive in nature  - Platelet count is 391,000    7.  Hypertension    8.  Health maintenance: Patient quit smoking in 2020    9. Advance Care Planning: For now patient remains full code and is able to make decisions.  Patient has health care surrogate mentioned on chart.                 PHQ-9 Total Score: 0   -Patient is not homicidal or suicidal.  No acute intervention required.    Mary Ann Condon reports a pain score of 0.  Given her pain assessment as noted, treatment options were discussed and the following options were decided upon as a follow-up plan to address the patient's pain: continuation of current treatment plan for pain.         Wil bAraham MD  1/4/2023  08:07 CST        Part of this note may be an electronic transcription/translation of spoken language to printed text using the Dragon Dictation System.          CC:

## 2023-01-04 ENCOUNTER — TELEPHONE (OUTPATIENT)
Dept: ONCOLOGY | Facility: CLINIC | Age: 78
End: 2023-01-04

## 2023-01-04 ENCOUNTER — OFFICE VISIT (OUTPATIENT)
Dept: ONCOLOGY | Facility: CLINIC | Age: 78
End: 2023-01-04
Payer: MEDICARE

## 2023-01-04 ENCOUNTER — INFUSION (OUTPATIENT)
Dept: ONCOLOGY | Facility: HOSPITAL | Age: 78
End: 2023-01-04
Payer: MEDICARE

## 2023-01-04 VITALS
WEIGHT: 147.3 LBS | DIASTOLIC BLOOD PRESSURE: 55 MMHG | SYSTOLIC BLOOD PRESSURE: 126 MMHG | OXYGEN SATURATION: 96 % | HEART RATE: 67 BPM | BODY MASS INDEX: 26.94 KG/M2 | TEMPERATURE: 97 F

## 2023-01-04 DIAGNOSIS — G62.0 NEUROPATHY DUE TO CHEMOTHERAPEUTIC DRUG: Chronic | ICD-10-CM

## 2023-01-04 DIAGNOSIS — E87.6 HYPOKALEMIA: Chronic | ICD-10-CM

## 2023-01-04 DIAGNOSIS — T45.1X5A ANEMIA DUE TO ANTINEOPLASTIC CHEMOTHERAPY: Chronic | ICD-10-CM

## 2023-01-04 DIAGNOSIS — Z45.2 ENCOUNTER FOR VENOUS ACCESS DEVICE CARE: ICD-10-CM

## 2023-01-04 DIAGNOSIS — C19 CANCER OF RECTOSIGMOID (COLON): Primary | ICD-10-CM

## 2023-01-04 DIAGNOSIS — C19 CANCER OF RECTOSIGMOID (COLON): Primary | Chronic | ICD-10-CM

## 2023-01-04 DIAGNOSIS — I82.A11 ACUTE DEEP VEIN THROMBOSIS (DVT) OF AXILLARY VEIN OF RIGHT UPPER EXTREMITY: ICD-10-CM

## 2023-01-04 DIAGNOSIS — D64.81 ANEMIA DUE TO ANTINEOPLASTIC CHEMOTHERAPY: Chronic | ICD-10-CM

## 2023-01-04 DIAGNOSIS — T45.1X5A NEUROPATHY DUE TO CHEMOTHERAPEUTIC DRUG: Chronic | ICD-10-CM

## 2023-01-04 DIAGNOSIS — C18.9 LOCAL RECURRENCE OF COLON CANCER: Chronic | ICD-10-CM

## 2023-01-04 PROBLEM — I82.621 ACUTE DEEP VEIN THROMBOSIS (DVT) OF RIGHT UPPER EXTREMITY: Status: ACTIVE | Noted: 2023-01-04

## 2023-01-04 PROBLEM — I82.621 ACUTE DEEP VEIN THROMBOSIS (DVT) OF RIGHT UPPER EXTREMITY: Chronic | Status: ACTIVE | Noted: 2023-01-04

## 2023-01-04 LAB
ALBUMIN SERPL-MCNC: 3.9 G/DL (ref 3.5–5.2)
ALBUMIN/GLOB SERPL: 1.4 G/DL
ALP SERPL-CCNC: 72 U/L (ref 39–117)
ALT SERPL W P-5'-P-CCNC: 13 U/L (ref 1–33)
ANION GAP SERPL CALCULATED.3IONS-SCNC: 11 MMOL/L (ref 5–15)
AST SERPL-CCNC: 21 U/L (ref 1–32)
BASOPHILS # BLD AUTO: 0.07 10*3/MM3 (ref 0–0.2)
BASOPHILS NFR BLD AUTO: 2.1 % (ref 0–1.5)
BILIRUB SERPL-MCNC: 0.6 MG/DL (ref 0–1.2)
BUN SERPL-MCNC: 20 MG/DL (ref 8–23)
BUN/CREAT SERPL: 20.2 (ref 7–25)
CALCIUM SPEC-SCNC: 8.9 MG/DL (ref 8.6–10.5)
CHLORIDE SERPL-SCNC: 107 MMOL/L (ref 98–107)
CO2 SERPL-SCNC: 25 MMOL/L (ref 22–29)
CREAT SERPL-MCNC: 0.99 MG/DL (ref 0.57–1)
DEPRECATED RDW RBC AUTO: 59.5 FL (ref 37–54)
EGFRCR SERPLBLD CKD-EPI 2021: 58.8 ML/MIN/1.73
EOSINOPHIL # BLD AUTO: 0.16 10*3/MM3 (ref 0–0.4)
EOSINOPHIL NFR BLD AUTO: 4.7 % (ref 0.3–6.2)
ERYTHROCYTE [DISTWIDTH] IN BLOOD BY AUTOMATED COUNT: 18.4 % (ref 12.3–15.4)
GLOBULIN UR ELPH-MCNC: 2.8 GM/DL
GLUCOSE SERPL-MCNC: 93 MG/DL (ref 65–99)
HCT VFR BLD AUTO: 34.9 % (ref 34–46.6)
HGB BLD-MCNC: 10.9 G/DL (ref 12–15.9)
HOLD SPECIMEN: NORMAL
IMM GRANULOCYTES # BLD AUTO: 0.01 10*3/MM3 (ref 0–0.05)
IMM GRANULOCYTES NFR BLD AUTO: 0.3 % (ref 0–0.5)
LYMPHOCYTES # BLD AUTO: 1.49 10*3/MM3 (ref 0.7–3.1)
LYMPHOCYTES NFR BLD AUTO: 44.2 % (ref 19.6–45.3)
MCH RBC QN AUTO: 28.1 PG (ref 26.6–33)
MCHC RBC AUTO-ENTMCNC: 31.2 G/DL (ref 31.5–35.7)
MCV RBC AUTO: 89.9 FL (ref 79–97)
MONOCYTES # BLD AUTO: 0.36 10*3/MM3 (ref 0.1–0.9)
MONOCYTES NFR BLD AUTO: 10.7 % (ref 5–12)
NEUTROPHILS NFR BLD AUTO: 1.28 10*3/MM3 (ref 1.7–7)
NEUTROPHILS NFR BLD AUTO: 38 % (ref 42.7–76)
NRBC BLD AUTO-RTO: 0 /100 WBC (ref 0–0.2)
PLATELET # BLD AUTO: 391 10*3/MM3 (ref 140–450)
PMV BLD AUTO: 9.8 FL (ref 6–12)
POTASSIUM SERPL-SCNC: 3.6 MMOL/L (ref 3.5–5.2)
PROT SERPL-MCNC: 6.7 G/DL (ref 6–8.5)
RBC # BLD AUTO: 3.88 10*6/MM3 (ref 3.77–5.28)
SODIUM SERPL-SCNC: 143 MMOL/L (ref 136–145)
WBC NRBC COR # BLD: 3.37 10*3/MM3 (ref 3.4–10.8)

## 2023-01-04 PROCEDURE — G0463 HOSPITAL OUTPT CLINIC VISIT: HCPCS | Performed by: INTERNAL MEDICINE

## 2023-01-04 PROCEDURE — 85025 COMPLETE CBC W/AUTO DIFF WBC: CPT

## 2023-01-04 PROCEDURE — 36591 DRAW BLOOD OFF VENOUS DEVICE: CPT | Performed by: INTERNAL MEDICINE

## 2023-01-04 PROCEDURE — 36415 COLL VENOUS BLD VENIPUNCTURE: CPT

## 2023-01-04 PROCEDURE — 80053 COMPREHEN METABOLIC PANEL: CPT

## 2023-01-04 PROCEDURE — 1159F MED LIST DOCD IN RCRD: CPT | Performed by: INTERNAL MEDICINE

## 2023-01-04 PROCEDURE — 1126F AMNT PAIN NOTED NONE PRSNT: CPT | Performed by: INTERNAL MEDICINE

## 2023-01-04 PROCEDURE — 1160F RVW MEDS BY RX/DR IN RCRD: CPT | Performed by: INTERNAL MEDICINE

## 2023-01-04 PROCEDURE — 1123F ACP DISCUSS/DSCN MKR DOCD: CPT | Performed by: INTERNAL MEDICINE

## 2023-01-04 PROCEDURE — 99214 OFFICE O/P EST MOD 30 MIN: CPT | Performed by: INTERNAL MEDICINE

## 2023-01-04 RX ORDER — SODIUM CHLORIDE 0.9 % (FLUSH) 0.9 %
10 SYRINGE (ML) INJECTION AS NEEDED
Status: CANCELLED | OUTPATIENT
Start: 2023-01-04

## 2023-01-04 RX ORDER — HEPARIN SODIUM (PORCINE) LOCK FLUSH IV SOLN 100 UNIT/ML 100 UNIT/ML
500 SOLUTION INTRAVENOUS AS NEEDED
Status: DISCONTINUED | OUTPATIENT
Start: 2023-01-04 | End: 2023-01-04 | Stop reason: HOSPADM

## 2023-01-04 RX ORDER — HEPARIN SODIUM (PORCINE) LOCK FLUSH IV SOLN 100 UNIT/ML 100 UNIT/ML
500 SOLUTION INTRAVENOUS AS NEEDED
Status: CANCELLED | OUTPATIENT
Start: 2023-01-04

## 2023-01-09 ENCOUNTER — TELEPHONE (OUTPATIENT)
Dept: ONCOLOGY | Facility: CLINIC | Age: 78
End: 2023-01-09
Payer: MEDICARE

## 2023-01-09 ENCOUNTER — APPOINTMENT (OUTPATIENT)
Dept: ONCOLOGY | Facility: HOSPITAL | Age: 78
End: 2023-01-09
Payer: MEDICARE

## 2023-01-11 ENCOUNTER — APPOINTMENT (OUTPATIENT)
Dept: ONCOLOGY | Facility: HOSPITAL | Age: 78
End: 2023-01-11
Payer: MEDICARE

## 2023-01-16 ENCOUNTER — APPOINTMENT (OUTPATIENT)
Dept: ONCOLOGY | Facility: CLINIC | Age: 78
End: 2023-01-16
Payer: MEDICARE

## 2023-01-19 RX ORDER — FERROUS SULFATE 325(65) MG
TABLET ORAL
Qty: 90 TABLET | Refills: 1 | Status: SHIPPED | OUTPATIENT
Start: 2023-01-19

## 2023-01-19 NOTE — TELEPHONE ENCOUNTER
Rx Refill Note  Requested Prescriptions     Pending Prescriptions Disp Refills   • ferrous sulfate 325 (65 FE) MG tablet [Pharmacy Med Name: FERROUS SULFATE 325 MG TABLET] 90 tablet 1     Sig: TAKE 1 TABLET BY MOUTH EVERY DAY WITH BREAKFAST      Last office visit with prescribing clinician: 12/19/2022   Last telemedicine visit with prescribing clinician: 1/25/2023   Next office visit with prescribing clinician: Visit date not found                         Would you like a call back once the refill request has been completed: [] Yes [] No    If the office needs to give you a call back, can they leave a voicemail: [] Yes [] No    Joseline Munson, Jagdeep Rep  01/19/23, 08:15 CST

## 2023-01-20 DIAGNOSIS — C19 CANCER OF RECTOSIGMOID (COLON): ICD-10-CM

## 2023-01-20 DIAGNOSIS — C18.9 LOCAL RECURRENCE OF COLON CANCER: Primary | Chronic | ICD-10-CM

## 2023-01-23 ENCOUNTER — APPOINTMENT (OUTPATIENT)
Dept: ONCOLOGY | Facility: HOSPITAL | Age: 78
End: 2023-01-23
Payer: MEDICARE

## 2023-01-25 ENCOUNTER — APPOINTMENT (OUTPATIENT)
Dept: ONCOLOGY | Facility: HOSPITAL | Age: 78
End: 2023-01-25
Payer: MEDICARE

## 2023-01-25 ENCOUNTER — OFFICE VISIT (OUTPATIENT)
Dept: ONCOLOGY | Facility: CLINIC | Age: 78
End: 2023-01-25
Payer: MEDICARE

## 2023-01-25 ENCOUNTER — INFUSION (OUTPATIENT)
Dept: ONCOLOGY | Facility: HOSPITAL | Age: 78
End: 2023-01-25
Payer: MEDICARE

## 2023-01-25 VITALS
WEIGHT: 147.5 LBS | BODY MASS INDEX: 26.98 KG/M2 | TEMPERATURE: 97 F | OXYGEN SATURATION: 95 % | SYSTOLIC BLOOD PRESSURE: 126 MMHG | HEART RATE: 68 BPM | DIASTOLIC BLOOD PRESSURE: 75 MMHG

## 2023-01-25 DIAGNOSIS — G62.0 NEUROPATHY DUE TO CHEMOTHERAPEUTIC DRUG: ICD-10-CM

## 2023-01-25 DIAGNOSIS — T45.1X5A ANEMIA DUE TO ANTINEOPLASTIC CHEMOTHERAPY: Chronic | ICD-10-CM

## 2023-01-25 DIAGNOSIS — D64.81 ANEMIA DUE TO ANTINEOPLASTIC CHEMOTHERAPY: Chronic | ICD-10-CM

## 2023-01-25 DIAGNOSIS — D75.839 THROMBOCYTOSIS: ICD-10-CM

## 2023-01-25 DIAGNOSIS — T45.1X5A NEUROPATHY DUE TO CHEMOTHERAPEUTIC DRUG: ICD-10-CM

## 2023-01-25 DIAGNOSIS — C19 CANCER OF RECTOSIGMOID (COLON): Primary | Chronic | ICD-10-CM

## 2023-01-25 DIAGNOSIS — C19 CANCER OF RECTOSIGMOID (COLON): Primary | ICD-10-CM

## 2023-01-25 DIAGNOSIS — E87.6 HYPOKALEMIA: Chronic | ICD-10-CM

## 2023-01-25 DIAGNOSIS — I82.A11 ACUTE DEEP VEIN THROMBOSIS (DVT) OF AXILLARY VEIN OF RIGHT UPPER EXTREMITY: Chronic | ICD-10-CM

## 2023-01-25 DIAGNOSIS — C18.9 LOCAL RECURRENCE OF COLON CANCER: Chronic | ICD-10-CM

## 2023-01-25 DIAGNOSIS — C18.9 LOCAL RECURRENCE OF COLON CANCER: ICD-10-CM

## 2023-01-25 PROBLEM — D69.6 THROMBOCYTOPENIA (HCC): Chronic | Status: RESOLVED | Noted: 2018-05-16 | Resolved: 2023-01-25

## 2023-01-25 LAB
ALBUMIN SERPL-MCNC: 3.9 G/DL (ref 3.5–5.2)
ALBUMIN/GLOB SERPL: 1.3 G/DL
ALP SERPL-CCNC: 78 U/L (ref 39–117)
ALT SERPL W P-5'-P-CCNC: 13 U/L (ref 1–33)
ANION GAP SERPL CALCULATED.3IONS-SCNC: 6 MMOL/L (ref 5–15)
AST SERPL-CCNC: 17 U/L (ref 1–32)
BASOPHILS # BLD AUTO: 0.07 10*3/MM3 (ref 0–0.2)
BASOPHILS NFR BLD AUTO: 1.1 % (ref 0–1.5)
BILIRUB SERPL-MCNC: 0.4 MG/DL (ref 0–1.2)
BUN SERPL-MCNC: 16 MG/DL (ref 8–23)
BUN/CREAT SERPL: 19.8 (ref 7–25)
CALCIUM SPEC-SCNC: 9.1 MG/DL (ref 8.6–10.5)
CHLORIDE SERPL-SCNC: 104 MMOL/L (ref 98–107)
CO2 SERPL-SCNC: 30 MMOL/L (ref 22–29)
CREAT SERPL-MCNC: 0.81 MG/DL (ref 0.57–1)
DEPRECATED RDW RBC AUTO: 54.4 FL (ref 37–54)
EGFRCR SERPLBLD CKD-EPI 2021: 74.9 ML/MIN/1.73
EOSINOPHIL # BLD AUTO: 0.16 10*3/MM3 (ref 0–0.4)
EOSINOPHIL NFR BLD AUTO: 2.6 % (ref 0.3–6.2)
ERYTHROCYTE [DISTWIDTH] IN BLOOD BY AUTOMATED COUNT: 16.2 % (ref 12.3–15.4)
GLOBULIN UR ELPH-MCNC: 2.9 GM/DL
GLUCOSE SERPL-MCNC: 121 MG/DL (ref 65–99)
HCT VFR BLD AUTO: 36.4 % (ref 34–46.6)
HGB BLD-MCNC: 11 G/DL (ref 12–15.9)
HOLD SPECIMEN: NORMAL
IMM GRANULOCYTES # BLD AUTO: 0.02 10*3/MM3 (ref 0–0.05)
IMM GRANULOCYTES NFR BLD AUTO: 0.3 % (ref 0–0.5)
LYMPHOCYTES # BLD AUTO: 1.34 10*3/MM3 (ref 0.7–3.1)
LYMPHOCYTES NFR BLD AUTO: 21.6 % (ref 19.6–45.3)
MCH RBC QN AUTO: 27.4 PG (ref 26.6–33)
MCHC RBC AUTO-ENTMCNC: 30.2 G/DL (ref 31.5–35.7)
MCV RBC AUTO: 90.5 FL (ref 79–97)
MONOCYTES # BLD AUTO: 0.59 10*3/MM3 (ref 0.1–0.9)
MONOCYTES NFR BLD AUTO: 9.5 % (ref 5–12)
NEUTROPHILS NFR BLD AUTO: 4.01 10*3/MM3 (ref 1.7–7)
NEUTROPHILS NFR BLD AUTO: 64.9 % (ref 42.7–76)
NRBC BLD AUTO-RTO: 0 /100 WBC (ref 0–0.2)
PLATELET # BLD AUTO: 456 10*3/MM3 (ref 140–450)
PMV BLD AUTO: 9.7 FL (ref 6–12)
POTASSIUM SERPL-SCNC: 3.7 MMOL/L (ref 3.5–5.2)
PROT SERPL-MCNC: 6.8 G/DL (ref 6–8.5)
RBC # BLD AUTO: 4.02 10*6/MM3 (ref 3.77–5.28)
SODIUM SERPL-SCNC: 140 MMOL/L (ref 136–145)
WBC NRBC COR # BLD: 6.19 10*3/MM3 (ref 3.4–10.8)

## 2023-01-25 PROCEDURE — 96409 CHEMO IV PUSH SNGL DRUG: CPT | Performed by: INTERNAL MEDICINE

## 2023-01-25 PROCEDURE — 25010000002 FLUOROURACIL PER 500 MG: Performed by: INTERNAL MEDICINE

## 2023-01-25 PROCEDURE — 36415 COLL VENOUS BLD VENIPUNCTURE: CPT

## 2023-01-25 PROCEDURE — 96375 TX/PRO/DX INJ NEW DRUG ADDON: CPT | Performed by: INTERNAL MEDICINE

## 2023-01-25 PROCEDURE — 25010000002 DEXAMETHASONE SODIUM PHOSPHATE 100 MG/10ML SOLUTION: Performed by: INTERNAL MEDICINE

## 2023-01-25 PROCEDURE — 1126F AMNT PAIN NOTED NONE PRSNT: CPT | Performed by: INTERNAL MEDICINE

## 2023-01-25 PROCEDURE — 96416 CHEMO PROLONG INFUSE W/PUMP: CPT | Performed by: INTERNAL MEDICINE

## 2023-01-25 PROCEDURE — 80053 COMPREHEN METABOLIC PANEL: CPT

## 2023-01-25 PROCEDURE — 85025 COMPLETE CBC W/AUTO DIFF WBC: CPT

## 2023-01-25 PROCEDURE — 96367 TX/PROPH/DG ADDL SEQ IV INF: CPT | Performed by: INTERNAL MEDICINE

## 2023-01-25 PROCEDURE — 25010000002 LEUCOVORIN CALCIUM PER 50 MG: Performed by: INTERNAL MEDICINE

## 2023-01-25 PROCEDURE — 99214 OFFICE O/P EST MOD 30 MIN: CPT | Performed by: INTERNAL MEDICINE

## 2023-01-25 PROCEDURE — 1123F ACP DISCUSS/DSCN MKR DOCD: CPT | Performed by: INTERNAL MEDICINE

## 2023-01-25 RX ORDER — GABAPENTIN 100 MG/1
100 CAPSULE ORAL 3 TIMES DAILY
Qty: 270 CAPSULE | Refills: 0 | Status: SHIPPED | OUTPATIENT
Start: 2023-01-25

## 2023-01-25 RX ORDER — SODIUM CHLORIDE 9 MG/ML
250 INJECTION, SOLUTION INTRAVENOUS ONCE
Status: CANCELLED | OUTPATIENT
Start: 2023-01-25

## 2023-01-25 RX ORDER — FLUOROURACIL 50 MG/ML
400 INJECTION, SOLUTION INTRAVENOUS ONCE
Status: CANCELLED | OUTPATIENT
Start: 2023-01-25

## 2023-01-25 RX ORDER — FLUOROURACIL 50 MG/ML
400 INJECTION, SOLUTION INTRAVENOUS ONCE
Status: COMPLETED | OUTPATIENT
Start: 2023-01-25 | End: 2023-01-25

## 2023-01-25 RX ORDER — SODIUM CHLORIDE 9 MG/ML
250 INJECTION, SOLUTION INTRAVENOUS ONCE
Status: COMPLETED | OUTPATIENT
Start: 2023-01-25 | End: 2023-01-25

## 2023-01-25 RX ADMIN — SODIUM CHLORIDE 250 ML: 9 INJECTION, SOLUTION INTRAVENOUS at 08:56

## 2023-01-25 RX ADMIN — DEXAMETHASONE SODIUM PHOSPHATE 12 MG: 10 INJECTION, SOLUTION INTRAMUSCULAR; INTRAVENOUS at 08:56

## 2023-01-25 RX ADMIN — FLUOROURACIL 4100 MG: 50 INJECTION, SOLUTION INTRAVENOUS at 10:53

## 2023-01-25 RX ADMIN — LEUCOVORIN CALCIUM 680 MG: 350 INJECTION, POWDER, LYOPHILIZED, FOR SOLUTION INTRAMUSCULAR; INTRAVENOUS at 09:27

## 2023-01-25 RX ADMIN — FLUOROURACIL 680 MG: 50 INJECTION, SOLUTION INTRAVENOUS at 10:37

## 2023-01-25 NOTE — PROGRESS NOTES
DATE OF VISIT: 1/25/2023      REASON FOR VISIT: Colon cancer with recurrence involving omentum on adjuvant chemotherapy, axillary PET changes neuropathy, DVT involving right upper extremity, thrombocytosis, iron deficiency, hypokalemia      HISTORY OF PRESENT ILLNESS:   77-year-old female with medical problem consisting of cardiomegaly, rectosigmoid cancer initially diagnosed in December 2017 with recurrence diagnosed on July 6, 2022 for which patient had omentectomy done and is currently on chemotherapy with 5-FU and leucovorin, history of hypokalemia, thrombocytosis, DVT involving right upper extremity for which patient is currently on Xarelto is here for follow-up appointment today.  Patient is scheduled to get cycle 10 of 5-FU and leucovorin today.  Patient was seen at [facility] on January 4, 2023 and due to neutropenia chemotherapy was held on that day.  Subsequently patient developed COVID infection requiring treatment delay.  Complains of fatigue.  Denies any recent worsening of neuropathy.  Denies any new lymph node enlargement.  Denies any hemoptysis.                  Oncology history:    1.  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  - Patient was started on FOLFOX on December 27, 2017.  Patient received total 12 cycles of FOLFOX between December 27, 2017 until June 13, 2018.  Dose of 5-FU and oxaliplatin was decreased by 20% with cycle 2 due to development of neutropenia.  - Patient was on surveillance since then.  - In June 2022, patient was found to have rising CEA level with CEA being at 23.  Initial CT scan did not show any evidence of recurrence.  - Patient had a colonoscopy by Dr. Arriola in July 2022 which was negative for any recurrence except for tubular adenoma with.  - PET/CT done on June 20 2022 showed isolated uptake in omental region.  Patient was evaluated by Dr. Packer and underwent omentectomy with solitary lymph  "node sampling which was negative for metastasis                Past Medical History, Past Surgical History, Social History, Family History have been reviewed and are without significant changes except as mentioned.    Review of Systems   A comprehensive 14 point review of systems was performed and was negative except as mentioned in HPI.    Medications:  The current medication list was reviewed in the EMR    ALLERGIES:    Allergies   Allergen Reactions   • Codeine Other (See Comments)     \"dont remember\"       Objective      Vitals:    01/25/23 0757   BP: 126/75   Pulse: 68   Temp: 97 °F (36.1 °C)   TempSrc: Temporal   SpO2: 95%   Weight: 66.9 kg (147 lb 8 oz)   PainSc: 0-No pain     Current Status 12/19/2022   ECOG score 0       Physical Exam  Cardiovascular:      Comments: Port-A-Cath present  Pulmonary:      Breath sounds: Normal breath sounds.   Neurological:      Mental Status: She is alert and oriented to person, place, and time.           RECENT LABS:  Glucose   Date Value Ref Range Status   01/04/2023 93 65 - 99 mg/dL Final     Sodium   Date Value Ref Range Status   01/04/2023 143 136 - 145 mmol/L Final   06/28/2022 143 136 - 145 mmol/L Final     Potassium   Date Value Ref Range Status   01/04/2023 3.6 3.5 - 5.2 mmol/L Final   06/28/2022 3.9 3.5 - 5.1 mmol/L Final     CO2   Date Value Ref Range Status   01/04/2023 25.0 22.0 - 29.0 mmol/L Final     Total CO2   Date Value Ref Range Status   06/28/2022 35 (H) 21 - 31 mmol/L Final     Chloride   Date Value Ref Range Status   01/04/2023 107 98 - 107 mmol/L Final   06/28/2022 103 98 - 107 mmol/L Final     Anion Gap   Date Value Ref Range Status   01/04/2023 11.0 5.0 - 15.0 mmol/L Final     Creatinine   Date Value Ref Range Status   01/04/2023 0.99 0.57 - 1.00 mg/dL Final   06/28/2022 0.6 (L) 0.7 - 1.3 mg/dL Final     BUN   Date Value Ref Range Status   01/04/2023 20 8 - 23 mg/dL Final   06/28/2022 12 7 - 25 mg/dL Final     BUN/Creatinine Ratio   Date Value Ref " Range Status   01/04/2023 20.2 7.0 - 25.0 Final     Calcium   Date Value Ref Range Status   01/04/2023 8.9 8.6 - 10.5 mg/dL Final   06/28/2022 9.3 8.6 - 10.3 mg/dL Final     eGFR Non  Amer   Date Value Ref Range Status   09/14/2021 80 >60 mL/min/1.73 Final     Alkaline Phosphatase   Date Value Ref Range Status   01/04/2023 72 39 - 117 U/L Final   06/28/2022 76 34 - 104 U/L Final     Total Protein   Date Value Ref Range Status   01/04/2023 6.7 6.0 - 8.5 g/dL Final     ALT (SGPT)   Date Value Ref Range Status   01/04/2023 13 1 - 33 U/L Final   06/28/2022 14 7 - 52 U/L Final     AST (SGOT)   Date Value Ref Range Status   01/04/2023 21 1 - 32 U/L Final   06/28/2022 17 13 - 39 U/L Final     Total Bilirubin   Date Value Ref Range Status   01/04/2023 0.6 0.0 - 1.2 mg/dL Final   06/28/2022 0.59 0.3 - 1.0 mg/dL Final     Albumin   Date Value Ref Range Status   01/04/2023 3.9 3.5 - 5.2 g/dL Final   06/28/2022 4.1 3.5 - 5.7 g/dL Final     Globulin   Date Value Ref Range Status   01/04/2023 2.8 gm/dL Final     Lab Results   Component Value Date    WBC 6.19 01/25/2023    HGB 11.0 (L) 01/25/2023    HCT 36.4 01/25/2023    MCV 90.5 01/25/2023     (H) 01/25/2023     Lab Results   Component Value Date    NEUTROABS 4.01 01/25/2023    IRON 78 12/19/2022    IRON 64 10/10/2022    IRON 58 12/18/2017    TIBC 405 12/19/2022    TIBC 408 10/10/2022    TIBC 312 12/18/2017    LABIRON 19 (L) 12/19/2022    LABIRON 16 (L) 10/10/2022    LABIRON 19 12/18/2017    FERRITIN 169.70 (H) 12/19/2022    FERRITIN 161.70 (H) 10/10/2022    FERRITIN 96.10 12/18/2017    [id number] 482 10/10/2022    [id number] 616 12/18/2017    FOLATE >20.00 10/10/2022    FOLATE 7.20 12/18/2017     Lab Results   Component Value Date    CEA 2.52 10/24/2022    REFLABREPO  07/05/2022     [facility]  [address] [place] [address]  Phone: [phone] or [phone]  Fax: [phone]  Jeff Bustamante M.D., Medical  "Director    PATIENT NAME                                     LABORATORY NO.  1800   SEVEN ANDERSON                             [id number]  [id number]                                 AGE                    SEX   N              CLIENT REF #  [facility]                   76        1945      F     [id number]      [id number]    [place]                               REQUESTING M.D.           ATTENDING M.D.         COPY TO.  [address] [place] [address]                         [place] [address]                     DATE COLLECTED            DATE RECEIVED          DATE REPORTED  2022    DIAGNOSIS:  DESCENDING COLON POLYPS:  Tubular adenoma    JBS/pah    CLINICAL HISTORY:  Abnormal tumor markers    SPECIMENS RECEIVED:  DESCENDING COLON POLYPS    MICROSCOPIC DESCRIPTION:  Tissue blocks are prepared and slides are examined microscopically on all  specimens. See diagnosis for details.    Professional interpretation rendered by Rupert Steen M.D. at [facility], [address] [place] [address].    GROSS DESCRIPTION:  Specimen is received in 1 formalin filled container \"large intestine,  left/descending colon polyps\" consists of 3 pieces of tan soft tissue measuring  0.4 x 0.3 x 0.2 cm.  Specimen is submitted entirely in 1 cassette.  MM    REVIEWED, DIAGNOSED AND ELECTRONICALLY  SIGNED BY:    Rupert Steen M.D.  CPT CODES:  35767           PATHOLOGY:  * Cannot find OR log *         RADIOLOGY DATA :  No radiology results for the last 7 days        Assessment & Plan     1.  Adenocarcinoma of sigmoid colon with recurrence:  - Review oncology history for prior treatment details  - Patient is currently on 5-FU and leucovorin since 2022.  - We will proceed with cycle 10 of chemotherapy today.  - We will continue with close monitoring for chemotherapy related side effect  - " Patient will return to clinic in 2 weeks with repeat CBC, CMP on that day.    2.  Oxaliplatin induced neuropathy  - Remains on gabapentin 100 mg 3 times a day    4.  Hypokalemia  - Currently on potassium 20 mg p.o. daily at home  - Potassium is 3.7    4.  Right upper extremity DVT  - Patient was diagnosed with right upper extremity DVT on September 28, 2022.  Currently on Xarelto 20 mg p.o. daily plan is to repeat Doppler ultrasound of right upper extremity around March 2023 prior to discontinuation of Xarelto.    5.  Anemia:  - Hemoglobin is 11.  - Currently on ferrous sulfate p.o. daily  - We will repeat anemia work-up upon in march 2023.    6.  Thrombocytosis:  - Reactive in nature  - Platelet count is 456,000.    7.  Hypertension    8.  Health maintenance: Patient quit smoking in 2020    9. Advance Care Planning: For now patient remains full code and is able to make decisions.  Patient has health care surrogate mentioned on chart.    10.  Prescriptions: Prescription for gabapentin has been sent to her pharmacy today             PHQ-9 Total Score: 0   -Patient is not homicidal or suicidal.  No acute intervention required.    Mary Ann Condon reports a pain score of 0.  Given her pain assessment as noted, treatment options were discussed and the following options were decided upon as a follow-up plan to address the patient's pain: continuation of current treatment plan for pain.         Wil Abraham MD  1/25/2023  08:12 CST        Part of this note may be an electronic transcription/translation of spoken language to printed text using the Dragon Dictation System.          CC:

## 2023-01-27 ENCOUNTER — INFUSION (OUTPATIENT)
Dept: ONCOLOGY | Facility: HOSPITAL | Age: 78
End: 2023-01-27
Payer: MEDICARE

## 2023-01-27 DIAGNOSIS — C19 CANCER OF RECTOSIGMOID (COLON): ICD-10-CM

## 2023-01-27 DIAGNOSIS — Z45.2 ENCOUNTER FOR VENOUS ACCESS DEVICE CARE: ICD-10-CM

## 2023-01-27 DIAGNOSIS — C18.9 LOCAL RECURRENCE OF COLON CANCER: Primary | ICD-10-CM

## 2023-01-27 PROCEDURE — 25010000002 HEPARIN LOCK FLUSH PER 10 UNITS: Performed by: INTERNAL MEDICINE

## 2023-01-27 RX ORDER — HEPARIN SODIUM (PORCINE) LOCK FLUSH IV SOLN 100 UNIT/ML 100 UNIT/ML
500 SOLUTION INTRAVENOUS AS NEEDED
Status: CANCELLED | OUTPATIENT
Start: 2023-01-27

## 2023-01-27 RX ORDER — SODIUM CHLORIDE 0.9 % (FLUSH) 0.9 %
10 SYRINGE (ML) INJECTION AS NEEDED
Status: DISCONTINUED | OUTPATIENT
Start: 2023-01-27 | End: 2023-01-27 | Stop reason: HOSPADM

## 2023-01-27 RX ORDER — HEPARIN SODIUM (PORCINE) LOCK FLUSH IV SOLN 100 UNIT/ML 100 UNIT/ML
500 SOLUTION INTRAVENOUS AS NEEDED
Status: DISCONTINUED | OUTPATIENT
Start: 2023-01-27 | End: 2023-01-27 | Stop reason: HOSPADM

## 2023-01-27 RX ORDER — SODIUM CHLORIDE 0.9 % (FLUSH) 0.9 %
10 SYRINGE (ML) INJECTION AS NEEDED
Status: CANCELLED | OUTPATIENT
Start: 2023-01-27

## 2023-01-27 RX ORDER — POTASSIUM CHLORIDE 750 MG/1
CAPSULE, EXTENDED RELEASE ORAL
Qty: 60 CAPSULE | Refills: 1 | Status: SHIPPED | OUTPATIENT
Start: 2023-01-27 | End: 2023-02-28

## 2023-01-27 RX ADMIN — Medication 10 ML: at 10:37

## 2023-01-27 RX ADMIN — Medication 500 UNITS: at 10:37

## 2023-01-27 NOTE — TELEPHONE ENCOUNTER
Rx Refill Note  Requested Prescriptions     Pending Prescriptions Disp Refills   • potassium chloride (MICRO-K) 10 MEQ CR capsule [Pharmacy Med Name: POTASSIUM CL ER 10 MEQ CAPSULE] 60 capsule 1     Sig: TAKE 2 CAPSULES BY MOUTH EVERY DAY      Last office visit with prescribing clinician: 1/25/2023   Last telemedicine visit with prescribing clinician: 2/8/2023   Next office visit with prescribing clinician: 2/8/2023                         Would you like a call back once the refill request has been completed: [] Yes [] No    If the office needs to give you a call back, can they leave a voicemail: [] Yes [] No    Marley Potter  01/27/23, 11:35 CST

## 2023-02-08 ENCOUNTER — OFFICE VISIT (OUTPATIENT)
Dept: ONCOLOGY | Facility: CLINIC | Age: 78
End: 2023-02-08
Payer: MEDICARE

## 2023-02-08 ENCOUNTER — INFUSION (OUTPATIENT)
Dept: ONCOLOGY | Facility: HOSPITAL | Age: 78
End: 2023-02-08
Payer: MEDICARE

## 2023-02-08 VITALS
OXYGEN SATURATION: 100 % | TEMPERATURE: 96.7 F | BODY MASS INDEX: 26.63 KG/M2 | WEIGHT: 145.6 LBS | DIASTOLIC BLOOD PRESSURE: 59 MMHG | SYSTOLIC BLOOD PRESSURE: 104 MMHG | HEART RATE: 63 BPM

## 2023-02-08 DIAGNOSIS — T45.1X5A ANEMIA DUE TO ANTINEOPLASTIC CHEMOTHERAPY: Chronic | ICD-10-CM

## 2023-02-08 DIAGNOSIS — C19 CANCER OF RECTOSIGMOID (COLON): Primary | Chronic | ICD-10-CM

## 2023-02-08 DIAGNOSIS — I82.A11 ACUTE DEEP VEIN THROMBOSIS (DVT) OF AXILLARY VEIN OF RIGHT UPPER EXTREMITY: Chronic | ICD-10-CM

## 2023-02-08 DIAGNOSIS — T45.1X5A CHEMOTHERAPY-INDUCED NEUTROPENIA: Chronic | ICD-10-CM

## 2023-02-08 DIAGNOSIS — D75.839 THROMBOCYTOSIS: Chronic | ICD-10-CM

## 2023-02-08 DIAGNOSIS — C18.9 LOCAL RECURRENCE OF COLON CANCER: Chronic | ICD-10-CM

## 2023-02-08 DIAGNOSIS — D64.81 ANEMIA DUE TO ANTINEOPLASTIC CHEMOTHERAPY: Chronic | ICD-10-CM

## 2023-02-08 DIAGNOSIS — T45.1X5A NEUROPATHY DUE TO CHEMOTHERAPEUTIC DRUG: Chronic | ICD-10-CM

## 2023-02-08 DIAGNOSIS — Z45.2 ENCOUNTER FOR VENOUS ACCESS DEVICE CARE: Primary | ICD-10-CM

## 2023-02-08 DIAGNOSIS — D70.1 CHEMOTHERAPY-INDUCED NEUTROPENIA: Chronic | ICD-10-CM

## 2023-02-08 DIAGNOSIS — C19 CANCER OF RECTOSIGMOID (COLON): ICD-10-CM

## 2023-02-08 DIAGNOSIS — C18.9 LOCAL RECURRENCE OF COLON CANCER: ICD-10-CM

## 2023-02-08 DIAGNOSIS — G62.0 NEUROPATHY DUE TO CHEMOTHERAPEUTIC DRUG: Chronic | ICD-10-CM

## 2023-02-08 DIAGNOSIS — E87.6 HYPOKALEMIA: Chronic | ICD-10-CM

## 2023-02-08 LAB
ALBUMIN SERPL-MCNC: 3.8 G/DL (ref 3.5–5.2)
ALBUMIN/GLOB SERPL: 1.4 G/DL
ALP SERPL-CCNC: 65 U/L (ref 39–117)
ALT SERPL W P-5'-P-CCNC: 12 U/L (ref 1–33)
ANION GAP SERPL CALCULATED.3IONS-SCNC: 8 MMOL/L (ref 5–15)
AST SERPL-CCNC: 17 U/L (ref 1–32)
BASOPHILS # BLD AUTO: 0.04 10*3/MM3 (ref 0–0.2)
BASOPHILS NFR BLD AUTO: 1.1 % (ref 0–1.5)
BILIRUB SERPL-MCNC: 0.3 MG/DL (ref 0–1.2)
BUN SERPL-MCNC: 18 MG/DL (ref 8–23)
BUN/CREAT SERPL: 25.4 (ref 7–25)
CALCIUM SPEC-SCNC: 8.8 MG/DL (ref 8.6–10.5)
CHLORIDE SERPL-SCNC: 107 MMOL/L (ref 98–107)
CO2 SERPL-SCNC: 27 MMOL/L (ref 22–29)
CREAT SERPL-MCNC: 0.71 MG/DL (ref 0.57–1)
DEPRECATED RDW RBC AUTO: 51.3 FL (ref 37–54)
EGFRCR SERPLBLD CKD-EPI 2021: 87.7 ML/MIN/1.73
EOSINOPHIL # BLD AUTO: 0.37 10*3/MM3 (ref 0–0.4)
EOSINOPHIL NFR BLD AUTO: 10.2 % (ref 0.3–6.2)
ERYTHROCYTE [DISTWIDTH] IN BLOOD BY AUTOMATED COUNT: 15.7 % (ref 12.3–15.4)
GLOBULIN UR ELPH-MCNC: 2.7 GM/DL
GLUCOSE SERPL-MCNC: 101 MG/DL (ref 65–99)
HCT VFR BLD AUTO: 34.1 % (ref 34–46.6)
HGB BLD-MCNC: 10.7 G/DL (ref 12–15.9)
HOLD SPECIMEN: NORMAL
IMM GRANULOCYTES # BLD AUTO: 0.01 10*3/MM3 (ref 0–0.05)
IMM GRANULOCYTES NFR BLD AUTO: 0.3 % (ref 0–0.5)
LYMPHOCYTES # BLD AUTO: 1.21 10*3/MM3 (ref 0.7–3.1)
LYMPHOCYTES NFR BLD AUTO: 33.2 % (ref 19.6–45.3)
MCH RBC QN AUTO: 28.2 PG (ref 26.6–33)
MCHC RBC AUTO-ENTMCNC: 31.4 G/DL (ref 31.5–35.7)
MCV RBC AUTO: 89.7 FL (ref 79–97)
MONOCYTES # BLD AUTO: 0.36 10*3/MM3 (ref 0.1–0.9)
MONOCYTES NFR BLD AUTO: 9.9 % (ref 5–12)
NEUTROPHILS NFR BLD AUTO: 1.65 10*3/MM3 (ref 1.7–7)
NEUTROPHILS NFR BLD AUTO: 45.3 % (ref 42.7–76)
NRBC BLD AUTO-RTO: 0 /100 WBC (ref 0–0.2)
PLATELET # BLD AUTO: 295 10*3/MM3 (ref 140–450)
PMV BLD AUTO: 9.6 FL (ref 6–12)
POTASSIUM SERPL-SCNC: 3.7 MMOL/L (ref 3.5–5.2)
PROT SERPL-MCNC: 6.5 G/DL (ref 6–8.5)
RBC # BLD AUTO: 3.8 10*6/MM3 (ref 3.77–5.28)
SODIUM SERPL-SCNC: 142 MMOL/L (ref 136–145)
WBC NRBC COR # BLD: 3.64 10*3/MM3 (ref 3.4–10.8)

## 2023-02-08 PROCEDURE — 1126F AMNT PAIN NOTED NONE PRSNT: CPT | Performed by: INTERNAL MEDICINE

## 2023-02-08 PROCEDURE — 25010000002 LEUCOVORIN CALCIUM PER 50 MG: Performed by: INTERNAL MEDICINE

## 2023-02-08 PROCEDURE — 25010000002 DEXAMETHASONE SODIUM PHOSPHATE 100 MG/10ML SOLUTION: Performed by: INTERNAL MEDICINE

## 2023-02-08 PROCEDURE — 1123F ACP DISCUSS/DSCN MKR DOCD: CPT | Performed by: INTERNAL MEDICINE

## 2023-02-08 PROCEDURE — 96367 TX/PROPH/DG ADDL SEQ IV INF: CPT | Performed by: INTERNAL MEDICINE

## 2023-02-08 PROCEDURE — 96409 CHEMO IV PUSH SNGL DRUG: CPT | Performed by: INTERNAL MEDICINE

## 2023-02-08 PROCEDURE — 99214 OFFICE O/P EST MOD 30 MIN: CPT | Performed by: INTERNAL MEDICINE

## 2023-02-08 PROCEDURE — 85025 COMPLETE CBC W/AUTO DIFF WBC: CPT

## 2023-02-08 PROCEDURE — 80053 COMPREHEN METABOLIC PANEL: CPT

## 2023-02-08 PROCEDURE — 25010000002 FLUOROURACIL PER 500 MG: Performed by: INTERNAL MEDICINE

## 2023-02-08 PROCEDURE — 96416 CHEMO PROLONG INFUSE W/PUMP: CPT | Performed by: INTERNAL MEDICINE

## 2023-02-08 RX ORDER — SODIUM CHLORIDE 9 MG/ML
250 INJECTION, SOLUTION INTRAVENOUS ONCE
Status: CANCELLED | OUTPATIENT
Start: 2023-02-08

## 2023-02-08 RX ORDER — HEPARIN SODIUM (PORCINE) LOCK FLUSH IV SOLN 100 UNIT/ML 100 UNIT/ML
500 SOLUTION INTRAVENOUS AS NEEDED
Status: CANCELLED | OUTPATIENT
Start: 2023-02-08

## 2023-02-08 RX ORDER — FLUOROURACIL 50 MG/ML
400 INJECTION, SOLUTION INTRAVENOUS ONCE
Status: CANCELLED | OUTPATIENT
Start: 2023-02-08

## 2023-02-08 RX ORDER — SODIUM CHLORIDE 9 MG/ML
250 INJECTION, SOLUTION INTRAVENOUS ONCE
Status: COMPLETED | OUTPATIENT
Start: 2023-02-08 | End: 2023-02-08

## 2023-02-08 RX ORDER — FLUOROURACIL 50 MG/ML
400 INJECTION, SOLUTION INTRAVENOUS ONCE
Status: COMPLETED | OUTPATIENT
Start: 2023-02-08 | End: 2023-02-08

## 2023-02-08 RX ORDER — SODIUM CHLORIDE 0.9 % (FLUSH) 0.9 %
10 SYRINGE (ML) INJECTION AS NEEDED
Status: DISCONTINUED | OUTPATIENT
Start: 2023-02-08 | End: 2023-02-08 | Stop reason: HOSPADM

## 2023-02-08 RX ORDER — SODIUM CHLORIDE 0.9 % (FLUSH) 0.9 %
10 SYRINGE (ML) INJECTION AS NEEDED
Status: CANCELLED | OUTPATIENT
Start: 2023-02-08

## 2023-02-08 RX ORDER — HEPARIN SODIUM (PORCINE) LOCK FLUSH IV SOLN 100 UNIT/ML 100 UNIT/ML
500 SOLUTION INTRAVENOUS AS NEEDED
Status: DISCONTINUED | OUTPATIENT
Start: 2023-02-08 | End: 2023-02-08 | Stop reason: HOSPADM

## 2023-02-08 RX ADMIN — SODIUM CHLORIDE 250 ML: 9 INJECTION, SOLUTION INTRAVENOUS at 09:16

## 2023-02-08 RX ADMIN — FLUOROURACIL 4100 MG: 50 INJECTION, SOLUTION INTRAVENOUS at 11:19

## 2023-02-08 RX ADMIN — FLUOROURACIL 680 MG: 50 INJECTION, SOLUTION INTRAVENOUS at 10:59

## 2023-02-08 RX ADMIN — DEXAMETHASONE SODIUM PHOSPHATE 12 MG: 10 INJECTION, SOLUTION INTRAMUSCULAR; INTRAVENOUS at 09:31

## 2023-02-08 RX ADMIN — LEUCOVORIN CALCIUM 680 MG: 350 INJECTION, POWDER, LYOPHILIZED, FOR SOLUTION INTRAMUSCULAR; INTRAVENOUS at 10:05

## 2023-02-08 NOTE — PROGRESS NOTES
DATE OF VISIT: 2/8/2023      REASON FOR VISIT: Colon cancer with recurrence involving omentum on adjuvant chemotherapy, oxaliplatin induced neuropathy, right upper extremity DVT, thrombocytosis, iron deficiency, hypokalemia      HISTORY OF PRESENT ILLNESS:   77-year-old female with medical problems consisting of cardiomegaly, rectosigmoid cancer initially diagnosed in December 2017 with recurrence involving omentum diagnosed in July 2022 for which patient is currently on treatment with 5-FU and leucovorin, hypokalemia, thrombocytosis, right upper extremity DVT, iron deficiency is here for follow-up appointment today.  Patient is scheduled to get cycle 11 of 5-FU and leucovorin today.  Complains of fatigue.  Denies any recent worsening of neuropathy.  Denies any excessive nausea or vomiting or diarrhea.  Denies any new lymph node enlargement.  Denies any hemoptysis.            Oncology history:    1.  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  - Patient was started on FOLFOX on December 27, 2017.  Patient received total 12 cycles of FOLFOX between December 27, 2017 until June 13, 2018.  Dose of 5-FU and oxaliplatin was decreased by 20% with cycle 2 due to development of neutropenia.  - Patient was on surveillance since then.  - In June 2022, patient was found to have rising CEA level with CEA being at 23.  Initial CT scan did not show any evidence of recurrence.  - Patient had a colonoscopy by Dr. Arriola in July 2022 which was negative for any recurrence except for tubular adenoma with.  - PET/CT done on June 20 2022 showed isolated uptake in omental region.  Patient was evaluated by Dr. Packer and underwent omentectomy with solitary lymph node sampling which was negative for metastasis            Past Medical History, Past Surgical History, Social History, Family History have been reviewed and are without significant changes except as  "mentioned.    Review of Systems   A comprehensive 14 point review of systems was performed and was negative except as mentioned in HPI.    Medications:  The current medication list was reviewed in the EMR    ALLERGIES:    Allergies   Allergen Reactions   • Codeine Other (See Comments)     \"dont remember\"       Objective      Vitals:    02/08/23 0829   BP: 104/59   Pulse: 63   Temp: 96.7 °F (35.9 °C)   TempSrc: Temporal   SpO2: 100%   Weight: 66 kg (145 lb 9.6 oz)   PainSc: 0-No pain     Current Status 12/19/2022   ECOG score 0       Physical Exam  Cardiovascular:      Comments: Port-A-Cath present  Pulmonary:      Breath sounds: Normal breath sounds.   Neurological:      Mental Status: She is alert and oriented to person, place, and time.           RECENT LABS:  Glucose   Date Value Ref Range Status   02/08/2023 101 (H) 65 - 99 mg/dL Final     Sodium   Date Value Ref Range Status   02/08/2023 142 136 - 145 mmol/L Final   06/28/2022 143 136 - 145 mmol/L Final     Potassium   Date Value Ref Range Status   02/08/2023 3.7 3.5 - 5.2 mmol/L Final   06/28/2022 3.9 3.5 - 5.1 mmol/L Final     CO2   Date Value Ref Range Status   02/08/2023 27.0 22.0 - 29.0 mmol/L Final     Total CO2   Date Value Ref Range Status   06/28/2022 35 (H) 21 - 31 mmol/L Final     Chloride   Date Value Ref Range Status   02/08/2023 107 98 - 107 mmol/L Final   06/28/2022 103 98 - 107 mmol/L Final     Anion Gap   Date Value Ref Range Status   02/08/2023 8.0 5.0 - 15.0 mmol/L Final     Creatinine   Date Value Ref Range Status   02/08/2023 0.71 0.57 - 1.00 mg/dL Final   06/28/2022 0.6 (L) 0.7 - 1.3 mg/dL Final     BUN   Date Value Ref Range Status   02/08/2023 18 8 - 23 mg/dL Final   06/28/2022 12 7 - 25 mg/dL Final     BUN/Creatinine Ratio   Date Value Ref Range Status   02/08/2023 25.4 (H) 7.0 - 25.0 Final     Calcium   Date Value Ref Range Status   02/08/2023 8.8 8.6 - 10.5 mg/dL Final   06/28/2022 9.3 8.6 - 10.3 mg/dL Final     eGFR Non  Amer "   Date Value Ref Range Status   2021 80 >60 mL/min/1.73 Final     Alkaline Phosphatase   Date Value Ref Range Status   2023 65 39 - 117 U/L Final   2022 76 34 - 104 U/L Final     Total Protein   Date Value Ref Range Status   2023 6.5 6.0 - 8.5 g/dL Final     ALT (SGPT)   Date Value Ref Range Status   2023 12 1 - 33 U/L Final   2022 14 7 - 52 U/L Final     AST (SGOT)   Date Value Ref Range Status   2023 17 1 - 32 U/L Final   2022 17 13 - 39 U/L Final     Total Bilirubin   Date Value Ref Range Status   2023 0.3 0.0 - 1.2 mg/dL Final   2022 0.59 0.3 - 1.0 mg/dL Final     Albumin   Date Value Ref Range Status   2023 3.8 3.5 - 5.2 g/dL Final   2022 4.1 3.5 - 5.7 g/dL Final     Globulin   Date Value Ref Range Status   2023 2.7 gm/dL Final     Lab Results   Component Value Date    WBC 3.64 2023    HGB 10.7 (L) 2023    HCT 34.1 2023    MCV 89.7 2023     2023     Lab Results   Component Value Date    NEUTROABS 1.65 (L) 2023    IRON 78 2022    IRON 64 10/10/2022    IRON 58 2017    TIBC 405 2022    TIBC 408 10/10/2022    TIBC 312 2017    LABIRON 19 (L) 2022    LABIRON 16 (L) 10/10/2022    LABIRON 19 2017    FERRITIN 169.70 (H) 2022    FERRITIN 161.70 (H) 10/10/2022    FERRITIN 96.10 2017    [id number] 482 10/10/2022    [id number] 616 2017    FOLATE >20.00 10/10/2022    FOLATE 7.20 2017     Lab Results   Component Value Date    CEA 2.52 10/24/2022    REFLABREPO  2022     [facility]  [address] [place] [address]  Phone: [phone] or [phone]  Fax: [phone]  Jeff Bustamante M.D., Medical Director    PATIENT NAME                                     LABORATORY NO.  1800   SEVEN ANDERSON [facility]                             [id number]  [id number]                                 AGE                     "SEX   [facility]              CLIENT REF #  [facility]                   76        1945      F     [id number]      [id number]    [place]                               REQUESTING M.D.           ATTENDING M.D.         COPY TO.  [address] [place] [address]                         [place] [address]                     DATE COLLECTED            DATE RECEIVED          DATE REPORTED  07/05/2022 07/05/2022 07/07/2022    DIAGNOSIS:  DESCENDING COLON POLYPS:  Tubular adenoma    JBS/pah    CLINICAL HISTORY:  Abnormal tumor markers    SPECIMENS RECEIVED:  DESCENDING COLON POLYPS    MICROSCOPIC DESCRIPTION:  Tissue blocks are prepared and slides are examined microscopically on all  specimens. See diagnosis for details.    Professional interpretation rendered by Rupert Steen M.D. at [facility], [address] [place] [address] , [place] [address].    GROSS DESCRIPTION:  Specimen is received in 1 formalin filled container \"large intestine,  left/descending colon polyps\" consists of 3 pieces of tan soft tissue measuring  0.4 x 0.3 x 0.2 cm.  Specimen is submitted entirely in 1 cassette.  MM    REVIEWED, DIAGNOSED AND ELECTRONICALLY  SIGNED BY:    Rupert Steen M.D.  CPT CODES:  [id number]           PATHOLOGY:  * Cannot find OR log *         RADIOLOGY DATA :  No radiology results for the last 7 days        Assessment & Plan     1.  Adenocarcinoma of sigmoid colon with recurrence:  - Review oncology history for prior treatment details  - Patient is currently on 5-FU and leucovorin since August 6, 2022  - We will proceed with cycle 11 of chemotherapy today  - We will continue close monitoring for chemotherapy related side effect.  - Patient will return to clinic in 2 weeks with repeat CBC and CMP on that day.    2.  Oxaliplatin induced neuropathy  - Remains on gabapentin 100 mg 3 times a day    3.  Hypokalemia:  - Currently on potassium 20 mEq p.o. daily " at home  - Potassium is 3.7    4.  Right upper extremity DVT:  - Patient was diagnosed with right upper extremity DVT on September 28, 2022.  Currently on Xarelto 20 mg p.o. daily.  Plan is to repeat Doppler ultrasound in March 2023 prior to discontinuation of Xarelto.    5.  Anemia:  - Hemoglobin is 10.7  - Currently on ferrous sulfate p.o. daily  -We will repeat anemia work-up with cycle 12 of chemotherapy.    6.  Thrombocytosis:  - Reactive in nature  - Platelet count is 295,000    7.  Chemotherapy-induced neutropenia:  - White blood cell count is 3.64 with ANC of 1.65.  Since ANC is greater than 15 we will proceed with cycle 11 of chemotherapy today.    8.  Hypertension    9.  Health maintenance: Patient quit smoking in 2020    10. Advance Care Planning: For now patient remains full code and is able to make decisions.  Patient has health care surrogate mentioned on chart.                 PHQ-9 Total Score: 0   -Patient is not homicidal or suicidal.  No acute intervention required.    Mary Ann Condon reports a pain score of 0.  Given her pain assessment as noted, treatment options were discussed and the following options were decided upon as a follow-up plan to address the patient's pain: continuation of current treatment plan for pain.         Wil Abraham MD  2/8/2023  08:38 CST        Part of this note may be an electronic transcription/translation of spoken language to printed text using the Dragon Dictation System.          CC:

## 2023-02-10 ENCOUNTER — INFUSION (OUTPATIENT)
Dept: ONCOLOGY | Facility: HOSPITAL | Age: 78
End: 2023-02-10
Payer: MEDICARE

## 2023-02-10 DIAGNOSIS — C19 CANCER OF RECTOSIGMOID (COLON): ICD-10-CM

## 2023-02-10 DIAGNOSIS — Z45.2 ENCOUNTER FOR VENOUS ACCESS DEVICE CARE: ICD-10-CM

## 2023-02-10 DIAGNOSIS — C18.9 LOCAL RECURRENCE OF COLON CANCER: Primary | ICD-10-CM

## 2023-02-10 PROCEDURE — 25010000002 HEPARIN LOCK FLUSH PER 10 UNITS: Performed by: INTERNAL MEDICINE

## 2023-02-10 RX ORDER — SODIUM CHLORIDE 0.9 % (FLUSH) 0.9 %
10 SYRINGE (ML) INJECTION AS NEEDED
Status: DISCONTINUED | OUTPATIENT
Start: 2023-02-10 | End: 2023-02-10 | Stop reason: HOSPADM

## 2023-02-10 RX ORDER — HEPARIN SODIUM (PORCINE) LOCK FLUSH IV SOLN 100 UNIT/ML 100 UNIT/ML
500 SOLUTION INTRAVENOUS AS NEEDED
Status: DISCONTINUED | OUTPATIENT
Start: 2023-02-10 | End: 2023-02-10 | Stop reason: HOSPADM

## 2023-02-10 RX ORDER — HEPARIN SODIUM (PORCINE) LOCK FLUSH IV SOLN 100 UNIT/ML 100 UNIT/ML
500 SOLUTION INTRAVENOUS AS NEEDED
Status: CANCELLED | OUTPATIENT
Start: 2023-02-10

## 2023-02-10 RX ORDER — SODIUM CHLORIDE 0.9 % (FLUSH) 0.9 %
10 SYRINGE (ML) INJECTION AS NEEDED
Status: CANCELLED | OUTPATIENT
Start: 2023-02-10

## 2023-02-10 RX ADMIN — Medication 10 ML: at 10:50

## 2023-02-10 RX ADMIN — Medication 500 UNITS: at 10:50

## 2023-02-17 ENCOUNTER — HOSPITAL ENCOUNTER (EMERGENCY)
Facility: HOSPITAL | Age: 78
Discharge: HOME | End: 2023-02-17
Attending: FAMILY MEDICINE | Admitting: FAMILY MEDICINE
Payer: MEDICARE

## 2023-02-17 ENCOUNTER — APPOINTMENT (OUTPATIENT)
Dept: ULTRASOUND IMAGING | Facility: HOSPITAL | Age: 78
End: 2023-02-17
Payer: MEDICARE

## 2023-02-17 ENCOUNTER — TELEPHONE (OUTPATIENT)
Dept: ONCOLOGY | Facility: CLINIC | Age: 78
End: 2023-02-17
Payer: MEDICARE

## 2023-02-17 VITALS
WEIGHT: 145 LBS | RESPIRATION RATE: 18 BRPM | HEIGHT: 62 IN | DIASTOLIC BLOOD PRESSURE: 64 MMHG | TEMPERATURE: 97.9 F | SYSTOLIC BLOOD PRESSURE: 158 MMHG | BODY MASS INDEX: 26.68 KG/M2 | HEART RATE: 58 BPM | OXYGEN SATURATION: 96 %

## 2023-02-17 DIAGNOSIS — M79.662 BILATERAL CALF PAIN: Primary | ICD-10-CM

## 2023-02-17 DIAGNOSIS — M79.661 BILATERAL CALF PAIN: Primary | ICD-10-CM

## 2023-02-17 LAB
ALBUMIN SERPL-MCNC: 3.9 G/DL (ref 3.5–5.2)
ALBUMIN/GLOB SERPL: 1.4 G/DL
ALP SERPL-CCNC: 57 U/L (ref 39–117)
ALT SERPL W P-5'-P-CCNC: 11 U/L (ref 1–33)
ANION GAP SERPL CALCULATED.3IONS-SCNC: 5 MMOL/L (ref 5–15)
ANISOCYTOSIS BLD QL: ABNORMAL
AST SERPL-CCNC: 14 U/L (ref 1–32)
BILIRUB SERPL-MCNC: 0.3 MG/DL (ref 0–1.2)
BUN SERPL-MCNC: 12 MG/DL (ref 8–23)
BUN/CREAT SERPL: 20 (ref 7–25)
CALCIUM SPEC-SCNC: 8.7 MG/DL (ref 8.6–10.5)
CHLORIDE SERPL-SCNC: 107 MMOL/L (ref 98–107)
CO2 SERPL-SCNC: 31 MMOL/L (ref 22–29)
CREAT SERPL-MCNC: 0.6 MG/DL (ref 0.57–1)
DEPRECATED RDW RBC AUTO: 50.4 FL (ref 37–54)
EGFRCR SERPLBLD CKD-EPI 2021: 92.6 ML/MIN/1.73
EOSINOPHIL # BLD MANUAL: 0.24 10*3/MM3 (ref 0–0.4)
EOSINOPHIL NFR BLD MANUAL: 9 % (ref 0.3–6.2)
ERYTHROCYTE [DISTWIDTH] IN BLOOD BY AUTOMATED COUNT: 15.5 % (ref 12.3–15.4)
GIANT PLATELETS: ABNORMAL
GLOBULIN UR ELPH-MCNC: 2.7 GM/DL
GLUCOSE SERPL-MCNC: 93 MG/DL (ref 65–99)
HCT VFR BLD AUTO: 32.9 % (ref 34–46.6)
HGB BLD-MCNC: 10.5 G/DL (ref 12–15.9)
HOLD SPECIMEN: NORMAL
HYPOCHROMIA BLD QL: ABNORMAL
INR PPP: 2.57 (ref 0.8–1.2)
LARGE PLATELETS: ABNORMAL
LYMPHOCYTES # BLD MANUAL: 1.21 10*3/MM3 (ref 0.7–3.1)
LYMPHOCYTES NFR BLD MANUAL: 22 % (ref 5–12)
MCH RBC QN AUTO: 28.5 PG (ref 26.6–33)
MCHC RBC AUTO-ENTMCNC: 31.9 G/DL (ref 31.5–35.7)
MCV RBC AUTO: 89.2 FL (ref 79–97)
MONOCYTES # BLD: 0.59 10*3/MM3 (ref 0.1–0.9)
NEUTROPHILS # BLD AUTO: 0.64 10*3/MM3 (ref 1.7–7)
NEUTROPHILS NFR BLD MANUAL: 24 % (ref 42.7–76)
NEUTS VAC BLD QL SMEAR: ABNORMAL
OVALOCYTES BLD QL SMEAR: ABNORMAL
PLATELET # BLD AUTO: 423 10*3/MM3 (ref 140–450)
PMV BLD AUTO: 9.5 FL (ref 6–12)
POIKILOCYTOSIS BLD QL SMEAR: ABNORMAL
POTASSIUM SERPL-SCNC: 3.4 MMOL/L (ref 3.5–5.2)
PROT SERPL-MCNC: 6.6 G/DL (ref 6–8.5)
PROTHROMBIN TIME: 27.7 SECONDS (ref 11.1–15.3)
RBC # BLD AUTO: 3.69 10*6/MM3 (ref 3.77–5.28)
SMALL PLATELETS BLD QL SMEAR: ABNORMAL
SODIUM SERPL-SCNC: 143 MMOL/L (ref 136–145)
STOMATOCYTES BLD QL SMEAR: ABNORMAL
VARIANT LYMPHS NFR BLD MANUAL: 45 % (ref 19.6–45.3)
WBC NRBC COR # BLD: 2.68 10*3/MM3 (ref 3.4–10.8)

## 2023-02-17 PROCEDURE — 85025 COMPLETE CBC W/AUTO DIFF WBC: CPT | Performed by: NURSE PRACTITIONER

## 2023-02-17 PROCEDURE — 99283 EMERGENCY DEPT VISIT LOW MDM: CPT

## 2023-02-17 PROCEDURE — 80053 COMPREHEN METABOLIC PANEL: CPT | Performed by: NURSE PRACTITIONER

## 2023-02-17 PROCEDURE — 85610 PROTHROMBIN TIME: CPT | Performed by: NURSE PRACTITIONER

## 2023-02-17 PROCEDURE — 85007 BL SMEAR W/DIFF WBC COUNT: CPT | Performed by: NURSE PRACTITIONER

## 2023-02-17 PROCEDURE — 93970 EXTREMITY STUDY: CPT

## 2023-02-17 PROCEDURE — 93926 LOWER EXTREMITY STUDY: CPT

## 2023-02-17 RX ORDER — SODIUM CHLORIDE 0.9 % (FLUSH) 0.9 %
10 SYRINGE (ML) INJECTION AS NEEDED
Status: DISCONTINUED | OUTPATIENT
Start: 2023-02-17 | End: 2023-02-17 | Stop reason: HOSPADM

## 2023-02-17 NOTE — TELEPHONE ENCOUNTER
Contacted pt and advised per Dr Abraham. PT is agreeable to ER evaluation. PT verbalizes understanding and denies any further questions.

## 2023-02-17 NOTE — TELEPHONE ENCOUNTER
PT states pain started yesterday morning. No injury/trauma. Bilateral calf area. No redness or swelling.

## 2023-02-17 NOTE — DISCHARGE INSTRUCTIONS
Your work up today does not show any blood clots to your legs. Follow up with Dr. Abraham for continued chemo treatment. Return to the ER as needed.

## 2023-02-18 NOTE — ED PROVIDER NOTES
"Subjective   History of Present Illness  Patient presents to the ER with complaints of bilateral calf pain that started yesterday morning.  She states she used a muscle cream at home without any relief.  Patient is currently a patient of Dr. Abraham and is on chemo for cancer treatment.  She has a history of a blood clot to her right arm about 6 months ago and is currently on Xarelto.  She was sent here to rule out DVT.        Review of Systems   Cardiovascular: Negative for chest pain and leg swelling.        Bilateral lower calf pain       Past Medical History:   Diagnosis Date   • Asthma    • Colon cancer (HCC) 2017   • COPD (chronic obstructive pulmonary disease) (HCC)    • Hyperlipidemia    • Hypertension        Allergies   Allergen Reactions   • Codeine Other (See Comments)     \"dont remember\"       Past Surgical History:   Procedure Laterality Date   • CATARACT EXTRACTION W/ INTRAOCULAR LENS IMPLANT Right 10/16/2020    Procedure: REMOVE CATARACT AND IMPLANT  INTRAOCULAR LENS;  Surgeon: Gustavo Yañez MD;  Location: [facility] OR;  Service: Ophthalmology;  Laterality: Right;   • COLON SURGERY     • COLONOSCOPY N/A 8/27/2018    Procedure: COLONOSCOPY;  Surgeon: Lobo Arriola DO;  Location: [facility] ENDOSCOPY;  Service: Gastroenterology   • COLONOSCOPY N/A 11/8/2021    Procedure: COLONOSCOPY;  Surgeon: Lobo Arriola DO;  Location: [facility] ENDOSCOPY;  Service: Gastroenterology;  Laterality: N/A;   • COLONOSCOPY N/A 7/5/2022    Procedure: COLONOSCOPY 8:30;  Surgeon: Lobo Arriola DO;  Location: [facility] ENDOSCOPY;  Service: Gastroenterology;  Laterality: N/A;   • ENDOSCOPY N/A 7/5/2022    Procedure: ESOPHAGOGASTRODUODENOSCOPY 8:30;  Surgeon: Lobo Arriola DO;  Location: [facility] ENDOSCOPY;  Service: Gastroenterology;  Laterality: N/A;   • GALLBLADDER SURGERY     • RETINAL DETACHMENT SURGERY Right        Family History   Problem Relation Age of Onset   • Heart attack Brother    • Cancer Maternal Aunt " "       Social History     Socioeconomic History   • Marital status:    Tobacco Use   • Smoking status: Former     Types: Cigarettes     Quit date: 12/2012     Years since quitting: 10.2   • Smokeless tobacco: Never   Vaping Use   • Vaping Use: Never used   Substance and Sexual Activity   • Alcohol use: Yes     Comment: socially   • Drug use: No   • Sexual activity: Defer           Objective    /64 (BP Location: Right arm, Patient Position: Lying)   Pulse 58   Temp 97.9 °F (36.6 °C) (Oral)   Resp 18   Ht 157.5 cm (62\")   Wt 65.8 kg (145 lb)   SpO2 96%   BMI 26.52 kg/m²   Physical Exam  Constitutional:       Appearance: She is not ill-appearing.   Cardiovascular:      Rate and Rhythm: Normal rate and regular rhythm.      Heart sounds: Normal heart sounds.      Comments: No redness or swelling noted to bilateral lower calfs.  There is tenderness generalized throughout.  Strong pedal pulse to the left foot however there is decreased palpable pulse to right foot.  Bilateral legs are equal in color and warmth.  Pulmonary:      Effort: Pulmonary effort is normal.      Breath sounds: Normal breath sounds.   Musculoskeletal:         General: Normal range of motion.   Skin:     General: Skin is warm and dry.      Capillary Refill: Capillary refill takes less than 2 seconds.   Neurological:      Mental Status: She is alert and oriented to person, place, and time.   Psychiatric:         Mood and Affect: Mood normal.         Behavior: Behavior normal.         Procedures  US Arterial Doppler Lower Extremity Right    Result Date: 2/17/2023  Narrative: EXAM: VASCULAR LAB LOWER EXTREMITY ARTERIAL ULTRASOUND COMPARISON: None. INDICATIONS: Right calf pain TECHNIQUE: Real-time, duplex Doppler and color-flow imaging of the bilateral external iliac, femoral, profunda femoral, popliteal, posterior tibial and dorsalis pedis arteries were obtained. Arterial waveform tracings and velocity measurements of these arteries " obtained. FINDINGS: Right lower extremity: Right common femoral artery: Triphasic arterial waveform. Measured systolic velocity of 126 cm/s. Right profunda femoral artery: Triphasic arterial waveform. Measured systolic velocity of 51 cm/s. Right femoral artery: Triphasic arterial waveform. Measured systolic velocity of 49 cm/s to 81 cm/s. Right popliteal artery: Triphasic arterial waveform. Measured systolic velocity of 59 cm/s to 88 cm/s. Right peroneal artery: Biphasic/triphasic arterial waveform. Measured systolic velocity of 44 cm/s to 65 cm/s. Right anterior tibial artery: Biphasic/triphasic arterial waveform. Measured systolic velocity of 44 cm/s to 75 cm/s. Right posterior tibial artery: Biphasic/triphasic arterial waveform. Measured systolic velocity of 37 cm/s to 81 cm/s.     Impression: Patent bilateral lower extremity arteries without evidence for a significant stenosis. Electronically signed by:  Jaswinder Martinez MD  2/17/2023 3:11 PM CST Workstation: [id number]    US Venous Doppler Lower Extremity Bilateral (duplex)    Result Date: 2/17/2023  Narrative: BILATERAL LOWER EXTREMITY VENOUS IMAGING CLINICAL HISTORY:  77 years Female,bilateral calf pain, on chemo, hx DVT in arm DESCRIPTION: The right common femoral vein, profunda femoral vein, femoral vein and popliteal vein have normal venous flow on color and duplex Doppler. The veins augment well, compress, and appear to be patent with no filling defects visible on grayscale or color Doppler imaging. The infrapopliteal veins also appear patent with no evidence of thrombus. The greater saphenous vein is patent.  The left common femoral vein, profunda femoral vein, femoral vein, and popliteal vein have normal venous flow on color and duplex Doppler. The veins augment well, compress, and appear to be patent with no filling defects visible on grayscale or color Doppler imaging. The infrapopliteal veins also appear patent with no evidence of thrombus. The greater  saphenous vein is patent.      Impression: Impression: No evidence for acute deep venous thrombus in either lower extremity. Electronically signed by:  Jaswinder Martinez MD  2/17/2023 3:00 PM [facility] Workstation: [id number]             ED Course  ED Course as of 02/17/23 2145 Fri Feb 17, 2023   1514 Work up reviewed with patient.  [SH]      ED Course User Index  [SH] Concha Joe APRN                                           Medical Decision Making  Patient presented to the ER with concerns for possible DVT to bilateral lower calf secondary to pain that started yesterday morning.  Patient has a history of DVT to her arm and is currently on a blood thinner.  Ultrasounds are negative for DVTs and right ultrasound is negative for arterial occlusion.  This was discussed with patient advised to continue to follow-up with Dr. Abraham.    Bilateral calf pain: complicated acute illness or injury  Amount and/or Complexity of Data Reviewed  Labs: ordered.  ECG/medicine tests: ordered.          Final diagnoses:   Bilateral calf pain       ED Disposition  ED Disposition     ED Disposition   Discharge    Condition   Stable    Comment   --             Wil Abraham MD  [address] [place] [address]   [place] [address] [phone]      as scheduled         Medication List      No changes were made to your prescriptions during this visit.          Concha Joe APRN  02/17/23 2145

## 2023-02-22 ENCOUNTER — INFUSION (OUTPATIENT)
Dept: ONCOLOGY | Facility: HOSPITAL | Age: 78
End: 2023-02-22
Payer: MEDICARE

## 2023-02-22 ENCOUNTER — OFFICE VISIT (OUTPATIENT)
Dept: ONCOLOGY | Facility: CLINIC | Age: 78
End: 2023-02-22
Payer: MEDICARE

## 2023-02-22 VITALS
OXYGEN SATURATION: 94 % | TEMPERATURE: 97.2 F | WEIGHT: 145.2 LBS | SYSTOLIC BLOOD PRESSURE: 140 MMHG | BODY MASS INDEX: 26.56 KG/M2 | HEART RATE: 67 BPM | DIASTOLIC BLOOD PRESSURE: 71 MMHG

## 2023-02-22 DIAGNOSIS — C19 CANCER OF RECTOSIGMOID (COLON): Chronic | ICD-10-CM

## 2023-02-22 DIAGNOSIS — D64.81 ANEMIA DUE TO ANTINEOPLASTIC CHEMOTHERAPY: Chronic | ICD-10-CM

## 2023-02-22 DIAGNOSIS — E87.6 HYPOKALEMIA: ICD-10-CM

## 2023-02-22 DIAGNOSIS — T45.1X5A ANEMIA DUE TO ANTINEOPLASTIC CHEMOTHERAPY: Chronic | ICD-10-CM

## 2023-02-22 DIAGNOSIS — I82.A11 ACUTE DEEP VEIN THROMBOSIS (DVT) OF AXILLARY VEIN OF RIGHT UPPER EXTREMITY: Chronic | ICD-10-CM

## 2023-02-22 DIAGNOSIS — C18.9 LOCAL RECURRENCE OF COLON CANCER: ICD-10-CM

## 2023-02-22 DIAGNOSIS — C18.9 LOCAL RECURRENCE OF COLON CANCER: Primary | Chronic | ICD-10-CM

## 2023-02-22 DIAGNOSIS — C19 CANCER OF RECTOSIGMOID (COLON): Primary | ICD-10-CM

## 2023-02-22 DIAGNOSIS — E87.6 HYPOKALEMIA: Chronic | ICD-10-CM

## 2023-02-22 LAB
ALBUMIN SERPL-MCNC: 3.9 G/DL (ref 3.5–5.2)
ALBUMIN/GLOB SERPL: 1.3 G/DL
ALP SERPL-CCNC: 61 U/L (ref 39–117)
ALT SERPL W P-5'-P-CCNC: 12 U/L (ref 1–33)
ANION GAP SERPL CALCULATED.3IONS-SCNC: 11 MMOL/L (ref 5–15)
AST SERPL-CCNC: 17 U/L (ref 1–32)
BASOPHILS # BLD AUTO: 0.03 10*3/MM3 (ref 0–0.2)
BASOPHILS NFR BLD AUTO: 0.7 % (ref 0–1.5)
BILIRUB SERPL-MCNC: 0.4 MG/DL (ref 0–1.2)
BUN SERPL-MCNC: 10 MG/DL (ref 8–23)
BUN/CREAT SERPL: 14.7 (ref 7–25)
CALCIUM SPEC-SCNC: 9 MG/DL (ref 8.6–10.5)
CEA SERPL-MCNC: 2.01 NG/ML
CHLORIDE SERPL-SCNC: 103 MMOL/L (ref 98–107)
CO2 SERPL-SCNC: 27 MMOL/L (ref 22–29)
CREAT SERPL-MCNC: 0.68 MG/DL (ref 0.57–1)
DEPRECATED RDW RBC AUTO: 49.7 FL (ref 37–54)
EGFRCR SERPLBLD CKD-EPI 2021: 89.8 ML/MIN/1.73
EOSINOPHIL # BLD AUTO: 0.18 10*3/MM3 (ref 0–0.4)
EOSINOPHIL NFR BLD AUTO: 4 % (ref 0.3–6.2)
ERYTHROCYTE [DISTWIDTH] IN BLOOD BY AUTOMATED COUNT: 15.7 % (ref 12.3–15.4)
FERRITIN SERPL-MCNC: 111.3 NG/ML (ref 13–150)
FOLATE SERPL-MCNC: >20 NG/ML (ref 4.78–24.2)
GLOBULIN UR ELPH-MCNC: 2.9 GM/DL
GLUCOSE SERPL-MCNC: 111 MG/DL (ref 65–99)
HCT VFR BLD AUTO: 34.5 % (ref 34–46.6)
HGB BLD-MCNC: 11 G/DL (ref 12–15.9)
IMM GRANULOCYTES # BLD AUTO: 0.01 10*3/MM3 (ref 0–0.05)
IMM GRANULOCYTES NFR BLD AUTO: 0.2 % (ref 0–0.5)
IRON 24H UR-MRATE: 50 MCG/DL (ref 37–145)
IRON SATN MFR SERPL: 13 % (ref 20–50)
LYMPHOCYTES # BLD AUTO: 1.28 10*3/MM3 (ref 0.7–3.1)
LYMPHOCYTES NFR BLD AUTO: 28.6 % (ref 19.6–45.3)
MCH RBC QN AUTO: 28.1 PG (ref 26.6–33)
MCHC RBC AUTO-ENTMCNC: 31.9 G/DL (ref 31.5–35.7)
MCV RBC AUTO: 88.2 FL (ref 79–97)
MONOCYTES # BLD AUTO: 0.5 10*3/MM3 (ref 0.1–0.9)
MONOCYTES NFR BLD AUTO: 11.2 % (ref 5–12)
NEUTROPHILS NFR BLD AUTO: 2.47 10*3/MM3 (ref 1.7–7)
NEUTROPHILS NFR BLD AUTO: 55.3 % (ref 42.7–76)
NRBC BLD AUTO-RTO: 0 /100 WBC (ref 0–0.2)
PLATELET # BLD AUTO: 405 10*3/MM3 (ref 140–450)
PMV BLD AUTO: 9.6 FL (ref 6–12)
POTASSIUM SERPL-SCNC: 3.1 MMOL/L (ref 3.5–5.2)
PROT SERPL-MCNC: 6.8 G/DL (ref 6–8.5)
RBC # BLD AUTO: 3.91 10*6/MM3 (ref 3.77–5.28)
SODIUM SERPL-SCNC: 141 MMOL/L (ref 136–145)
TIBC SERPL-MCNC: 396 MCG/DL (ref 298–536)
TRANSFERRIN SERPL-MCNC: 266 MG/DL (ref 200–360)
VIT B12 BLD-MCNC: 813 PG/ML (ref 211–946)
WBC NRBC COR # BLD: 4.47 10*3/MM3 (ref 3.4–10.8)

## 2023-02-22 PROCEDURE — 82378 CARCINOEMBRYONIC ANTIGEN: CPT

## 2023-02-22 PROCEDURE — 85025 COMPLETE CBC W/AUTO DIFF WBC: CPT

## 2023-02-22 PROCEDURE — 84466 ASSAY OF TRANSFERRIN: CPT

## 2023-02-22 PROCEDURE — 80053 COMPREHEN METABOLIC PANEL: CPT

## 2023-02-22 PROCEDURE — 25010000002 DEXAMETHASONE SODIUM PHOSPHATE 100 MG/10ML SOLUTION: Performed by: INTERNAL MEDICINE

## 2023-02-22 PROCEDURE — 96409 CHEMO IV PUSH SNGL DRUG: CPT | Performed by: INTERNAL MEDICINE

## 2023-02-22 PROCEDURE — 25010000002 FLUOROURACIL PER 500 MG: Performed by: INTERNAL MEDICINE

## 2023-02-22 PROCEDURE — 1126F AMNT PAIN NOTED NONE PRSNT: CPT | Performed by: INTERNAL MEDICINE

## 2023-02-22 PROCEDURE — 96367 TX/PROPH/DG ADDL SEQ IV INF: CPT | Performed by: INTERNAL MEDICINE

## 2023-02-22 PROCEDURE — 99214 OFFICE O/P EST MOD 30 MIN: CPT | Performed by: INTERNAL MEDICINE

## 2023-02-22 PROCEDURE — 82607 VITAMIN B-12: CPT

## 2023-02-22 PROCEDURE — 83540 ASSAY OF IRON: CPT

## 2023-02-22 PROCEDURE — 25010000002 LEUCOVORIN CALCIUM PER 50 MG: Performed by: INTERNAL MEDICINE

## 2023-02-22 PROCEDURE — 82728 ASSAY OF FERRITIN: CPT

## 2023-02-22 PROCEDURE — 1123F ACP DISCUSS/DSCN MKR DOCD: CPT | Performed by: INTERNAL MEDICINE

## 2023-02-22 PROCEDURE — 96416 CHEMO PROLONG INFUSE W/PUMP: CPT | Performed by: INTERNAL MEDICINE

## 2023-02-22 PROCEDURE — 96375 TX/PRO/DX INJ NEW DRUG ADDON: CPT | Performed by: INTERNAL MEDICINE

## 2023-02-22 PROCEDURE — 82746 ASSAY OF FOLIC ACID SERUM: CPT

## 2023-02-22 RX ORDER — FLUOROURACIL 50 MG/ML
400 INJECTION, SOLUTION INTRAVENOUS ONCE
Status: COMPLETED | OUTPATIENT
Start: 2023-02-22 | End: 2023-02-22

## 2023-02-22 RX ORDER — SODIUM CHLORIDE 9 MG/ML
250 INJECTION, SOLUTION INTRAVENOUS ONCE
Status: COMPLETED | OUTPATIENT
Start: 2023-02-22 | End: 2023-02-22

## 2023-02-22 RX ORDER — FLUOROURACIL 50 MG/ML
400 INJECTION, SOLUTION INTRAVENOUS ONCE
Status: CANCELLED | OUTPATIENT
Start: 2023-02-22

## 2023-02-22 RX ORDER — POTASSIUM CHLORIDE 750 MG/1
40 CAPSULE, EXTENDED RELEASE ORAL ONCE
Status: CANCELLED
Start: 2023-02-22 | End: 2023-02-22

## 2023-02-22 RX ORDER — CEFDINIR 300 MG/1
1 CAPSULE ORAL EVERY 12 HOURS SCHEDULED
COMMUNITY
Start: 2023-01-07

## 2023-02-22 RX ORDER — SODIUM CHLORIDE 9 MG/ML
250 INJECTION, SOLUTION INTRAVENOUS ONCE
Status: CANCELLED | OUTPATIENT
Start: 2023-02-22

## 2023-02-22 RX ORDER — POTASSIUM CHLORIDE 750 MG/1
40 CAPSULE, EXTENDED RELEASE ORAL ONCE
Status: COMPLETED | OUTPATIENT
Start: 2023-02-22 | End: 2023-02-22

## 2023-02-22 RX ADMIN — POTASSIUM CHLORIDE 40 MEQ: 750 CAPSULE, EXTENDED RELEASE ORAL at 09:27

## 2023-02-22 RX ADMIN — DEXAMETHASONE SODIUM PHOSPHATE 12 MG: 10 INJECTION, SOLUTION INTRAMUSCULAR; INTRAVENOUS at 09:15

## 2023-02-22 RX ADMIN — SODIUM CHLORIDE 250 ML: 9 INJECTION, SOLUTION INTRAVENOUS at 09:15

## 2023-02-22 RX ADMIN — FLUOROURACIL 680 MG: 50 INJECTION, SOLUTION INTRAVENOUS at 11:08

## 2023-02-22 RX ADMIN — FLUOROURACIL 4100 MG: 50 INJECTION, SOLUTION INTRAVENOUS at 11:31

## 2023-02-22 RX ADMIN — LEUCOVORIN CALCIUM 680 MG: 350 INJECTION, POWDER, LYOPHILIZED, FOR SOLUTION INTRAMUSCULAR; INTRAVENOUS at 09:58

## 2023-02-22 NOTE — PROGRESS NOTES
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  Patient is scheduled to get cycle 12 of 5-FU and leucovorin today on February 22, 2022.  Complains of fatigue.  Denies any recent worsening of neuropathy.  Patient was recently seen in the emergency room with lower extremity pain with negative Doppler ultrasound.  Denies any excessive nausea or vomiting or diarrhea with last cycle of chemotherapy.  Denies any new lymph node enlargement.                Oncology history:    1.  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  - Patient was started on FOLFOX on December 27, 2017.  Patient received total 12 cycles of FOLFOX between December 27, 2017 until June 13, 2018.  Dose of 5-FU and oxaliplatin was decreased by 20% with cycle 2 due to development of neutropenia.  - In June 2022, patient was found to have rising CEA level with CEA being at 23.  Initial CT scan did not show any evidence of recurrence.  - Patient had a colonoscopy by Dr. Arriola in July 2022 which was negative for any recurrence except for tubular adenoma with.  - PET/CT done on June 20 2022 showed isolated uptake in omental region.  Patient was evaluated by Dr. Packer and underwent omentectomy with solitary lymph node sampling which was negative for metastasis              Past Medical History, Past Surgical History, Social  "History, Family History have been reviewed and are without significant changes except as mentioned.    Review of Systems   A comprehensive 14 point review of systems was performed and was negative except as mentioned in HPI.    Medications:  The current medication list was reviewed in the EMR    ALLERGIES:    Allergies   Allergen Reactions   • Codeine Other (See Comments)     \"dont remember\"       Objective      Vitals:    02/22/23 0808   BP: 140/71   Pulse: 67   Temp: 97.2 °F (36.2 °C)   TempSrc: Temporal   SpO2: 94%   Weight: 65.9 kg (145 lb 3.2 oz)   PainSc: 0-No pain     Current Status 2/8/2023   ECOG score 0       Physical Exam  Cardiovascular:      Comments: Port-A-Cath present  Pulmonary:      Breath sounds: Normal breath sounds.   Neurological:      Mental Status: She is alert and oriented to person, place, and time.           RECENT LABS:  Glucose   Date Value Ref Range Status   02/22/2023 111 (H) 65 - 99 mg/dL Final     Sodium   Date Value Ref Range Status   02/22/2023 141 136 - 145 mmol/L Final   06/28/2022 143 136 - 145 mmol/L Final     Potassium   Date Value Ref Range Status   02/22/2023 3.1 (L) 3.5 - 5.2 mmol/L Final   06/28/2022 3.9 3.5 - 5.1 mmol/L Final     CO2   Date Value Ref Range Status   02/22/2023 27.0 22.0 - 29.0 mmol/L Final     Total CO2   Date Value Ref Range Status   06/28/2022 35 (H) 21 - 31 mmol/L Final     Chloride   Date Value Ref Range Status   02/22/2023 103 98 - 107 mmol/L Final   06/28/2022 103 98 - 107 mmol/L Final     Anion Gap   Date Value Ref Range Status   02/22/2023 11.0 5.0 - 15.0 mmol/L Final     Creatinine   Date Value Ref Range Status   02/22/2023 0.68 0.57 - 1.00 mg/dL Final   06/28/2022 0.6 (L) 0.7 - 1.3 mg/dL Final     BUN   Date Value Ref Range Status   02/22/2023 10 8 - 23 mg/dL Final   06/28/2022 12 7 - 25 mg/dL Final     BUN/Creatinine Ratio   Date Value Ref Range Status   02/22/2023 14.7 7.0 - 25.0 Final     Calcium   Date Value Ref Range Status   02/22/2023 9.0 " 8.6 - 10.5 mg/dL Final   06/28/2022 9.3 8.6 - 10.3 mg/dL Final     eGFR Non  Amer   Date Value Ref Range Status   09/14/2021 80 >60 mL/min/1.73 Final     Alkaline Phosphatase   Date Value Ref Range Status   02/22/2023 61 39 - 117 U/L Final   06/28/2022 76 34 - 104 U/L Final     Total Protein   Date Value Ref Range Status   02/22/2023 6.8 6.0 - 8.5 g/dL Final     ALT (SGPT)   Date Value Ref Range Status   02/22/2023 12 1 - 33 U/L Final   06/28/2022 14 7 - 52 U/L Final     AST (SGOT)   Date Value Ref Range Status   02/22/2023 17 1 - 32 U/L Final   06/28/2022 17 13 - 39 U/L Final     Total Bilirubin   Date Value Ref Range Status   02/22/2023 0.4 0.0 - 1.2 mg/dL Final   06/28/2022 0.59 0.3 - 1.0 mg/dL Final     Albumin   Date Value Ref Range Status   02/22/2023 3.9 3.5 - 5.2 g/dL Final   06/28/2022 4.1 3.5 - 5.7 g/dL Final     Globulin   Date Value Ref Range Status   02/22/2023 2.9 gm/dL Final     Lab Results   Component Value Date    WBC 4.47 02/22/2023    HGB 11.0 (L) 02/22/2023    HCT 34.5 02/22/2023    MCV 88.2 02/22/2023     02/22/2023     Lab Results   Component Value Date    NEUTROABS 2.47 02/22/2023    IRON 50 02/22/2023    IRON 78 12/19/2022    IRON 64 10/10/2022    TIBC 396 02/22/2023    TIBC 405 12/19/2022    TIBC 408 10/10/2022    LABIRON 13 (L) 02/22/2023    LABIRON 19 (L) 12/19/2022    LABIRON 16 (L) 10/10/2022    FERRITIN 111.30 02/22/2023    FERRITIN 169.70 (H) 12/19/2022    FERRITIN 161.70 (H) 10/10/2022    AFYIRWFJ05 813 02/22/2023    KNNGNWPS55 482 10/10/2022    QKSDVKQC79 616 12/18/2017    FOLATE >20.00 02/22/2023    FOLATE >20.00 10/10/2022    FOLATE 7.20 12/18/2017     Lab Results   Component Value Date    CEA 2.01 02/22/2023    REFLABREPO  07/05/2022     [facility]  [address] [place] [address]  Phone: [phone] or [phone]  Fax: [phone]  Jeff Bustamante M.D., Medical Director    PATIENT NAME                                      "LABORATORY NO.  [id number]   SEVEN ANDERSON                             [id number]  [id number]                                 AGE                    SEX   [facility]              CLIENT REF #  [facility]                   76        1945      F     [id number]      [id number]    [place]                               REQUESTING M.D.           ATTENDING M.D.         COPY TO.  [address] [place] [address]                         [place] [address]                     DATE COLLECTED            DATE RECEIVED          DATE REPORTED  2022    DIAGNOSIS:  DESCENDING COLON POLYPS:  Tubular adenoma    JBS/pah    CLINICAL HISTORY:  Abnormal tumor markers    SPECIMENS RECEIVED:  DESCENDING COLON POLYPS    MICROSCOPIC DESCRIPTION:  Tissue blocks are prepared and slides are examined microscopically on all  specimens. See diagnosis for details.    Professional interpretation rendered by Rupert Steen M.D. at [facility], [address] [place] [address].    GROSS DESCRIPTION:  Specimen is received in 1 formalin filled container \"large intestine,  left/descending colon polyps\" consists of 3 pieces of tan soft tissue measuring  0.4 x 0.3 x 0.2 cm.  Specimen is submitted entirely in 1 cassette.  MM    REVIEWED, DIAGNOSED AND ELECTRONICALLY  SIGNED BY:    Rupert Steen M.D.  CPT CODES:  38859           PATHOLOGY:  * Cannot find OR log *         RADIOLOGY DATA :  US Arterial Doppler Lower Extremity Right    Result Date: 2023  Patent bilateral lower extremity arteries without evidence for a significant stenosis. Electronically signed by:  Jaswinder Martinez MD  2023 3:11 PM CST Workstation: [id number]    US Venous Doppler Lower Extremity Bilateral (duplex)    Result Date: 2023  Impression: No evidence for acute deep venous thrombus in either lower extremity. Electronically signed by:  Jaswinder" Juan GARCIA  2/17/2023 3:00 PM CST Workstation: [id number]          Assessment & Plan     1.  Adenocarcinoma of sigmoid colon with recurrence  - Review oncology history for prior treatment details  - Patient is currently on 5-FU and leucovorin since August 6, 2022  - We will proceed with cycle 12 of chemotherapy today on February 22, 2023  -CEA level today was normal at 2.01  - We will continue with close monitoring for chemotherapy related side effect  - We will have patient return to clinic in 3 months with repeat CBC, CMP, CEA, iron studies, ferritin, B12, folate, CT of chest abdomen and pelvis with contrast as well as Doppler ultrasound of right upper extremity to be done prior to that.  - Patient was offered to have CT scan done in April 2023 but she wants to go to [place] and wants to do scans and ultrasound in May 2023.    2.  Oxaliplatin induced neuropathy:  - Remains on gabapentin 100 mg 3 times a day    3.  Hypokalemia:  - Potassium is 3.1.  Will provide potassium 40 mg p.o. x1 here today.  - Currently on potassium 20 mEq p.o. daily at home.    4.  Right upper extremity DVT:  - Patient was diagnosed with right upper extremity DVT on September 28, 2022.  Remains on Xarelto 20 mg p.o. daily  - We will repeat Doppler ultrasound of right upper extremity prior to next clinic visit.    5.  Anemia:  - Hemoglobin is 11.0  - Currently on ferrous sulfate p.o. daily and B12 p.o. daily    6.  Thrombocytosis:  - Reactive in nature  - Platelet count is 405,000    7.  Chemotherapy-induced neutropenia:  - White blood cell count is 4.47 with ANC of 2.27.    8.  Hypertension    9.  Lower extremity pain:  - Work-up in the ER including arterial Doppler and venous Doppler were negative.    10.  Hypertension    11.  Health maintenance: Patient quit smoking in 2020    12.  Advance care planning:  - CODE STATUS and resuscitation were discussed with patient.  Patient wants to be full code    13.  Prescription: Prescription for  Xarelto has been sent to her pharmacy today             PHQ-9 Total Score: 0   -Patient is not homicidal or suicidal.  No acute intervention required.    Mary Ann Condon reports a pain score of 0.  Given her pain assessment as noted, treatment options were discussed and the following options were decided upon as a follow-up plan to address the patient's pain: continuation of current treatment plan for pain.         Wil Abraham MD  2/22/2023  18:23 CST        Part of this note may be an electronic transcription/translation of spoken language to printed text using the Dragon Dictation System.          CC:

## 2023-02-23 ENCOUNTER — TELEPHONE (OUTPATIENT)
Dept: ONCOLOGY | Facility: HOSPITAL | Age: 78
End: 2023-02-23
Payer: MEDICARE

## 2023-02-23 NOTE — TELEPHONE ENCOUNTER
Pt returned call regarding lab work and supplements. PT verbalizes understanding and denies any further questions.

## 2023-02-23 NOTE — TELEPHONE ENCOUNTER
----- Message from Wil Abraham MD sent at 2/22/2023  6:26 PM CST -----  Please let patient know, her CEA level today was normal at 2.01.  Recommend continuing with ferrous sulfate and B12 p.o. daily.  Thank you

## 2023-02-24 ENCOUNTER — INFUSION (OUTPATIENT)
Dept: ONCOLOGY | Facility: HOSPITAL | Age: 78
End: 2023-02-24
Payer: MEDICARE

## 2023-02-24 DIAGNOSIS — C18.9 LOCAL RECURRENCE OF COLON CANCER: ICD-10-CM

## 2023-02-24 DIAGNOSIS — C19 CANCER OF RECTOSIGMOID (COLON): ICD-10-CM

## 2023-02-24 DIAGNOSIS — Z45.2 ENCOUNTER FOR VENOUS ACCESS DEVICE CARE: Primary | ICD-10-CM

## 2023-02-24 PROCEDURE — 25010000002 HEPARIN LOCK FLUSH PER 10 UNITS: Performed by: INTERNAL MEDICINE

## 2023-02-24 RX ORDER — SODIUM CHLORIDE 0.9 % (FLUSH) 0.9 %
10 SYRINGE (ML) INJECTION AS NEEDED
Status: DISCONTINUED | OUTPATIENT
Start: 2023-02-24 | End: 2023-02-24 | Stop reason: HOSPADM

## 2023-02-24 RX ORDER — HEPARIN SODIUM (PORCINE) LOCK FLUSH IV SOLN 100 UNIT/ML 100 UNIT/ML
500 SOLUTION INTRAVENOUS AS NEEDED
OUTPATIENT
Start: 2023-02-24

## 2023-02-24 RX ORDER — SODIUM CHLORIDE 0.9 % (FLUSH) 0.9 %
10 SYRINGE (ML) INJECTION AS NEEDED
OUTPATIENT
Start: 2023-02-24

## 2023-02-24 RX ORDER — HEPARIN SODIUM (PORCINE) LOCK FLUSH IV SOLN 100 UNIT/ML 100 UNIT/ML
500 SOLUTION INTRAVENOUS AS NEEDED
Status: DISCONTINUED | OUTPATIENT
Start: 2023-02-24 | End: 2023-02-24 | Stop reason: HOSPADM

## 2023-02-24 RX ADMIN — Medication 500 UNITS: at 10:51

## 2023-02-24 RX ADMIN — Medication 10 ML: at 10:51

## 2023-02-28 RX ORDER — POTASSIUM CHLORIDE 750 MG/1
CAPSULE, EXTENDED RELEASE ORAL
Qty: 60 CAPSULE | Refills: 1 | Status: SHIPPED | OUTPATIENT
Start: 2023-02-28 | End: 2023-03-27

## 2023-02-28 NOTE — TELEPHONE ENCOUNTER
Rx Refill Note  Requested Prescriptions     Pending Prescriptions Disp Refills   • potassium chloride (MICRO-K) 10 MEQ CR capsule [Pharmacy Med Name: POTASSIUM CL ER 10 MEQ CAPSULE] 60 capsule 1     Sig: TAKE 2 CAPSULES BY MOUTH EVERY DAY      Last office visit with prescribing clinician: 2/22/2023   Last telemedicine visit with prescribing clinician: 5/24/2023   Next office visit with prescribing clinician: 5/24/2023                         Would you like a call back once the refill request has been completed: [] Yes [] No    If the office needs to give you a call back, can they leave a voicemail: [] Yes [] No    Marley Potter  02/28/23, 13:25 CST

## 2023-02-28 NOTE — DISCHARGE INSTR - APPOINTMENTS
Go to Dr. Clinton office when you leave same day surgery    Retention Suture Text: Retention sutures were placed to support the closure and prevent dehiscence.

## 2023-03-27 RX ORDER — POTASSIUM CHLORIDE 750 MG/1
CAPSULE, EXTENDED RELEASE ORAL
Qty: 60 CAPSULE | Refills: 1 | Status: SHIPPED | OUTPATIENT
Start: 2023-03-27

## 2023-03-27 NOTE — TELEPHONE ENCOUNTER
Rx Refill Note  Requested Prescriptions     Pending Prescriptions Disp Refills   • potassium chloride (MICRO-K) 10 MEQ CR capsule [Pharmacy Med Name: POTASSIUM CL ER 10 MEQ CAPSULE] 60 capsule 1     Sig: TAKE 2 CAPSULES BY MOUTH EVERY DAY      Last office visit with prescribing clinician: 2/22/2023   Last telemedicine visit with prescribing clinician: 5/24/2023   Next office visit with prescribing clinician: 5/24/2023                         Would you like a call back once the refill request has been completed: [] Yes [] No    If the office needs to give you a call back, can they leave a voicemail: [] Yes [] No    Marley Potter  03/27/23, 07:51 CDT

## 2023-04-03 RX ORDER — LANOLIN ALCOHOL/MO/W.PET/CERES
CREAM (GRAM) TOPICAL
Qty: 90 TABLET | Refills: 1 | Status: SHIPPED | OUTPATIENT
Start: 2023-04-03

## 2023-04-03 NOTE — TELEPHONE ENCOUNTER
Rx Refill Note  Requested Prescriptions     Pending Prescriptions Disp Refills   • vitamin B-12 (CYANOCOBALAMIN) 1000 MCG tablet [Pharmacy Med Name: VITAMIN B-12 1,000 MCG TABLET] 90 tablet 1     Sig: TAKE 1 TABLET BY MOUTH EVERY DAY      Last office visit with prescribing clinician: 12/19/2022   Last telemedicine visit with prescribing clinician: 5/24/2023   Next office visit with prescribing clinician: Visit date not found                         Would you like a call back once the refill request has been completed: [] Yes [] No    If the office needs to give you a call back, can they leave a voicemail: [] Yes [] No    Joseline Munosn, Jagdeep Rep  04/03/23, 07:56 CDT

## 2023-04-04 NOTE — TELEPHONE ENCOUNTER
Incoming Refill Request      Medication requested (name and dose): [facility]    Pharmacy where request should be sent:     Additional details provided by patient:     Best call back number: [phone]    Does the patient have less than a 3 day supply:  [] Yes  [x] No    Kia Obrien  04/04/23, 15:28 CDT

## 2023-04-04 NOTE — TELEPHONE ENCOUNTER
Incoming Refill Request      Medication requested (name and dose): [facility]     Pharmacy where request should be sent: [facility]    Additional details provided by patient: 2wk supply left    Best call back number: [phone]    Does the patient have less than a 3 day supply:  [] Yes  [x] No    Jagdeep Dang Rep  04/04/23, 15:20 CDT

## 2023-04-06 NOTE — TELEPHONE ENCOUNTER
Rx Refill Note  Requested Prescriptions     Pending Prescriptions Disp Refills   • rivaroxaban (Xarelto) 20 MG tablet 90 tablet 0     Sig: Take 1 tablet by mouth Daily.      Last office visit with prescribing clinician: 2/22/2023   Last telemedicine visit with prescribing clinician: 4/4/2023   Next office visit with prescribing clinician: 4/4/2023                         Would you like a call back once the refill request has been completed: [] Yes [] No    If the office needs to give you a call back, can they leave a voicemail: [] Yes [] No    Jagdeep Wagoner Rep  04/06/23, 07:49 CDT

## 2023-04-11 RX ORDER — POTASSIUM CHLORIDE 750 MG/1
20 CAPSULE, EXTENDED RELEASE ORAL DAILY
Qty: 180 CAPSULE | Refills: 0 | Status: SHIPPED | OUTPATIENT
Start: 2023-04-11

## 2023-04-11 NOTE — TELEPHONE ENCOUNTER
Rx Refill Note  Requested Prescriptions     Pending Prescriptions Disp Refills   • potassium chloride (MICRO-K) 10 MEQ CR capsule 60 capsule 1     Sig: Take 2 capsules by mouth Daily.      Last office visit with prescribing clinician: 2/22/2023   Last telemedicine visit with prescribing clinician: 5/24/2023   Next office visit with prescribing clinician: 5/24/2023                         Would you like a call back once the refill request has been completed: [] Yes [] No    If the office needs to give you a call back, can they leave a voicemail: [] Yes [] No    Tarah Gunn RN  04/11/23, 10:15 CDT

## 2023-05-05 NOTE — TELEPHONE ENCOUNTER
Incoming Refill Request      Medication requested (name and dose): [facility]    Pharmacy where request should be sent[facility] says she thinks [facility]    Additional details provided by patient: Patient is to be out of medication on the 23rd (taking last does on 02-22-23).  Her question is, does she need to keep taking this when the port is due to be removed on the 30th.  If she needs to continue to take, she needs the reorder to start now, due to the length of time it takes to get to her and not be out for several days in a row.    Best call back number: [phone]    Does the patient have less than a 3 day supply:  [] Yes  [x] No    Mary Borjas  05/05/23, 15:14 CDT

## 2023-05-08 NOTE — TELEPHONE ENCOUNTER
Rx Refill Note  Requested Prescriptions     Pending Prescriptions Disp Refills   • rivaroxaban (Xarelto) 20 MG tablet 90 tablet 0     Sig: Take 1 tablet by mouth Daily.      Last office visit with prescribing clinician: 2/22/2023   Last telemedicine visit with prescribing clinician: 5/24/2023   Next office visit with prescribing clinician: 5/24/2023                         Would you like a call back once the refill request has been completed: [] Yes [] No    If the office needs to give you a call back, can they leave a voicemail: [] Yes [] No    Jagdeep Wagoner Rep  05/08/23, 07:53 CDT

## 2023-05-22 ENCOUNTER — HOSPITAL ENCOUNTER (OUTPATIENT)
Dept: ULTRASOUND IMAGING | Facility: HOSPITAL | Age: 78
Discharge: HOME | End: 2023-05-22
Payer: MEDICARE

## 2023-05-22 ENCOUNTER — HOSPITAL ENCOUNTER (OUTPATIENT)
Dept: CT IMAGING | Facility: HOSPITAL | Age: 78
Discharge: HOME | End: 2023-05-22
Payer: MEDICARE

## 2023-05-22 ENCOUNTER — LAB (OUTPATIENT)
Dept: LAB | Facility: HOSPITAL | Age: 78
End: 2023-05-22
Payer: MEDICARE

## 2023-05-22 DIAGNOSIS — I82.A11 ACUTE DEEP VEIN THROMBOSIS (DVT) OF AXILLARY VEIN OF RIGHT UPPER EXTREMITY: Chronic | ICD-10-CM

## 2023-05-22 DIAGNOSIS — D64.81 ANEMIA DUE TO ANTINEOPLASTIC CHEMOTHERAPY: ICD-10-CM

## 2023-05-22 DIAGNOSIS — C19 CANCER OF RECTOSIGMOID (COLON): Chronic | ICD-10-CM

## 2023-05-22 DIAGNOSIS — T45.1X5A ANEMIA DUE TO ANTINEOPLASTIC CHEMOTHERAPY: ICD-10-CM

## 2023-05-22 DIAGNOSIS — C19 CANCER OF RECTOSIGMOID (COLON): ICD-10-CM

## 2023-05-22 DIAGNOSIS — C18.9 LOCAL RECURRENCE OF COLON CANCER: ICD-10-CM

## 2023-05-22 DIAGNOSIS — C18.9 LOCAL RECURRENCE OF COLON CANCER: Chronic | ICD-10-CM

## 2023-05-22 LAB
ALBUMIN SERPL-MCNC: 4 G/DL (ref 3.5–5.2)
ALBUMIN/GLOB SERPL: 1.4 G/DL
ALP SERPL-CCNC: 76 U/L (ref 39–117)
ALT SERPL W P-5'-P-CCNC: 12 U/L (ref 1–33)
ANION GAP SERPL CALCULATED.3IONS-SCNC: 10 MMOL/L (ref 5–15)
AST SERPL-CCNC: 16 U/L (ref 1–32)
BASOPHILS # BLD AUTO: 0.05 10*3/MM3 (ref 0–0.2)
BASOPHILS NFR BLD AUTO: 0.9 % (ref 0–1.5)
BILIRUB SERPL-MCNC: 0.4 MG/DL (ref 0–1.2)
BUN SERPL-MCNC: 11 MG/DL (ref 8–23)
BUN/CREAT SERPL: 16.7 (ref 7–25)
CALCIUM SPEC-SCNC: 9 MG/DL (ref 8.6–10.5)
CEA SERPL-MCNC: 1.55 NG/ML
CHLORIDE SERPL-SCNC: 104 MMOL/L (ref 98–107)
CO2 SERPL-SCNC: 28 MMOL/L (ref 22–29)
CREAT SERPL-MCNC: 0.66 MG/DL (ref 0.57–1)
DEPRECATED RDW RBC AUTO: 48 FL (ref 37–54)
EGFRCR SERPLBLD CKD-EPI 2021: 90.5 ML/MIN/1.73
EOSINOPHIL # BLD AUTO: 0.13 10*3/MM3 (ref 0–0.4)
EOSINOPHIL NFR BLD AUTO: 2.4 % (ref 0.3–6.2)
ERYTHROCYTE [DISTWIDTH] IN BLOOD BY AUTOMATED COUNT: 15 % (ref 12.3–15.4)
FERRITIN SERPL-MCNC: 73.82 NG/ML (ref 13–150)
FOLATE SERPL-MCNC: 14.1 NG/ML (ref 4.78–24.2)
GLOBULIN UR ELPH-MCNC: 2.9 GM/DL
GLUCOSE SERPL-MCNC: 98 MG/DL (ref 65–99)
HCT VFR BLD AUTO: 41.6 % (ref 34–46.6)
HGB BLD-MCNC: 12.8 G/DL (ref 12–15.9)
IMM GRANULOCYTES # BLD AUTO: 0.02 10*3/MM3 (ref 0–0.05)
IMM GRANULOCYTES NFR BLD AUTO: 0.4 % (ref 0–0.5)
IRON 24H UR-MRATE: 62 MCG/DL (ref 37–145)
IRON SATN MFR SERPL: 17 % (ref 20–50)
LYMPHOCYTES # BLD AUTO: 1.28 10*3/MM3 (ref 0.7–3.1)
LYMPHOCYTES NFR BLD AUTO: 23.4 % (ref 19.6–45.3)
MCH RBC QN AUTO: 26.7 PG (ref 26.6–33)
MCHC RBC AUTO-ENTMCNC: 30.8 G/DL (ref 31.5–35.7)
MCV RBC AUTO: 86.7 FL (ref 79–97)
MONOCYTES # BLD AUTO: 0.45 10*3/MM3 (ref 0.1–0.9)
MONOCYTES NFR BLD AUTO: 8.2 % (ref 5–12)
NEUTROPHILS NFR BLD AUTO: 3.55 10*3/MM3 (ref 1.7–7)
NEUTROPHILS NFR BLD AUTO: 64.7 % (ref 42.7–76)
NRBC BLD AUTO-RTO: 0 /100 WBC (ref 0–0.2)
PLATELET # BLD AUTO: 454 10*3/MM3 (ref 140–450)
PMV BLD AUTO: 9.6 FL (ref 6–12)
POTASSIUM SERPL-SCNC: 3.6 MMOL/L (ref 3.5–5.2)
PROT SERPL-MCNC: 6.9 G/DL (ref 6–8.5)
RBC # BLD AUTO: 4.8 10*6/MM3 (ref 3.77–5.28)
SODIUM SERPL-SCNC: 142 MMOL/L (ref 136–145)
TIBC SERPL-MCNC: 367 MCG/DL (ref 298–536)
TRANSFERRIN SERPL-MCNC: 246 MG/DL (ref 200–360)
VIT B12 BLD-MCNC: 671 PG/ML (ref 211–946)
WBC NRBC COR # BLD: 5.48 10*3/MM3 (ref 3.4–10.8)

## 2023-05-22 PROCEDURE — 25510000001 IOPAMIDOL 61 % SOLUTION: Performed by: INTERNAL MEDICINE

## 2023-05-22 PROCEDURE — 82378 CARCINOEMBRYONIC ANTIGEN: CPT

## 2023-05-22 PROCEDURE — 82607 VITAMIN B-12: CPT

## 2023-05-22 PROCEDURE — 80053 COMPREHEN METABOLIC PANEL: CPT

## 2023-05-22 PROCEDURE — 82728 ASSAY OF FERRITIN: CPT

## 2023-05-22 PROCEDURE — 85025 COMPLETE CBC W/AUTO DIFF WBC: CPT

## 2023-05-22 PROCEDURE — 74177 CT ABD & PELVIS W/CONTRAST: CPT

## 2023-05-22 PROCEDURE — 93971 EXTREMITY STUDY: CPT

## 2023-05-22 PROCEDURE — 82746 ASSAY OF FOLIC ACID SERUM: CPT

## 2023-05-22 PROCEDURE — 71260 CT THORAX DX C+: CPT

## 2023-05-22 PROCEDURE — 84466 ASSAY OF TRANSFERRIN: CPT

## 2023-05-22 PROCEDURE — 83540 ASSAY OF IRON: CPT

## 2023-05-22 RX ADMIN — IOPAMIDOL 90 ML: 612 INJECTION, SOLUTION INTRAVENOUS at 10:28

## 2023-05-22 NOTE — PROGRESS NOTES
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  Denies any new abdominal pain or any change in bowel habits.  Denies any blood in stool.  Denies any new swelling of upper or lower extremity.                Oncology history:    1.  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  - Patient was started on FOLFOX on December 27, 2017.  Patient received total 12 cycles of FOLFOX between December 27, 2017 until June 13, 2018.  Dose of 5-FU and oxaliplatin was decreased by 20% with cycle 2 due to development of neutropenia.  - In June 2022, patient was found to have rising CEA level with CEA being at 23.  Initial CT scan did not show any evidence of recurrence.  - Patient had a colonoscopy by Dr. Arriola in July 2022 which was negative for any recurrence except for tubular adenoma with.  - PET/CT done on June 20 2022 showed isolated uptake in omental region.  Patient was evaluated by Dr. Packer and underwent omentectomy with solitary lymph node sampling which was negative for metastasis  -Patient received 12 cycles of 5-FU and leucovorin from August 8, 2022 until February 22, 2023.            Past Medical History, Past Surgical History, Social History, Family History have been reviewed and are without significant changes except as mentioned.    Review of Systems   A  "comprehensive 14 point review of systems was performed and was negative except as mentioned in HPI.    Medications:  The current medication list was reviewed in the EMR    ALLERGIES:    Allergies   Allergen Reactions   • Codeine Other (See Comments)     \"dont remember\"       Objective      Vitals:    05/24/23 0947   BP: 130/62   Pulse: 64   Resp: 18   Temp: 97.8 °F (36.6 °C)   SpO2: 93%   Weight: 67 kg (147 lb 12.8 oz)   PainSc: 0-No pain         2/8/2023     9:28 AM   Current Status   ECOG score 0       Physical Exam  Cardiovascular:      Comments: Port-A-Cath present  Pulmonary:      Breath sounds: Normal breath sounds.   Neurological:      Mental Status: She is alert and oriented to person, place, and time.           RECENT LABS:  Glucose   Date Value Ref Range Status   05/22/2023 98 65 - 99 mg/dL Final     Sodium   Date Value Ref Range Status   05/22/2023 142 136 - 145 mmol/L Final   06/28/2022 143 136 - 145 mmol/L Final     Potassium   Date Value Ref Range Status   05/22/2023 3.6 3.5 - 5.2 mmol/L Final   06/28/2022 3.9 3.5 - 5.1 mmol/L Final     CO2   Date Value Ref Range Status   05/22/2023 28.0 22.0 - 29.0 mmol/L Final     Total CO2   Date Value Ref Range Status   06/28/2022 35 (H) 21 - 31 mmol/L Final     Chloride   Date Value Ref Range Status   05/22/2023 104 98 - 107 mmol/L Final   06/28/2022 103 98 - 107 mmol/L Final     Anion Gap   Date Value Ref Range Status   05/22/2023 10.0 5.0 - 15.0 mmol/L Final     Creatinine   Date Value Ref Range Status   05/22/2023 0.66 0.57 - 1.00 mg/dL Final   06/28/2022 0.6 (L) 0.7 - 1.3 mg/dL Final     BUN   Date Value Ref Range Status   05/22/2023 11 8 - 23 mg/dL Final   06/28/2022 12 7 - 25 mg/dL Final     BUN/Creatinine Ratio   Date Value Ref Range Status   05/22/2023 16.7 7.0 - 25.0 Final     Calcium   Date Value Ref Range Status   05/22/2023 9.0 8.6 - 10.5 mg/dL Final   06/28/2022 9.3 8.6 - 10.3 mg/dL Final     eGFR Non  Amer   Date Value Ref Range Status "   09/14/2021 80 >60 mL/min/1.73 Final     Alkaline Phosphatase   Date Value Ref Range Status   05/22/2023 76 39 - 117 U/L Final   06/28/2022 76 34 - 104 U/L Final     Total Protein   Date Value Ref Range Status   05/22/2023 6.9 6.0 - 8.5 g/dL Final     ALT (SGPT)   Date Value Ref Range Status   05/22/2023 12 1 - 33 U/L Final   06/28/2022 14 7 - 52 U/L Final     AST (SGOT)   Date Value Ref Range Status   05/22/2023 16 1 - 32 U/L Final   06/28/2022 17 13 - 39 U/L Final     Total Bilirubin   Date Value Ref Range Status   05/22/2023 0.4 0.0 - 1.2 mg/dL Final   06/28/2022 0.59 0.3 - 1.0 mg/dL Final     Albumin   Date Value Ref Range Status   05/22/2023 4.0 3.5 - 5.2 g/dL Final   06/28/2022 4.1 3.5 - 5.7 g/dL Final     Globulin   Date Value Ref Range Status   05/22/2023 2.9 gm/dL Final     Lab Results   Component Value Date    WBC 5.48 05/22/2023    HGB 12.8 05/22/2023    HCT 41.6 05/22/2023    MCV 86.7 05/22/2023     (H) 05/22/2023     Lab Results   Component Value Date    NEUTROABS 3.55 05/22/2023    IRON 62 05/22/2023    IRON 50 02/22/2023    IRON 78 12/19/2022    TIBC 367 05/22/2023    TIBC 396 02/22/2023    TIBC 405 12/19/2022    LABIRON 17 (L) 05/22/2023    LABIRON 13 (L) 02/22/2023    LABIRON 19 (L) 12/19/2022    FERRITIN 73.82 05/22/2023    FERRITIN 111.30 02/22/2023    FERRITIN 169.70 (H) 12/19/2022    FQQTHUGX21 671 05/22/2023    OTPVFBFF16 813 02/22/2023    YBCGPRDT83 482 10/10/2022    FOLATE 14.10 05/22/2023    FOLATE >20.00 02/22/2023    FOLATE >20.00 10/10/2022     Lab Results   Component Value Date    CEA 1.55 05/22/2023    REFLABREPO  07/05/2022     [facility]  [address] [place] [address]  Phone: [phone] or [phone]  Fax: [phone]  Jeff Bustamante M.D., Medical Director    PATIENT NAME                                     LABORATORY NO.  1800   SEVEN ANDERSON ABDI                             [id number]  [id number]                                  "AGE                    SEX   N              CLIENT REF #  [facility]                   76        1945      F     [id number]      [id number]    [place]                               REQUESTING M.D.           ATTENDING MVIRGEN.         COPY TO.  [address] [place] [address]                         [place] [address]                     DATE COLLECTED            DATE RECEIVED          DATE REPORTED  2022    DIAGNOSIS:  DESCENDING COLON POLYPS:  Tubular adenoma    JBS/pah    CLINICAL HISTORY:  Abnormal tumor markers    SPECIMENS RECEIVED:  DESCENDING COLON POLYPS    MICROSCOPIC DESCRIPTION:  Tissue blocks are prepared and slides are examined microscopically on all  specimens. See diagnosis for details.    Professional interpretation rendered by Rupert Steen M.D. at [facility], [address] [place] [address] , [place] [address].    GROSS DESCRIPTION:  Specimen is received in 1 formalin filled container \"large intestine,  left/descending colon polyps\" consists of 3 pieces of tan soft tissue measuring  0.4 x 0.3 x 0.2 cm.  Specimen is submitted entirely in 1 cassette.  MM    REVIEWED, DIAGNOSED AND ELECTRONICALLY  SIGNED BY:    Rupert Steen M.D.  CPT CODES:  03466           PATHOLOGY:  * Cannot find OR log *         RADIOLOGY DATA :  CT Abdomen Pelvis With Contrast    Result Date: 2023  No evidence of recurrent or residual disease.    US Venous Doppler Upper Extremity Right (duplex)    Result Date: 2023  1. Normal duplex ultrasound of the right upper extremity without evidence of DVT. 2.  Right cephalic vein cannot be seen.           Assessment & Plan     1.  Adenocarcinoma of sigmoid colon with recurrence  - Review oncology history for prior treatment details  - Patient completed adjuvant 5-FU and leucovorin on 2023  - Recent CEA was 1.55.  - CT of chest abdomen and pelvis with " contrast done on May 22, 2023 showed no evidence of recurrence which was discussed with patient and family  - We will continue with clinical surveillance for now  - Patient will return to clinic in 3 weeks with repeat CBC, CMP, CEA, iron studies, ferritin, B12 and folate to be done prior to that    2.  Oxaliplatin induced neuropathy  - Remains on gabapentin 100 mg 3 times a day    3.  Hypokalemia  - Potassium is 3.6.  Recommend discontinuing potassium supplement for now.    4.  Right upper extremity DVT  - Patient has been on Xarelto since September 28, 2022.  Recent Doppler ultrasound shows resolution of DVT.  Recommend discontinuing Xarelto.  - Recommend port removal.  Patient has appointment with Dr. Gonzalez in [place] on Friday this week.  She was encouraged to get port removed as soon as possible.    5.  Anemia:  - Hemoglobin is 12.8.  Iron studies still shows component of iron deficiency with iron saturation of 17% with ferritin of 73.  Recommend continue with ferrous sulfate and B12 p.o. daily.  Prescription for B12 and ferrous sulfate has been sent to patient's pharmacy    6.  Thrombocytosis:  - Reactive in nature  - Platelet count is 154,000.  Will monitor with CBC    7.  Hypertension    8.  Health maintenance: Patient quit smoking in 2020    9.  Advance care planning:  - Patient remains full code                 PHQ-9 Total Score: 0   -Patient is not homicidal or suicidal.  No acute intervention required.    Mary Ann Condon reports a pain score of 0.  Given her pain assessment as noted, treatment options were discussed and the following options were decided upon as a follow-up plan to address the patient's pain: continuation of current treatment plan for pain.         Wil Abraham MD  5/24/2023  09:58 CDT        Part of this note may be an electronic transcription/translation of spoken language to printed text using the Dragon Dictation System.          CC:

## 2023-05-24 ENCOUNTER — OFFICE VISIT (OUTPATIENT)
Dept: ONCOLOGY | Facility: CLINIC | Age: 78
End: 2023-05-24
Payer: MEDICARE

## 2023-05-24 VITALS
TEMPERATURE: 97.8 F | RESPIRATION RATE: 18 BRPM | DIASTOLIC BLOOD PRESSURE: 62 MMHG | OXYGEN SATURATION: 93 % | WEIGHT: 147.8 LBS | SYSTOLIC BLOOD PRESSURE: 130 MMHG | HEART RATE: 64 BPM | BODY MASS INDEX: 27.03 KG/M2

## 2023-05-24 DIAGNOSIS — C19 CANCER OF RECTOSIGMOID (COLON): Primary | Chronic | ICD-10-CM

## 2023-05-24 DIAGNOSIS — T45.1X5A ANEMIA DUE TO ANTINEOPLASTIC CHEMOTHERAPY: Chronic | ICD-10-CM

## 2023-05-24 DIAGNOSIS — G62.0 NEUROPATHY DUE TO CHEMOTHERAPEUTIC DRUG: Chronic | ICD-10-CM

## 2023-05-24 DIAGNOSIS — T45.1X5A NEUROPATHY DUE TO CHEMOTHERAPEUTIC DRUG: Chronic | ICD-10-CM

## 2023-05-24 DIAGNOSIS — D64.81 ANEMIA DUE TO ANTINEOPLASTIC CHEMOTHERAPY: Chronic | ICD-10-CM

## 2023-05-24 DIAGNOSIS — I82.A11 ACUTE DEEP VEIN THROMBOSIS (DVT) OF AXILLARY VEIN OF RIGHT UPPER EXTREMITY: Chronic | ICD-10-CM

## 2023-05-24 DIAGNOSIS — E87.6 HYPOKALEMIA: Chronic | ICD-10-CM

## 2023-05-24 RX ORDER — SERTRALINE HYDROCHLORIDE 25 MG/1
1 TABLET, FILM COATED ORAL DAILY
COMMUNITY
Start: 2023-04-17

## 2023-05-24 RX ORDER — FERROUS SULFATE 325(65) MG
1 TABLET ORAL
Qty: 90 TABLET | Refills: 1 | Status: SHIPPED | OUTPATIENT
Start: 2023-05-24

## 2023-05-24 RX ORDER — LANOLIN ALCOHOL/MO/W.PET/CERES
1000 CREAM (GRAM) TOPICAL DAILY
Qty: 90 TABLET | Refills: 1 | Status: SHIPPED | OUTPATIENT
Start: 2023-05-24

## 2023-06-08 RX ORDER — RIVAROXABAN 20 MG/1
TABLET, FILM COATED ORAL
Qty: 90 TABLET | Refills: 0 | OUTPATIENT
Start: 2023-06-08

## 2023-09-10 NOTE — PROGRESS NOTES
DATE OF VISIT: 9/12/2023      REASON FOR VISIT: Colon cancer with recurrence involving omentum, oxaliplatin induced neuropathy, right upper extremity DVT, iron deficiency, hypokalemia      HISTORY OF PRESENT ILLNESS:   77-year-old female with medical problem consisting of cardiomegaly, rectosigmoid cancer initially diagnosed in December 2017 with recurrence in [place] diagnosed in July 2022 for which patient completed chemotherapy on February 22, 2023 currently on surveillance, hypokalemia, iron deficiency is here for follow-up appointment today.  Denies any bleeding.  Denies any new abdominal pain or change in bowel habits.  Denies any new swelling of upper or lower extremity.  Continues to have neuropathy.                Oncology history:    1.  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  - Patient was started on FOLFOX on December 27, 2017.  Patient received total 12 cycles of FOLFOX between December 27, 2017 until June 13, 2018.  Dose of 5-FU and oxaliplatin was decreased by 20% with cycle 2 due to development of neutropenia.  - In June 2022, patient was found to have rising CEA level with CEA being at 23.  Initial CT scan did not show any evidence of recurrence.  - Patient had a colonoscopy by Dr. Arriola in July 2022 which was negative for any recurrence except for tubular adenoma with.  - PET/CT done on June 20 2022 showed isolated uptake in omental region.  Patient was evaluated by Dr. Packer and underwent omentectomy with solitary lymph node sampling which was negative for metastasis  -Patient received 12 cycles of 5-FU and leucovorin from August 8, 2022 until February 22, 2023.            Past Medical History, Past Surgical History, Social History, Family History have been reviewed and are without significant changes except as mentioned.    Review of Systems   A comprehensive 14 point review of systems was performed and was negative  "except as mentioned in HPI.    Medications:  The current medication list was reviewed in the EMR    ALLERGIES:    Allergies   Allergen Reactions    Codeine Other (See Comments)     \"dont remember\"       Objective      Vitals:    09/11/23 1015   BP: 142/65   Pulse: 67   SpO2: 92%   Weight: 66.2 kg (146 lb)   Height: 157.5 cm (62\")   PainSc: 0-No pain         2/8/2023     9:28 AM   Current Status   ECOG score 0       Physical Exam  Pulmonary:      Breath sounds: Normal breath sounds.   Neurological:      Mental Status: She is alert and oriented to person, place, and time.         RECENT LABS:  Glucose   Date Value Ref Range Status   09/11/2023 93 65 - 99 mg/dL Final     Sodium   Date Value Ref Range Status   09/11/2023 141 136 - 145 mmol/L Final   06/28/2022 143 136 - 145 mmol/L Final     Potassium   Date Value Ref Range Status   09/11/2023 3.3 (L) 3.5 - 5.2 mmol/L Final   06/28/2022 3.9 3.5 - 5.1 mmol/L Final     CO2   Date Value Ref Range Status   09/11/2023 30.0 (H) 22.0 - 29.0 mmol/L Final     Total CO2   Date Value Ref Range Status   06/28/2022 35 (H) 21 - 31 mmol/L Final     Chloride   Date Value Ref Range Status   09/11/2023 102 98 - 107 mmol/L Final   06/28/2022 103 98 - 107 mmol/L Final     Anion Gap   Date Value Ref Range Status   09/11/2023 9.0 5.0 - 15.0 mmol/L Final     Creatinine   Date Value Ref Range Status   09/11/2023 0.69 0.57 - 1.00 mg/dL Final   06/28/2022 0.6 (L) 0.7 - 1.3 mg/dL Final     BUN   Date Value Ref Range Status   09/11/2023 11 8 - 23 mg/dL Final   06/28/2022 12 7 - 25 mg/dL Final     BUN/Creatinine Ratio   Date Value Ref Range Status   09/11/2023 15.9 7.0 - 25.0 Final     Calcium   Date Value Ref Range Status   09/11/2023 9.4 8.6 - 10.5 mg/dL Final   06/28/2022 9.3 8.6 - 10.3 mg/dL Final     eGFR Non  Amer   Date Value Ref Range Status   09/14/2021 80 >60 mL/min/1.73 Final     Alkaline Phosphatase   Date Value Ref Range Status   09/11/2023 94 39 - 117 U/L Final   06/28/2022 76 " 34 - 104 U/L Final     Total Protein   Date Value Ref Range Status   2023 8.1 6.0 - 8.5 g/dL Final     ALT (SGPT)   Date Value Ref Range Status   2023 14 1 - 33 U/L Final   2022 14 7 - 52 U/L Final     AST (SGOT)   Date Value Ref Range Status   2023 21 1 - 32 U/L Final   2022 17 13 - 39 U/L Final     Total Bilirubin   Date Value Ref Range Status   2023 0.8 0.0 - 1.2 mg/dL Final   2022 0.59 0.3 - 1.0 mg/dL Final     Albumin   Date Value Ref Range Status   2023 4.5 3.5 - 5.2 g/dL Final   2022 4.1 3.5 - 5.7 g/dL Final     Globulin   Date Value Ref Range Status   2023 3.6 gm/dL Final     Lab Results   Component Value Date    WBC 7.04 2023    HGB 14.3 2023    HCT 45.4 2023    MCV 83.2 2023     2023     Lab Results   Component Value Date    NEUTROABS 5.19 2023    IRON 97 2023    IRON 62 2023    IRON 50 2023    TIBC 428 2023    TIBC 367 2023    TIBC 396 2023    LABIRON 23 2023    LABIRON 17 (L) 2023    LABIRON 13 (L) 2023    FERRITIN 137.40 2023    FERRITIN 73.82 2023    FERRITIN 111.30 2023    FSEPWXVP27 801 2023    WGRXZXOI83 671 2023    IWUOIWCY14 813 2023    FOLATE 15.70 2023    FOLATE 14.10 2023    FOLATE >20.00 2023     Lab Results   Component Value Date    CEA 2.46 2023    REFLABREPO  2022     [facility]  [address] [place] [address]  Phone: [phone] or [phone]  Fax: [phone]  Jeff Bustamante M.D., Medical Director    PATIENT NAME                                     LABORATORY NO.  1800   SEVEN ANDERSON.                             [id number]  [id number]                                 AGE                    SEX   SSN              CLIENT REF #  [facility]                   76        1945      F     [id number]       [id number]    [facility]                               REQUESTING M.D.           ATTENDING MANAYELI         COPY TO.  [address] [place] [address]                         [place] [address]                     DATE COLLECTED            DATE RECEIVED          DATE REPORTED  07/05/2022 07/05/2022 07/07/2022    DIAGNOSIS:  DESCENDING COLON POLYPS:  Tubular adenoma    JBS/pah    CLINICAL HISTORY:  Abnormal tumor markers    SPECIMENS RECEIVED:  DESCENDING COLON POLYPS    MICROSCOPIC DESCRIPTION:  Tissue blocks are prepared and slides are examined microscopically on all  specimens. See diagnosis for details.    Professional interpretation rendered by Rupert Steen M.D. at [facility], [address] [place] [address] , [place] [address].    GROSS DESCRIPTION:  Specimen is received in 1 formalin filled container \"large intestine,  left/descending colon polyps\" consists of 3 pieces of tan soft tissue measuring  0.4 x 0.3 x 0.2 cm.  Specimen is submitted entirely in 1 cassette.  MM    REVIEWED, DIAGNOSED AND ELECTRONICALLY  SIGNED BY:    Rupert Steen M.D.  CPT CODES:  71401           PATHOLOGY:  * Cannot find OR log *         RADIOLOGY DATA :  No radiology results for the last 7 days        Assessment & Plan     1.  Adenocarcinoma of sigmoid colon with recurrence:  - Review oncology history for prior treatment details  - Patient completed adjuvant 5-FU and leucovorin on February 22, 2023  - CEA level today is normal at 2.46  - We will continue with clinical surveillance for now  - Patient will return to clinic in 3 months with repeat CBC, CMP, CEA, iron studies, ferritin, B12, folate and CT of chest abdomen and pelvis with contrast to be done prior to that.    2.  Right upper extremity DVT  - Patient received Xarelto between September 28, 2022 and May 24, 2023  - After Doppler ultrasound returning negative port has been removed.  We will continue with clinical " surveillance.    3.  Anemia:  - Hemoglobin is 14.1  - Patient is currently on ferrous sulfate and B12 p.o. daily.    4.  Oxaliplatin induced neuropathy  - Remains on gabapentin 100 mg 3 times a day    5.  Thrombocytosis:  - Reactive in nature  - Platelet count is 436,000.  We will monitor with CBC    6.  Hypertension    7.  Health maintenance: Patient quit smoking in 2020    8.  Advance care planning:  - Patient remains full code.      9.  Hypokalemia:  - Patient is taking potassium 20 mEq p.o. daily.  Recommend taking additional 20 mg of potassium today in view of potential 93.3.               PHQ-9 Total Score: 0   -Patient is not homicidal or suicidal.  No acute intervention required.     Mary Ann Condon reports a pain score of 0.  Given her pain assessment as noted, treatment options were discussed and the following options were decided upon as a follow-up plan to address the patient's pain: continuation of current treatment plan for pain.         Wil Abraham MD  9/12/2023  07:00 CDT        Part of this note may be an electronic transcription/translation of spoken language to printed text using the Dragon Dictation System.          CC:

## 2023-09-11 ENCOUNTER — INFUSION (OUTPATIENT)
Dept: ONCOLOGY | Facility: HOSPITAL | Age: 78
End: 2023-09-11
Payer: MEDICARE

## 2023-09-11 ENCOUNTER — OFFICE VISIT (OUTPATIENT)
Dept: ONCOLOGY | Facility: CLINIC | Age: 78
End: 2023-09-11
Payer: MEDICARE

## 2023-09-11 VITALS
OXYGEN SATURATION: 92 % | SYSTOLIC BLOOD PRESSURE: 142 MMHG | WEIGHT: 146 LBS | DIASTOLIC BLOOD PRESSURE: 65 MMHG | HEIGHT: 62 IN | HEART RATE: 67 BPM | BODY MASS INDEX: 26.87 KG/M2

## 2023-09-11 DIAGNOSIS — D64.81 ANEMIA DUE TO ANTINEOPLASTIC CHEMOTHERAPY: Chronic | ICD-10-CM

## 2023-09-11 DIAGNOSIS — I82.A11 ACUTE DEEP VEIN THROMBOSIS (DVT) OF AXILLARY VEIN OF RIGHT UPPER EXTREMITY: ICD-10-CM

## 2023-09-11 DIAGNOSIS — G62.0 NEUROPATHY DUE TO CHEMOTHERAPEUTIC DRUG: Chronic | ICD-10-CM

## 2023-09-11 DIAGNOSIS — T45.1X5A ANEMIA DUE TO ANTINEOPLASTIC CHEMOTHERAPY: ICD-10-CM

## 2023-09-11 DIAGNOSIS — E87.6 HYPOKALEMIA: ICD-10-CM

## 2023-09-11 DIAGNOSIS — C18.9 LOCAL RECURRENCE OF COLON CANCER: Chronic | ICD-10-CM

## 2023-09-11 DIAGNOSIS — T45.1X5A ANEMIA DUE TO ANTINEOPLASTIC CHEMOTHERAPY: Chronic | ICD-10-CM

## 2023-09-11 DIAGNOSIS — T45.1X5A NEUROPATHY DUE TO CHEMOTHERAPEUTIC DRUG: ICD-10-CM

## 2023-09-11 DIAGNOSIS — T45.1X5A NEUROPATHY DUE TO CHEMOTHERAPEUTIC DRUG: Chronic | ICD-10-CM

## 2023-09-11 DIAGNOSIS — C19 CANCER OF RECTOSIGMOID (COLON): Primary | Chronic | ICD-10-CM

## 2023-09-11 DIAGNOSIS — D64.81 ANEMIA DUE TO ANTINEOPLASTIC CHEMOTHERAPY: ICD-10-CM

## 2023-09-11 DIAGNOSIS — Z86.718 HISTORY OF DVT (DEEP VEIN THROMBOSIS): ICD-10-CM

## 2023-09-11 DIAGNOSIS — G62.0 NEUROPATHY DUE TO CHEMOTHERAPEUTIC DRUG: ICD-10-CM

## 2023-09-11 DIAGNOSIS — C19 CANCER OF RECTOSIGMOID (COLON): ICD-10-CM

## 2023-09-11 LAB
ALBUMIN SERPL-MCNC: 4.5 G/DL (ref 3.5–5.2)
ALBUMIN/GLOB SERPL: 1.3 G/DL
ALP SERPL-CCNC: 94 U/L (ref 39–117)
ALT SERPL W P-5'-P-CCNC: 14 U/L (ref 1–33)
ANION GAP SERPL CALCULATED.3IONS-SCNC: 9 MMOL/L (ref 5–15)
AST SERPL-CCNC: 21 U/L (ref 1–32)
BASOPHILS # BLD AUTO: 0.04 10*3/MM3 (ref 0–0.2)
BASOPHILS NFR BLD AUTO: 0.6 % (ref 0–1.5)
BILIRUB SERPL-MCNC: 0.8 MG/DL (ref 0–1.2)
BUN SERPL-MCNC: 11 MG/DL (ref 8–23)
BUN/CREAT SERPL: 15.9 (ref 7–25)
CALCIUM SPEC-SCNC: 9.4 MG/DL (ref 8.6–10.5)
CEA SERPL-MCNC: 2.46 NG/ML
CHLORIDE SERPL-SCNC: 102 MMOL/L (ref 98–107)
CO2 SERPL-SCNC: 30 MMOL/L (ref 22–29)
CREAT SERPL-MCNC: 0.69 MG/DL (ref 0.57–1)
DEPRECATED RDW RBC AUTO: 46.8 FL (ref 37–54)
EGFRCR SERPLBLD CKD-EPI 2021: 89.5 ML/MIN/1.73
EOSINOPHIL # BLD AUTO: 0.1 10*3/MM3 (ref 0–0.4)
EOSINOPHIL NFR BLD AUTO: 1.4 % (ref 0.3–6.2)
ERYTHROCYTE [DISTWIDTH] IN BLOOD BY AUTOMATED COUNT: 15.6 % (ref 12.3–15.4)
FERRITIN SERPL-MCNC: 137.4 NG/ML (ref 13–150)
FOLATE SERPL-MCNC: 15.7 NG/ML (ref 4.78–24.2)
GLOBULIN UR ELPH-MCNC: 3.6 GM/DL
GLUCOSE SERPL-MCNC: 93 MG/DL (ref 65–99)
HCT VFR BLD AUTO: 45.4 % (ref 34–46.6)
HGB BLD-MCNC: 14.3 G/DL (ref 12–15.9)
IMM GRANULOCYTES # BLD AUTO: 0.03 10*3/MM3 (ref 0–0.05)
IMM GRANULOCYTES NFR BLD AUTO: 0.4 % (ref 0–0.5)
IRON 24H UR-MRATE: 97 MCG/DL (ref 37–145)
IRON SATN MFR SERPL: 23 % (ref 20–50)
LYMPHOCYTES # BLD AUTO: 1.13 10*3/MM3 (ref 0.7–3.1)
LYMPHOCYTES NFR BLD AUTO: 16.1 % (ref 19.6–45.3)
MCH RBC QN AUTO: 26.2 PG (ref 26.6–33)
MCHC RBC AUTO-ENTMCNC: 31.5 G/DL (ref 31.5–35.7)
MCV RBC AUTO: 83.2 FL (ref 79–97)
MONOCYTES # BLD AUTO: 0.55 10*3/MM3 (ref 0.1–0.9)
MONOCYTES NFR BLD AUTO: 7.8 % (ref 5–12)
NEUTROPHILS NFR BLD AUTO: 5.19 10*3/MM3 (ref 1.7–7)
NEUTROPHILS NFR BLD AUTO: 73.7 % (ref 42.7–76)
NRBC BLD AUTO-RTO: 0 /100 WBC (ref 0–0.2)
PLATELET # BLD AUTO: 436 10*3/MM3 (ref 140–450)
PMV BLD AUTO: 9.8 FL (ref 6–12)
POTASSIUM SERPL-SCNC: 3.3 MMOL/L (ref 3.5–5.2)
PROT SERPL-MCNC: 8.1 G/DL (ref 6–8.5)
RBC # BLD AUTO: 5.46 10*6/MM3 (ref 3.77–5.28)
SODIUM SERPL-SCNC: 141 MMOL/L (ref 136–145)
TIBC SERPL-MCNC: 428 MCG/DL (ref 298–536)
TRANSFERRIN SERPL-MCNC: 287 MG/DL (ref 200–360)
VIT B12 BLD-MCNC: 801 PG/ML (ref 211–946)
WBC NRBC COR # BLD: 7.04 10*3/MM3 (ref 3.4–10.8)

## 2023-09-11 PROCEDURE — 83540 ASSAY OF IRON: CPT

## 2023-09-11 PROCEDURE — 80053 COMPREHEN METABOLIC PANEL: CPT

## 2023-09-11 PROCEDURE — 82728 ASSAY OF FERRITIN: CPT

## 2023-09-11 PROCEDURE — 82607 VITAMIN B-12: CPT

## 2023-09-11 PROCEDURE — 82746 ASSAY OF FOLIC ACID SERUM: CPT

## 2023-09-11 PROCEDURE — 82378 CARCINOEMBRYONIC ANTIGEN: CPT

## 2023-09-11 PROCEDURE — G0463 HOSPITAL OUTPT CLINIC VISIT: HCPCS | Performed by: INTERNAL MEDICINE

## 2023-09-11 PROCEDURE — 85025 COMPLETE CBC W/AUTO DIFF WBC: CPT

## 2023-09-11 PROCEDURE — 84466 ASSAY OF TRANSFERRIN: CPT

## 2023-09-12 ENCOUNTER — TELEPHONE (OUTPATIENT)
Dept: ONCOLOGY | Facility: HOSPITAL | Age: 78
End: 2023-09-12
Payer: MEDICARE

## 2023-09-12 NOTE — TELEPHONE ENCOUNTER
----- Message from Wil Abraham MD sent at 9/12/2023  7:01 AM CDT -----  Please let patient know, her CEA level was normal at 2.46.  Recommend continue with iron tablet and B12 p.o. daily.  No need for any folate replacement at present.  Thank you

## 2025-06-30 NOTE — TELEPHONE ENCOUNTER
Rx Refill Note  Requested Prescriptions     Pending Prescriptions Disp Refills    Xarelto 20 MG tablet [Pharmacy Med Name: XARELTO 20 MG TABLET] 90 tablet 0     Sig: TAKE 1 TABLET BY MOUTH EVERY DAY      Last office visit with prescribing clinician: 5/24/2023   Last telemedicine visit with prescribing clinician: Visit date not found   Next office visit with prescribing clinician: 9/11/2023                         Would you like a call back once the refill request has been completed: [] Yes [] No    If the office needs to give you a call back, can they leave a voicemail: [] Yes [] No    Jagdeep Wagoner Rep  06/08/23, 08:20 CDT    
Detail Level: Zone

## (undated) DEVICE — CANN SMPL SOFTECH BIFLO ETCO2 A/M 7FT

## (undated) DEVICE — SINGLE-USE BIOPSY FORCEPS: Brand: RADIAL JAW 4

## (undated) DEVICE — GLV SURG SENSICARE MICRO PF LF 7.5 STRL

## (undated) DEVICE — BITEBLOCK ENDO W/STRAP 60F A/ LF DISP

## (undated) DEVICE — Device

## (undated) DEVICE — SYR LL 3CC

## (undated) DEVICE — MICROSURGICAL INSTRUMENT PROVISC CANNULA 27GA THREADED HUB: Brand: ALCON

## (undated) DEVICE — SOL IRR H2O BTL 1000ML STRL

## (undated) DEVICE — TRAP SXN POLYP QUICKCATCH LF

## (undated) DEVICE — Device: Brand: DISPOSABLE ELECTROSURGICAL SNARE